# Patient Record
Sex: MALE | Race: WHITE | NOT HISPANIC OR LATINO | Employment: OTHER | URBAN - METROPOLITAN AREA
[De-identification: names, ages, dates, MRNs, and addresses within clinical notes are randomized per-mention and may not be internally consistent; named-entity substitution may affect disease eponyms.]

---

## 2017-02-20 ENCOUNTER — ALLSCRIPTS OFFICE VISIT (OUTPATIENT)
Dept: OTHER | Facility: OTHER | Age: 62
End: 2017-02-20

## 2017-05-23 ENCOUNTER — ALLSCRIPTS OFFICE VISIT (OUTPATIENT)
Dept: OTHER | Facility: OTHER | Age: 62
End: 2017-05-23

## 2017-05-23 DIAGNOSIS — N32.81 OVERACTIVE BLADDER: ICD-10-CM

## 2017-05-23 DIAGNOSIS — I48.0 PAROXYSMAL ATRIAL FIBRILLATION (HCC): ICD-10-CM

## 2017-05-23 DIAGNOSIS — I67.89 OTHER CEREBROVASCULAR DISEASE: ICD-10-CM

## 2017-05-23 DIAGNOSIS — I10 ESSENTIAL (PRIMARY) HYPERTENSION: ICD-10-CM

## 2017-06-01 ENCOUNTER — LAB CONVERSION - ENCOUNTER (OUTPATIENT)
Dept: OTHER | Facility: OTHER | Age: 62
End: 2017-06-01

## 2017-06-01 LAB
A/G RATIO (HISTORICAL): 1.7 (CALC) (ref 1–2.5)
ALBUMIN SERPL BCP-MCNC: 4 G/DL (ref 3.6–5.1)
ALP SERPL-CCNC: 58 U/L (ref 40–115)
ALT SERPL W P-5'-P-CCNC: 22 U/L (ref 9–46)
AST SERPL W P-5'-P-CCNC: 21 U/L (ref 10–35)
BACTERIA UR QL AUTO: NORMAL /HPF
BASOPHILS # BLD AUTO: 0.2 %
BASOPHILS # BLD AUTO: 9 CELLS/UL (ref 0–200)
BILIRUB SERPL-MCNC: 0.7 MG/DL (ref 0.2–1.2)
BILIRUB UR QL STRIP: NEGATIVE
BUN SERPL-MCNC: 16 MG/DL (ref 7–25)
BUN/CREA RATIO (HISTORICAL): ABNORMAL (CALC) (ref 6–22)
CALCIUM SERPL-MCNC: 9.1 MG/DL (ref 8.6–10.3)
CHLORIDE SERPL-SCNC: 105 MMOL/L (ref 98–110)
CHOLEST SERPL-MCNC: 132 MG/DL (ref 125–200)
CHOLEST/HDLC SERPL: 2.5 (CALC)
CO2 SERPL-SCNC: 33 MMOL/L (ref 20–31)
COLOR UR: YELLOW
COMMENT (HISTORICAL): CLEAR
CREAT SERPL-MCNC: 1.25 MG/DL (ref 0.7–1.25)
CULTURE RESULT (HISTORICAL): NORMAL
DEPRECATED RDW RBC AUTO: 13.7 % (ref 11–15)
EGFR AFRICAN AMERICAN (HISTORICAL): 71 ML/MIN/1.73M2
EGFR-AMERICAN CALC (HISTORICAL): 61 ML/MIN/1.73M2
EOSINOPHIL # BLD AUTO: 1 %
EOSINOPHIL # BLD AUTO: 46 CELLS/UL (ref 15–500)
FECAL OCCULT BLOOD DIAGNOSTIC (HISTORICAL): NEGATIVE
GAMMA GLOBULIN (HISTORICAL): 2.4 G/DL (CALC) (ref 1.9–3.7)
GLUCOSE (HISTORICAL): 90 MG/DL (ref 65–99)
GLUCOSE (HISTORICAL): NEGATIVE
HCT VFR BLD AUTO: 44.3 % (ref 38.5–50)
HDLC SERPL-MCNC: 53 MG/DL
HGB BLD-MCNC: 15.1 G/DL (ref 13.2–17.1)
HYALINE CASTS #/AREA URNS LPF: NORMAL /LPF
KETONES UR STRIP-MCNC: NEGATIVE MG/DL
LDL CHOLESTEROL (HISTORICAL): 68 MG/DL (CALC)
LEUKOCYTE ESTERASE UR QL STRIP: NEGATIVE
LYMPHOCYTES # BLD AUTO: 1132 CELLS/UL (ref 850–3900)
LYMPHOCYTES # BLD AUTO: 24.6 %
MCH RBC QN AUTO: 30.2 PG (ref 27–33)
MCHC RBC AUTO-ENTMCNC: 34.1 G/DL (ref 32–36)
MCV RBC AUTO: 88.8 FL (ref 80–100)
MONOCYTES # BLD AUTO: 285 CELLS/UL (ref 200–950)
MONOCYTES (HISTORICAL): 6.2 %
NEUTROPHILS # BLD AUTO: 3128 CELLS/UL (ref 1500–7800)
NEUTROPHILS # BLD AUTO: 68 %
NITRITE UR QL STRIP: NEGATIVE
NON-HDL-CHOL (CHOL-HDL) (HISTORICAL): 79 MG/DL (CALC)
PH UR STRIP.AUTO: 7 [PH] (ref 5–8)
PLATELET # BLD AUTO: 226 THOUSAND/UL (ref 140–400)
PMV BLD AUTO: 8 FL (ref 7.5–12.5)
POTASSIUM SERPL-SCNC: 4.7 MMOL/L (ref 3.5–5.3)
PROSTATE SPECIFIC ANTIGEN TOTAL (HISTORICAL): 1.7 NG/ML
PROT UR STRIP-MCNC: NEGATIVE MG/DL
RBC # BLD AUTO: 4.99 MILLION/UL (ref 4.2–5.8)
RBC (HISTORICAL): NORMAL /HPF
SODIUM SERPL-SCNC: 142 MMOL/L (ref 135–146)
SP GR UR STRIP.AUTO: 1.02 (ref 1–1.03)
SQUAMOUS EPITHELIAL CELLS (HISTORICAL): NORMAL /HPF
TOTAL PROTEIN (HISTORICAL): 6.4 G/DL (ref 6.1–8.1)
TRIGL SERPL-MCNC: 56 MG/DL
TSH SERPL DL<=0.05 MIU/L-ACNC: 2.85 MIU/L (ref 0.4–4.5)
WBC # BLD AUTO: 4.6 THOUSAND/UL (ref 3.8–10.8)
WBC # BLD AUTO: NORMAL /HPF

## 2017-07-24 ENCOUNTER — ALLSCRIPTS OFFICE VISIT (OUTPATIENT)
Dept: OTHER | Facility: OTHER | Age: 62
End: 2017-07-24

## 2017-07-24 DIAGNOSIS — I67.89 OTHER CEREBROVASCULAR DISEASE: ICD-10-CM

## 2017-09-20 ENCOUNTER — LAB CONVERSION - ENCOUNTER (OUTPATIENT)
Dept: OTHER | Facility: OTHER | Age: 62
End: 2017-09-20

## 2017-09-20 LAB
A/G RATIO (HISTORICAL): 1.6 (CALC) (ref 1–2.5)
ALBUMIN SERPL BCP-MCNC: 3.9 G/DL (ref 3.6–5.1)
ALP SERPL-CCNC: 55 U/L (ref 40–115)
ALT SERPL W P-5'-P-CCNC: 20 U/L (ref 9–46)
AST SERPL W P-5'-P-CCNC: 21 U/L (ref 10–35)
BILIRUB SERPL-MCNC: 0.9 MG/DL (ref 0.2–1.2)
BUN SERPL-MCNC: 16 MG/DL (ref 7–25)
BUN/CREA RATIO (HISTORICAL): NORMAL (CALC) (ref 6–22)
CALCIUM SERPL-MCNC: 9 MG/DL (ref 8.6–10.3)
CHLORIDE SERPL-SCNC: 103 MMOL/L (ref 98–110)
CHOLEST SERPL-MCNC: 136 MG/DL
CHOLEST/HDLC SERPL: 2.3 (CALC)
CK SERPL-CCNC: 66 U/L (ref 44–196)
CO2 SERPL-SCNC: 31 MMOL/L (ref 20–31)
CREAT SERPL-MCNC: 1.12 MG/DL (ref 0.7–1.25)
EGFR AFRICAN AMERICAN (HISTORICAL): 81 ML/MIN/1.73M2
EGFR-AMERICAN CALC (HISTORICAL): 70 ML/MIN/1.73M2
GAMMA GLOBULIN (HISTORICAL): 2.5 G/DL (CALC) (ref 1.9–3.7)
GLUCOSE (HISTORICAL): 79 MG/DL (ref 65–99)
HDLC SERPL-MCNC: 59 MG/DL
LDL CHOLESTEROL (HISTORICAL): 64 MG/DL (CALC)
NON-HDL-CHOL (CHOL-HDL) (HISTORICAL): 77 MG/DL (CALC)
POTASSIUM SERPL-SCNC: 4.3 MMOL/L (ref 3.5–5.3)
SODIUM SERPL-SCNC: 141 MMOL/L (ref 135–146)
TOTAL PROTEIN (HISTORICAL): 6.4 G/DL (ref 6.1–8.1)
TRIGL SERPL-MCNC: 54 MG/DL

## 2017-10-03 ENCOUNTER — ALLSCRIPTS OFFICE VISIT (OUTPATIENT)
Dept: OTHER | Facility: OTHER | Age: 62
End: 2017-10-03

## 2017-10-03 LAB
BILIRUB UR QL STRIP: NORMAL
CLARITY UR: NORMAL
COLOR UR: YELLOW
GLUCOSE (HISTORICAL): NORMAL
HGB UR QL STRIP.AUTO: NORMAL
KETONES UR STRIP-MCNC: NORMAL MG/DL
LEUKOCYTE ESTERASE UR QL STRIP: NORMAL
NITRITE UR QL STRIP: NORMAL
PH UR STRIP.AUTO: 6 [PH]
PROT UR STRIP-MCNC: NORMAL MG/DL
SP GR UR STRIP.AUTO: 1.01
UROBILINOGEN UR QL STRIP.AUTO: NORMAL

## 2017-10-04 NOTE — PROGRESS NOTES
Assessment  1  Special screening examination for neoplasm of prostate (V76 44) (Z12 5)   2  OAB (overactive bladder) (596 51) (N32 81)    Plan   OAB (overactive bladder)    · (1) PSA, DIAGNOSTIC (FOLLOW-UP); Status:Active; Requested [facility]:[id number]; Perform:[facility]; IXR:[id number];VFLQZIC;IGT:DEEDEE (overactive bladder); Ordered By:Raghu Jeffrey;  OAB (overactive bladder), Special screening examination for neoplasm of prostate    · Urine Dip Non-Automated- POC; Status:Complete - Retrospective By Protocol  Authorization;   Done: 95RXI3484 08:44AM   Performed: In Office; [phone]; Last Updated By:Jairo Crook; 10/3/2017 8:45:02 AM;Ordered;For:OAB (overactive bladder), Special screening examination for neoplasm of prostate; Ordered By:Raghu Jeffrey;    Follow-up visit in 1 year Evaluation and Treatment  Follow-up  Status: Hold For - Scheduling  Requested for: 31NIC2108  Ordered; For: OAB (overactive bladder); Ordered By: Shukri Ruiz  Performed:   Due: 20YBU6792     Discussion/Summary  Discussion Summary:   Patient continues to do well with stable urinary pattern  Dietary recommendations provided to help minimize bladder irritants  He will otherwise return in 1 year with PSA prior to visit  All questions answered at this time  Chief Complaint  Chief Complaint Free Text Note Form: Patient presents for over active bladder, prostate cancer screen  1 5      History of Present Illness  HPI: Suzy Smith is seen for prostate cancer screening, and lower urinary tract symptoms  He has been undergoing routine prostate cancer screening by another urologist since the age 47  His PSAs have all been normal  He does have a familial history of prostate cancer in his maternal grandfather who was diagnosed when he was 76years of age  Patient also has had bothersome lower urinary tract symptoms including urinary urgency  This happens only occasionally  He was previously trialed on tamsulosin   He discontinued this due to orthostatic hypotension  PSA is 1 7  Review of Systems  Complete-Male Urology:   Constitutional: No fever or chills, feels well, no tiredness, no recent weight gain or weight loss  Respiratory: No complaints of shortness of breath, no wheezing, no cough, no SOB on exertion, no orthopnea or PND  Cardiovascular: No complaints of slow heart rate, no fast heart rate, no chest pain, no palpitations, no leg claudication, no lower extremity  Gastrointestinal: No complaints of abdominal pain, no constipation, no nausea or vomiting, no diarrhea or bloody stools  Genitourinary: Empty sensation,-feelings of urinary urgency-and-stream quality good, but-as noted in HPI,-no dysuria,-no urinary hesitancy,-no hematuria-and-no incontinence-   The patient presents with complaints of nocturia (2 times )  Musculoskeletal: No complaints of arthralgia, no myalgias, no joint swelling or stiffness, no limb pain or swelling  Integumentary: No complaints of skin rash or skin lesions, no itching, no skin wound, no dry skin  Hematologic/Lymphatic: No complaints of swollen glands, no swollen glands in the neck, does not bleed easily, no easy bruising  Neurological: No compliants of headache, no confusion, no convulsions, no numbness or tingling, no dizziness or fainting, no limb weakness, no difficulty walking  Active Problems  1  Cerebrovascular disease, ill-defined, acute (436) (I67 89)   2  Colon cancer screening (V76 51) (Z12 11)   3  Dermatitis (692 9) (L30 9)   4  Hypertension (401 9) (I10)   5  Nasal congestion (478 19) (R09 81)   6  Need for immunization against influenza (V04 81) (Z23)   7  OAB (overactive bladder) (596 51) (N32 81)   8  Paroxysmal atrial fibrillation (427 31) (I48 0)   9  Sinusitis, acute (461 9) (J01 90)   10  Special screening examination for neoplasm of prostate (V76 44) (Z12 5)   11  Subconjunctival hemorrhage of right eye (372 72) (H11 31)    Past Medical History  1   Acute conjunctivitis (372 00) (H10 30)   2  Acute serous otitis media, unspecified laterality   3  History of pneumonia (V12 61) (Z87 01)   4  History of stroke (V12 54) (Z86 73)   5  History of upper respiratory infection (V12 09) (Z87 09)   6  History of Supraventricular tachycardia (427 89) (I47 1)  Active Problems And Past Medical History Reviewed: The active problems and past medical history were reviewed and updated today  Surgical History  1  History of Appendectomy   2  History of Colonoscopy (Fiberoptic) Screening   3  History of Hernia Repair   4  History of Knee Surgery  Surgical History Reviewed: The surgical history was reviewed and updated today  Family History  Father    1  Family history of Cancer   2  Family history of Hypertension (V17 49)   3  Family history of Stroke Syndrome (V17 1)  Family History    4  Family history of Cancer   5  Family history of Heart Disease (V17 49)   6  Family history of Hypertension (V17 49)   7  Family history of Stroke Syndrome (V17 1)  Family History Reviewed: The family history was reviewed and updated today  Social History   · Being A Social Drinker   · Denied: History of Drug Use   · Never a smoker   · Never A Smoker  Social History Reviewed: The social history was reviewed and updated today  Current Meds   1  Atorvastatin Calcium 10 MG Oral Tablet; take 0 5 tablet daily; Therapy: 75Ztu1082 to (Evaluate:20Jan2018)  Requested for: 15Btl4712; Last   Rx:34Lvv5438 Ordered   2  Eliquis 5 MG Oral Tablet; take one tablet twice daily; Therapy: 06Dog9435 to (Evaluate:10Nov2017)  Requested for: 47Dby1923; Last   Rx:83Vgt6922 Ordered   3  Metoprolol Succinate ER 50 MG Oral Tablet Extended Release 24 Hour; TAKE 1 TABLET   ONCE DAILY  Requested for: 53Sqw9681; Last Rx:01Uqs3668 Ordered   4  Mometasone Furoate 50 MCG/ACT Nasal Suspension; USE 1 SPRAY IN EACH   NOSTRIL TWICE DAILY;    Therapy: 12UAT1937 to Kenny Adelita)  Requested for: 67DCH9821; Last   Rx:92Fom6724 Ordered   5  Olmesartan Medoxomil-HCTZ 40-25 MG Oral Tablet; TAKE 1 TABLET DAILY; Therapy: 08XNG6584 to (Evaluate:56Lzg1777)  Requested for: 29Fno1466; Last   Rx:19Nnk2857 Ordered  Medication List Reviewed: The medication list was reviewed and updated today  Allergies  1  No Known Drug Allergies    Vitals  Vital Signs    Recorded: 31MRI3166 08:44AM   Heart Rate 60   Systolic 375   Diastolic 76   Height 6 ft 2 in   Weight 217 lb 6 oz   BMI Calculated 27 91   BSA Calculated 2 25     Physical Exam    Constitutional   General appearance: No acute distress, well appearing and well nourished  Pulmonary   Respiratory effort: No increased work of breathing or signs of respiratory distress  Cardiovascular   Examination of extremities for edema and/or varicosities: Normal     Abdomen   Abdomen: Non-tender, no masses  Genitourinary   Anus and perineum: Normal     Scrotum contents: Normal size, no masses  Epididymis: Normal, no masses  Testes: Normal testes, no masses  Urethral meatus: Normal, no lesions  Penis: Normal, no lesions  -25-30 g, smooth, no nodules  Digital rectal exam of seminal vesicles: Normal size, no masses  Anus, perineum, and rectum: Normal     Musculoskeletal   Digits and nails: Normal without clubbing or cyanosis  Skin   Skin and subcutaneous tissue: Normal without rashes or lesions  Lymphatic   Palpation of lymph nodes in groin: Normal        Results/Data  Urine Dip Non-Automated- POC 10PKE4297 08:44AM Jovanna Castro     Test Name Result Flag Reference   Color Yellow     Clarity Transparent     Leukocytes -     Nitrite -     Blood -     Bilirubin -     Urobilinogen -     Protein -     Ph 6 0     Specific Gravity 1 010     Ketone -     Glucose -         Future Appointments    Date/Time Provider Specialty Site   10/04/2018 09:15 AM KENZIE Bautista   [facility]rology [address]  [facility]     Signatures   Electronically signed by : Jorge Das M D ; Oct  3 2017  1:34PM EST                       (Author)

## 2017-12-08 ENCOUNTER — GENERIC CONVERSION - ENCOUNTER (OUTPATIENT)
Dept: OTHER | Facility: OTHER | Age: 62
End: 2017-12-08

## 2018-01-11 NOTE — PROGRESS NOTES
Assessment    1  Encounter for preventive health examination (V70 0) (Z00 00)    Plan  Cerebrovascular disease, ill-defined, acute, Dermatitis, Health Maintenance,  Hypertension    · Follow Up if Not Better Evaluation and Treatment  Follow-up  Status: Complete  Done:  93MIZ1621 09:42AM    Chief Complaint  PT PRESENTS FOR A YEARLY WELL VISIT  PT REPORTS DOING WELL AND HAVING NO ISSUES  PT IS Requesting YEARLY BW  PT IS DUE FOR A FLU IMMIN WHICH HE DOSE NOT WANT TO HAVE DONE  History of Present Illness  HPI: Patient is here for wellness exam  Patient doing well without complaints  Patient already had flu shot  Review of Systems    Constitutional: No fever or chills, feels well, no tiredness, no recent weight gain or weight loss  Eyes: No complaints of eye pain, no red eyes, no discharge from eyes, no itchy eyes  ENT: no complaints of earache, no hearing loss, no nosebleeds, no nasal discharge, no sore throat, no hoarseness  Cardiovascular: No complaints of slow heart rate, no fast heart rate, no chest pain, no palpitations, no leg claudication, no lower extremity  Respiratory: No complaints of shortness of breath, no wheezing, no cough, no SOB on exertion, no orthopnea or PND  Gastrointestinal: No complaints of abdominal pain, no constipation, no nausea or vomiting, no diarrhea or bloody stools  Genitourinary: No complaints of dysuria, no incontinence, no hesitancy, no nocturia, no genital lesion, no testicular pain  Musculoskeletal: No complaints of arthralgia, no myalgias, no joint swelling or stiffness, no limb pain or swelling  Integumentary: No complaints of skin rash or skin lesions, no itching, no skin wound, no dry skin  Neurological: No compliants of headache, no confusion, no convulsions, no numbness or tingling, no dizziness or fainting, no limb weakness, no difficulty walking     Psychiatric: Is not suicidal, no sleep disturbances, no anxiety or depression, no change in personality, no emotional problems  Endocrine: No complaints of proptosis, no hot flashes, no muscle weakness, no erectile dysfunction, no deepening of the voice, no feelings of weakness  Hematologic/Lymphatic: No complaints of swollen glands, no swollen glands in the neck, does not bleed easily, no easy bruising  Active Problems    1  Cerebrovascular disease, ill-defined, acute (436) (I67 89)   2  Dermatitis (692 9) (L30 9)   3  Hypertension (401 9) (I10)   4  Nasal congestion (478 19) (R09 81)   5  Need for immunization against influenza (V04 81) (Z23)   6  OAB (overactive bladder) (596 51) (N32 81)   7  Paroxysmal atrial fibrillation (427 31) (I48 0)   8  Sinusitis, acute (461 9) (J01 90)   9  Special screening examination for neoplasm of prostate (V76 44) (Z12 5)   10  Subconjunctival hemorrhage of right eye (372 72) (H11 31)    Past Medical History    · Acute conjunctivitis (372 00) (H10 30)   · Acute serous otitis media, unspecified laterality   · History of pneumonia (V12 61) (Z87 01)   · History of stroke (V12 54) (Z86 73)   · History of upper respiratory infection (V12 09) (Z87 09)   · History of Supraventricular tachycardia (427 89) (I47 1)    Surgical History    · History of Appendectomy   · History of Colonoscopy (Fiberoptic) Screening   · History of Hernia Repair   · History of Knee Surgery    Family History  Father    · Family history of Cancer   · Family history of Hypertension (V17 49)   · Family history of Stroke Syndrome (V17 1)  Family History    · Family history of Cancer   · Family history of Heart Disease (V17 49)   · Family history of Hypertension (V17 49)   · Family history of Stroke Syndrome (V17 1)    Social History    · Being A Social Drinker   · Denied: History of Drug Use   · Never A Smoker    Current Meds   1  Atorvastatin Calcium 10 MG Oral Tablet; TAKE 1 TABLET DAILY; Therapy: 25Apr2014 to (Evaluate:72Kiv9583)  Requested for: 26XNF3682; Last   Rx:30Jun2016 Ordered   2  Benicar HCT 40-25 MG Oral Tablet; TAKE 1 TABLET DAILY; Therapy: 88GGM2637 to (Evaluate:18Apr2017)  Requested for: 37XPZ5477; Last   Rx:42Oxr4451 Ordered   3  Eliquis 5 MG Oral Tablet; take one tablet twice daily; Therapy: 69Syr2099 to (Evaluate:25Nov2016)  Requested for: 48ILE7244; Last   Rx:29Tvv1510 Ordered   4  Metoprolol Succinate ER 50 MG Oral Tablet Extended Release 24 Hour; TAKE 1 TABLET   ONCE DAILY  Requested for: 28JCI4660; Last Rx:92Txk6017 Ordered   5  Mometasone Furoate 50 MCG/ACT Nasal Suspension; USE 1 SPRAY IN EACH   NOSTRIL TWICE DAILY; Therapy: 29JMZ7046 to (Evaluate:15Oct2017)  Requested for: 20YCP6864; Last   Rx:20Oct2016 Ordered    Allergies    1  No Known Drug Allergies    Vitals   Recorded: 45ZEF9117 37:04UT   Systolic 590, LUE, Sitting   Diastolic 74, LUE, Sitting   Height 6 ft 1 in   Weight 217 lb    BMI Calculated 28 63   BSA Calculated 2 23     Physical Exam    Constitutional   General appearance: No acute distress, well appearing and well nourished  Eyes   Conjunctiva and lids: No swelling, erythema, or discharge  Pupils and irises: Equal, round and reactive to light  Ears, Nose, Mouth, and Throat   External inspection of ears and nose: Normal     Otoscopic examination: Tympanic membrance translucent with normal light reflex  Canals patent without erythema  Oropharynx: Normal with no erythema, edema, exudate or lesions  Pulmonary   Respiratory effort: No increased work of breathing or signs of respiratory distress  Auscultation of lungs: Clear to auscultation  Cardiovascular   Palpation of heart: Normal PMI, no thrills  Auscultation of heart: Normal rate and rhythm, normal S1 and S2, without murmurs  Examination of extremities for edema and/or varicosities: Normal     Abdomen   Abdomen: Non-tender, no masses  Liver and spleen: No hepatomegaly or splenomegaly  Lymphatic   Palpation of lymph nodes in neck: No lymphadenopathy      Musculoskeletal   Gait and station: Normal     Digits and nails: Normal without clubbing or cyanosis  Inspection/palpation of joints, bones, and muscles: Normal     Skin   Skin and subcutaneous tissue: Normal without rashes or lesions  Neurologic   Cranial nerves: Cranial nerves 2-12 intact  Reflexes: 2+ and symmetric  Sensation: No sensory loss      Psychiatric   Orientation to person, place and time: Normal     Mood and affect: Normal        Future Appointments    Date/Time Provider Specialty Site   02/23/2017 09:00 AM Virgie Madrigal DO Family Medicine  [facility]     Signatures   Electronically signed by : Gucci Sam DO; Nov 30 2016  9:42AM EST                       (Author)

## 2018-01-12 VITALS
HEIGHT: 74 IN | DIASTOLIC BLOOD PRESSURE: 68 MMHG | WEIGHT: 215.38 LBS | BODY MASS INDEX: 27.64 KG/M2 | SYSTOLIC BLOOD PRESSURE: 126 MMHG

## 2018-01-13 VITALS
HEART RATE: 56 BPM | BODY MASS INDEX: 27.85 KG/M2 | HEIGHT: 74 IN | SYSTOLIC BLOOD PRESSURE: 120 MMHG | DIASTOLIC BLOOD PRESSURE: 76 MMHG | WEIGHT: 217 LBS

## 2018-01-13 VITALS
SYSTOLIC BLOOD PRESSURE: 112 MMHG | TEMPERATURE: 98.3 F | WEIGHT: 210.25 LBS | BODY MASS INDEX: 39.7 KG/M2 | HEIGHT: 61 IN | DIASTOLIC BLOOD PRESSURE: 60 MMHG

## 2018-01-13 VITALS
HEIGHT: 74 IN | DIASTOLIC BLOOD PRESSURE: 76 MMHG | BODY MASS INDEX: 27.9 KG/M2 | HEART RATE: 60 BPM | WEIGHT: 217.38 LBS | SYSTOLIC BLOOD PRESSURE: 122 MMHG

## 2018-01-24 VITALS
BODY MASS INDEX: 27.72 KG/M2 | SYSTOLIC BLOOD PRESSURE: 120 MMHG | TEMPERATURE: 98 F | HEIGHT: 74 IN | WEIGHT: 216 LBS | DIASTOLIC BLOOD PRESSURE: 74 MMHG

## 2018-03-15 ENCOUNTER — OFFICE VISIT (OUTPATIENT)
Dept: FAMILY MEDICINE CLINIC | Facility: CLINIC | Age: 63
End: 2018-03-15
Payer: COMMERCIAL

## 2018-03-15 VITALS
SYSTOLIC BLOOD PRESSURE: 130 MMHG | DIASTOLIC BLOOD PRESSURE: 80 MMHG | HEIGHT: 60 IN | WEIGHT: 218.6 LBS | BODY MASS INDEX: 42.92 KG/M2

## 2018-03-15 DIAGNOSIS — I10 ESSENTIAL HYPERTENSION: ICD-10-CM

## 2018-03-15 DIAGNOSIS — I48.0 PAROXYSMAL ATRIAL FIBRILLATION (HCC): ICD-10-CM

## 2018-03-15 DIAGNOSIS — I67.89 CEREBROVASCULAR DISEASE, ILL-DEFINED, ACUTE: Primary | ICD-10-CM

## 2018-03-15 DIAGNOSIS — E78.2 MIXED HYPERLIPIDEMIA: ICD-10-CM

## 2018-03-15 PROCEDURE — 99214 OFFICE O/P EST MOD 30 MIN: CPT | Performed by: FAMILY MEDICINE

## 2018-03-15 RX ORDER — METOPROLOL SUCCINATE 50 MG/1
50 TABLET, EXTENDED RELEASE ORAL DAILY
Qty: 90 TABLET | Refills: 1 | Status: SHIPPED | OUTPATIENT
Start: 2018-03-15 | End: 2018-07-16 | Stop reason: SDUPTHER

## 2018-03-15 RX ORDER — ATORVASTATIN CALCIUM 10 MG/1
10 TABLET, FILM COATED ORAL DAILY
Qty: 90 TABLET | Refills: 1 | Status: SHIPPED | OUTPATIENT
Start: 2018-03-15 | End: 2018-07-16 | Stop reason: SDUPTHER

## 2018-03-15 RX ORDER — METOPROLOL SUCCINATE 50 MG/1
50 TABLET, EXTENDED RELEASE ORAL
COMMUNITY
Start: 2014-05-08 | End: 2018-03-15 | Stop reason: SDUPTHER

## 2018-03-15 RX ORDER — OLMESARTAN MEDOXOMIL 20 MG/1
20 TABLET ORAL
COMMUNITY
End: 2018-03-15 | Stop reason: SDUPTHER

## 2018-03-15 RX ORDER — ATORVASTATIN CALCIUM 10 MG/1
10 TABLET, FILM COATED ORAL
COMMUNITY
Start: 2014-05-08 | End: 2018-03-15 | Stop reason: SDUPTHER

## 2018-03-15 RX ORDER — OLMESARTAN MEDOXOMIL AND HYDROCHLOROTHIAZIDE 40/25 40; 25 MG/1; MG/1
1 TABLET ORAL DAILY
Qty: 90 TABLET | Refills: 1 | Status: SHIPPED | OUTPATIENT
Start: 2018-03-15 | End: 2018-07-16 | Stop reason: SDUPTHER

## 2018-03-15 NOTE — PROGRESS NOTES
Assessment/Plan:   labs reviewed  Patient have laboratory studies at follow-up visit  Patient doing very well overall regard to blood pressure, cholesterol, Afib which patient is now in normal sinus rhythm  No new CVA issues  Refills given on medications  No problem-specific Assessment & Plan notes found for this encounter  Diagnoses and all orders for this visit:    Cerebrovascular disease, ill-defined, acute  -     apixaban (ELIQUIS) 5 mg; Take 1 tablet (5 mg total) by mouth 2 (two) times a day    Essential hypertension  -     olmesartan-hydrochlorothiazide (BENICAR HCT) 40-25 MG per tablet; Take 1 tablet by mouth daily    Mixed hyperlipidemia  -     atorvastatin (LIPITOR) 10 mg tablet; Take 1 tablet (10 mg total) by mouth daily    Paroxysmal atrial fibrillation (HCC)  -     metoprolol succinate (TOPROL-XL) 50 mg 24 hr tablet; Take 1 tablet (50 mg total) by mouth daily    Other orders  -     Discontinue: olmesartan (BENICAR) 20 mg tablet; Take 20 mg by mouth  -     Discontinue: apixaban (ELIQUIS) 5 mg; Take 5 mg by mouth  -     Discontinue: atorvastatin (LIPITOR) 10 mg tablet; Take 10 mg by mouth  -     Discontinue: metoprolol succinate (TOPROL-XL) 50 mg 24 hr tablet; Take 50 mg by mouth          Subjective:      Patient ID: Kirit Rivas is a 60 y o  male  Patient follow-up on CVA, hypertension, hyperlipidemia and Afib  The no chest pain, palpitations or shortness of breath  Patient did get over URI symptoms recently  No difficulty with urination or defecation  No new stroke-like symptoms  No weakness in the upper extremity or lower extremity or numbness  All other review systems negative  Patient will need medications refilled        Medication Refill         The following portions of the patient's history were reviewed and updated as appropriate: allergies, current medications, past family history, past medical history, past social history, past surgical history and problem list     Review of Systems   Constitutional: Negative  HENT: Negative  Eyes: Negative  Respiratory: Negative  Cardiovascular: Negative  Gastrointestinal: Negative  Endocrine: Negative  Genitourinary: Negative  Musculoskeletal: Negative  Skin: Negative  Allergic/Immunologic: Negative  Neurological: Negative  Hematological: Negative  Psychiatric/Behavioral: Negative  Objective:      /80 (BP Location: Left arm, Patient Position: Sitting, Cuff Size: Standard)   Ht 1' 10 56" (0 573 m)   Wt 99 2 kg (218 lb 9 6 oz)    98 kg/m²          Physical Exam   Constitutional: He is oriented to person, place, and time  He appears well-developed and well-nourished  No distress  HENT:   Head: Normocephalic  Right Ear: External ear normal    Left Ear: External ear normal    Mouth/Throat: Oropharynx is clear and moist  No oropharyngeal exudate  Eyes: EOM are normal  Pupils are equal, round, and reactive to light  Right eye exhibits no discharge  Left eye exhibits no discharge  No scleral icterus  Neck: Normal range of motion  Neck supple  No thyromegaly present  Cardiovascular: Normal rate, regular rhythm, normal heart sounds and intact distal pulses  Exam reveals no gallop and no friction rub  No murmur heard  Pulmonary/Chest: Effort normal and breath sounds normal  No respiratory distress  He has no wheezes  He has no rales  He exhibits no tenderness  Abdominal: Soft  Bowel sounds are normal  He exhibits no distension  There is no tenderness  There is no rebound and no guarding  Musculoskeletal: Normal range of motion  He exhibits no edema or tenderness  Lymphadenopathy:     He has no cervical adenopathy  Neurological: He is oriented to person, place, and time  No cranial nerve deficit  He exhibits normal muscle tone  Coordination normal    Skin: Skin is warm and dry  No rash noted  He is not diaphoretic  No erythema  No pallor     Psychiatric: He has a normal mood and affect  His behavior is normal  Judgment and thought content normal    Nursing note and vitals reviewed

## 2018-07-16 ENCOUNTER — OFFICE VISIT (OUTPATIENT)
Dept: FAMILY MEDICINE CLINIC | Facility: CLINIC | Age: 63
End: 2018-07-16
Payer: COMMERCIAL

## 2018-07-16 VITALS
TEMPERATURE: 96.9 F | DIASTOLIC BLOOD PRESSURE: 64 MMHG | HEIGHT: 72 IN | WEIGHT: 221 LBS | BODY MASS INDEX: 29.93 KG/M2 | SYSTOLIC BLOOD PRESSURE: 116 MMHG

## 2018-07-16 DIAGNOSIS — I67.89 CEREBROVASCULAR DISEASE, ILL-DEFINED, ACUTE: ICD-10-CM

## 2018-07-16 DIAGNOSIS — E78.2 MIXED HYPERLIPIDEMIA: ICD-10-CM

## 2018-07-16 DIAGNOSIS — Z12.11 SCREENING FOR COLON CANCER: ICD-10-CM

## 2018-07-16 DIAGNOSIS — I10 ESSENTIAL HYPERTENSION: ICD-10-CM

## 2018-07-16 DIAGNOSIS — M79.674 PAIN OF TOE OF RIGHT FOOT: ICD-10-CM

## 2018-07-16 DIAGNOSIS — I48.0 PAROXYSMAL ATRIAL FIBRILLATION (HCC): Primary | ICD-10-CM

## 2018-07-16 PROCEDURE — 99214 OFFICE O/P EST MOD 30 MIN: CPT | Performed by: FAMILY MEDICINE

## 2018-07-16 RX ORDER — OLMESARTAN MEDOXOMIL AND HYDROCHLOROTHIAZIDE 40/25 40; 25 MG/1; MG/1
1 TABLET ORAL DAILY
Qty: 90 TABLET | Refills: 1 | Status: SHIPPED | OUTPATIENT
Start: 2018-07-16 | End: 2018-11-19 | Stop reason: SDUPTHER

## 2018-07-16 RX ORDER — ATORVASTATIN CALCIUM 10 MG/1
10 TABLET, FILM COATED ORAL DAILY
Qty: 90 TABLET | Refills: 1 | Status: SHIPPED | OUTPATIENT
Start: 2018-07-16 | End: 2018-11-19 | Stop reason: SDUPTHER

## 2018-07-16 RX ORDER — METOPROLOL SUCCINATE 50 MG/1
50 TABLET, EXTENDED RELEASE ORAL DAILY
Qty: 90 TABLET | Refills: 1 | Status: SHIPPED | OUTPATIENT
Start: 2018-07-16 | End: 2018-11-19 | Stop reason: SDUPTHER

## 2018-07-16 NOTE — PROGRESS NOTES
Assessment/Plan:   patient's blood pressure, Afib hyperlipidemia all stable at this time  Labs reviewed  Patient had refills given at this time  Patient may get x-ray of right foot  Patient will use Tylenol  Patient to consider seeing Podiatry  Follow-up in 4 months  Diagnoses and all orders for this visit:    Paroxysmal atrial fibrillation (HCC)  -     metoprolol succinate (TOPROL-XL) 50 mg 24 hr tablet; Take 1 tablet (50 mg total) by mouth daily    Screening for colon cancer  -     Ambulatory referral to Gastroenterology; Future    Essential hypertension  -     olmesartan-hydrochlorothiazide (BENICAR HCT) 40-25 MG per tablet; Take 1 tablet by mouth daily    Mixed hyperlipidemia  -     atorvastatin (LIPITOR) 10 mg tablet; Take 1 tablet (10 mg total) by mouth daily    Cerebrovascular disease, ill-defined, acute  -     apixaban (ELIQUIS) 5 mg; Take 1 tablet (5 mg total) by mouth 2 (two) times a day    Pain of toe of right foot  -     XR foot 3+ vw right; Future    Other orders  -     Cancel: Occult Bloood,Fecal Immunochemical; Future          Subjective:      Patient ID: Senthil Almazan is a 58 y o  male  Patient follow-up on hypertension hyperlipidemia Afib  Patient doing well this regard  No chest pain or shortness of breath palpitations or syncope or dizziness  No change in urination or defecation  Patient has noticed some right toe pain over past 3-4 weeks without any significant trauma noted  No redness or swelling  Patient is not use any medication in this regard  All review systems negative  The following portions of the patient's history were reviewed and updated as appropriate: allergies, current medications, past family history, past medical history, past social history, past surgical history and problem list     Review of Systems   Constitutional: Negative  HENT: Negative  Eyes: Negative  Respiratory: Negative  Cardiovascular: Negative  Gastrointestinal: Negative  Endocrine: Negative  Genitourinary: Negative  Musculoskeletal: Positive for arthralgias  Skin: Negative  Allergic/Immunologic: Negative  Neurological: Negative  Hematological: Negative  Psychiatric/Behavioral: Negative  Objective:      /64 (BP Location: Right arm)   Temp (!) 96 9 °F (36 1 °C)   Ht 6' (1 829 m)   Wt 100 kg (221 lb)   BMI 29 97 kg/m²          Physical Exam   Constitutional: He is oriented to person, place, and time  He appears well-developed and well-nourished  No distress  HENT:   Head: Normocephalic  Right Ear: External ear normal    Left Ear: External ear normal    Mouth/Throat: Oropharynx is clear and moist  No oropharyngeal exudate  Eyes: EOM are normal  Pupils are equal, round, and reactive to light  Right eye exhibits no discharge  Left eye exhibits no discharge  No scleral icterus  Neck: Normal range of motion  Neck supple  No thyromegaly present  Cardiovascular: Normal rate, regular rhythm, normal heart sounds and intact distal pulses  Exam reveals no gallop and no friction rub  No murmur heard  Pulmonary/Chest: Effort normal and breath sounds normal  No respiratory distress  He has no wheezes  He has no rales  He exhibits no tenderness  Abdominal: Soft  Bowel sounds are normal  He exhibits no distension  There is no tenderness  There is no rebound and no guarding  Musculoskeletal: Normal range of motion  He exhibits no edema or tenderness  Pain right toe with pressure   Lymphadenopathy:     He has no cervical adenopathy  Neurological: He is oriented to person, place, and time  No cranial nerve deficit  He exhibits normal muscle tone  Coordination normal    Skin: Skin is warm and dry  No rash noted  He is not diaphoretic  No erythema  No pallor  Psychiatric: He has a normal mood and affect  His behavior is normal  Judgment and thought content normal    Nursing note and vitals reviewed

## 2018-08-06 ENCOUNTER — OFFICE VISIT (OUTPATIENT)
Dept: URGENT CARE | Age: 63
End: 2018-08-06
Payer: COMMERCIAL

## 2018-08-06 VITALS
WEIGHT: 217 LBS | TEMPERATURE: 98.2 F | HEART RATE: 61 BPM | SYSTOLIC BLOOD PRESSURE: 150 MMHG | DIASTOLIC BLOOD PRESSURE: 86 MMHG | OXYGEN SATURATION: 95 % | HEIGHT: 73 IN | BODY MASS INDEX: 28.76 KG/M2 | RESPIRATION RATE: 16 BRPM

## 2018-08-06 DIAGNOSIS — J20.9 ACUTE BRONCHITIS, UNSPECIFIED ORGANISM: Primary | ICD-10-CM

## 2018-08-06 PROCEDURE — 99283 EMERGENCY DEPT VISIT LOW MDM: CPT | Performed by: PHYSICIAN ASSISTANT

## 2018-08-06 PROCEDURE — G0382 LEV 3 HOSP TYPE B ED VISIT: HCPCS | Performed by: PHYSICIAN ASSISTANT

## 2018-08-06 PROCEDURE — 94640 AIRWAY INHALATION TREATMENT: CPT | Performed by: PHYSICIAN ASSISTANT

## 2018-08-06 RX ORDER — ALBUTEROL SULFATE 90 UG/1
2 AEROSOL, METERED RESPIRATORY (INHALATION) EVERY 6 HOURS PRN
Qty: 1 INHALER | Refills: 0 | Status: SHIPPED | OUTPATIENT
Start: 2018-08-06 | End: 2019-08-20

## 2018-08-06 RX ORDER — BENZONATATE 200 MG/1
200 CAPSULE ORAL 3 TIMES DAILY PRN
Qty: 30 CAPSULE | Refills: 0 | Status: SHIPPED | OUTPATIENT
Start: 2018-08-06 | End: 2018-10-08

## 2018-08-06 RX ORDER — ALBUTEROL SULFATE 2.5 MG/3ML
2.5 SOLUTION RESPIRATORY (INHALATION) ONCE
Status: COMPLETED | OUTPATIENT
Start: 2018-08-06 | End: 2018-08-06

## 2018-08-06 RX ADMIN — ALBUTEROL SULFATE 2.5 MG: 2.5 SOLUTION RESPIRATORY (INHALATION) at 15:21

## 2018-08-06 NOTE — PATIENT INSTRUCTIONS
Take Tessalon and albuterol inhaler as prescribed  Take over the counter Mucinex during the day  Saline Nasal Spray; Afrin as needed (do not use >3 days)  Fluids and rest (Warm water with honey and lemon)  Tylenol/Ibuprofen for pain fever  Follow up with PCP in 3-5 days  Proceed to  ER if symptoms worsen  Acute Bronchitis   WHAT YOU NEED TO KNOW:   Acute bronchitis is swelling and irritation in the air passages of your lungs  This irritation may cause you to cough or have other breathing problems  Acute bronchitis often starts because of another illness, such as a cold or the flu  The illness spreads from your nose and throat to your windpipe and airways  Bronchitis is often called a chest cold  Acute bronchitis lasts about 3 to 6 weeks and is usually not a serious illness  Your cough can last for several weeks  DISCHARGE INSTRUCTIONS:   Return to the emergency department if:   · You cough up blood  · Your lips or fingernails turn blue  · You feel like you are not getting enough air when you breathe  Contact your healthcare provider if:   · You have a fever  · Your breathing problems do not go away or get worse  · Your cough does not get better within 4 weeks  · You have questions or concerns about your condition or care  Self-care:   · Get more rest   Rest helps your body to heal  Slowly start to do more each day  Rest when you feel it is needed  · Avoid irritants in the air  Avoid chemicals, fumes, and dust  Wear a face mask if you must work around dust or fumes  Stay inside on days when air pollution levels are high  If you have allergies, stay inside when pollen counts are high  Do not use aerosol products, such as spray-on deodorant, bug spray, and hair spray  · Do not smoke or be around others who smoke  Nicotine and other chemicals in cigarettes and cigars damages the cilia that move mucus out of your lungs   Ask your healthcare provider for information if you currently smoke and need help to quit  E-cigarettes or smokeless tobacco still contain nicotine  Talk to your healthcare provider before you use these products  · Drink liquids as directed  Liquids help keep your air passages moist and help you cough up mucus  You may need to drink more liquids when you have acute bronchitis  Ask how much liquid to drink each day and which liquids are best for you  · Use a humidifier or vaporizer  Use a cool mist humidifier or a vaporizer to increase air moisture in your home  This may make it easier for you to breathe and help decrease your cough  Decrease risk for acute bronchitis:   · Get the vaccinations you need  Ask your healthcare provider if you should get vaccinated against the flu or pneumonia  · Prevent the spread of germs  You can decrease your risk of acute bronchitis and other illnesses by doing the following:     [facility] [place] often with soap and water  Carry germ-killing hand lotion or gel with you  You can use the lotion or gel to clean your hands when soap and water are not available  ¨ Do not touch your eyes, nose, or mouth unless you have washed your hands first     ¨ Always cover your mouth when you cough to prevent the spread of germs  It is best to cough into a tissue or your shirt sleeve instead of into your hand  Ask those around you cover their mouths when they cough  ¨ Try to avoid people who have a cold or the flu  If you are sick, stay away from others as much as possible  Medicines: Your healthcare provider may  give you any of the following:  · Ibuprofen or acetaminophen  are medicines that help lower your fever  They are available without a doctor's order  Ask your healthcare provider which medicine is right for you  Ask how much to take and how often to take it  Follow directions  These medicines can cause stomach bleeding if not taken correctly  Ibuprofen can cause kidney damage   Do not take ibuprofen if you have kidney disease, an ulcer, or allergies to aspirin  Acetaminophen can cause liver damage  Do not take more than 4,000 milligrams in 24 hours  · Decongestants  help loosen mucus in your lungs and make it easier to cough up  This can help you breathe easier  · Cough suppressants  decrease your urge to cough  If your cough produces mucus, do not take a cough suppressant unless your healthcare provider tells you to  Your healthcare provider may suggest that you take a cough suppressant at night so you can rest     · Inhalers  may be given  Your healthcare provider may give you one or more inhalers to help you breathe easier and cough less  An inhaler gives your medicine to open your airways  Ask your healthcare provider to show you how to use your inhaler correctly  · Take your medicine as directed  Contact your healthcare provider if you think your medicine is not helping or if you have side effects  Tell him of her if you are allergic to any medicine  Keep a list of the medicines, vitamins, and herbs you take  Include the amounts, and when and why you take them  Bring the list or the pill bottles to follow-up visits  Carry your medicine list with you in case of an emergency  Follow up with your healthcare provider as directed:  Write down questions you have so you will remember to ask them during your follow-up visits  © 2017 [employer] [employer] Information is for End User's use only and may not be sold, redistributed or otherwise used for commercial purposes  All illustrations and images included in CareNotes® are the copyrighted property of [employer] , [employer]  or Asher Lassiter  The above information is an  only  It is not intended as medical advice for individual conditions or treatments  Talk to your doctor, nurse or pharmacist before following any medical regimen to see if it is safe and effective for you

## 2018-08-06 NOTE — PROGRESS NOTES
[facility]        NAME: Concepción Dailey is a 58 y o  male  : 1955    MRN: [id number]  DATE: 2018  TIME: 3:23 PM    Assessment and Plan   Acute bronchitis, unspecified organism [J20 9]  1  Acute bronchitis, unspecified organism  benzonatate (TESSALON) 200 MG capsule    albuterol (VENTOLIN HFA) 90 mcg/act inhaler    albuterol inhalation solution 2 5 mg     Patient was given nebulizer in office  Patient Instructions     Take Tessalon and albuterol inhaler as prescribed  Take over the counter Mucinex during the day  Saline Nasal Spray; Afrin as needed (do not use >3 days)  Fluids and rest (Warm water with honey and lemon)  Tylenol/Ibuprofen for pain fever  Follow up with PCP in 3-5 days  Proceed to  ER if symptoms worsen  Chief Complaint     Chief Complaint   Patient presents with    Cough     Pt c/o cough and nasal congestion x 3 days/         History of Present Illness       Denies sick contacts  Cough   This is a new problem  The current episode started in the past 7 days  The problem has been unchanged  The problem occurs every few minutes  The cough is non-productive  Associated symptoms include nasal congestion  Pertinent negatives include no chest pain, chills, ear congestion, ear pain, fever, headaches, heartburn, hemoptysis, myalgias, postnasal drip, rash, rhinorrhea, sore throat, shortness of breath, sweats, weight loss or wheezing  Nothing aggravates the symptoms  Treatments tried: Robutussin; Mucinex  The treatment provided no relief  His past medical history is significant for pneumonia  There is no history of asthma, bronchiectasis, bronchitis, COPD, emphysema or environmental allergies  Review of Systems   Review of Systems   Constitutional: Negative for activity change, appetite change, chills, fever and weight loss  HENT: Positive for congestion   Negative for dental problem, ear discharge, ear pain, facial swelling, postnasal drip, rhinorrhea, sinus pain, sinus pressure, sneezing, sore throat and trouble swallowing  Eyes: Negative for itching  Respiratory: Positive for cough  Negative for hemoptysis, chest tightness, shortness of breath and wheezing  Cardiovascular: Negative for chest pain and palpitations  Gastrointestinal: Negative for abdominal pain, constipation, diarrhea, heartburn, nausea and vomiting  Musculoskeletal: Negative for myalgias  Skin: Negative for rash  Allergic/Immunologic: Negative for environmental allergies  Neurological: Negative for dizziness, weakness, light-headedness and headaches  Current Medications       Current Outpatient Prescriptions:     apixaban (ELIQUIS) 5 mg, Take 1 tablet (5 mg total) by mouth 2 (two) times a day, Disp: 180 tablet, Rfl: 1    metoprolol succinate (TOPROL-XL) 50 mg 24 hr tablet, Take 1 tablet (50 mg total) by mouth daily, Disp: 90 tablet, Rfl: 1    olmesartan-hydrochlorothiazide (BENICAR HCT) 40-25 MG per tablet, Take 1 tablet by mouth daily, Disp: 90 tablet, Rfl: 1    albuterol (VENTOLIN HFA) 90 mcg/act inhaler, Inhale 2 puffs every 6 (six) hours as needed for wheezing, Disp: 1 Inhaler, Rfl: 0    atorvastatin (LIPITOR) 10 mg tablet, Take 1 tablet (10 mg total) by mouth daily, Disp: 90 tablet, Rfl: 1    benzonatate (TESSALON) 200 MG capsule, Take 1 capsule (200 mg total) by mouth 3 (three) times a day as needed for cough for up to 30 doses, Disp: 30 capsule, Rfl: 0    mometasone (NASONEX) 50 mcg/act nasal spray, 1 spray into each nostril 2 (two) times a day, Disp: , Rfl:   No current facility-administered medications for this visit       Current Allergies     Allergies as of 08/06/2018    (No Known Allergies)            The following portions of the patient's history were reviewed and updated as appropriate: allergies, current medications, past family history, past medical history, past social history, past surgical history and problem list      Past Medical History: Diagnosis Date    Pneumonia     Stroke [facility])     Supraventricular tachycardia [facility])        Past Surgical History:   Procedure Laterality Date    APPENDECTOMY      COLONOSCOPY      fiberoptic, also 2009, both negative, resolved 2004    HERNIA REPAIR      KNEE SURGERY         Family History   Problem Relation Age of Onset    Cancer Father         stomach    Hypertension Father     Stroke Father         syndrome    Cancer Family     Heart disease Family     Hypertension Family     Stroke Family         syndrome         Medications have been verified  Objective   /86   Pulse 61   Temp 98 2 °F (36 8 °C) (Tympanic)   Resp 16   Ht 6' 1" (1 854 m)   Wt 98 4 kg (217 lb)   SpO2 95%   BMI 28 63 kg/m²        Physical Exam     Physical Exam   Constitutional: He is oriented to person, place, and time  He appears well-developed and well-nourished  No distress  HENT:   Head: Normocephalic  Right Ear: External ear normal    Left Ear: External ear normal    Nose: Nose normal    Mouth/Throat: Oropharynx is clear and moist  No oropharyngeal exudate  Eyes: Conjunctivae are normal    Cardiovascular: Normal rate, regular rhythm, normal heart sounds and intact distal pulses  Exam reveals no gallop and no friction rub  No murmur heard  Pulmonary/Chest: Effort normal  No respiratory distress  He has wheezes (Expiratory)  He has no rales  He exhibits no tenderness  Rhonchi noted across all lung fields     Lymphadenopathy:     He has no cervical adenopathy  Neurological: He is alert and oriented to person, place, and time  Skin: Skin is warm  He is not diaphoretic  Psychiatric: He has a normal mood and affect  His behavior is normal  Judgment and thought content normal    Vitals reviewed

## 2018-08-07 ENCOUNTER — OFFICE VISIT (OUTPATIENT)
Dept: CARDIOLOGY CLINIC | Facility: CLINIC | Age: 63
End: 2018-08-07
Payer: COMMERCIAL

## 2018-08-07 VITALS
BODY MASS INDEX: 28.63 KG/M2 | HEIGHT: 73 IN | WEIGHT: 216 LBS | DIASTOLIC BLOOD PRESSURE: 70 MMHG | HEART RATE: 60 BPM | SYSTOLIC BLOOD PRESSURE: 118 MMHG

## 2018-08-07 DIAGNOSIS — I10 ESSENTIAL HYPERTENSION: ICD-10-CM

## 2018-08-07 DIAGNOSIS — I48.0 PAF (PAROXYSMAL ATRIAL FIBRILLATION) (HCC): Primary | ICD-10-CM

## 2018-08-07 DIAGNOSIS — E78.2 MIXED HYPERLIPIDEMIA: ICD-10-CM

## 2018-08-07 PROCEDURE — 99214 OFFICE O/P EST MOD 30 MIN: CPT | Performed by: INTERNAL MEDICINE

## 2018-08-07 PROCEDURE — 93000 ELECTROCARDIOGRAM COMPLETE: CPT | Performed by: INTERNAL MEDICINE

## 2018-08-07 NOTE — PROGRESS NOTES
Cardiology Follow Up    Natalie Lopez  1955  [id number]  [address] [facility] [address] [facility] [place] [address]  [phone]    1  PAF (paroxysmal atrial fibrillation) (HCC)  POCT ECG   2  Essential hypertension     3  Mixed hyperlipidemia         Discussion/Summary:  PAF: maintaining sinus rhythm with beta blocker alone  Prior CVA  On anticoagulation  BP is controlled  Blood work done since last visit, reviewed with patient  Continue current medication  History of Present Illness:   Pleasant 21-year-old man  History of paroxysmal atrial fibrillation with a prior CVA  He is maintained on Eliquis  Sinus rhythm is maintained with Toprol X L  Since last visit, no significant changes  Denies any chest pain or shortness of breath  No palpitations  No changes in his medications  Blood pressure has been controlled with his current medication  Problem List     Cerebrovascular disease, ill-defined, acute    Essential hypertension    Paroxysmal atrial fibrillation (HCC)    Overview Signed 3/15/2018  2:07 PM by João Smith DO     Transitioned From: Atrial fibrillation         Mixed hyperlipidemia    Pain of toe of right foot        Past Medical History:   Diagnosis Date    Pneumonia     [facility] [address] [facility]     [facility]raventricular tachy[address] [address] )      Social History     Social History    Marital status: /Civil Union     Spouse name: N/A    Number of children: N/A    Years of education: N/A     Occupational History    Not on file       Social History Main Topics    Smoking status: Never Smoker    Smokeless tobacco: Never Used    Alcohol use Yes      Comment: socailly    Drug use: No    Sexual activity: Not on file     Other Topics Concern    Not on file     Social History Narrative    No narrative on file      Family History   Problem Relation Age of Onset    Cancer Father         stomach  Hypertension Father     Stroke Father         syndrome    Cancer Family     Heart disease Family     Hypertension Family     Stroke Family         syndrome     Past Surgical History:   Procedure Laterality Date    APPENDECTOMY      COLONOSCOPY      fiberoptic, also 2009, both negative, resolved 2004    HERNIA REPAIR      KNEE SURGERY         Current Outpatient Prescriptions:     albuterol (VENTOLIN HFA) 90 mcg/act inhaler, Inhale 2 puffs every 6 (six) hours as needed for wheezing, Disp: 1 Inhaler, Rfl: 0    apixaban (ELIQUIS) 5 mg, Take 1 tablet (5 mg total) by mouth 2 (two) times a day, Disp: 180 tablet, Rfl: 1    atorvastatin (LIPITOR) 10 mg tablet, Take 1 tablet (10 mg total) by mouth daily, Disp: 90 tablet, Rfl: 1    benzonatate (TESSALON) 200 MG capsule, Take 1 capsule (200 mg total) by mouth 3 (three) times a day as needed for cough for up to 30 doses, Disp: 30 capsule, Rfl: 0    metoprolol succinate (TOPROL-XL) 50 mg 24 hr tablet, Take 1 tablet (50 mg total) by mouth daily, Disp: 90 tablet, Rfl: 1    mometasone (NASONEX) 50 mcg/act nasal spray, 1 spray into each nostril 2 (two) times a day, Disp: , Rfl:     olmesartan-hydrochlorothiazide (BENICAR HCT) 40-25 MG per tablet, Take 1 tablet by mouth daily, Disp: 90 tablet, Rfl: 1  No current facility-administered medications for this visit  No Known Allergies    Vitals:    08/07/18 1043   BP: 118/70   BP Location: Right arm   Patient Position: Sitting   Cuff Size: Standard   Pulse: 60   Weight: 98 kg (216 lb)   Height: 6' 1" (1 854 m)     Vitals:    08/07/18 1043   Weight: 98 kg (216 lb)      Height: 6' 1" (185 4 cm)   Body mass index is 28 5 kg/m²      Physical Exam:  GENERAL: Alert, well appearing, and in no distress  HEENT:  PERRL, EOMI, no scleral icterus, no conjunctival pallor  NECK:  Supple, No elevated JVP, no thyromegaly, no carotid bruits  HEART:  Regular rate and rhythm, normal S1/S2, no S3/S4, no murmur or rub  LUNGS:  Clear to auscultation bilaterally  ABDOMEN:  Soft, non-tender, positive bowel sounds, no rebound or guarding  EXTREMITIES:  No edema  VASCULAR:  Normal pedal pulses   NEURO: No focal deficits,  SKIN: Normal without suspicious lesions on exposed skin    ROS:  Except as noted in HPI, is otherwise reviewed in detail and a 12 point review of systems is negative  Labs:  Lab Results   Component Value Date     09/19/2017    K 4 3 09/19/2017     09/19/2017    CREATININE 1 12 09/19/2017    BUN 16 09/19/2017    CO2 31 09/19/2017    ALT 20 09/19/2017    AST 21 09/19/2017    INR 1 07 05/14/2016    WBC NONE SEEN 05/31/2017    WBC 4 6 05/31/2017    HGB 15 1 05/31/2017    HCT 44 3 05/31/2017     05/31/2017       Lab Results   Component Value Date    CHOL 128 05/12/2018    CHOL 136 09/19/2017    CHOL 132 05/31/2017     No results found for: [address]  Lab Results   Component Value Date    HDL 59 09/19/2017    HDL 53 05/31/2017    HDL 56 03/30/2016     Lab Results   Component Value Date    TRIG 54 09/19/2017    TRIG 56 05/31/2017    TRIG 57 03/30/2016     EKG:  Sinus rhythm  60 beats per minute  Normal EKG

## 2018-08-15 ENCOUNTER — TELEPHONE (OUTPATIENT)
Dept: GASTROENTEROLOGY | Facility: CLINIC | Age: 63
End: 2018-08-15

## 2018-08-17 ENCOUNTER — APPOINTMENT (OUTPATIENT)
Dept: RADIOLOGY | Age: 63
End: 2018-08-17
Payer: COMMERCIAL

## 2018-08-17 DIAGNOSIS — M79.674 PAIN OF TOE OF RIGHT FOOT: ICD-10-CM

## 2018-08-17 PROCEDURE — 73630 X-RAY EXAM OF FOOT: CPT

## 2018-09-10 ENCOUNTER — IMMUNIZATION (OUTPATIENT)
Dept: FAMILY MEDICINE CLINIC | Facility: CLINIC | Age: 63
End: 2018-09-10
Payer: COMMERCIAL

## 2018-09-10 DIAGNOSIS — Z23 ENCOUNTER FOR IMMUNIZATION: ICD-10-CM

## 2018-09-10 PROCEDURE — 90471 IMMUNIZATION ADMIN: CPT | Performed by: FAMILY MEDICINE

## 2018-09-10 PROCEDURE — 90685 IIV4 VACC NO PRSV 0.25 ML IM: CPT | Performed by: FAMILY MEDICINE

## 2018-09-26 LAB — PSA SERPL-MCNC: 2 NG/ML

## 2018-10-03 DIAGNOSIS — N32.81 OVERACTIVE BLADDER: ICD-10-CM

## 2018-10-06 NOTE — PROGRESS NOTES
10/8/2018    Mallory Cobian  1955  [id number]    Discussion and Plan    Patient continues to do well with stable urinary symptoms  PSA results reviewed  He will return in 1 year with PSA prior to visit  All questions answered at this time  1  Benign prostatic hyperplasia with lower urinary tract symptoms, symptom details unspecified  - PSA Total, Diagnostic; Future    Assessment      Patient Active Problem List   Diagnosis    Cerebrovascular disease, ill-defined, acute    Essential hypertension    Paroxysmal atrial fibrillation (HCC)    Mixed hyperlipidemia    Pain of toe of right foot    Benign prostatic hyperplasia with lower urinary tract symptoms       History of Present Illness    Mike Stinson is a 58 y o  male seen today in regards to a history of prostate cancer screening, and lower urinary tract symptoms  He has been undergoing routine prostate cancer screening by another urologist since the age 47  His PSAs have all been normal  He does have a familial history of prostate cancer in his maternal grandfather who was diagnosed when he was 76years of age  Patient also has had bothersome lower urinary tract symptoms including urinary urgency  This happens only occasionally  He was previously trialed on tamsulosin  He discontinued this due to orthostatic hypotension  No interval changes in urinary pattern      Urinary Symptom Assessment        Past Medical History  Past Medical History:   Diagnosis Date    Pneumonia     [facility] [address] [facility]     [facility]ra[facility]ricular [facility] [address] [facility]        Past Social History  Past Surgical History:   Procedure Laterality Date    APPENDECTOMY      COLONOSCOPY      fiberoptic, also 2009, both negative, resolved 2004    HERNIA REPAIR      KNEE SURGERY         Past Family History  Family History   Problem Relation Age of Onset    Cancer Father         stomach    Hypertension Father     Stroke Father         syndrome    Cancer Family     Heart disease Family     Hypertension Family     Stroke Family         syndrome       Past Social history  Social History     Social History    Marital status: /Civil Union     Spouse name: N/A    Number of children: N/A    Years of education: N/A     Occupational History    Not on file  Social History Main Topics    Smoking status: Never Smoker    Smokeless tobacco: Never Used    Alcohol use Yes      Comment: socailly    Drug use: No    Sexual activity: Not on file     Other Topics Concern    Not on file     Social History Narrative    No narrative on file       Current Medications  Current Outpatient Prescriptions   Medication Sig Dispense Refill    apixaban (ELIQUIS) 5 mg Take 1 tablet (5 mg total) by mouth 2 (two) times a day 180 tablet 1    atorvastatin (LIPITOR) 10 mg tablet Take 1 tablet (10 mg total) by mouth daily 90 tablet 1    metoprolol succinate (TOPROL-XL) 50 mg 24 hr tablet Take 1 tablet (50 mg total) by mouth daily 90 tablet 1    olmesartan-hydrochlorothiazide (BENICAR HCT) 40-25 MG per tablet Take 1 tablet by mouth daily 90 tablet 1    albuterol (VENTOLIN HFA) 90 mcg/act inhaler Inhale 2 puffs every 6 (six) hours as needed for wheezing (Patient not taking: Reported on 10/8/2018 ) 1 Inhaler 0    benzonatate (TESSALON) 200 MG capsule Take 1 capsule (200 mg total) by mouth 3 (three) times a day as needed for cough for up to 30 doses (Patient not taking: Reported on 10/8/2018 ) 30 capsule 0    mometasone (NASONEX) 50 mcg/act nasal spray 1 spray into each nostril 2 (two) times a day       No current facility-administered medications for this visit  Allergies  No Known Allergies    Past Medical History, Social History, Family History, medications and allergies were reviewed  Review of Systems  Review of Systems   Constitutional: Negative  HENT: Negative  Eyes: Negative  Respiratory: Negative  Cardiovascular: Negative  Gastrointestinal: Negative  Endocrine: Negative  Genitourinary: Negative for decreased urine volume, difficulty urinating, hematuria and urgency  Musculoskeletal: Negative  Skin: Negative  Neurological: Negative  Hematological: Negative  Psychiatric/Behavioral: Negative  Vitals  Vitals:    10/08/18 0854   BP: 146/86   Pulse: 64   Weight: 100 kg (221 lb)   Height: 6' 2" (1 88 m)         Physical Exam    Physical Exam   Constitutional: He is oriented to person, place, and time  He appears well-developed and well-nourished  HENT:   Head: Normocephalic and atraumatic  Eyes: Pupils are equal, round, and reactive to light  Neck: Normal range of motion  Cardiovascular: Normal rate, regular rhythm and normal heart sounds  Pulmonary/Chest: Effort normal and breath sounds normal  No accessory muscle usage  No respiratory distress  Abdominal: Soft  Normal appearance and bowel sounds are normal  There is no tenderness  Genitourinary: Rectum normal, prostate normal and penis normal  No penile tenderness  Genitourinary Comments: Prostate 35 g  No nodules   Musculoskeletal: Normal range of motion  Neurological: He is alert and oriented to person, place, and time  Skin: Skin is warm, dry and intact  Psychiatric: He has a normal mood and affect  His speech is normal  Cognition and memory are normal    Nursing note and vitals reviewed        Results    Below listed labs, pathology results, and radiology images were personally reviewed:    Lab Results   Component Value Date/Time    PSA 2 0 09/25/2018 08:51 AM     Lab Results   Component Value Date    CALCIUM 9 0 09/19/2017     09/19/2017    K 4 3 09/19/2017    CO2 31 09/19/2017     09/19/2017    BUN 16 09/19/2017    CREATININE 1 12 09/19/2017     Lab Results   Component Value Date    WBC NONE SEEN 05/31/2017    WBC 4 6 05/31/2017    HGB 15 1 05/31/2017    HCT 44 3 05/31/2017    MCV 88 8 05/31/2017     05/31/2017       No results found for this or any previous visit (from the past 1 hour(s)) ]

## 2018-10-08 ENCOUNTER — OFFICE VISIT (OUTPATIENT)
Dept: GASTROENTEROLOGY | Facility: MEDICAL CENTER | Age: 63
End: 2018-10-08
Payer: COMMERCIAL

## 2018-10-08 ENCOUNTER — OFFICE VISIT (OUTPATIENT)
Dept: UROLOGY | Facility: AMBULATORY SURGERY CENTER | Age: 63
End: 2018-10-08
Payer: COMMERCIAL

## 2018-10-08 VITALS
SYSTOLIC BLOOD PRESSURE: 118 MMHG | TEMPERATURE: 98.4 F | HEIGHT: 74 IN | HEART RATE: 62 BPM | BODY MASS INDEX: 28.36 KG/M2 | DIASTOLIC BLOOD PRESSURE: 68 MMHG | WEIGHT: 221 LBS

## 2018-10-08 VITALS
SYSTOLIC BLOOD PRESSURE: 146 MMHG | HEART RATE: 64 BPM | WEIGHT: 221 LBS | BODY MASS INDEX: 28.36 KG/M2 | DIASTOLIC BLOOD PRESSURE: 86 MMHG | HEIGHT: 74 IN

## 2018-10-08 DIAGNOSIS — N40.1 BENIGN PROSTATIC HYPERPLASIA WITH LOWER URINARY TRACT SYMPTOMS, SYMPTOM DETAILS UNSPECIFIED: Primary | ICD-10-CM

## 2018-10-08 DIAGNOSIS — Z80.0 FAMILY HISTORY OF STOMACH CANCER: Primary | ICD-10-CM

## 2018-10-08 DIAGNOSIS — Z12.11 SCREENING FOR COLON CANCER: ICD-10-CM

## 2018-10-08 PROCEDURE — 99243 OFF/OP CNSLTJ NEW/EST LOW 30: CPT | Performed by: INTERNAL MEDICINE

## 2018-10-08 PROCEDURE — 99214 OFFICE O/P EST MOD 30 MIN: CPT | Performed by: UROLOGY

## 2018-10-08 NOTE — PROGRESS NOTES
[facility] - Outpatient Consultation  Nallely Davis 58 y o  male MRN: [id number]  Encounter: [id number]          ASSESSMENT AND PLAN:      1  Screening for colon cancer  He is asymptomatic without alarm symptoms at this time  His last colon cancer screening was performed 10 years ago - he had no colon polyps at that time  I will schedule him for colon cancer screening  Risks and benefits of procedure discussed  Risks include, but are not limited to bleeding, perforation, missed lesion  He is agreeable to the procedure  Bowel prep instructions given  2  Family history of stomach cancer - in first degree relative, father  Because of his family history, I will schedule him for EGD  He is agreeable to this      ______________________________________________________________________    HPI:  Nallely Davis is a 58 y o  male who is here today for colon cancer screening  His last colonoscopy was performed 10 years ago - he did not have polyps at that time  He is currently doing well  He is asymptomatic without alarm symptoms  He denies abdominal pain, reflux, change in bowel habits, melena, hematochezia  His weight has been stable  He is currently on Eliquis for A-fib, history of stroke  He has a family history of stomach cancer in his father  No family history of colon cancer  REVIEW OF SYSTEMS:    CONSTITUTIONAL: Denies any fever, chills, rigors, and weight loss  HEENT: No earache or tinnitus  Denies hearing loss or visual disturbances  CARDIOVASCULAR: No chest pain or palpitations  RESPIRATORY: Denies any cough, hemoptysis, shortness of breath or dyspnea on exertion  GASTROINTESTINAL: As noted in the History of Present Illness  GENITOURINARY: No problems with urination  Denies any hematuria or dysuria  NEUROLOGIC: No dizziness or vertigo, denies headaches  MUSCULOSKELETAL: Denies any muscle or joint pain  SKIN: Denies skin rashes or itching     ENDOCRINE: Denies excessive thirst  Denies intolerance to heat or cold  PSYCHOSOCIAL: Denies depression or anxiety  Denies any recent memory loss  Historical Information   Past Medical History:   Diagnosis Date    Hypertension     Pneumonia     [facility]     Supraventricular tachycardia ([facility] [address] [facility]      Past Surgical History:   Procedure Laterality Date    APPENDECTOMY      COLONOSCOPY      fiberoptic, also 2009, both negative, resolved 2004    HERNIA REPAIR      KNEE SURGERY       Social History   History   Alcohol Use    Yes     Comment: socailly     History   Drug Use No     History   Smoking Status    Never Smoker   Smokeless Tobacco    Never Used     Family History   Problem Relation Age of Onset    Cancer Father         stomach    Hypertension Father     Stroke Father         syndrome    Cancer Family     Heart disease Family     Hypertension Family     Stroke Family         syndrome       Meds/Allergies       Current Outpatient Prescriptions:     albuterol (VENTOLIN HFA) 90 mcg/act inhaler    apixaban (ELIQUIS) 5 mg    atorvastatin (LIPITOR) 10 mg tablet    metoprolol succinate (TOPROL-XL) 50 mg 24 hr tablet    mometasone (NASONEX) 50 mcg/act nasal spray    olmesartan-hydrochlorothiazide (BENICAR HCT) 40-25 MG per tablet    No Known Allergies        Objective     Blood pressure 118/68, pulse 62, temperature 98 4 °F (36 9 °C), temperature source Tympanic, height 6' 2" (1 88 m), weight 100 kg (221 lb)  Body mass index is 28 37 kg/m²  PHYSICAL EXAM:      General Appearance:   Alert, cooperative, no distress   HEENT:   Normocephalic, atraumatic, anicteric      Neck:  Supple, symmetrical, trachea midline   Lungs:   Clear to auscultation bilaterally; no rales, rhonchi or wheezing; respirations unlabored    Heart[de-identified]   Regular rate and rhythm; no murmur, rub, or gallop     Abdomen:   Soft, non-tender, non-distended; normal bowel sounds; no masses, no organomegaly    Genitalia:   Deferred    Rectal:   Deferred    Extremities:  No cyanosis, clubbing or edema    Pulses:  2+ and symmetric    Skin:  No jaundice, rashes, or lesions    Lymph nodes:  No palpable cervical lymphadenopathy        Lab Results:   No visits with results within 1 Day(s) from this visit  Latest known visit with results is:   Orders Only on 09/25/2018   Component Date Value    Prostate Specific Antige* 09/25/2018 2 0          Radiology Results:   No results found  Attestation:   By signing my name below, I, [facility], attest that this documentation has been prepared under the direction and in the presence of Dipak Hopkins MD Electronically Signed: [facility]  10/8/2018    I, Dipak Hopkins, personally performed the services described in this documentation  All medical record entries made by the scribe were at my direction and in my presence  I have reviewed the chart and discharge instructions and agree that the record reflects my personal performance and is accurate and complete  Dipak Hopkins MD  10/8/2018

## 2018-10-08 NOTE — LETTER
October 8, 2018     [facility], [address] [place] [address]    Patient: Tara Curiel   YOB: 1955   Date of Visit: 10/8/2018       Dear Dr Kenan Fuchs:    Thank you for referring Tara Curiel to me for evaluation  Below are my notes for this consultation  If you have questions, please do not hesitate to call me  I look forward to following your patient along with you  Sincerely,        Kenney Yeung MD        CC: No Recipients  Kenney Yeung MD  10/8/2018  2:25 PM  Sign at close encounter  [facility] - Outpatient Consultation  Tara Curiel 58 y o  male MRN: [id number]  Encounter: [id number]          ASSESSMENT AND PLAN:      1  Screening for colon cancer  He is asymptomatic without alarm symptoms at this time  His last colon cancer screening was performed 10 years ago - he had no colon polyps at that time  I will schedule him for colon cancer screening  Risks and benefits of procedure discussed  Risks include, but are not limited to bleeding, perforation, missed lesion  He is agreeable to the procedure  Bowel prep instructions given  2  Family history of stomach cancer - in first degree relative, father  Because of his family history, I will schedule him for EGD  He is agreeable to this      ______________________________________________________________________    HPI:  Tara Curiel is a 58 y o  male who is here today for colon cancer screening  His last colonoscopy was performed 10 years ago - he did not have polyps at that time  He is currently doing well  He is asymptomatic without alarm symptoms  He denies abdominal pain, reflux, change in bowel habits, melena, hematochezia  His weight has been stable  He is currently on Eliquis for A-fib, history of stroke  He has a family history of stomach cancer in his father  No family history of colon cancer            REVIEW OF SYSTEMS:    CONSTITUTIONAL: Denies any fever, chills, rigors, and weight loss  HEENT: No earache or tinnitus  Denies hearing loss or visual disturbances  CARDIOVASCULAR: No chest pain or palpitations  RESPIRATORY: Denies any cough, hemoptysis, shortness of breath or dyspnea on exertion  GASTROINTESTINAL: As noted in the History of Present Illness  GENITOURINARY: No problems with urination  Denies any hematuria or dysuria  NEUROLOGIC: No dizziness or vertigo, denies headaches  MUSCULOSKELETAL: Denies any muscle or joint pain  SKIN: Denies skin rashes or itching  ENDOCRINE: Denies excessive thirst  Denies intolerance to heat or cold  PSYCHOSOCIAL: Denies depression or anxiety  Denies any recent memory loss         Historical Information   Past Medical History:   Diagnosis Date    Hypertension     Pneumonia     [facility]     [facility]ra[facility]ricular tachy[facility]      Past Surgical History:   Procedure Laterality Date    APPENDECTOMY      COLONOSCOPY      fiberoptic, also 2009, both negative, resolved 2004    HERNIA REPAIR      KNEE SURGERY       Social History   History   Alcohol Use    Yes     Comment: socailly     History   Drug Use No     History   Smoking Status    Never Smoker   Smokeless Tobacco    Never Used     Family History   Problem Relation Age of Onset    Cancer Father         stomach    Hypertension Father     Stroke Father         syndrome    Cancer Family     Heart disease Family     Hypertension Family     Stroke Family         syndrome       Meds/Allergies       Current Outpatient Prescriptions:     albuterol (VENTOLIN HFA) 90 mcg/act inhaler    apixaban (ELIQUIS) 5 mg    atorvastatin (LIPITOR) 10 mg tablet    metoprolol succinate (TOPROL-XL) 50 mg 24 hr tablet    mometasone (NASONEX) 50 mcg/act nasal spray    olmesartan-hydrochlorothiazide (BENICAR HCT) 40-25 MG per tablet    No Known Allergies        Objective     Blood pressure 118/68, pulse 62, temperature 98 4 °F (36 9 °C), temperature source Tympanic, height 6' 2" (1 88 m), weight 100 kg (221 lb)  Body mass index is 28 37 kg/m²  PHYSICAL EXAM:      General Appearance:   Alert, cooperative, no distress   HEENT:   Normocephalic, atraumatic, anicteric      Neck:  Supple, symmetrical, trachea midline   Lungs:   Clear to auscultation bilaterally; no rales, rhonchi or wheezing; respirations unlabored    Heart[de-identified]   Regular rate and rhythm; no murmur, rub, or gallop  Abdomen:   Soft, non-tender, non-distended; normal bowel sounds; no masses, no organomegaly    Genitalia:   Deferred    Rectal:   Deferred    Extremities:  No cyanosis, clubbing or edema    Pulses:  2+ and symmetric    Skin:  No jaundice, rashes, or lesions    Lymph nodes:  No palpable cervical lymphadenopathy        Lab Results:   No visits with results within 1 Day(s) from this visit  Latest known visit with results is:   Orders Only on 09/25/2018   Component Date Value    Prostate Specific Antige* 09/25/2018 2 0          Radiology Results:   No results found  Attestation:   By signing my name below, I, MANJU GARCIA  [facility], attest that this documentation has been prepared under the direction and in the presence of Alex Lala MD Electronically Signed: [facility]  10/8/2018    I, Alex Lala, personally performed the services described in this documentation  All medical record entries made by the scribe were at my direction and in my presence  I have reviewed the chart and discharge instructions and agree that the record reflects my personal performance and is accurate and complete  Alex Lala MD  10/8/2018

## 2018-11-19 ENCOUNTER — OFFICE VISIT (OUTPATIENT)
Dept: FAMILY MEDICINE CLINIC | Facility: CLINIC | Age: 63
End: 2018-11-19
Payer: COMMERCIAL

## 2018-11-19 VITALS
WEIGHT: 221 LBS | TEMPERATURE: 97.2 F | DIASTOLIC BLOOD PRESSURE: 74 MMHG | SYSTOLIC BLOOD PRESSURE: 120 MMHG | BODY MASS INDEX: 28.36 KG/M2 | HEIGHT: 74 IN

## 2018-11-19 DIAGNOSIS — I67.89 CEREBROVASCULAR DISEASE, ILL-DEFINED, ACUTE: ICD-10-CM

## 2018-11-19 DIAGNOSIS — I10 ESSENTIAL HYPERTENSION: ICD-10-CM

## 2018-11-19 DIAGNOSIS — I48.0 PAROXYSMAL ATRIAL FIBRILLATION (HCC): ICD-10-CM

## 2018-11-19 DIAGNOSIS — E78.2 MIXED HYPERLIPIDEMIA: ICD-10-CM

## 2018-11-19 PROCEDURE — 99214 OFFICE O/P EST MOD 30 MIN: CPT | Performed by: FAMILY MEDICINE

## 2018-11-19 RX ORDER — MOMETASONE FUROATE 50 UG/1
1 SPRAY, METERED NASAL 2 TIMES DAILY
Qty: 17 G | Refills: 5 | Status: SHIPPED | OUTPATIENT
Start: 2018-11-19 | End: 2019-08-20

## 2018-11-19 RX ORDER — METOPROLOL SUCCINATE 50 MG/1
50 TABLET, EXTENDED RELEASE ORAL DAILY
Qty: 90 TABLET | Refills: 0 | Status: SHIPPED | OUTPATIENT
Start: 2018-11-19 | End: 2019-03-29 | Stop reason: SDUPTHER

## 2018-11-19 RX ORDER — OLMESARTAN MEDOXOMIL AND HYDROCHLOROTHIAZIDE 40/25 40; 25 MG/1; MG/1
1 TABLET ORAL DAILY
Qty: 90 TABLET | Refills: 0 | Status: SHIPPED | OUTPATIENT
Start: 2018-11-19 | End: 2019-03-29

## 2018-11-19 RX ORDER — ATORVASTATIN CALCIUM 10 MG/1
10 TABLET, FILM COATED ORAL DAILY
Qty: 90 TABLET | Refills: 0 | Status: SHIPPED | OUTPATIENT
Start: 2018-11-19 | End: 2019-03-28 | Stop reason: SDUPTHER

## 2018-11-19 NOTE — PROGRESS NOTES
Assessment/Plan:  Patient's AFib, hypertension CVA symptoms/hyperlipidemia all stable at present time  Patient have laboratory studies in the near future  Patient may proceed with colonoscopy  Patient will hold Eliquis for 2 days prior to procedure  Patient will start medially thereafter  Patient will try Nasonex for nasal congestion  Patient may need to see ENT if symptoms persist   Patient already had flu shot  Diagnoses and all orders for this visit:    Essential hypertension  -     olmesartan-hydrochlorothiazide (BENICAR HCT) 40-25 MG per tablet; Take 1 tablet by mouth daily  -     mometasone (NASONEX) 50 mcg/act nasal spray; 1 spray into each nostril 2 (two) times a day  -     CBC and differential; Future  -     Comprehensive metabolic panel; Future  -     Lipid panel; Future  -     TSH, 3rd generation with Free T4 reflex; Future    Paroxysmal atrial fibrillation (HCC)  -     metoprolol succinate (TOPROL-XL) 50 mg 24 hr tablet; Take 1 tablet (50 mg total) by mouth daily  -     mometasone (NASONEX) 50 mcg/act nasal spray; 1 spray into each nostril 2 (two) times a day  -     CBC and differential; Future  -     Comprehensive metabolic panel; Future  -     Lipid panel; Future  -     TSH, 3rd generation with Free T4 reflex; Future    Mixed hyperlipidemia  -     atorvastatin (LIPITOR) 10 mg tablet; Take 1 tablet (10 mg total) by mouth daily    Cerebrovascular disease, ill-defined, acute  -     apixaban (ELIQUIS) 5 mg; Take 1 tablet (5 mg total) by mouth 2 (two) times a day          Subjective:      Patient ID: Tara Curiel is a 58 y o  male  Patient follow-up on AFib, hypertension hyperlipidemia CVA  No new stroke-like symptoms  No chest pain shortness of breath palpitations or dizziness  No change in urination or defecation  No edema  All review systems negative  Patient will be going for colonoscopy in the near future    Patient will need to hold Eliquis for 2 days prior to procedure      Medication Refill         The following portions of the patient's history were reviewed and updated as appropriate: allergies, current medications, past family history, past medical history, past social history, past surgical history and problem list     Review of Systems   Constitutional: Negative  HENT: Negative  Eyes: Negative  Respiratory: Negative  Cardiovascular: Negative  Gastrointestinal: Negative  Endocrine: Negative  Genitourinary: Negative  Musculoskeletal: Negative  Skin: Negative  Allergic/Immunologic: Negative  Neurological: Negative  Hematological: Negative  Psychiatric/Behavioral: Negative  Objective:      /74 (BP Location: Right arm)   Temp (!) 97 2 °F (36 2 °C)   Ht 6' 2" (1 88 m)   Wt 100 kg (221 lb)   BMI 28 37 kg/m²          Physical Exam   Constitutional: He is oriented to person, place, and time  He appears well-developed and well-nourished  No distress  HENT:   Head: Normocephalic  Right Ear: External ear normal    Left Ear: External ear normal    Mouth/Throat: Oropharynx is clear and moist  No oropharyngeal exudate  Eyes: Pupils are equal, round, and reactive to light  EOM are normal  Right eye exhibits no discharge  Left eye exhibits no discharge  No scleral icterus  Neck: Normal range of motion  Neck supple  No thyromegaly present  Cardiovascular: Normal rate, normal heart sounds and intact distal pulses  Exam reveals no gallop and no friction rub  No murmur heard  Irregular irregular   Pulmonary/Chest: Effort normal and breath sounds normal  No respiratory distress  He has no wheezes  He has no rales  He exhibits no tenderness  Abdominal: Soft  Bowel sounds are normal  He exhibits no distension  There is no tenderness  There is no rebound and no guarding  Musculoskeletal: Normal range of motion  He exhibits no edema or tenderness  Lymphadenopathy:     He has no cervical adenopathy  Neurological: He is oriented to person, place, and time  No cranial nerve deficit  He exhibits normal muscle tone  Coordination normal    Skin: Skin is warm and dry  No rash noted  He is not diaphoretic  No erythema  No pallor  Psychiatric: He has a normal mood and affect  His behavior is normal  Judgment and thought content normal    Nursing note and vitals reviewed

## 2018-12-07 LAB
ALBUMIN SERPL-MCNC: 4.1 G/DL (ref 3.6–5.1)
ALBUMIN/GLOB SERPL: 1.6 (CALC) (ref 1–2.5)
ALP SERPL-CCNC: 54 U/L (ref 40–115)
ALT SERPL-CCNC: 19 U/L (ref 9–46)
AST SERPL-CCNC: 24 U/L (ref 10–35)
BASOPHILS # BLD AUTO: 19 CELLS/UL (ref 0–200)
BASOPHILS NFR BLD AUTO: 0.4 %
BILIRUB SERPL-MCNC: 1 MG/DL (ref 0.2–1.2)
BUN SERPL-MCNC: 16 MG/DL (ref 7–25)
BUN/CREAT SERPL: NORMAL (CALC) (ref 6–22)
CALCIUM SERPL-MCNC: 9.4 MG/DL (ref 8.6–10.3)
CHLORIDE SERPL-SCNC: 104 MMOL/L (ref 98–110)
CHOLEST SERPL-MCNC: 144 MG/DL
CHOLEST/HDLC SERPL: 2.3 (CALC)
CO2 SERPL-SCNC: 31 MMOL/L (ref 20–32)
CREAT SERPL-MCNC: 1.22 MG/DL (ref 0.7–1.25)
EOSINOPHIL # BLD AUTO: 71 CELLS/UL (ref 15–500)
EOSINOPHIL NFR BLD AUTO: 1.5 %
ERYTHROCYTE [DISTWIDTH] IN BLOOD BY AUTOMATED COUNT: 13 % (ref 11–15)
GLOBULIN SER CALC-MCNC: 2.5 G/DL (CALC) (ref 1.9–3.7)
GLUCOSE SERPL-MCNC: 90 MG/DL (ref 65–99)
HCT VFR BLD AUTO: 44.3 % (ref 38.5–50)
HDLC SERPL-MCNC: 62 MG/DL
HGB BLD-MCNC: 14.9 G/DL (ref 13.2–17.1)
LDLC SERPL CALC-MCNC: 69 MG/DL (CALC)
LYMPHOCYTES # BLD AUTO: 1123 CELLS/UL (ref 850–3900)
LYMPHOCYTES NFR BLD AUTO: 23.9 %
MCH RBC QN AUTO: 30.2 PG (ref 27–33)
MCHC RBC AUTO-ENTMCNC: 33.6 G/DL (ref 32–36)
MCV RBC AUTO: 89.7 FL (ref 80–100)
MONOCYTES # BLD AUTO: 334 CELLS/UL (ref 200–950)
MONOCYTES NFR BLD AUTO: 7.1 %
NEUTROPHILS # BLD AUTO: 3154 CELLS/UL (ref 1500–7800)
NEUTROPHILS NFR BLD AUTO: 67.1 %
NONHDLC SERPL-MCNC: 82 MG/DL (CALC)
PLATELET # BLD AUTO: 264 THOUSAND/UL (ref 140–400)
PMV BLD REES-ECKER: 9.8 FL (ref 7.5–12.5)
POTASSIUM SERPL-SCNC: 4.7 MMOL/L (ref 3.5–5.3)
PROT SERPL-MCNC: 6.6 G/DL (ref 6.1–8.1)
RBC # BLD AUTO: 4.94 MILLION/UL (ref 4.2–5.8)
SL AMB EGFR AFRICAN AMERICAN: 72 ML/MIN/1.73M2
SL AMB EGFR NON AFRICAN AMERICAN: 62 ML/MIN/1.73M2
SODIUM SERPL-SCNC: 141 MMOL/L (ref 135–146)
TRIGL SERPL-MCNC: 51 MG/DL
TSH SERPL-ACNC: 2.34 MIU/L (ref 0.4–4.5)
WBC # BLD AUTO: 4.7 THOUSAND/UL (ref 3.8–10.8)

## 2019-01-17 DIAGNOSIS — I10 HYPERTENSION, UNSPECIFIED TYPE: Primary | ICD-10-CM

## 2019-01-17 NOTE — TELEPHONE ENCOUNTER
Pt called because they can't find olmesartan anywhere  Do you want to do a different medication?  Please address, call pt if questions

## 2019-01-18 RX ORDER — LOSARTAN POTASSIUM AND HYDROCHLOROTHIAZIDE 25; 100 MG/1; MG/1
1 TABLET ORAL DAILY
Qty: 90 TABLET | Refills: 1 | Status: SHIPPED | OUTPATIENT
Start: 2019-01-18 | End: 2019-03-29 | Stop reason: SDUPTHER

## 2019-01-18 NOTE — TELEPHONE ENCOUNTER
Spoke with patient about change in medication  Patient requests medication be sent to [facility] mail order   Have placed order for approval

## 2019-03-28 DIAGNOSIS — E78.2 MIXED HYPERLIPIDEMIA: ICD-10-CM

## 2019-03-28 RX ORDER — ATORVASTATIN CALCIUM 10 MG/1
10 TABLET, FILM COATED ORAL DAILY
Qty: 90 TABLET | Refills: 1 | Status: SHIPPED | OUTPATIENT
Start: 2019-03-28 | End: 2019-03-29 | Stop reason: SDUPTHER

## 2019-03-29 ENCOUNTER — OFFICE VISIT (OUTPATIENT)
Dept: FAMILY MEDICINE CLINIC | Facility: CLINIC | Age: 64
End: 2019-03-29
Payer: COMMERCIAL

## 2019-03-29 VITALS
BODY MASS INDEX: 28.23 KG/M2 | HEIGHT: 74 IN | WEIGHT: 220 LBS | SYSTOLIC BLOOD PRESSURE: 124 MMHG | DIASTOLIC BLOOD PRESSURE: 68 MMHG | TEMPERATURE: 97.7 F

## 2019-03-29 DIAGNOSIS — I10 HYPERTENSION, UNSPECIFIED TYPE: ICD-10-CM

## 2019-03-29 DIAGNOSIS — E78.2 MIXED HYPERLIPIDEMIA: ICD-10-CM

## 2019-03-29 DIAGNOSIS — I10 ESSENTIAL HYPERTENSION: Primary | ICD-10-CM

## 2019-03-29 DIAGNOSIS — I48.0 PAROXYSMAL ATRIAL FIBRILLATION (HCC): ICD-10-CM

## 2019-03-29 DIAGNOSIS — I67.89 CEREBROVASCULAR DISEASE, ILL-DEFINED, ACUTE: ICD-10-CM

## 2019-03-29 PROCEDURE — 99214 OFFICE O/P EST MOD 30 MIN: CPT | Performed by: FAMILY MEDICINE

## 2019-03-29 RX ORDER — ATORVASTATIN CALCIUM 10 MG/1
10 TABLET, FILM COATED ORAL DAILY
Qty: 90 TABLET | Refills: 1 | Status: SHIPPED | OUTPATIENT
Start: 2019-03-29 | End: 2019-12-03 | Stop reason: SDUPTHER

## 2019-03-29 RX ORDER — LOSARTAN POTASSIUM AND HYDROCHLOROTHIAZIDE 25; 100 MG/1; MG/1
1 TABLET ORAL DAILY
Qty: 90 TABLET | Refills: 1 | Status: SHIPPED | OUTPATIENT
Start: 2019-03-29 | End: 2019-12-02 | Stop reason: SDUPTHER

## 2019-03-29 RX ORDER — METOPROLOL SUCCINATE 50 MG/1
50 TABLET, EXTENDED RELEASE ORAL DAILY
Qty: 90 TABLET | Refills: 1 | Status: SHIPPED | OUTPATIENT
Start: 2019-03-29 | End: 2019-12-03 | Stop reason: SDUPTHER

## 2019-03-29 RX ORDER — METOPROLOL SUCCINATE 50 MG/1
50 TABLET, EXTENDED RELEASE ORAL DAILY
Qty: 90 TABLET | Refills: 1 | Status: SHIPPED | OUTPATIENT
Start: 2019-03-29 | End: 2019-03-29 | Stop reason: SDUPTHER

## 2019-03-29 NOTE — PROGRESS NOTES
Assessment/Plan:  Labs reviewed  Blood pressure stable  Hyperlipidemia stable  CVA stable  AFib stable  Patient stable  Patient will follow up in 6 months   Diagnoses and all orders for this visit:    Essential hypertension    Cerebrovascular disease, ill-defined, acute  -     apixaban (ELIQUIS) 5 mg; Take 1 tablet (5 mg total) by mouth 2 (two) times a day    Mixed hyperlipidemia  -     atorvastatin (LIPITOR) 10 mg tablet; Take 1 tablet (10 mg total) by mouth daily    Hypertension, unspecified type  -     losartan-hydrochlorothiazide (HYZAAR) 100-25 MG per tablet; Take 1 tablet by mouth daily    Paroxysmal atrial fibrillation (HCC)  -     metoprolol succinate (TOPROL-XL) 50 mg 24 hr tablet; Take 1 tablet (50 mg total) by mouth daily          Subjective:      Patient ID: Josemanuel Carbajal is a 58 y o  male  Patient here to follow-up on hypertension hyperlipidemia CVA AFib  No new chest pain or palpitations or weakness or numbness in the upper extremities or lower extremities  No headache or blurred vision  No change in urination or defecation or skin related issues  No edema  No other CVA so seated symptoms noted  Patient feeling well overall  The following portions of the patient's history were reviewed and updated as appropriate: allergies, current medications, past family history, past medical history, past social history, past surgical history and problem list     Review of Systems   Constitutional: Negative  HENT: Negative  Eyes: Negative  Respiratory: Negative  Cardiovascular: Negative  Gastrointestinal: Negative  Endocrine: Negative  Genitourinary: Negative  Musculoskeletal: Negative  Skin: Negative  Allergic/Immunologic: Negative  Neurological: Negative  Hematological: Negative  Psychiatric/Behavioral: Negative            Objective:      /68 (BP Location: Right arm, Patient Position: Sitting, Cuff Size: Adult)   Temp 97 7 °F (36 5 °C) (Tympanic)  6' 1 5" (1 867 m)   Wt 99 8 kg (220 lb)   BMI 28 63 kg/m²          Physical Exam   Constitutional: He is oriented to person, place, and time  He appears well-developed and well-nourished  No distress  HENT:   Head: Normocephalic  Right Ear: External ear normal    Left Ear: External ear normal    Mouth/Throat: Oropharynx is clear and moist  No oropharyngeal exudate  Eyes: Pupils are equal, round, and reactive to light  EOM are normal  Right eye exhibits no discharge  Left eye exhibits no discharge  No scleral icterus  Neck: Normal range of motion  Neck supple  No thyromegaly present  Cardiovascular: Normal rate, regular rhythm, normal heart sounds and intact distal pulses  Exam reveals no gallop and no friction rub  No murmur heard  Pulmonary/Chest: Effort normal and breath sounds normal  No respiratory distress  He has no wheezes  He has no rales  He exhibits no tenderness  Abdominal: Soft  Bowel sounds are normal  He exhibits no distension  There is no tenderness  There is no rebound and no guarding  Musculoskeletal: Normal range of motion  He exhibits no edema or tenderness  Lymphadenopathy:     He has no cervical adenopathy  Neurological: He is oriented to person, place, and time  No cranial nerve deficit  He exhibits normal muscle tone  Coordination normal    Skin: Skin is warm and dry  No rash noted  He is not diaphoretic  No erythema  No pallor  Psychiatric: He has a normal mood and affect  His behavior is normal  Judgment and thought content normal    Nursing note and vitals reviewed

## 2019-04-26 ENCOUNTER — TELEPHONE (OUTPATIENT)
Dept: FAMILY MEDICINE CLINIC | Facility: CLINIC | Age: 64
End: 2019-04-26

## 2019-08-01 ENCOUNTER — TELEPHONE (OUTPATIENT)
Dept: GASTROENTEROLOGY | Facility: CLINIC | Age: 64
End: 2019-08-01

## 2019-08-20 ENCOUNTER — OFFICE VISIT (OUTPATIENT)
Dept: FAMILY MEDICINE CLINIC | Facility: CLINIC | Age: 64
End: 2019-08-20
Payer: MEDICARE

## 2019-08-20 VITALS
HEIGHT: 74 IN | TEMPERATURE: 97.6 F | WEIGHT: 216 LBS | BODY MASS INDEX: 27.72 KG/M2 | DIASTOLIC BLOOD PRESSURE: 70 MMHG | SYSTOLIC BLOOD PRESSURE: 132 MMHG

## 2019-08-20 DIAGNOSIS — I10 ESSENTIAL HYPERTENSION: ICD-10-CM

## 2019-08-20 DIAGNOSIS — Z12.11 SCREENING FOR COLON CANCER: Primary | ICD-10-CM

## 2019-08-20 PROCEDURE — 99213 OFFICE O/P EST LOW 20 MIN: CPT | Performed by: FAMILY MEDICINE

## 2019-08-20 NOTE — PROGRESS NOTES
Assessment/Plan:  Patient will continue with current list of medications for chronic conditions  Diagnoses and all orders for this visit:    Screening for colon cancer  -     Ambulatory referral to Gastroenterology; Future    Essential hypertension            Subjective:        Patient ID: Helene Barclay is a 71 y o  male  Patient follow-up on hypertension  Patient has noted that it has been elevated at the dermatologist well as ophthalmologist   No new headaches chest pain shortness of breath visual disturbance or difficulty urinating  The following portions of the patient's history were reviewed and updated as appropriate: allergies, current medications, past family history, past medical history, past social history, past surgical history and problem list       Review of Systems   Constitutional: Negative  HENT: Negative  Eyes: Negative  Respiratory: Negative  Cardiovascular: Negative  Gastrointestinal: Negative  Endocrine: Negative  Genitourinary: Negative  Musculoskeletal: Negative  Skin: Negative  Allergic/Immunologic: Negative  Neurological: Negative  Hematological: Negative  Psychiatric/Behavioral: Negative  Objective:      BMI Counseling: Body mass index is 27 73 kg/m²  Discussed the patient's BMI with him  The BMI is above average  BMI counseling and education was provided to the patient  Nutrition recommendations include reducing portion sizes  Depression Screening Follow-up Plan: Patient's depression screening was positive with a PHQ-2 score of 0  Their PHQ-9 score was   Patient assessed for underlying major depression  They have no active suicidal ideations  Brief counseling provided and recommend additional follow-up/re-evaluation next office visit        /70 (BP Location: Left arm, Patient Position: Sitting, Cuff Size: Adult)   Temp 97 6 °F (36 4 °C) (Tympanic)   Ht 6' 2" (1 88 m)   Wt 98 kg (216 lb)   BMI 27 73 kg/m² Physical Exam   Constitutional: He is oriented to person, place, and time  He appears well-developed and well-nourished  No distress  HENT:   Head: Normocephalic  Right Ear: External ear normal    Left Ear: External ear normal    Mouth/Throat: Oropharynx is clear and moist  No oropharyngeal exudate  Eyes: Pupils are equal, round, and reactive to light  EOM are normal  Right eye exhibits no discharge  Left eye exhibits no discharge  No scleral icterus  Neck: Normal range of motion  Neck supple  No thyromegaly present  Cardiovascular: Normal rate, regular rhythm, normal heart sounds and intact distal pulses  Exam reveals no gallop and no friction rub  No murmur heard  Pulmonary/Chest: Effort normal and breath sounds normal  No respiratory distress  He has no wheezes  He has no rales  He exhibits no tenderness  Musculoskeletal: Normal range of motion  He exhibits no edema  Lymphadenopathy:     He has no cervical adenopathy  Neurological: He is oriented to person, place, and time  No cranial nerve deficit  He exhibits normal muscle tone  Coordination normal    Skin: Skin is warm and dry  No rash noted  He is not diaphoretic  No erythema  No pallor  Psychiatric: He has a normal mood and affect  His behavior is normal  Judgment and thought content normal    Nursing note and vitals reviewed

## 2019-09-05 ENCOUNTER — OFFICE VISIT (OUTPATIENT)
Dept: CARDIOLOGY CLINIC | Facility: CLINIC | Age: 64
End: 2019-09-05
Payer: MEDICARE

## 2019-09-05 VITALS
SYSTOLIC BLOOD PRESSURE: 118 MMHG | HEART RATE: 72 BPM | RESPIRATION RATE: 18 BRPM | HEIGHT: 74 IN | DIASTOLIC BLOOD PRESSURE: 80 MMHG | WEIGHT: 217 LBS | BODY MASS INDEX: 27.85 KG/M2

## 2019-09-05 DIAGNOSIS — I48.0 PAROXYSMAL ATRIAL FIBRILLATION (HCC): Primary | ICD-10-CM

## 2019-09-05 DIAGNOSIS — I10 ESSENTIAL HYPERTENSION: ICD-10-CM

## 2019-09-05 DIAGNOSIS — I67.89 CEREBROVASCULAR DISEASE, ILL-DEFINED, ACUTE: ICD-10-CM

## 2019-09-05 PROCEDURE — 99214 OFFICE O/P EST MOD 30 MIN: CPT | Performed by: INTERNAL MEDICINE

## 2019-09-05 PROCEDURE — 93000 ELECTROCARDIOGRAM COMPLETE: CPT | Performed by: INTERNAL MEDICINE

## 2019-09-05 NOTE — PROGRESS NOTES
Cardiology Follow Up    Stacie Carreon  1955  [id number]  [address] [facility] [phone] [address]  [phone]    1  Paroxysmal atrial fibrillation (HCC)  POCT ECG    Holter monitor - 24 hour    Echo complete with contrast if indicated   2  Essential hypertension     3  Cerebrovascular disease, ill-defined, acute         Discussion/Summary:  PAF - he is back in atrial fibrillation today  He is anticoagulated with Eliquis  His heart rate is well controlled currently on his dose of metoprolol  He does have some symptoms noted  I discussed with him the options of rate versus rhythm control  I will check an echocardiogram and a Holter monitor  If these are stable, then we can continue with rate control and anticoagulation  I left it to his discretion with regards to his symptoms he wanted to try to restore sinus rhythm  I explained that we could do a stress test to try and start flecainide  If he did not convert with this alone, would then we could perform electrical cardioversion  He wanted to start with the echo and the Holter and then decide from there depending on how he feels  He has an EGD and a colonoscopy planned for the near future  I explained to him that typically after cardioversion, I would want to keep him on uninterrupted anticoagulation for 1 month, and similarly if we wanted to avoid doing a ALONZO, he would need to be on anticoagulation uninterrupted for 1 month prior to the cardioversion  I will contact him with results of testing, and he will let me know of symptoms in the meantime if he develops any  I will see him back at a shorter interval to review  History of Present Illness:   Pleasant 69-year-old man  History of paroxysmal atrial fibrillation with a prior CVA  He is maintained on Eliquis  Was previously maintaining sinus rhythm with Toprol X L        Interval History:  Returns for his regular follow-up visit today  Overall, he has been noticing some symptoms of palpitations mostly when he lays down or leans back in bed  Not particularly bothersome to him, but he does notice it  He is found to be back in atrial fibrillation today  Thinks the symptoms may be have been going on for 1 month  He has been on uninterrupted anticoagulation  Last stress testing was > 5 years ago, and no recent echo  No edema, PND, orthopnea  No chest pain      Problem List     Cerebrovascular disease, ill-defined, acute    Essential hypertension    Paroxysmal atrial fibrillation ([facility] [address] )    Overview Signed 3/15/2018  2:07 PM by Sharri Rahman DO     Transitioned From: Atrial fibrillation         Mixed hyperlipidemia    Pain of toe of right foot        Past Medical History:   Diagnosis Date    Hypertension     Pneumonia     Stroke ([facility] [address] )     Supraventricular tachycardia ([facility] [address] )      Social History     Tobacco Use    Smoking status: Never Smoker    Smokeless tobacco: Never Used   Substance Use Topics    Alcohol use: Yes     Comment: socailly     Family History   Problem Relation Age of Onset    Cancer Father         stomach    Hypertension Father     Stroke Father         syndrome    Cancer Family     Heart disease Family     Hypertension Family     Stroke Family         syndrome     Past Surgical History:   Procedure Laterality Date    APPENDECTOMY      COLONOSCOPY      fiberoptic, also 2009, both negative, resolved 2004    HERNIA REPAIR      KNEE SURGERY         Current Outpatient Medications:     apixaban (ELIQUIS) 5 mg, Take 1 tablet (5 mg total) by mouth 2 (two) times a day, Disp: 180 tablet, Rfl: 1    atorvastatin (LIPITOR) 10 mg tablet, Take 1 tablet (10 mg total) by mouth daily, Disp: 90 tablet, Rfl: 1    losartan-hydrochlorothiazide (HYZAAR) 100-25 MG per tablet, Take 1 tablet by mouth daily, Disp: 90 tablet, Rfl: 1    metoprolol succinate (TOPROL-XL) 50 mg 24 hr tablet, Take 1 tablet (50 mg total) by mouth daily, Disp: 90 tablet, Rfl: 1  No Known Allergies    Vitals:    09/05/19 0848   BP: 118/80   BP Location: Right arm   Patient Position: Sitting   Cuff Size: Large   Pulse: 72   Resp: 18   Weight: 98 4 kg (217 lb)   Height: 6' 2" (1 88 m)     Vitals:    09/05/19 0848   Weight: 98 4 kg (217 lb)      Height: 6' 2" (188 cm)   Body mass index is 27 86 kg/m²  Physical Exam:  GEN: Serge Butler appears well, alert and oriented x 3, pleasant and cooperative   HEENT: pupils equal, round, and reactive to light; extraocular muscles intact  NECK: supple, no carotid bruits   HEART: Irregularly irregular, normal S1 and S2, no murmurs, clicks, gallops or rubs   LUNGS: clear to auscultation bilaterally; no wheezes, rales, or rhonchi   ABDOMEN: normal bowel sounds, soft, no tenderness, no distention  EXTREMITIES: peripheral pulses normal; no clubbing, cyanosis  Trace LE edema  NEURO: no focal findings   SKIN: normal without suspicious lesions on exposed skin  ROS:  Positive for palpitations  Except as noted in HPI, is otherwise reviewed in detail and a 12 point review of systems is negative  ROS reviewed and is unchanged    Labs:  Lab Results   Component Value Date     09/19/2017    K 4 7 12/06/2018     12/06/2018    CREATININE 1 22 12/06/2018    BUN 16 12/06/2018    CO2 31 12/06/2018    ALT 19 12/06/2018    AST 24 12/06/2018    INR 1 07 05/14/2016    WBC 4 7 12/06/2018    HGB 14 9 12/06/2018    HCT 44 3 12/06/2018     12/06/2018       Lab Results   Component Value Date    CHOL 136 09/19/2017    CHOL 132 05/31/2017    CHOL 124 (L) 03/30/2016     No results found for: [facility]  Lab Results   Component Value Date    HDL 62 12/06/2018    HDL 59 09/19/2017    HDL 53 05/31/2017     Lab Results   Component Value Date    TRIG 51 12/06/2018    TRIG 54 09/19/2017    TRIG 56 05/31/2017     EKG:  Atrial fibrillation, 72 beats per minute   Poor anterior R wave progression

## 2019-09-11 LAB — PSA SERPL-MCNC: 2 NG/ML

## 2019-09-30 NOTE — PROGRESS NOTES
10/3/2019    Vee Gayle  1955  [id number]    Discussion and Plan      Patient continues to do well with no significant changes in urinary pattern  BPH confirmed on examination  PSA is stable  He will return in 1 year with blood work prior to visit  All questions answered at this time  1  Benign prostatic hyperplasia with lower urinary tract symptoms, symptom details unspecified  - PSA Total, Diagnostic; Future    Assessment      Patient Active Problem List   Diagnosis    Cerebrovascular disease, ill-defined, acute    Essential hypertension    Paroxysmal atrial fibrillation (HCC)    Mixed hyperlipidemia    Pain of toe of right foot    Benign prostatic hyperplasia with lower urinary tract symptoms    Screening for colon cancer       History of Present Illness    Sayra Edwards is a 71 y o  male seen today in regards to a history of prostate cancer screening, and lower urinary tract symptoms  He has been undergoing routine prostate cancer screening by another urologist since the age 47  His PSAs have all been normal  He does have a familial history of prostate cancer in his maternal grandfather who was diagnosed when he was 76years of age  Patient also has had bothersome lower urinary tract symptoms including urinary urgency  This happens only occasionally  He was previously trialed on tamsulosin  He discontinued this due to orthostatic hypotension  No interval changes in urinary pattern Other than sporadic urgency  Otherwise comfortable      Urinary Symptom Assessment        Past Medical History  Past Medical History:   Diagnosis Date    Hypertension     Pneumonia     Stroke ([address] )     Supraventricular tachycardia ([address] )        Past Social History  Past Surgical History:   Procedure Laterality Date    APPENDECTOMY      COLONOSCOPY      fiberoptic, also 2009, both negative, resolved 2004    HERNIA REPAIR      KNEE SURGERY         Past Family History  Family History   Problem Relation Age of Onset  Cancer Father         stomach    Hypertension Father     Stroke Father         syndrome    Cancer Family     Heart disease Family     Hypertension Family     Stroke Family         syndrome       Past Social history  Social History     Socioeconomic History    Marital status: /Civil Union     Spouse name: Not on file    Number of children: Not on file    Years of education: Not on file    Highest education level: Not on file   Occupational History    Not on file   Social Needs    Financial resource strain: Not on file    Food insecurity:     Worry: Not on file     Inability: Not on file    Transportation needs:     Medical: Not on file     Non-medical: Not on file   Tobacco Use    Smoking status: Never Smoker    Smokeless tobacco: Never Used   Substance and Sexual Activity    Alcohol use: Yes     Comment: socailly    Drug use: No    Sexual activity: Not on file   Lifestyle    Physical activity:     Days per week: Not on file     Minutes per session: Not on file    Stress: Not on file   Relationships    Social connections:     Talks on phone: Not on file     Gets together: Not on file     Attends [employer] service: Not on file     Active member of club or organization: Not on file     Attends meetings of clubs or organizations: Not on file     Relationship status: Not on file    Intimate partner violence:     Fear of current or ex partner: Not on file     Emotionally abused: Not on file     Physically abused: Not on file     Forced sexual activity: Not on file   Other Topics Concern    Not on file   Social History Narrative    Not on file       Current Medications  Current Outpatient Medications   Medication Sig Dispense Refill    apixaban (ELIQUIS) 5 mg Take 1 tablet (5 mg total) by mouth 2 (two) times a day 180 tablet 1    atorvastatin (LIPITOR) 10 mg tablet Take 1 tablet (10 mg total) by mouth daily 90 tablet 1    losartan-hydrochlorothiazide (HYZAAR) 100-25 MG per tablet Take 1 tablet by mouth daily 90 tablet 1    metoprolol succinate (TOPROL-XL) 50 mg 24 hr tablet Take 1 tablet (50 mg total) by mouth daily 90 tablet 1     No current facility-administered medications for this visit  Allergies  No Known Allergies    Past Medical History, Social History, Family History, medications and allergies were reviewed  Review of Systems  Review of Systems   Constitutional: Negative  HENT: Negative  Eyes: Negative  Respiratory: Negative  Cardiovascular: Negative  Gastrointestinal: Negative  Endocrine: Negative  Genitourinary: Positive for urgency  Negative for decreased urine volume, difficulty urinating and hematuria  Musculoskeletal: Negative  Skin: Negative  Neurological: Negative  Hematological: Negative  Psychiatric/Behavioral: Negative  Vitals  Vitals:    10/03/19 0758   BP: 124/70   BP Location: Left arm   Patient Position: Sitting   Cuff Size: Standard   Pulse: 57   Weight: 97 2 kg (214 lb 3 2 oz)   Height: 6' 2" (1 88 m)         Physical Exam    Physical Exam   Constitutional: He is oriented to person, place, and time  He appears well-developed and well-nourished  HENT:   Head: Normocephalic and atraumatic  Eyes: Pupils are equal, round, and reactive to light  Neck: Normal range of motion  Cardiovascular: Normal rate, regular rhythm and normal heart sounds  Pulmonary/Chest: Effort normal and breath sounds normal  No accessory muscle usage  No respiratory distress  Abdominal: Soft  Normal appearance and bowel sounds are normal  There is no tenderness  Genitourinary: Rectum normal, prostate normal and penis normal  No penile tenderness  Genitourinary Comments:   Prostate greater than 50 g  No nodules   Musculoskeletal: Normal range of motion  Neurological: He is alert and oriented to person, place, and time  Skin: Skin is warm, dry and intact  Psychiatric: He has a normal mood and affect   His speech is normal  Cognition and memory are normal    Nursing note and vitals reviewed        Results    Below listed labs, pathology results, and radiology images were personally reviewed:    Lab Results   Component Value Date/Time    PSA 2 0 09/10/2019 09:03 AM     Lab Results   Component Value Date    CALCIUM 9 4 12/06/2018     09/19/2017    K 4 7 12/06/2018    CO2 31 12/06/2018     12/06/2018    BUN 16 12/06/2018    CREATININE 1 22 12/06/2018     Lab Results   Component Value Date    WBC 4 7 12/06/2018    HGB 14 9 12/06/2018    HCT 44 3 12/06/2018    MCV 89 7 12/06/2018     12/06/2018       No results found for this or any previous visit (from the past 1 hour(s)) ]    Component      Latest Ref Rng & Units 9/25/2018 9/10/2019   PSA, Total      < OR = 4 0 ng/mL 2 0 2 0

## 2019-10-03 ENCOUNTER — OFFICE VISIT (OUTPATIENT)
Dept: UROLOGY | Facility: AMBULATORY SURGERY CENTER | Age: 64
End: 2019-10-03
Payer: MEDICARE

## 2019-10-03 VITALS
HEIGHT: 74 IN | SYSTOLIC BLOOD PRESSURE: 124 MMHG | DIASTOLIC BLOOD PRESSURE: 70 MMHG | HEART RATE: 57 BPM | BODY MASS INDEX: 27.49 KG/M2 | WEIGHT: 214.2 LBS

## 2019-10-03 DIAGNOSIS — N40.1 BENIGN PROSTATIC HYPERPLASIA WITH LOWER URINARY TRACT SYMPTOMS, SYMPTOM DETAILS UNSPECIFIED: Primary | ICD-10-CM

## 2019-10-03 PROCEDURE — 99214 OFFICE O/P EST MOD 30 MIN: CPT | Performed by: UROLOGY

## 2019-10-08 ENCOUNTER — HOSPITAL ENCOUNTER (OUTPATIENT)
Dept: NON INVASIVE DIAGNOSTICS | Facility: CLINIC | Age: 64
Discharge: HOME | End: 2019-10-08
Payer: MEDICARE

## 2019-10-08 DIAGNOSIS — I48.0 PAROXYSMAL ATRIAL FIBRILLATION (HCC): ICD-10-CM

## 2019-10-08 PROCEDURE — 93225 XTRNL ECG REC<48 HRS REC: CPT

## 2019-10-08 PROCEDURE — 93306 TTE W/DOPPLER COMPLETE: CPT

## 2019-10-08 PROCEDURE — 93226 XTRNL ECG REC<48 HR SCAN A/R: CPT

## 2019-10-08 PROCEDURE — 93306 TTE W/DOPPLER COMPLETE: CPT | Performed by: INTERNAL MEDICINE

## 2019-10-10 PROCEDURE — 93227 XTRNL ECG REC<48 HR R&I: CPT | Performed by: INTERNAL MEDICINE

## 2019-10-11 ENCOUNTER — TELEPHONE (OUTPATIENT)
Dept: CARDIOLOGY CLINIC | Facility: CLINIC | Age: 64
End: 2019-10-11

## 2019-10-17 ENCOUNTER — TELEPHONE (OUTPATIENT)
Dept: FAMILY MEDICINE CLINIC | Facility: CLINIC | Age: 64
End: 2019-10-17

## 2019-10-17 NOTE — TELEPHONE ENCOUNTER
Patient may stop Eliquis 1 day prior to dental procedure    Patient to start Eliquis right after dental procedure done

## 2019-10-17 NOTE — TELEPHONE ENCOUNTER
Pt asking about stopping eloquis, he says he has a dental appt coming up and his dentist was inquiring about it  Pt did not state when his appt is

## 2019-10-18 ENCOUNTER — ANESTHESIA EVENT (OUTPATIENT)
Dept: GASTROENTEROLOGY | Facility: MEDICAL CENTER | Age: 64
End: 2019-10-18

## 2019-10-22 ENCOUNTER — ANESTHESIA (OUTPATIENT)
Dept: GASTROENTEROLOGY | Facility: MEDICAL CENTER | Age: 64
End: 2019-10-22

## 2019-10-22 ENCOUNTER — HOSPITAL ENCOUNTER (OUTPATIENT)
Dept: GASTROENTEROLOGY | Facility: MEDICAL CENTER | Age: 64
Setting detail: OUTPATIENT SURGERY
Discharge: HOME | End: 2019-10-22
Attending: INTERNAL MEDICINE | Admitting: INTERNAL MEDICINE
Payer: MEDICARE

## 2019-10-22 VITALS
BODY MASS INDEX: 26.95 KG/M2 | WEIGHT: 210 LBS | SYSTOLIC BLOOD PRESSURE: 141 MMHG | DIASTOLIC BLOOD PRESSURE: 72 MMHG | HEART RATE: 54 BPM | OXYGEN SATURATION: 98 % | TEMPERATURE: 98.2 F | HEIGHT: 74 IN | RESPIRATION RATE: 16 BRPM

## 2019-10-22 DIAGNOSIS — Z80.0 FAMILY HISTORY OF COLON CANCER IN FATHER: ICD-10-CM

## 2019-10-22 PROCEDURE — 88305 TISSUE EXAM BY PATHOLOGIST: CPT | Performed by: PATHOLOGY

## 2019-10-22 PROCEDURE — NC001 PR NO CHARGE: Performed by: INTERNAL MEDICINE

## 2019-10-22 PROCEDURE — 45385 COLONOSCOPY W/LESION REMOVAL: CPT | Performed by: INTERNAL MEDICINE

## 2019-10-22 PROCEDURE — 43239 EGD BIOPSY SINGLE/MULTIPLE: CPT | Performed by: INTERNAL MEDICINE

## 2019-10-22 PROCEDURE — 43251 EGD REMOVE LESION SNARE: CPT | Performed by: INTERNAL MEDICINE

## 2019-10-22 RX ORDER — PROPOFOL 10 MG/ML
INJECTION, EMULSION INTRAVENOUS AS NEEDED
Status: DISCONTINUED | OUTPATIENT
Start: 2019-10-22 | End: 2019-10-22 | Stop reason: SURG

## 2019-10-22 RX ORDER — SODIUM CHLORIDE 9 MG/ML
125 INJECTION, SOLUTION INTRAVENOUS CONTINUOUS
Status: DISCONTINUED | OUTPATIENT
Start: 2019-10-22 | End: 2019-10-26 | Stop reason: HOSPADM

## 2019-10-22 RX ADMIN — PROPOFOL 100 MG: 10 INJECTION, EMULSION INTRAVENOUS at 09:31

## 2019-10-22 RX ADMIN — PROPOFOL 100 MG: 10 INJECTION, EMULSION INTRAVENOUS at 09:46

## 2019-10-22 RX ADMIN — PROPOFOL 50 MG: 10 INJECTION, EMULSION INTRAVENOUS at 09:54

## 2019-10-22 RX ADMIN — PROPOFOL 100 MG: 10 INJECTION, EMULSION INTRAVENOUS at 09:35

## 2019-10-22 RX ADMIN — PROPOFOL 150 MG: 10 INJECTION, EMULSION INTRAVENOUS at 09:29

## 2019-10-22 RX ADMIN — SODIUM CHLORIDE 125 ML/HR: 0.9 INJECTION, SOLUTION INTRAVENOUS at 08:42

## 2019-10-22 NOTE — ANESTHESIA PREPROCEDURE EVALUATION
Review of Systems/Medical History  Patient summary reviewed  Chart reviewed      Cardiovascular  Exercise tolerance (METS): >4,  Hyperlipidemia, Hypertension controlled,    Pulmonary  Pneumonia,        GI/Hepatic  Negative GI/hepatic ROS          Negative  ROS        Endo/Other  Negative endo/other ROS      GYN       Hematology  Negative hematology ROS      Musculoskeletal  Negative musculoskeletal ROS        Neurology    CVA , residual symptoms,    Psychology   Negative psychology ROS              Physical Exam    Airway    Mallampati score: II  TM Distance: <3 FB  Neck ROM: full     Dental       Cardiovascular  Rhythm: regular, Rate: normal,     Pulmonary  Breath sounds clear to auscultation,     Other Findings        Anesthesia Plan  ASA Score- 3     Anesthesia Type- IV sedation with anesthesia with ASA Monitors  Additional Monitors:   Airway Plan:         Plan Factors-Patient not instructed to abstain from smoking on day of procedure  Patient did not smoke on day of surgery  Induction- intravenous  Postoperative Plan-     Informed Consent- Anesthetic plan and risks discussed with patient

## 2019-10-22 NOTE — DISCHARGE INSTRUCTIONS
Upper Endoscopy   WHAT YOU NEED TO KNOW:   An upper endoscopy is also called an upper gastrointestinal (GI) endoscopy, or an esophagogastroduodenoscopy (EGD)  You may feel bloated, gassy, or have some abdominal discomfort after your procedure  Your throat may be sore for 24 to 36 hours  You may burp or pass gas from air that is still inside your body  DISCHARGE INSTRUCTIONS:   Call 911 for any of the following:   · You have sudden chest pain or trouble breathing  Seek care immediately if:   · You feel dizzy or faint  · You have trouble swallowing  · Your bowel movements are very dark or black  · Your abdomen is hard and firm and you have severe pain  · You vomit blood  Contact your healthcare provider if:   · You feel full or bloated and cannot burp or pass gas  · You have not had a bowel movement for 3 days after your procedure  · You have neck pain  · You have a fever or chills  · You have nausea or are vomiting  · You have a rash or hives  · You have questions or concerns about your endoscopy  Relieve a sore throat:  Suck on throat lozenges or crushed ice  Gargle with a small amount of warm salt water  Mix 1 teaspoon of salt and 1 cup of warm water to make salt water  Relieve gas and discomfort from bloating:  Lie on your right side with a heating pad on your abdomen  Take short walks to help pass gas  Eat small meals until bloating is relieved  Rest after your procedure: You have been given medicine to relax you  Do not  drive or make important decisions until the day after your procedure  Return to your normal activity as directed  You can usually return to work the day after your procedure  Follow up with your healthcare provider as directed:  Write down your questions so you remember to ask them during your visits     © 2017 [address] is for End User's use only and may not be sold, redistributed or otherwise used for commercial purposes  All illustrations and images included in CareNotes® are the copyrighted property of [employer]  or [employer]ass[employer]  The above information is an  only  It is not intended as medical advice for individual conditions or treatments  Talk to your doctor, nurse or pharmacist before following any medical regimen to see if it is safe and effective for you  Esophagitis   WHAT YOU NEED TO KNOW:   Esophagitis is inflammation or irritation of the lining of the esophagus  DISCHARGE INSTRUCTIONS:   Call 911 for any of the following:   · You have chest pain that does not go away within a few minutes or gets worse  Seek care immediately if:   · You feel like you have food stuck in your throat and you cannot cough it out  Contact your healthcare provider if:   · You have new or worsening symptoms, even after treatment  · You have questions or concerns about your condition or care  Medicines:   · Medicines  may be given to fight an infection or to control stomach acid  · Take your medicine as directed  Contact your healthcare provider if you think your medicine is not helping or if you have side effects  Tell him or her if you are allergic to any medicine  Keep a list of the medicines, vitamins, and herbs you take  Include the amounts, and when and why you take them  Bring the list or the pill bottles to follow-up visits  Carry your medicine list with you in case of an emergency  Follow up with your healthcare provider as directed: You may need ongoing tests or treatment  Write down your questions so you remember to ask them during your visits  Do not smoke:  Nicotine and other chemicals in cigarettes and cigars can cause blood vessel and lung damage  Ask your healthcare provider for information if you currently smoke and need help to quit  E-cigarettes or smokeless tobacco still contain nicotine   Talk to your healthcare provider before you use these products  Do not drink alcohol:  Alcohol can irritate your esophagus  Talk to your healthcare provider if you need help to stop drinking  Keep batteries and similar objects out of the reach of children:  Babies often put items in their mouths to explore them  Button batteries are easy to swallow and can cause serious damage  Keep the battery covers of electronic devices such as remote controls taped closed  Store all batteries and toxic materials where children cannot get to them  Use childproof locks to keep children away from dangerous materials  Manage or prevent esophagitis:   · Limit or do not eat foods that can lead to esophagitis  Foods such as oranges and salsa can irritate your esophagus  Caffeine and chocolate can cause acid reflux  High-fat and fried foods make your stomach digest food more slowly  This increases the amount of stomach acid your esophagus is exposed to  Eat small meals, and drink water with your meals  Soft foods such as yogurt and applesauce may help soothe your throat  Do not eat for at least 3 hours before you go to bed  · Prevent acid reflux  Do not bend over unless it is necessary  Acid may back up into your esophagus when you bend over  If possible, keep the head of your bed elevated while you sleep  This will help keep acid from backing up  Manage stress  Stress can make your symptoms worse or cause stomach acid to back up  · Drink more liquid when you take pills  Drink a full glass of water when you take your pills  Ask your healthcare provider if you can take your pills at least an hour before you go to bed  © 2017 [address] Information is for End User's use only and may not be sold, redistributed or otherwise used for commercial purposes  All illustrations and images included in CareNotes® are the copyrighted property of [employer] M [employer]  or [employer]her Lass[employer]  The above information is an  only   It is not intended as medical advice for individual conditions or treatments  Talk to your doctor, nurse or pharmacist before following any medical regimen to see if it is safe and effective for you  Gastroesophageal Reflux Disease   WHAT YOU NEED TO KNOW:   Gastroesophageal reflux occurs when acid and food in the stomach back up into the esophagus  Gastroesophageal reflux disease (GERD) is reflux that occurs more than twice a week for a few weeks  It usually causes heartburn and other symptoms  GERD can cause other health problems over time if it is not treated  DISCHARGE INSTRUCTIONS:   Seek care immediately if:   · You feel full and cannot burp or vomit  · You have severe chest pain and sudden trouble breathing  · Your bowel movements are black, bloody, or tarry-looking  · Your vomit looks like coffee grounds or has blood in it  Contact your healthcare provider if:   · You vomit large amounts, or you vomit often  · You have trouble breathing after you vomit  · You have trouble swallowing, or pain with swallowing  · You are losing weight without trying  · Your symptoms get worse or do not improve with treatment  · You have questions or concerns about your condition or care  Medicines:   · Medicines  are used to decrease stomach acid  Medicine may also be used to help your lower esophageal sphincter and stomach contract (tighten) more  · Take your medicine as directed  Contact your healthcare provider if you think your medicine is not helping or if you have side effects  Tell him or her if you are allergic to any medicine  Keep a list of the medicines, vitamins, and herbs you take  Include the amounts, and when and why you take them  Bring the list or the pill bottles to follow-up visits  Carry your medicine list with you in case of an emergency  Manage GERD:   · Do not have foods or drinks that may increase heartburn    These include chocolate, peppermint, fried or fatty foods, drinks that contain caffeine, or carbonated drinks (soda)  Other foods include spicy foods, onions, tomatoes, and tomato-based foods  Do not have foods or drinks that can irritate your esophagus, such as citrus fruits, juices, and alcohol  · Do not eat large meals  When you eat a lot of food at one time, your stomach needs more acid to digest it  Eat 6 small meals each day instead of 3 large ones, and eat slowly  Do not eat meals 2 to 3 hours before bedtime  · Elevate the head of your bed  Place 6-inch blocks under the head of your bed frame  You may also use more than one pillow under your head and shoulders while you sleep  · Maintain a healthy weight  If you are overweight, weight loss may help relieve symptoms of GERD  · Do not smoke  Smoking weakens the lower esophageal sphincter and increases the risk of GERD  Ask your healthcare provider for information if you currently smoke and need help to quit  E-cigarettes or smokeless tobacco still contain nicotine  Talk to your healthcare provider before you use these products  · Do not wear clothing that is tight around your waist   Tight clothing can put pressure on your stomach and cause or worsen GERD symptoms  Follow up with your healthcare provider as directed:  Write down your questions so you remember to ask them during your visits  © 2017 [address]  Information is for End User's use only and may not be sold, redistributed or otherwise used for commercial purposes  All illustrations and images included in CareNotes® are the copyrighted property of A [employer] M [employer] Inc  or [employer]her Lass[employer]  The above information is an  only  It is not intended as medical advice for individual conditions or treatments  Talk to your doctor, nurse or pharmacist before following any medical regimen to see if it is safe and effective for you        Gastric Polyps   WHAT YOU NEED TO KNOW:   Gastric polyps are growths that form in the lining of your stomach  They are not cancerous, but certain types of polyps can change into cancer  DISCHARGE INSTRUCTIONS:   Follow up with your healthcare provider as directed: You may need more tests or procedures  Write down any questions so you remember to ask them at your visits  Medicines:   · Medicines may  be given if you have an infection caused by H  pylori bacteria  · Take your medicine as directed  Contact your healthcare provider if you think your medicine is not helping or if you have side effects  Tell him or her if you are allergic to any medicine  Keep a list of the medicines, vitamins, and herbs you take  Include the amounts, and when and why you take them  Bring the list or the pill bottles to follow-up visits  Carry your medicine list with you in case of an emergency  Seek care immediately if:   · You have blood in your vomit  · You have dark bowel movements  · You have severe pain in your abdomen that does not go away after you take medicine  Contact your healthcare provider if:   · You have indigestion that does not go away with treatment  · You vomit after meals  · You have questions or concerns about your condition or care  © 2017 [address] [facility] Information is for End User's use only and may not be sold, redistributed or otherwise used for commercial purposes  All illustrations and images included in CareNotes® are the copyrighted property of [employer] M , [employer]  or [employer]her Lass[employer]  The above information is an  only  It is not intended as medical advice for individual conditions or treatments  Talk to your doctor, nurse or pharmacist before following any medical regimen to see if it is safe and effective for you  Colonoscopy   WHAT YOU NEED TO KNOW:   A colonoscopy is a procedure to examine the inside of your colon (intestine) with a scope  Polyps or tissue growths may have been removed during your colonoscopy   It is normal to feel bloated and to have some abdominal discomfort  You should be passing gas  If you have hemorrhoids or you had polyps removed, you may have a small amount of bleeding  DISCHARGE INSTRUCTIONS:   Seek care immediately if:   · You have a large amount of bright red blood in your bowel movements  · Your abdomen is hard and firm and you have severe pain  · You have sudden trouble breathing  Contact your healthcare provider if:   · You develop a rash or hives  · You have a fever within 24 hours of your procedure       · You have not had a bowel movement for 3 days after your procedure  · You have questions or concerns about your condition or care  Activity:   · Do not lift, strain, or run  for 3 days after your procedure  · Rest after your procedure  You have been given medicine to relax you  Do not  drive or make important decisions until the day after your procedure  Return to your normal activity as directed  · Relieve gas and discomfort from bloating  by lying on your right side with a heating pad on your abdomen  You may need to take short walks to help the gas move out  Eat small meals until bloating is relieved  If you had polyps removed: For 7 days after your procedure:  · Do not  take aspirin  · Do not  go on long car rides  Follow up with your healthcare provider as directed:  Write down your questions so you remember to ask them during your visits  © 2017 [address] is for End User's use only and may not be sold, redistributed or otherwise used for commercial purposes  All illustrations and images included in CareNotes® are the copyrighted property of [employer]  or [employer]her Lass[employer]  The above information is an  only  It is not intended as medical advice for individual conditions or treatments  Talk to your doctor, nurse or pharmacist before following any medical regimen to see if it is safe and effective for you        Colorectal Polyps   WHAT YOU NEED TO KNOW:   Colorectal polyps are small growths of tissue in the lining of the colon and rectum  Most polyps are hyperplastic polyps and are usually benign (noncancerous)  Certain types of polyps, called adenomatous polyps, may turn into cancer  DISCHARGE INSTRUCTIONS:   Follow up with your healthcare provider or gastroenterologist as directed: You may need to return for more tests, such as another colonoscopy  Write down your questions so you remember to ask them during your visits  Reduce your risk for colorectal polyps:   · Eat a variety of healthy foods:  Healthy foods include fruit, vegetables, whole-grain breads, low-fat dairy products, beans, lean meat, and fish  Ask if you need to be on a special diet  · Maintain a healthy weight:  Ask your healthcare provider if you need to lose weight and how much you need to lose  Ask for help with a weight loss program     · Exercise:  Begin to exercise slowly and do more as you get stronger  Talk with your healthcare provider before you start an exercise program      · Limit alcohol:  Your risk for polyps increases the more you drink  · Do not smoke: If you smoke, it is never too late to quit  Ask for information about how to stop  For support and more information:   · [facility] [place] ([facility])  [address], [place] [address]  Phone: [phone]  Web Address: www digestive  niddk nih gov  Contact your healthcare provider or gastroenterologist if:   · You have a fever  · You have chills, a cough, or feel weak and achy  · You have abdominal pain that does not go away or gets worse after you take medicine  · Your abdomen is swollen  · You are losing weight without trying  · You have questions or concerns about your condition or care  Seek care immediately or call 911 if:   · You have sudden shortness of breath       · You have a fast heart rate, fast breathing, or are too dizzy to stand up  · You have severe abdominal pain  · You see blood in your bowel movement  © 2017 [address] Information is for End User's use only and may not be sold, redistributed or otherwise used for commercial purposes  All illustrations and images included in CareNotes® are the copyrighted property of [employer]  or [employer]her [employer]  The above information is an  only  It is not intended as medical advice for individual conditions or treatments  Talk to your doctor, nurse or pharmacist before following any medical regimen to see if it is safe and effective for you  Hemorrhoids   WHAT YOU NEED TO KNOW:   Hemorrhoids are swollen blood vessels inside your rectum (internal hemorrhoids) or on your anus (external hemorrhoids)  Sometimes a hemorrhoid may prolapse  This means it extends out of your anus  DISCHARGE INSTRUCTIONS:   Seek care immediately if:   · You have severe pain in your rectum or around your anus  · You have severe pain in your abdomen and you are vomiting  · You have bleeding from your anus that soaks through your underwear  Contact your healthcare provider if:   · You have frequent and painful bowel movements  · Your hemorrhoid looks or feels more swollen than usual      · You do not have a bowel movement for 2 days or more  · You see or feel tissue coming through your anus  · You have questions or concerns about your condition or care  Medicines: You may  need any of the following:  · Medicine  may be given to decrease pain, swelling, and itching  The medicine may come as a pad, cream, or ointment  · Stool softeners  help treat or prevent constipation  · NSAIDs , such as ibuprofen, help decrease swelling, pain, and fever  NSAIDs can cause stomach bleeding or kidney problems in certain people  If you take blood thinner medicine, always ask your healthcare provider if NSAIDs are safe for you   Always read the medicine label and follow directions  · Take your medicine as directed  Contact your healthcare provider if you think your medicine is not helping or if you have side effects  Tell him or her if you are allergic to any medicine  Keep a list of the medicines, vitamins, and herbs you take  Include the amounts, and when and why you take them  Bring the list or the pill bottles to follow-up visits  Carry your medicine list with you in case of an emergency  Manage your symptoms:   · Apply ice on your anus for 15 to 20 minutes every hour or as directed  Use an ice pack, or put crushed ice in a plastic bag  Cover it with a towel before you apply it to your anus  Ice helps prevent tissue damage and decreases swelling and pain  · Take a sitz bath  Fill a bathtub with 4 to 6 inches of warm water  You may also use a sitz bath pan that fits inside a toilet bowl  Sit in the sitz bath for 15 minutes  Do this 3 times a day, and after each bowel movement  The warm water can help decrease pain and swelling  · Keep your anal area clean  Gently wash the area with warm water daily  Soap may irritate the area  After a bowel movement, wipe with moist towelettes or wet toilet paper  Dry toilet paper can irritate the area  Prevent hemorrhoids:   · Do not strain to have a bowel movement  Do not sit on the toilet too long  These actions can increase pressure on the tissues in your rectum and anus  · Drink plenty of liquids  Liquids can help prevent constipation  Ask how much liquid to drink each day and which liquids are best for you  · Eat a variety of high-fiber foods  Examples include fruits, vegetables, and whole grains  Ask your healthcare provider how much fiber you need each day  You may need to take a fiber supplement  · Exercise as directed  Exercise, such as walking, may make it easier to have a bowel movement  Ask your healthcare provider to help you create an exercise plan       · Do not have anal sex  Anal sex can weaken the skin around your rectum and anus  · Avoid heavy lifting  This can cause straining and increase your risk for another hemorrhoid  Follow up with your healthcare provider as directed:  Write down your questions so you remember to ask them during your visits  © 2017 [facility] Information is for End User's use only and may not be sold, redistributed or otherwise used for commercial purposes  All illustrations and images included in CareNotes® are the copyrighted property of [employer] M [employer] Inc  or [employer]her Lass[employer]  The above information is an  only  It is not intended as medical advice for individual conditions or treatments  Talk to your doctor, nurse or pharmacist before following any medical regimen to see if it is safe and effective for you  Diverticulosis   WHAT YOU NEED TO KNOW:   Diverticulosis is a condition that causes small pockets called diverticula to form in your intestine  These pockets make it difficult for bowel movements to pass through your digestive system  DISCHARGE INSTRUCTIONS:   Seek care immediately if:   · You have severe pain on the left side of your lower abdomen  · Your bowel movements are bright or dark red  Contact your healthcare provider if:   · You have a fever and chills  · You feel dizzy or lightheaded  · You have nausea, or you are vomiting  · You have a change in your bowel movements  · You have questions or concerns about your condition or care  Medicines:   · Medicines  to soften your bowel movements may be given  You may also need medicines to treat symptoms such as bloating and pain  · Take your medicine as directed  Contact your healthcare provider if you think your medicine is not helping or if you have side effects  Tell him or her if you are allergic to any medicine  Keep a list of the medicines, vitamins, and herbs you take   Include the amounts, and when and why you take them  Bring the list or the pill bottles to follow-up visits  Carry your medicine list with you in case of an emergency  Self-care: The goal of treatment is to manage any symptoms you have and prevent other problems such as diverticulitis  Diverticulitis is swelling or infection of the diverticula  Your healthcare provider may recommend any of the following:  · Eat a variety of high-fiber foods  High-fiber foods help you have regular bowel movements  High-fiber foods include cooked beans, fruits, vegetables, and some cereals  Most adults need 25 to 35 grams of fiber each day  Your healthcare provider may recommend that you have more  Ask your healthcare provider how much fiber you need  Increase fiber slowly  You may have abdominal discomfort, bloating, and gas if you add fiber to your diet too quickly  You may need to take a fiber supplement if you are not getting enough fiber from food  · Drink liquids as directed  You may need to drink 2 to 3 liters (8 to 12 cups) of liquids every day  Ask your healthcare provider how much liquid to drink each day and which liquids are best for you  · Apply heat  on your abdomen for 20 to 30 minutes every 2 hours for as many days as directed  Heat helps decrease pain and muscle spasms  Help prevent diverticulitis or other symptoms: The following may help decrease your risk for diverticulitis or symptoms, such as bleeding  Talk to your provider about these or other things you can do to prevent problems that may occur with diverticulosis  · Exercise regularly  Ask your healthcare provider about the best exercise plan for you  Exercise can help you have regular bowel movements  Get 30 minutes of exercise on most days of the week  · Maintain a healthy weight  Ask your healthcare provider how much you should weigh  Ask him or her to help you create a weight loss plan if you are overweight  · Do not smoke    Nicotine and other chemicals in cigarettes increase your risk for diverticulitis  Ask your healthcare provider for information if you currently smoke and need help to quit  E-cigarettes or smokeless tobacco still contain nicotine  Talk to your healthcare provider before you use these products  · Ask your healthcare provider if it is safe to take NSAIDs  NSAIDs may increase your risk of diverticulitis  Follow up with your healthcare provider as directed:  Write down your questions so you remember to ask them during your visits  © 2017 [address] Information is for End User's use only and may not be sold, redistributed or otherwise used for commercial purposes  All illustrations and images included in CareNotes® are the copyrighted property of [employer]  or [employer]her Lass[employer]  The above information is an  only  It is not intended as medical advice for individual conditions or treatments  Talk to your doctor, nurse or pharmacist before following any medical regimen to see if it is safe and effective for you  Diverticulitis Diet   WHAT YOU NEED TO KNOW:   A diverticulitis diet includes foods that allow your intestines to rest while you have diverticulitis  Diverticulitis is a condition that causes small pockets along your intestine called diverticula to become inflamed or infected  This is caused by hard bowel movement, food, or bacteria that get stuck in the pockets  DISCHARGE INSTRUCTIONS:   Foods you can eat while you have diverticulitis:   · A clear liquid diet may be recommended for 2 to 3 days  A clear liquid diet is made up of clear liquids and foods that are liquid at room temperature  Your healthcare provider will tell you when you can start eating solid foods   Examples of clear liquids include the following:     ¨ Water and clear juices (such as apple, cranberry, or grape), strained citrus juices or fruit punch    ¨ Coffee or tea (without cream or milk)    ¨ Clear sports drinks or soft drinks, such as ginger ale, lemon-lime soda, or club soda (no cola or root beer)    ¨ Clear broth, bouillon, or consommé    ¨ Plain popsicles (no popsicles with pureed fruit or fiber)    ¨ Flavored gelatin without fruit    · A low-fiber diet may be recommended until your symptoms improve  Your healthcare provider will tell you when you can slowly add high-fiber foods back into your diet  ¨ Cream of wheat and finely ground grits    ¨ White bread, white pasta, and white rice    ¨ Canned and well-cooked fruit without skins or seeds, and juice without pulp    ¨ Canned and well-cooked vegetables without skins or seeds, and vegetable juice    ¨ Cow's milk, lactose-free milk, soy milk, and rice milk    ¨ Yogurt, cottage cheese, and sherbet    ¨ Eggs, poultry (such as chicken and turkey), fish, and tender, ground, well-cooked beef     ¨ Tofu and smooth nut butters, such as peanut butter    ¨ Broth and strained soups made of low-fiber foods  Foods you should avoid while you have diverticulitis:  Avoid foods that are high in fiber while you have symptoms of diverticulitis  Examples of high-fiber foods include the following:  · Whole grains and breads, and cereals made with whole grains    · Dried fruit, fresh fruit with skin, and fruit pulp    · Raw vegetables    · Cooked greens, such as spinach    · Tough meat and meat with gristle    · Legumes, such as castellanos beans and lentils  Contact your healthcare provider if:   · Your symptoms do not get better, or they get worse  · You have questions about the foods you should eat  · You have questions or concerns about your condition or care  © 2017 [address] Information is for End User's use only and may not be sold, redistributed or otherwise used for commercial purposes  All illustrations and images included in CareNotes® are the copyrighted property of [employer]  or [employer]    The above information is an educational aid only  It is not intended as medical advice for individual conditions or treatments  Talk to your doctor, nurse or pharmacist before following any medical regimen to see if it is safe and effective for you  Diverticulosis Diet   AMBULATORY CARE:   A diverticulosis diet  includes high-fiber foods  High-fiber foods help you have regular bowel movements  Extra fiber may decrease your risk of forming new diverticula (small pockets) in your intestine  A high-fiber diet may also help prevent diverticulitis  Diverticulitis is a painful condition that occurs when diverticula become inflamed or infected  You do not need to avoid nuts, seeds, corn, or popcorn while you are on a diverticulosis diet  Contact your healthcare provider if:   · You have questions about a high-fiber diet  · You have a change in your bowel movements  · You have an upset stomach  · You have a fever  · You have pain in your lower abdomen on the left side  · You have questions about your condition or care  Amount of fiber you need: You may need 25 to 35 grams of fiber each day  Ask your dietitian or healthcare provider how much fiber you should have  Increase your intake of fiber slowly  When you eat more fiber, you may have gas and feel bloated  You may need to take a fiber supplement if you do not get enough fiber from food  Drink plenty of liquids as you increase the fiber in your diet  Your dietitian or healthcare provider may recommend 8 eight-ounce cups or more each day  Ask which liquids are best for you     Foods that are high in fiber:   · Foods with at least 4 grams of fiber per serving:      ¨ ? to ½ cup of high-fiber cereal (check the nutrition label on the box)    ¨ ½ cup of blackberries or raspberries    ¨ 4 dried prunes    ¨ 1 cooked artichoke    ¨ ½ cup of cooked legumes, such as lentils, or red, kidney, and castellanos beans    · Foods with 1 to 3 grams of fiber per serving:      ¨ 1 slice of whole-wheat, pumpernickel, or rye bread    ¨ 4 whole-wheat crackers    ¨ ½ cup of cereal with 1 to 3 grams of fiber per serving (check the nutrition label on the box)    ¨ 1 piece of fruit, such as an apple, banana, pear, kiwi, or orange    ¨ 3 dates    ¨ ½ cup of canned apricots, fruit cocktail, peaches, or pears    ¨ ½ cup of raw or cooked vegetables, such as carrots, cauliflower, cabbage, spinach, squash, or corn  © 2017 [address] is for End User's use only and may not be sold, redistributed or otherwise used for commercial purposes  All illustrations and images included in CareNotes® are the copyrighted property of [employer]  or [employer]her [employer]ass[employer]  The above information is an  only  It is not intended as medical advice for individual conditions or treatments  Talk to your doctor, nurse or pharmacist before following any medical regimen to see if it is safe and effective for you

## 2019-10-22 NOTE — H&P
History and Physical -  [facility]  Jerome Marino 71 y o  male MRN: [id number]                  HPI: Jerome Marino is a 71y o  year old male who presents for EGD and colonoscopy evaluation  EGD is for evaluation of family history of stomach cancer  Colonoscopy evaluation is for screening for colorectal cancer  REVIEW OF SYSTEMS: Per the HPI, and otherwise unremarkable  Historical Information   Past Medical History:   Diagnosis Date    [facility]     Hyperlipidemia     Hypertension     Pneumonia     Skin cancer     Stroke ([facility]     Supraventricular tachycardia (HCC)      Past Surgical History:   Procedure Laterality Date    APPENDECTOMY      COLONOSCOPY      fiberoptic, also 2009, both negative, resolved 2004    HERNIA REPAIR      KNEE SURGERY      SKIN GRAFT      left ear, skin cancer     Social History   Social History     Substance and Sexual Activity   Alcohol Use Yes    Comment: socailly     Social History     Substance and Sexual Activity   Drug Use No     Social History     Tobacco Use   Smoking Status Never Smoker   Smokeless Tobacco Never Used     Family History   Problem Relation Age of Onset    Cancer Father         stomach    Hypertension Father     Stroke Father         syndrome    Cancer Family     Heart disease Family     Hypertension Family     Stroke Family         syndrome       Meds/Allergies       (Not in a hospital admission)    No Known Allergies    Objective     /74   Pulse 60 Comment: Sinus Rhythm  Temp 98 2 °F (36 8 °C) (Temporal)   Resp 21   Ht 6' 2" (1 88 m)   Wt 95 3 kg (210 lb)   SpO2 99%   BMI 26 96 kg/m²       PHYSICAL EXAM    Gen: NAD  CV: RRR  CHEST: Clear  ABD: soft, NT/ND  EXT: no edema      ASSESSMENT/PLAN:  This is a 71y o  year old male here for EGD and colonoscopy, and he is stable and optimized for his procedure

## 2019-10-23 ENCOUNTER — CLINICAL SUPPORT (OUTPATIENT)
Dept: FAMILY MEDICINE CLINIC | Facility: CLINIC | Age: 64
End: 2019-10-23
Payer: MEDICARE

## 2019-10-23 DIAGNOSIS — Z23 NEEDS FLU SHOT: Primary | ICD-10-CM

## 2019-10-23 PROCEDURE — 90662 IIV NO PRSV INCREASED AG IM: CPT

## 2019-10-23 PROCEDURE — G0008 ADMIN INFLUENZA VIRUS VAC: HCPCS

## 2019-12-02 DIAGNOSIS — I10 HYPERTENSION, UNSPECIFIED TYPE: ICD-10-CM

## 2019-12-02 RX ORDER — LOSARTAN POTASSIUM AND HYDROCHLOROTHIAZIDE 25; 100 MG/1; MG/1
1 TABLET ORAL DAILY
Qty: 90 TABLET | Refills: 1 | Status: SHIPPED | OUTPATIENT
Start: 2019-12-02 | End: 2019-12-03 | Stop reason: SDUPTHER

## 2019-12-02 NOTE — TELEPHONE ENCOUNTER
Patient is requesting refill of losartan-HCTZ  Patient was seen in August and has follow up in December  Have placed order for approval to go to [facility] pharmacy  Thank you

## 2019-12-03 ENCOUNTER — OFFICE VISIT (OUTPATIENT)
Dept: FAMILY MEDICINE CLINIC | Facility: CLINIC | Age: 64
End: 2019-12-03
Payer: MEDICARE

## 2019-12-03 VITALS
WEIGHT: 216.2 LBS | BODY MASS INDEX: 27.75 KG/M2 | SYSTOLIC BLOOD PRESSURE: 124 MMHG | HEIGHT: 74 IN | OXYGEN SATURATION: 97 % | TEMPERATURE: 97.5 F | DIASTOLIC BLOOD PRESSURE: 80 MMHG | HEART RATE: 60 BPM

## 2019-12-03 DIAGNOSIS — I48.0 PAROXYSMAL ATRIAL FIBRILLATION (HCC): ICD-10-CM

## 2019-12-03 DIAGNOSIS — I67.89 CEREBROVASCULAR DISEASE, ILL-DEFINED, ACUTE: ICD-10-CM

## 2019-12-03 DIAGNOSIS — H10.33 ACUTE BACTERIAL CONJUNCTIVITIS OF BOTH EYES: ICD-10-CM

## 2019-12-03 DIAGNOSIS — I10 ESSENTIAL HYPERTENSION: Primary | ICD-10-CM

## 2019-12-03 DIAGNOSIS — E78.2 MIXED HYPERLIPIDEMIA: ICD-10-CM

## 2019-12-03 DIAGNOSIS — I10 HYPERTENSION, UNSPECIFIED TYPE: ICD-10-CM

## 2019-12-03 PROCEDURE — 99214 OFFICE O/P EST MOD 30 MIN: CPT | Performed by: FAMILY MEDICINE

## 2019-12-03 RX ORDER — LOSARTAN POTASSIUM 100 MG/1
100 TABLET ORAL DAILY
Qty: 90 TABLET | Refills: 1 | Status: SHIPPED | OUTPATIENT
Start: 2019-12-03 | End: 2020-04-17 | Stop reason: SDUPTHER

## 2019-12-03 RX ORDER — ATORVASTATIN CALCIUM 10 MG/1
10 TABLET, FILM COATED ORAL DAILY
Qty: 90 TABLET | Refills: 1 | Status: SHIPPED | OUTPATIENT
Start: 2019-12-03 | End: 2020-04-17 | Stop reason: SDUPTHER

## 2019-12-03 RX ORDER — LOSARTAN POTASSIUM AND HYDROCHLOROTHIAZIDE 25; 100 MG/1; MG/1
1 TABLET ORAL DAILY
Qty: 90 TABLET | Refills: 1 | Status: SHIPPED | OUTPATIENT
Start: 2019-12-03 | End: 2019-12-11

## 2019-12-03 RX ORDER — METOPROLOL SUCCINATE 50 MG/1
50 TABLET, EXTENDED RELEASE ORAL DAILY
Qty: 90 TABLET | Refills: 1 | Status: SHIPPED | OUTPATIENT
Start: 2019-12-03 | End: 2020-04-17 | Stop reason: SDUPTHER

## 2019-12-03 RX ORDER — OFLOXACIN 3 MG/ML
1 SOLUTION/ DROPS OPHTHALMIC 3 TIMES DAILY
Qty: 5 ML | Refills: 1 | Status: SHIPPED | OUTPATIENT
Start: 2019-12-03 | End: 2020-07-31

## 2019-12-03 RX ORDER — HYDROCHLOROTHIAZIDE 25 MG/1
25 TABLET ORAL DAILY
Qty: 90 TABLET | Refills: 1 | Status: SHIPPED | OUTPATIENT
Start: 2019-12-03 | End: 2020-04-17 | Stop reason: SDUPTHER

## 2019-12-03 NOTE — PROGRESS NOTES
Assessment/Plan:  Chronic conditions stable overall  Refills given on medications at this time  Diagnoses and all orders for this visit:    Essential hypertension    Cerebrovascular disease, ill-defined, acute  -     apixaban (ELIQUIS) 5 mg; Take 1 tablet (5 mg total) by mouth 2 (two) times a day    Mixed hyperlipidemia  -     atorvastatin (LIPITOR) 10 mg tablet; Take 1 tablet (10 mg total) by mouth daily    Hypertension, unspecified type  -     losartan-hydrochlorothiazide (HYZAAR) 100-25 MG per tablet; Take 1 tablet by mouth daily    Paroxysmal atrial fibrillation (HCC)  -     metoprolol succinate (TOPROL-XL) 50 mg 24 hr tablet; Take 1 tablet (50 mg total) by mouth daily    Acute bacterial conjunctivitis of both eyes  -     ofloxacin (OCUFLOX) 0 3 % ophthalmic solution; Administer 1 drop to both eyes 3 (three) times a day            Subjective:        Patient ID: Bartolome Salomon is a 71 y o  male  Patient follow-up on AFib, hypertension hyperlipidemia  Patient did see Cardiology  Patient did have echo and other studies  Patient with AFib on EKG  No chest pain shortness of breath  Patient status post colonoscopy  Patient will need to follow up in 3 years in this regard  Patient status post skin cancer left ear surgery  The patient doing well in this regard  Patient with some a cough put patient also redness and drainage out of bilateral eyes  Patient with crusting in the morning  No change in visual acuity  No fever noted  No headaches  Patient did use natural tears  The following portions of the patient's history were reviewed and updated as appropriate: allergies, current medications, past family history, past medical history, past social history, past surgical history and problem list       Review of Systems   Constitutional: Negative  HENT: Negative  Eyes: Positive for discharge, redness and itching  Negative for photophobia, pain and visual disturbance     Respiratory: Negative  Cardiovascular: Negative  Gastrointestinal: Negative  Endocrine: Negative  Genitourinary: Negative  Musculoskeletal: Negative  Skin: Negative  Allergic/Immunologic: Negative  Neurological: Negative  Hematological: Negative  Psychiatric/Behavioral: Negative  Objective:               /80 (BP Location: Right arm, Patient Position: Sitting, Cuff Size: Standard)   Pulse 60   Temp 97 5 °F (36 4 °C) (Tympanic)   Ht 6' 2" (1 88 m)   Wt 98 1 kg (216 lb 3 2 oz)   SpO2 97%   BMI 27 76 kg/m²          Physical Exam   Constitutional: He appears well-developed and well-nourished  No distress  HENT:   Head: Normocephalic  Right Ear: External ear normal    Left Ear: External ear normal    Mouth/Throat: Oropharynx is clear and moist  No oropharyngeal exudate  Eyes: Pupils are equal, round, and reactive to light  EOM are normal  Right eye exhibits discharge  Left eye exhibits discharge  No scleral icterus  Bilateral conjunctival injection   Neck: Normal range of motion  Neck supple  No thyromegaly present  Cardiovascular: Normal rate, normal heart sounds and intact distal pulses  Exam reveals no gallop and no friction rub  No murmur heard  Irregular irregular but rate controlled   Pulmonary/Chest: Effort normal and breath sounds normal  No respiratory distress  He has no wheezes  He has no rales  He exhibits no tenderness  Abdominal: Soft  Bowel sounds are normal  He exhibits no distension  There is no tenderness  There is no rebound and no guarding  Musculoskeletal: Normal range of motion  He exhibits no edema or tenderness  Lymphadenopathy:     He has no cervical adenopathy  Neurological: He is alert  No cranial nerve deficit  He exhibits normal muscle tone  Coordination normal    Skin: Skin is warm and dry  No rash noted  He is not diaphoretic  No erythema  No pallor  Psychiatric: He has a normal mood and affect   His behavior is normal  Judgment and thought content normal    Nursing note and vitals reviewed

## 2019-12-03 NOTE — TELEPHONE ENCOUNTER
Pharmacy call to inform us that the medication together is back ordered, I have place two orders for this med

## 2019-12-11 ENCOUNTER — OFFICE VISIT (OUTPATIENT)
Dept: CARDIOLOGY CLINIC | Facility: CLINIC | Age: 64
End: 2019-12-11
Payer: MEDICARE

## 2019-12-11 VITALS
BODY MASS INDEX: 27.21 KG/M2 | WEIGHT: 212 LBS | HEIGHT: 74 IN | DIASTOLIC BLOOD PRESSURE: 78 MMHG | HEART RATE: 62 BPM | SYSTOLIC BLOOD PRESSURE: 124 MMHG

## 2019-12-11 DIAGNOSIS — I10 ESSENTIAL HYPERTENSION: ICD-10-CM

## 2019-12-11 DIAGNOSIS — I48.0 PAROXYSMAL ATRIAL FIBRILLATION (HCC): Primary | ICD-10-CM

## 2019-12-11 PROCEDURE — 93000 ELECTROCARDIOGRAM COMPLETE: CPT | Performed by: INTERNAL MEDICINE

## 2019-12-11 PROCEDURE — 99214 OFFICE O/P EST MOD 30 MIN: CPT | Performed by: INTERNAL MEDICINE

## 2019-12-11 NOTE — PROGRESS NOTES
Cardiology Follow Up    Adelso Forman  1955  [id number]  [address] [facility] [address]  [facility] [address] [address]  [phone]    1  Paroxysmal atrial fibrillation (HCC)  Comprehensive metabolic panel    Lipid Panel with Direct LDL reflex    CBC and Platelet   2  Essential hypertension  Comprehensive metabolic panel    Lipid Panel with Direct LDL reflex    CBC and Platelet       Discussion/Summary:    Paroxysmal atrial fibrillation:  Holter monitor done recently in October showed sinus rhythm  Interestingly, he has no palpitations, but is in atrial fibrillation today  this suggests that he is not symptomatic in atrial fibrillation, and we continue with a rate control and anticoagulation strategy  Heart rate is very well controlled here in the office on his current dose of metoprolol  He will remain on Eliquis for anticoagulation  He recently saw his primary care physician  I will order blood work to monitor his kidney function, blood counts, cholesterol  Can return to see me in 1 year, unless there are changes in the interim  Blood pressure is well controlled  Dyslipidemia: On atorvastatin  Check lipid panel and CMP  History of Present Illness:   Pleasant 69-year-old man  History of paroxysmal atrial fibrillation with a prior CVA  He is maintained on Eliquis  Was previously maintaining sinus rhythm with Toprol X L  Interval History:  When I saw him last visit, he was complaining of palpitations  He was in atrial fibrillation at that time, but his heart rate was pretty well controlled  We opted for a Holter monitor to see what his rate control was, and interestingly he was in sinus rhythm when he had the Holter monitor done  Given this change, I felt that he was probably symptomatic with the atrial fibrillation  He returns for regular follow-up today    Overall, he continues to feel very well  He has no further palpitations  No dizziness, lightheadedness  No problems with his anticoagulation  Colonoscopy was performed, and showed some polyps  He will have a repeat in 3 years        Problem List     Cerebrovascular disease, ill-defined, acute    Essential hypertension    Paroxysmal atrial fibrillation ([facility] )    Overview Signed 3/15/2018  2:07 PM by Maggie Parmar DO     Transitioned From: Atrial fibrillation         Mixed hyperlipidemia    Pain of toe of right foot        Past Medical History:   Diagnosis Date    [facility]     Hyperlipidemia     Hypertension     Pneumonia     Skin cancer     Stroke ([facility] )     Supraventricular tachycardia ([facility] )      Social History     Tobacco Use    Smoking status: Never Smoker    Smokeless tobacco: Never Used   Substance Use Topics    Alcohol use: Yes     Comment: socailly     Family History   Problem Relation Age of Onset    Cancer Father         stomach    Hypertension Father     Stroke Father         syndrome    Cancer Family     Heart disease Family     Hypertension Family     Stroke Family         syndrome     Past Surgical History:   Procedure Laterality Date    APPENDECTOMY      COLONOSCOPY      fiberoptic, also 2009, both negative, resolved 2004    HERNIA REPAIR      KNEE SURGERY      SKIN GRAFT      left ear, skin cancer       Current Outpatient Medications:     apixaban (ELIQUIS) 5 mg, Take 1 tablet (5 mg total) by mouth 2 (two) times a day, Disp: 180 tablet, Rfl: 1    atorvastatin (LIPITOR) 10 mg tablet, Take 1 tablet (10 mg total) by mouth daily, Disp: 90 tablet, Rfl: 1    hydrochlorothiazide (HYDRODIURIL) 25 mg tablet, Take 1 tablet (25 mg total) by mouth daily, Disp: 90 tablet, Rfl: 1    losartan (COZAAR) 100 MG tablet, Take 1 tablet (100 mg total) by mouth daily, Disp: 90 tablet, Rfl: 1    metoprolol succinate (TOPROL-XL) 50 mg 24 hr tablet, Take 1 tablet (50 mg total) by mouth daily, Disp: 90 tablet, Rfl: 1    ofloxacin (OCUFLOX) 0 3 % ophthalmic solution, Administer 1 drop to both eyes 3 (three) times a day, Disp: 5 mL, Rfl: 1  No Known Allergies    Vitals:    12/11/19 1309   BP: 124/78   BP Location: Right arm   Patient Position: Sitting   Cuff Size: Standard   Pulse: 62   Weight: 96 2 kg (212 lb)   Height: 6' 2" (1 88 m)     Vitals:    12/11/19 1309   Weight: 96 2 kg (212 lb)      Height: 6' 2" (188 cm)   Body mass index is 27 22 kg/m²  Physical Exam:  GEN: Mindi Grayson appears well, alert and oriented x 3, pleasant and cooperative   HEENT: pupils equal, round, and reactive to light; extraocular muscles intact  NECK: supple, no carotid bruits   HEART: irregularly irregular  LUNGS: clear to auscultation bilaterally; no wheezes, rales, or rhonchi   ABDOMEN: normal bowel sounds, soft, no tenderness, no distention  EXTREMITIES: peripheral pulses normal; no clubbing, cyanosis, or edema  NEURO: no focal findings   SKIN: normal without suspicious lesions on exposed skin    ROS:  Palpitations are improved  Except as noted in HPI, is otherwise reviewed in detail and a 12 point review of systems is negative  ROS reviewed and is unchanged    Labs:  Lab Results   Component Value Date     09/19/2017    K 4 7 12/06/2018     12/06/2018    CREATININE 1 22 12/06/2018    BUN 16 12/06/2018    CO2 31 12/06/2018    ALT 19 12/06/2018    AST 24 12/06/2018    INR 1 07 05/14/2016    WBC 4 7 12/06/2018    HGB 14 9 12/06/2018    HCT 44 3 12/06/2018     12/06/2018       Lab Results   Component Value Date    CHOL 136 09/19/2017    CHOL 132 05/31/2017    CHOL 124 (L) 03/30/2016     No results found for: [facility]  Lab Results   Component Value Date    HDL 62 12/06/2018    HDL 59 09/19/2017    HDL 53 05/31/2017     Lab Results   Component Value Date    TRIG 51 12/06/2018    TRIG 54 09/19/2017    TRIG 56 05/31/2017     EKG:  Atrial fibrillation, 86 beats per minute

## 2020-01-24 LAB
ALBUMIN SERPL-MCNC: 4 G/DL (ref 3.6–5.1)
ALBUMIN/GLOB SERPL: 1.7 (CALC) (ref 1–2.5)
ALP SERPL-CCNC: 63 U/L (ref 40–115)
ALT SERPL-CCNC: 25 U/L (ref 9–46)
AST SERPL-CCNC: 23 U/L (ref 10–35)
BILIRUB SERPL-MCNC: 0.8 MG/DL (ref 0.2–1.2)
BUN SERPL-MCNC: 17 MG/DL (ref 7–25)
BUN/CREAT SERPL: 12 (CALC) (ref 6–22)
CALCIUM SERPL-MCNC: 9.5 MG/DL (ref 8.6–10.3)
CHLORIDE SERPL-SCNC: 104 MMOL/L (ref 98–110)
CHOLEST SERPL-MCNC: 128 MG/DL
CHOLEST/HDLC SERPL: 2.6 (CALC)
CO2 SERPL-SCNC: 35 MMOL/L (ref 20–32)
CREAT SERPL-MCNC: 1.38 MG/DL (ref 0.7–1.25)
ERYTHROCYTE [DISTWIDTH] IN BLOOD BY AUTOMATED COUNT: 12.7 % (ref 11–15)
GLOBULIN SER CALC-MCNC: 2.4 G/DL (CALC) (ref 1.9–3.7)
GLUCOSE SERPL-MCNC: 90 MG/DL (ref 65–99)
HCT VFR BLD AUTO: 44.6 % (ref 38.5–50)
HDLC SERPL-MCNC: 49 MG/DL
HGB BLD-MCNC: 14.9 G/DL (ref 13.2–17.1)
LDLC SERPL CALC-MCNC: 66 MG/DL (CALC)
MCH RBC QN AUTO: 30.2 PG (ref 27–33)
MCHC RBC AUTO-ENTMCNC: 33.4 G/DL (ref 32–36)
MCV RBC AUTO: 90.3 FL (ref 80–100)
NONHDLC SERPL-MCNC: 79 MG/DL (CALC)
PLATELET # BLD AUTO: 279 THOUSAND/UL (ref 140–400)
PMV BLD REES-ECKER: 10.2 FL (ref 7.5–12.5)
POTASSIUM SERPL-SCNC: 4.2 MMOL/L (ref 3.5–5.3)
PROT SERPL-MCNC: 6.4 G/DL (ref 6.1–8.1)
RBC # BLD AUTO: 4.94 MILLION/UL (ref 4.2–5.8)
SL AMB EGFR AFRICAN AMERICAN: 62 ML/MIN/1.73M2
SL AMB EGFR NON AFRICAN AMERICAN: 53 ML/MIN/1.73M2
SODIUM SERPL-SCNC: 143 MMOL/L (ref 135–146)
TRIGL SERPL-MCNC: 53 MG/DL
WBC # BLD AUTO: 5.7 THOUSAND/UL (ref 3.8–10.8)

## 2020-02-07 ENCOUNTER — TELEPHONE (OUTPATIENT)
Dept: CARDIOLOGY CLINIC | Facility: CLINIC | Age: 65
End: 2020-02-07

## 2020-02-07 NOTE — TELEPHONE ENCOUNTER
Spoke with patient's wife Gonzales regarding results  Advised patient should follow up with PCP per Dr Trish Leija       ----- Message from Van Menendez MD sent at 1/24/2020  7:47 AM EST -----  Please let patient know blood work was stable  Kidney function was borderline, should remain hydrated and follow up with PCP

## 2020-02-17 DIAGNOSIS — J11.1 INFLUENZA: Primary | ICD-10-CM

## 2020-02-17 RX ORDER — OSELTAMIVIR PHOSPHATE 75 MG/1
75 CAPSULE ORAL DAILY
Qty: 10 CAPSULE | Refills: 0 | Status: SHIPPED | OUTPATIENT
Start: 2020-02-17 | End: 2020-02-27

## 2020-02-17 NOTE — TELEPHONE ENCOUNTER
Patient called and stated that his wife was seen by Dr Claudette Polo for the flu and wasn't given Tamiflu  She was also advise to have her  take this medication as well    He is going to need a script call into the pharmacy, please call it into [facility] [phone]

## 2020-04-17 ENCOUNTER — OFFICE VISIT (OUTPATIENT)
Dept: FAMILY MEDICINE CLINIC | Facility: CLINIC | Age: 65
End: 2020-04-17
Payer: MEDICARE

## 2020-04-17 VITALS
BODY MASS INDEX: 28.44 KG/M2 | OXYGEN SATURATION: 99 % | TEMPERATURE: 97 F | WEIGHT: 221.6 LBS | HEIGHT: 74 IN | HEART RATE: 129 BPM | DIASTOLIC BLOOD PRESSURE: 80 MMHG | SYSTOLIC BLOOD PRESSURE: 130 MMHG

## 2020-04-17 DIAGNOSIS — I10 ESSENTIAL HYPERTENSION: ICD-10-CM

## 2020-04-17 DIAGNOSIS — E78.2 MIXED HYPERLIPIDEMIA: ICD-10-CM

## 2020-04-17 DIAGNOSIS — Z11.59 NEED FOR HEPATITIS C SCREENING TEST: ICD-10-CM

## 2020-04-17 DIAGNOSIS — Z00.00 MEDICARE ANNUAL WELLNESS VISIT, SUBSEQUENT: ICD-10-CM

## 2020-04-17 DIAGNOSIS — Z23 ENCOUNTER FOR IMMUNIZATION: ICD-10-CM

## 2020-04-17 DIAGNOSIS — I48.0 PAROXYSMAL ATRIAL FIBRILLATION (HCC): Primary | ICD-10-CM

## 2020-04-17 DIAGNOSIS — I67.89 CEREBROVASCULAR DISEASE, ILL-DEFINED, ACUTE: ICD-10-CM

## 2020-04-17 PROCEDURE — 3008F BODY MASS INDEX DOCD: CPT | Performed by: FAMILY MEDICINE

## 2020-04-17 PROCEDURE — G0009 ADMIN PNEUMOCOCCAL VACCINE: HCPCS

## 2020-04-17 PROCEDURE — 90670 PCV13 VACCINE IM: CPT

## 2020-04-17 PROCEDURE — 1125F AMNT PAIN NOTED PAIN PRSNT: CPT | Performed by: FAMILY MEDICINE

## 2020-04-17 PROCEDURE — 4040F PNEUMOC VAC/ADMIN/RCVD: CPT | Performed by: FAMILY MEDICINE

## 2020-04-17 PROCEDURE — 1160F RVW MEDS BY RX/DR IN RCRD: CPT | Performed by: FAMILY MEDICINE

## 2020-04-17 PROCEDURE — G0438 PPPS, INITIAL VISIT: HCPCS | Performed by: FAMILY MEDICINE

## 2020-04-17 PROCEDURE — 1170F FXNL STATUS ASSESSED: CPT | Performed by: FAMILY MEDICINE

## 2020-04-17 PROCEDURE — 99214 OFFICE O/P EST MOD 30 MIN: CPT | Performed by: FAMILY MEDICINE

## 2020-04-17 PROCEDURE — 1036F TOBACCO NON-USER: CPT | Performed by: FAMILY MEDICINE

## 2020-04-17 PROCEDURE — 3075F SYST BP GE 130 - 139MM HG: CPT | Performed by: FAMILY MEDICINE

## 2020-04-17 PROCEDURE — 3079F DIAST BP 80-89 MM HG: CPT | Performed by: FAMILY MEDICINE

## 2020-04-17 RX ORDER — HYDROCHLOROTHIAZIDE 25 MG/1
25 TABLET ORAL DAILY
Qty: 90 TABLET | Refills: 1 | Status: SHIPPED | OUTPATIENT
Start: 2020-04-17 | End: 2020-09-09

## 2020-04-17 RX ORDER — METOPROLOL SUCCINATE 100 MG/1
100 TABLET, EXTENDED RELEASE ORAL DAILY
Qty: 90 TABLET | Refills: 1 | Status: SHIPPED | OUTPATIENT
Start: 2020-04-17 | End: 2020-09-17

## 2020-04-17 RX ORDER — LOSARTAN POTASSIUM 100 MG/1
100 TABLET ORAL DAILY
Qty: 90 TABLET | Refills: 1 | Status: SHIPPED | OUTPATIENT
Start: 2020-04-17 | End: 2020-05-24

## 2020-04-17 RX ORDER — ATORVASTATIN CALCIUM 10 MG/1
10 TABLET, FILM COATED ORAL DAILY
Qty: 90 TABLET | Refills: 1 | Status: SHIPPED | OUTPATIENT
Start: 2020-04-17 | End: 2020-08-05

## 2020-04-29 ENCOUNTER — OFFICE VISIT (OUTPATIENT)
Dept: CARDIOLOGY CLINIC | Facility: CLINIC | Age: 65
End: 2020-04-29
Payer: MEDICARE

## 2020-04-29 VITALS
HEART RATE: 68 BPM | HEIGHT: 74 IN | WEIGHT: 219 LBS | BODY MASS INDEX: 28.11 KG/M2 | DIASTOLIC BLOOD PRESSURE: 84 MMHG | OXYGEN SATURATION: 98 % | SYSTOLIC BLOOD PRESSURE: 136 MMHG

## 2020-04-29 DIAGNOSIS — I48.19 PERSISTENT ATRIAL FIBRILLATION (HCC): Primary | ICD-10-CM

## 2020-04-29 DIAGNOSIS — I67.89 CEREBROVASCULAR DISEASE, ILL-DEFINED, ACUTE: ICD-10-CM

## 2020-04-29 DIAGNOSIS — I10 ESSENTIAL HYPERTENSION: ICD-10-CM

## 2020-04-29 PROCEDURE — 4040F PNEUMOC VAC/ADMIN/RCVD: CPT | Performed by: INTERNAL MEDICINE

## 2020-04-29 PROCEDURE — 1160F RVW MEDS BY RX/DR IN RCRD: CPT | Performed by: INTERNAL MEDICINE

## 2020-04-29 PROCEDURE — 93000 ELECTROCARDIOGRAM COMPLETE: CPT | Performed by: INTERNAL MEDICINE

## 2020-04-29 PROCEDURE — 99214 OFFICE O/P EST MOD 30 MIN: CPT | Performed by: INTERNAL MEDICINE

## 2020-04-29 PROCEDURE — 1036F TOBACCO NON-USER: CPT | Performed by: INTERNAL MEDICINE

## 2020-04-29 PROCEDURE — 1170F FXNL STATUS ASSESSED: CPT | Performed by: INTERNAL MEDICINE

## 2020-04-29 PROCEDURE — 3075F SYST BP GE 130 - 139MM HG: CPT | Performed by: INTERNAL MEDICINE

## 2020-04-29 PROCEDURE — 3079F DIAST BP 80-89 MM HG: CPT | Performed by: INTERNAL MEDICINE

## 2020-04-29 PROCEDURE — 3008F BODY MASS INDEX DOCD: CPT | Performed by: INTERNAL MEDICINE

## 2020-05-01 ENCOUNTER — TELEMEDICINE (OUTPATIENT)
Dept: CARDIOLOGY CLINIC | Facility: CLINIC | Age: 65
End: 2020-05-01
Payer: MEDICARE

## 2020-05-01 VITALS
HEIGHT: 74 IN | BODY MASS INDEX: 27.59 KG/M2 | DIASTOLIC BLOOD PRESSURE: 86 MMHG | HEART RATE: 76 BPM | SYSTOLIC BLOOD PRESSURE: 129 MMHG | WEIGHT: 215 LBS

## 2020-05-01 DIAGNOSIS — I48.19 PERSISTENT ATRIAL FIBRILLATION (HCC): ICD-10-CM

## 2020-05-01 PROCEDURE — 99214 OFFICE O/P EST MOD 30 MIN: CPT | Performed by: INTERNAL MEDICINE

## 2020-05-01 RX ORDER — FLECAINIDE ACETATE 100 MG/1
100 TABLET ORAL 2 TIMES DAILY
Qty: 60 TABLET | Refills: 3 | Status: SHIPPED | OUTPATIENT
Start: 2020-05-01 | End: 2020-09-17

## 2020-05-13 ENCOUNTER — TELEPHONE (OUTPATIENT)
Dept: SLEEP CENTER | Facility: CLINIC | Age: 65
End: 2020-05-13

## 2020-05-23 DIAGNOSIS — I10 ESSENTIAL HYPERTENSION: ICD-10-CM

## 2020-05-24 RX ORDER — LOSARTAN POTASSIUM 100 MG/1
TABLET ORAL
Qty: 90 TABLET | Refills: 1 | Status: SHIPPED | OUTPATIENT
Start: 2020-05-24 | End: 2020-09-09

## 2020-05-27 ENCOUNTER — HOSPITAL ENCOUNTER (OUTPATIENT)
Dept: NON INVASIVE DIAGNOSTICS | Facility: CLINIC | Age: 65
Discharge: HOME | End: 2020-05-27
Payer: MEDICARE

## 2020-05-27 DIAGNOSIS — I48.19 PERSISTENT ATRIAL FIBRILLATION (HCC): ICD-10-CM

## 2020-05-27 PROCEDURE — 93225 XTRNL ECG REC<48 HRS REC: CPT

## 2020-05-27 PROCEDURE — 93226 XTRNL ECG REC<48 HR SCAN A/R: CPT

## 2020-05-29 PROCEDURE — 93227 XTRNL ECG REC<48 HR R&I: CPT | Performed by: INTERNAL MEDICINE

## 2020-06-02 ENCOUNTER — TELEPHONE (OUTPATIENT)
Dept: CARDIOLOGY CLINIC | Facility: CLINIC | Age: 65
End: 2020-06-02

## 2020-06-02 ENCOUNTER — TELEPHONE (OUTPATIENT)
Dept: SLEEP CENTER | Facility: CLINIC | Age: 65
End: 2020-06-02

## 2020-06-04 ENCOUNTER — HOSPITAL ENCOUNTER (OUTPATIENT)
Dept: SLEEP CENTER | Facility: CLINIC | Age: 65
Discharge: HOME | End: 2020-06-04
Payer: MEDICARE

## 2020-06-04 DIAGNOSIS — I67.89 CEREBROVASCULAR DISEASE, ILL-DEFINED, ACUTE: ICD-10-CM

## 2020-06-04 DIAGNOSIS — I48.19 PERSISTENT ATRIAL FIBRILLATION (HCC): ICD-10-CM

## 2020-06-04 DIAGNOSIS — I10 ESSENTIAL HYPERTENSION: ICD-10-CM

## 2020-06-04 PROCEDURE — G0399 HOME SLEEP TEST/TYPE 3 PORTA: HCPCS

## 2020-06-04 PROCEDURE — G0399 HOME SLEEP TEST/TYPE 3 PORTA: HCPCS | Performed by: PSYCHIATRY & NEUROLOGY

## 2020-06-08 DIAGNOSIS — G47.33 OSA (OBSTRUCTIVE SLEEP APNEA): Primary | ICD-10-CM

## 2020-06-09 ENCOUNTER — TELEPHONE (OUTPATIENT)
Dept: SLEEP CENTER | Facility: CLINIC | Age: 65
End: 2020-06-09

## 2020-07-02 ENCOUNTER — TELEPHONE (OUTPATIENT)
Dept: SLEEP CENTER | Facility: CLINIC | Age: 65
End: 2020-07-02

## 2020-07-02 ENCOUNTER — OFFICE VISIT (OUTPATIENT)
Dept: SLEEP CENTER | Facility: CLINIC | Age: 65
End: 2020-07-02
Payer: MEDICARE

## 2020-07-02 VITALS
SYSTOLIC BLOOD PRESSURE: 140 MMHG | HEIGHT: 74 IN | WEIGHT: 213 LBS | DIASTOLIC BLOOD PRESSURE: 90 MMHG | BODY MASS INDEX: 27.34 KG/M2

## 2020-07-02 DIAGNOSIS — G47.33 OSA (OBSTRUCTIVE SLEEP APNEA): Primary | ICD-10-CM

## 2020-07-02 DIAGNOSIS — I48.19 PERSISTENT ATRIAL FIBRILLATION (HCC): ICD-10-CM

## 2020-07-02 PROCEDURE — 99204 OFFICE O/P NEW MOD 45 MIN: CPT | Performed by: PSYCHIATRY & NEUROLOGY

## 2020-07-02 NOTE — PROGRESS NOTES
Sleep Medicine Consultation Note    HPI:  Mr Gerhardt Hoe is a 76 y o  male seen at the request of Luis Antonio Jaquez DO for advice regarding treatment of sleep disordered breathing  He was recently diagnosed in June with FELISA on an HST: respiratory event index (ARIAN) of 9 2   The lowest SpO2 recorded is 85%  The patient presented with his wife  The patient stated that his PCP referred him due to atrial fibrillation  He has been diagnosed with A fib 6 years ago when he had a stroke  This was minor before, but then as the time has gone on, this has been more frequent  He has not had an ablation yet, but this is in the future he believes  He is currently on a blood thinner and beta blocker  The patient does not feel the Atrial fibrillation, but when he gets an EKG, the doctor sees it       Please see below for continuation of the HPI:      Sleep Disordered Breathing:  -Snoring: yes   -Severity: sometimes loud   -Frequency: nightly   -Duration: 30 years   -Over time: worsened   -Modifying factors: unsure  -Observed Apneas: yes  -Mouth Breathing at night: yes  -Dry Mouth in morning: yes   -Nocturnal Gasping: no  -Nasal Obstruction: yes  -Weight: stable    Sleep Pattern:  -Location: bedroom   -Bed/Recliner/Wedge: bed  -Bed Partner: yes  -HOB: flat  -# of pillows under head: 2  -Position: side  -Bedtime: 1130pm  -Lights out: by 12am  -Environmental: No lights/TV  -Latency: 15 mins  -Awakenings: 2-3   -Reason: void and other times he is unsure what wakes him   -Duration: difficult to get back to sleep  -Wake time: 6am   -Alarm: no  -Rise time: same time  -Days off: same  -Shift Work: not currently working  -Patient's estimate of total sleep time: 5-6h    Daytime Symptoms:  -Upon Awakening: tired  -Daytime fatigue/sleepiness: tired during the day and sleepy in the middle of the afternoon  -Naps: daily for 1 hour or more  -Involuntary Dozing: watching TV  -Cognitive Symptoms: denied  -Driving: Difficulty with sleepiness and driving:  denied   -- Close calls related to sleepiness: denied   -- Accidents related to sleepiness: denied      Questionnaires:   Sitting and reading: Moderate chance of dozing  Watching TV: Slight chance of dozing  Sitting, inactive in a public place (e g  a theatre or a meeting): Slight chance of dozing  As a passenger in a car for an hour without a break: Slight chance of dozing  Lying down to rest in the afternoon when circumstances permit: Moderate chance of dozing  Sitting and talking to someone: Would never doze  Sitting quietly after a lunch without alcohol: Would never doze  In a car, while stopped for a few minutes in traffic: Would never doze  Total score: 7      Sleep Review of Symptoms:  -Parasomnias:  --Sleep Walking: denied  --Dream Enactment: denied  --Bruxism: denied  -Motor:  --RLS: denied  --PLMS: denied  -Narcolepsy:  --Hallucinations: denied  --Paralysis: denied  --Cataplexy: denied    Childhood Sleep History: denied    Prior Sleep Studies/Evaluations:  See HPI    Family History:  Family history of sleep disorders: denied    Patient Active Problem List   Diagnosis    Cerebrovascular disease, ill-defined, acute    Essential hypertension    Persistent atrial fibrillation ([facility] [address]    Mixed hyperlipidemia    Pain of toe of right foot    Benign prostatic hyperplasia with lower urinary tract symptoms    Screening for colon cancer    Acute bacterial conjunctivitis of both eyes    FELISA (obstructive sleep apnea)     Past Medical History:   Diagnosis Date    [facility]     Hyperlipidemia     Hypertension     Pneumonia     Skin cancer     [facility]     [facility]        --> Denies any other cardiopulmonary disease  --> Seizure hx: denies  --> Head injury with LOC: denies  --> Supplemental Oxygen Use: denies    Labs   Results for Emmanuel Oats (MRN [id number]) as of 7/2/2020 09:22   Ref   Range 1/23/2020 07:19   Sodium Latest Ref Range: 135 - 146 mmol/L 143   Potassium Latest Ref Range: 3 5 - 5 3 mmol/L 4 2   Chloride Latest Ref Range: 98 - 110 mmol/L 104   CO2 Latest Ref Range: 20 - 32 mmol/L 35 (H)   BUN Latest Ref Range: 7 - 25 mg/dL 17   Creatinine Latest Ref Range: 0 70 - 1 25 mg/dL 1 38 (H)   SL AMB BUN/CREATININE RATIO Latest Ref Range: 6 - 22 (calc) 12   Glucose, Random Latest Ref Range: 65 - 99 mg/dL 90   Calcium Latest Ref Range: 8 6 - 10 3 mg/dL 9 5   AST Latest Ref Range: 10 - 35 U/L 23   ALT Latest Ref Range: 9 - 46 U/L 25   Alkaline Phosphatase Latest Ref Range: 40 - 115 U/L 63   Total Protein Latest Ref Range: 6 1 - 8 1 g/dL 6 4   Albumin Latest Ref Range: 3 6 - 5 1 g/dL 4 0   TOTAL BILIRUBIN Latest Ref Range: 0 2 - 1 2 mg/dL 0 8   eGFR African American Latest Ref Range: > OR = 60 mL/min/1 73m2 62   eGFR Non  Latest Ref Range: > OR = 60 mL/min/1 73m2 53 (L)   Albumin/Globulin Ratio Latest Ref Range: 1 0 - 2 5 (calc) 1 7   Cholesterol Latest Ref Range: <200 mg/dL 128   Triglycerides Latest Ref Range: <150 mg/dL 53   HDL Latest Ref Range: >40 mg/dL 49   Non-HDL Cholesterol Latest Ref Range: <130 mg/dL (calc) 79   LDL Calculated Latest Units: mg/dL (calc) 66   Chol HDLC Ratio Latest Ref Range: <5 0 (calc) 2 6   Globulin Latest Ref Range: 1 9 - 3 7 g/dL (calc) 2 4   White Blood Cell Count Latest Ref Range: 3 8 - 10 8 Thousand/uL 5 7   Red Blood Cell Count Latest Ref Range: 4 20 - 5 80 Million/uL 4 94   Hemoglobin Latest Ref Range: 13 2 - 17 1 g/dL 14 9   HCT Latest Ref Range: 38 5 - 50 0 % 44 6   MCV Latest Ref Range: 80 0 - 100 0 fL 90 3   MCH Latest Ref Range: 27 0 - 33 0 pg 30 2   MCHC  Latest Ref Range: 32 0 - 36 0 g/dL 33 4   RDW Latest Ref Range: 11 0 - 15 0 % 12 7   Platelet Count Latest Ref Range: 140 - 400 Thousand/uL 279   SL AMB MPV Latest Ref Range: 7 5 - 12 5 fL 10 2     Holter monitor 5/2020: IMPRESSION:  1   Predominantly atrial fibrillation with an average heart rate of 78 bpm    2  Rare PVCs, consisting of 0 5% of total beats  No sustained ventricular arrhythmias  3  No significant pauses  Patient's symptoms of fluttering correlated with atrial fibrillation at controlled rates  Echocardiogram 10/2019: SUMMARY     LEFT VENTRICLE:  Systolic function was normal  Ejection fraction was estimated to be 55 %  There were no regional wall motion abnormalities  Wall thickness was at the upper limits of normal      MITRAL VALVE:  There was mild to moderate regurgitation      TRICUSPID VALVE:  There was mild regurgitation  Pulmonary artery systolic pressure was within the normal range      PULMONIC VALVE:  There was trace regurgitation      AORTA:  The root exhibited mild dilatation  3 8 cm  There was mild dilatation of the ascending aorta  3 7 cm       Past Surgical History:   Procedure Laterality Date    APPENDECTOMY      COLONOSCOPY      fiberoptic, also 2009, both negative, resolved 2004    HERNIA REPAIR      KNEE SURGERY      SKIN GRAFT      left ear, skin cancer       --> ENT procedures: tonsillectomy and adenoidectomy as a child    Current Outpatient Medications   Medication Sig Dispense Refill    apixaban (ELIQUIS) 5 mg Take 1 tablet (5 mg total) by mouth 2 (two) times a day 180 tablet 1    atorvastatin (LIPITOR) 10 mg tablet Take 1 tablet (10 mg total) by mouth daily 90 tablet 1    flecainide (TAMBOCOR) 100 mg tablet Take 1 tablet (100 mg total) by mouth 2 (two) times a day 60 tablet 3    hydrochlorothiazide (HYDRODIURIL) 25 mg tablet Take 1 tablet (25 mg total) by mouth daily 90 tablet 1    losartan (COZAAR) 100 MG tablet TAKE 1 TABLET BY MOUTH DAILY 90 tablet 1    metoprolol succinate (TOPROL-XL) 100 mg 24 hr tablet Take 1 tablet (100 mg total) by mouth daily 90 tablet 1    ofloxacin (OCUFLOX) 0 3 % ophthalmic solution Administer 1 drop to both eyes 3 (three) times a day (Patient not taking: Reported on 4/17/2020) 5 mL 1     No current facility-administered medications for this visit          Social History:  -Employment:    -Smoking: denied  -Caffeine: decaf coffee  -Alcohol: minimally  -THC: denied  -OTC/Supplements/herbals: denied  -Illicits:  denies  -Family: lives with wife    ROS:  Genitourinary none   Cardiology palpitations/fluttering feeling in the chest and ankle/leg swelling   Gastrointestinal none   Neurology need to move extremities   Constitutional none   Integumentary none   Psychiatry none   Musculoskeletal none   Pulmonary shortness of breath with activity   ENT none   Endocrine none   Hematological none     MSE:  -Alert and appropriate: alert, calm, cooperative  -Oriented to person, place and time:  name, age, location, day/date/mon/yr  -Behavior: good, sustained eye contact  -Speech: Unremarkable rate/rhythm/volume  -Mood: "good"  -Affect:  constricted  -Thought Processes: linear, logical, goal directed  PE:  Body mass index is 27 35 kg/m²  Vitals:    07/02/20 0900   BP: 140/90   Weight: 96 6 kg (213 lb)   Height: 6' 2" (1 88 m)       -General:  In NAD    -Eyes: Conjunctival injection: none     -EOM:  PERRLA, EOMI   -Eyelid hooding: yes    -ENT: MP: 4/4   -Facial deformity: no retrognathia   -Hard palate: high arch   -Soft palate: low set, No crowding   -Gums and teeth: dentures on top only, normla dentition on the bottom   -Tongue:  Scalloping   -Nares:  Patent    -Neck/Lymphatics: Lymphadenopathy:  none appreciated   -Masses:  none appreciated   -Circumference: Neck Circumference: 16 5 "    -Cardiac: Auscultation:  RRR   - LE edema over shins: trace to 1+ bilaterally     -Pulm: -Respirations: unlaboured         -Auscultation:  CTA bilaterally, posterior fields    -Neuro: No resting tremor     -Musculoskeletal: Gait and stance: normal turning and ambulation; unremarkable  Assessment:  Mr Wendi Murdock is a 76 y o  male who is seen to evaluate for treatment of obstructive sleep apnea    Given the patients persistent atrial fibrillation, stroke history, and daytime sleepiness, it is important to treat his FELISA at this time  The pathophysiology of, the reasons to treat and treatment options for obstructive sleep apnea were all reviewed with the patient and his wife today  He is amenable to treatment with PAP therapy  Discussed keeping nasal passages clear, abstaining from alcohol, and other sedating drugs at night- which will worsen symptoms of FELISA  --History provided by: patient and his wife  --Records reviewed: in chart, HST, echo, holter      Recommendations:  1) APAP 5-15cm with a FFM  2) Driving safety was reviewed with patient  If the patient feels too sleepy to drive he knows not to drive  If he becomes sleepy while driving he will pull over and nap  3) Follow-up 6-8 weeks  4) Call with any questions or concerns  All questions answered for the patient and his wife who indicated understanding and agreed with the plan       Dylon Britt MD  Psychiatry/ Sleep medicine

## 2020-07-02 NOTE — PATIENT INSTRUCTIONS
Recommendations:  1) APAP 5-15cm with a FFM  2) Driving safety was reviewed with patient  If the patient feels too sleepy to drive he knows not to drive  If he becomes sleepy while driving he will pull over and nap  3) Follow-up 6-8 weeks  4) Call with any questions or concerns

## 2020-07-02 NOTE — LETTER
July 2, 2020     Suzanne Danielson, [address] [address]    Patient: Colt Wade   YOB: 1955   Date of Visit: 7/2/2020       Dear Dr Ajit Harris:    Thank you for referring Colt Wade to me for evaluation  Below are my notes for this consultation  If you have questions, please do not hesitate to call me  I look forward to following your patient along with you  Sincerely,        Santy Jesus MD        CC: MD Santy Farmer MD  7/2/2020  9:49 AM  Sign at close encounter  Sleep Medicine Consultation Note    HPI:  Mr Colt Wade is a 76 y o  male seen at the request of Antolin Rutledge DO for advice regarding treatment of sleep disordered breathing  He was recently diagnosed in June with FELISA on an HST: respiratory event index (ARIAN) of 9 2   The lowest SpO2 recorded is 85%  The patient presented with his wife  The patient stated that his PCP referred him due to atrial fibrillation  He has been diagnosed with A fib 6 years ago when he had a stroke  This was minor before, but then as the time has gone on, this has been more frequent  He has not had an ablation yet, but this is in the future he believes  He is currently on a blood thinner and beta blocker  The patient does not feel the Atrial fibrillation, but when he gets an EKG, the doctor sees it       Please see below for continuation of the HPI:      Sleep Disordered Breathing:  -Snoring: yes   -Severity: sometimes loud   -Frequency: nightly   -Duration: 30 years   -Over time: worsened   -Modifying factors: unsure  -Observed Apneas: yes  -Mouth Breathing at night: yes  -Dry Mouth in morning: yes   -Nocturnal Gasping: no  -Nasal Obstruction: yes  -Weight: stable    Sleep Pattern:  -Location: bedroom   -Bed/Recliner/Wedge: bed  -Bed Partner: yes  -HOB: flat  -# of pillows under head: 2  -Position: side  -Bedtime: 1130pm  -Lights out: by 12am  -Environmental: No lights/TV  -Latency: 15 mins  -Awakenings: 2-3   -Reason: void and other times he is unsure what wakes him   -Duration: difficult to get back to sleep  -Wake time: 6am   -Alarm: no  -Rise time: same time  -Days off: same  -Shift Work: not currently working  -Patient's estimate of total sleep time: 5-6h    Daytime Symptoms:  -Upon Awakening: tired  -Daytime fatigue/sleepiness: tired during the day and sleepy in the middle of the afternoon  -Naps: daily for 1 hour or more  -Involuntary Dozing: watching TV  -Cognitive Symptoms: denied  -Driving: Difficulty with sleepiness and driving:  denied   -- Close calls related to sleepiness: denied   -- Accidents related to sleepiness: denied      Questionnaires:   Sitting and reading:  Moderate chance of dozing  Watching TV: Slight chance of dozing  Sitting, inactive in a public place (e g  a theatre or a meeting): Slight chance of dozing  As a passenger in a car for an hour without a break: Slight chance of dozing  Lying down to rest in the afternoon when circumstances permit: Moderate chance of dozing  Sitting and talking to someone: Would never doze  Sitting quietly after a lunch without alcohol: Would never doze  In a car, while stopped for a few minutes in traffic: Would never doze  Total score: 7      Sleep Review of Symptoms:  -Parasomnias:  --Sleep Walking: denied  --Dream Enactment: denied  --Bruxism: denied  -Motor:  --RLS: denied  --PLMS: denied  -Narcolepsy:  --Hallucinations: denied  --Paralysis: denied  --Cataplexy: denied    Childhood Sleep History: denied    Prior Sleep Studies/Evaluations:  See HPI    Family History:  Family history of sleep disorders: denied    Patient Active Problem List   Diagnosis    Cerebrovascular disease, ill-defined, acute    Essential hypertension    Persistent [facility] ([facility] [address] [facility]    Mixed hyperlipidemia    Pain of toe of right foot    Benign prostatic hyperplasia with lower urinary tract symptoms    Screening for colon cancer    Acute bacterial conjunctivitis of both eyes    FELISA (obstructive sleep apnea)     Past Medical History:   Diagnosis Date    [facility]     Hyperlipidemia     Hypertension     Pneumonia     Skin cancer     [facility]     Supraventricular tachycardia ([facility])        --> Denies any other cardiopulmonary disease  --> Seizure hx: denies  --> Head injury with LOC: denies  --> Supplemental Oxygen Use: denies    Labs   Results for Maricruz Deluna (MRN [id number]) as of 7/2/2020 09:22   Ref   Range 1/23/2020 07:19   Sodium Latest Ref Range: 135 - 146 mmol/L 143   Potassium Latest Ref Range: 3 5 - 5 3 mmol/L 4 2   Chloride Latest Ref Range: 98 - 110 mmol/L 104   CO2 Latest Ref Range: 20 - 32 mmol/L 35 (H)   BUN Latest Ref Range: 7 - 25 mg/dL 17   Creatinine Latest Ref Range: 0 70 - 1 25 mg/dL 1 38 (H)   SL AMB BUN/CREATININE RATIO Latest Ref Range: 6 - 22 (calc) 12   Glucose, Random Latest Ref Range: 65 - 99 mg/dL 90   Calcium Latest Ref Range: 8 6 - 10 3 mg/dL 9 5   AST Latest Ref Range: 10 - 35 U/L 23   ALT Latest Ref Range: 9 - 46 U/L 25   Alkaline Phosphatase Latest Ref Range: 40 - 115 U/L 63   Total Protein Latest Ref Range: 6 1 - 8 1 g/dL 6 4   Albumin Latest Ref Range: 3 6 - 5 1 g/dL 4 0   TOTAL BILIRUBIN Latest Ref Range: 0 2 - 1 2 mg/dL 0 8   eGFR African American Latest Ref Range: > OR = 60 mL/min/1 73m2 62   eGFR Non  Latest Ref Range: > OR = 60 mL/min/1 73m2 53 (L)   Albumin/Globulin Ratio Latest Ref Range: 1 0 - 2 5 (calc) 1 7   Cholesterol Latest Ref Range: <200 mg/dL 128   Triglycerides Latest Ref Range: <150 mg/dL 53   HDL Latest Ref Range: >40 mg/dL 49   Non-HDL Cholesterol Latest Ref Range: <130 mg/dL (calc) 79   LDL Calculated Latest Units: mg/dL (calc) 66   Chol HDLC Ratio Latest Ref Range: <5 0 (calc) 2 6   Globulin Latest Ref Range: 1 9 - 3 7 g/dL (calc) 2 4   White Blood Cell Count Latest Ref Range: 3 8 - 10 8 Thousand/uL 5 7   Red Blood Cell Count Latest Ref Range: 4 20 - 5 80 Million/uL 4 94   Hemoglobin Latest Ref Range: 13 2 - 17 1 g/dL 14 9   HCT Latest Ref Range: 38 5 - 50 0 % 44 6   MCV Latest Ref Range: 80 0 - 100 0 fL 90 3   MCH Latest Ref Range: 27 0 - 33 0 pg 30 2   MCHC  Latest Ref Range: 32 0 - 36 0 g/dL 33 4   RDW Latest Ref Range: 11 0 - 15 0 % 12 7   Platelet Count Latest Ref Range: 140 - 400 Thousand/uL 279   SL AMB MPV Latest Ref Range: 7 5 - 12 5 fL 10 2     Holter monitor 5/2020: IMPRESSION:  1  Predominantly atrial fibrillation with an average heart rate of 78 bpm    2  Rare PVCs, consisting of 0 5% of total beats  No sustained ventricular arrhythmias  3  No significant pauses  Patient's symptoms of fluttering correlated with atrial fibrillation at controlled rates  Echocardiogram 10/2019: SUMMARY     LEFT VENTRICLE:  Systolic function was normal  Ejection fraction was estimated to be 55 %  There were no regional wall motion abnormalities  Wall thickness was at the upper limits of normal      MITRAL VALVE:  There was mild to moderate regurgitation      TRICUSPID VALVE:  There was mild regurgitation  Pulmonary artery systolic pressure was within the normal range      PULMONIC VALVE:  There was trace regurgitation      AORTA:  The root exhibited mild dilatation  3 8 cm  There was mild dilatation of the ascending aorta   3 7 cm       Past Surgical History:   Procedure Laterality Date    APPENDECTOMY      COLONOSCOPY      fiberoptic, also 2009, both negative, resolved 2004    HERNIA REPAIR      KNEE SURGERY      SKIN GRAFT      left ear, skin cancer       --> ENT procedures: tonsillectomy and adenoidectomy as a child    Current Outpatient Medications   Medication Sig Dispense Refill    apixaban (ELIQUIS) 5 mg Take 1 tablet (5 mg total) by mouth 2 (two) times a day 180 tablet 1    atorvastatin (LIPITOR) 10 mg tablet Take 1 tablet (10 mg total) by mouth daily 90 tablet 1    flecainide (TAMBOCOR) 100 mg tablet Take 1 tablet (100 mg total) by mouth 2 (two) times a day 60 tablet 3    hydrochlorothiazide (HYDRODIURIL) 25 mg tablet Take 1 tablet (25 mg total) by mouth daily 90 tablet 1    losartan (COZAAR) 100 MG tablet TAKE 1 TABLET BY MOUTH DAILY 90 tablet 1    metoprolol succinate (TOPROL-XL) 100 mg 24 hr tablet Take 1 tablet (100 mg total) by mouth daily 90 tablet 1    ofloxacin (OCUFLOX) 0 3 % ophthalmic solution Administer 1 drop to both eyes 3 (three) times a day (Patient not taking: Reported on 4/17/2020) 5 mL 1     No current facility-administered medications for this visit  Social History:  -Employment:    -Smoking: denied  -Caffeine: decaf coffee  -Alcohol: minimally  -THC: denied  -OTC/Supplements/herbals: denied  -Illicits:  denies  -Family: lives with wife    ROS:  Genitourinary none   Cardiology palpitations/fluttering feeling in the chest and ankle/leg swelling   Gastrointestinal none   Neurology need to move extremities   Constitutional none   Integumentary none   Psychiatry none   Musculoskeletal none   Pulmonary shortness of breath with activity   ENT none   Endocrine none   Hematological none     MSE:  -Alert and appropriate: alert, calm, cooperative  -Oriented to person, place and time:  name, age, location, day/date/mon/yr  -Behavior: good, sustained eye contact  -Speech: Unremarkable rate/rhythm/volume  -Mood: "good"  -Affect:  constricted  -Thought Processes: linear, logical, goal directed  PE:  Body mass index is 27 35 kg/m²    Vitals:    07/02/20 0900   BP: 140/90   Weight: 96 6 kg (213 lb)   Height: 6' 2" (1 88 m)       -General:  In NAD    -Eyes: Conjunctival injection: none     -EOM:  PERRLA, EOMI   -Eyelid hooding: yes    -ENT: MP: 4/4   -Facial deformity: no retrognathia   -Hard palate: high arch   -Soft palate: low set, No crowding   -Gums and teeth: dentures on top only, normla dentition on the bottom   -Tongue:  Scalloping   -Nares:  Patent    -Neck/Lymphatics: Lymphadenopathy:  none appreciated   -Masses:  none appreciated   -Circumference: Neck Circumference: 16 5 "    -Cardiac: Auscultation:  RRR   - LE edema over shins: trace to 1+ bilaterally     -Pulm: -Respirations: unlaboured         -Auscultation:  CTA bilaterally, posterior fields    -Neuro: No resting tremor     -Musculoskeletal: Gait and stance: normal turning and ambulation; unremarkable  Assessment:  Mr Shawn Guzman is a 76 y o  male who is seen to evaluate for treatment of obstructive sleep apnea  Given the patients persistent atrial fibrillation, stroke history, and daytime sleepiness, it is important to treat his FELISA at this time  The pathophysiology of, the reasons to treat and treatment options for obstructive sleep apnea were all reviewed with the patient and his wife today  He is amenable to treatment with PAP therapy  Discussed keeping nasal passages clear, abstaining from alcohol, and other sedating drugs at night- which will worsen symptoms of FELISA  --History provided by: patient and his wife  --Records reviewed: in chart, HST, echo, holter      Recommendations:  1) APAP 5-15cm with a FFM  2) Driving safety was reviewed with patient  If the patient feels too sleepy to drive he knows not to drive  If he becomes sleepy while driving he will pull over and nap  3) Follow-up 6-8 weeks  4) Call with any questions or concerns  All questions answered for the patient and his wife who indicated understanding and agreed with the plan       Steven Mitchell MD  Psychiatry/ Sleep medicine

## 2020-07-06 ENCOUNTER — TELEPHONE (OUTPATIENT)
Dept: SLEEP CENTER | Facility: CLINIC | Age: 65
End: 2020-07-06

## 2020-07-06 DIAGNOSIS — I67.89 CEREBROVASCULAR DISEASE, ILL-DEFINED, ACUTE: ICD-10-CM

## 2020-07-09 ENCOUNTER — OFFICE VISIT (OUTPATIENT)
Dept: CARDIOLOGY CLINIC | Facility: CLINIC | Age: 65
End: 2020-07-09
Payer: MEDICARE

## 2020-07-09 VITALS
HEART RATE: 62 BPM | DIASTOLIC BLOOD PRESSURE: 82 MMHG | TEMPERATURE: 97.4 F | WEIGHT: 214 LBS | SYSTOLIC BLOOD PRESSURE: 130 MMHG | HEIGHT: 74 IN | BODY MASS INDEX: 27.46 KG/M2

## 2020-07-09 DIAGNOSIS — I48.19 PERSISTENT ATRIAL FIBRILLATION (HCC): Primary | ICD-10-CM

## 2020-07-09 PROCEDURE — 3075F SYST BP GE 130 - 139MM HG: CPT | Performed by: INTERNAL MEDICINE

## 2020-07-09 PROCEDURE — 1160F RVW MEDS BY RX/DR IN RCRD: CPT | Performed by: INTERNAL MEDICINE

## 2020-07-09 PROCEDURE — 99215 OFFICE O/P EST HI 40 MIN: CPT | Performed by: INTERNAL MEDICINE

## 2020-07-09 PROCEDURE — 3079F DIAST BP 80-89 MM HG: CPT | Performed by: INTERNAL MEDICINE

## 2020-07-09 PROCEDURE — 1036F TOBACCO NON-USER: CPT | Performed by: INTERNAL MEDICINE

## 2020-07-09 PROCEDURE — 4040F PNEUMOC VAC/ADMIN/RCVD: CPT | Performed by: INTERNAL MEDICINE

## 2020-07-09 PROCEDURE — 93000 ELECTROCARDIOGRAM COMPLETE: CPT | Performed by: INTERNAL MEDICINE

## 2020-07-09 PROCEDURE — 3008F BODY MASS INDEX DOCD: CPT | Performed by: INTERNAL MEDICINE

## 2020-07-09 NOTE — H&P (VIEW-ONLY)
[facility] [address]     Outpatient Follow-up  Today's Date: 07/09/20        Patient name: Anika Bhatt  YOB: 1955  Sex: male         Chief Complaint: f/u afib    ASSESSMENT:  Problem List Items Addressed This Visit        Cardiovascular and Mediastinum    Persistent atrial fibrillation [facility]) - Primary    Relevant Orders    POCT ECG          77 yo male  1) Peristent afib since Dec 2019, rate controlled but symptomatic w fatigue and mild palpitations  Active 77 yo  [id number] age, HTN, CVA on Eliquis  Afib was paroxysmal before this and holter Oct 2019 showed no episodes w sinus bradycardia in 50s  We added flecaindie and HR was in 50s so he stopped on his own, but didn't feel bad  Holter showed 100% afib rate controlled 40-141bpm average 78bpm  2) Normal EF and LA size, mild to moderate MR Echo Oct 2019  No edema or symptoms of heart failure  3) HTN well controlled  4) CVA 6 years ago w/o residual defecit  PLAN:  1  Went over options of rate control, ablation and dccv w antiarrhythmic again  He will opt for DCCV w Flecainide  He will start this day before ablation and have DCCV  IF afib recurs the plan is for abaltion  He is at risk for bradycardia post DCCV so will have him hold metoprolol the morning of (But he should take flecainide/eliquis)     We went over risks and benefits of afib ablation in detail:    INFORMED CONSENT: Risks, benefits, and alternatives to atrial fibrillation ablation with possibly a combination of cryoballoon and radiofrequency ablation, and associated procedures such as transesophageal echocardiogram and atrial flutter ablation discussed  Alternative treatment with medical management has been tried and/or discussed  Usual pre and post operative expectations were discussed  Estimated complication rate is <1%    Atrial fibrillation ablation was explained to be a treatment that reduces burden of atrial fibrillation but is not a cure and medications and repeat ablations are often required  Although most patients derive improvement in symptoms and burden in atrial fibrillation, there are some patients that have not improved following this procedure  Blood thinners will not be discontinued immediately after ablation and may be required long term regardless of result  The patient understood risks include but not limited to death, esophageal injury, phrenic nerve injury (diaphragm), stroke, bleeding and vascular complication, bleeding around the heart and open heart surgery  Follow up in: 1 month (DCCV planned for 3 weeks)    Orders Placed This Encounter   Procedures    POCT ECG     There are no discontinued medications    HPI/Subjective:     77 yo male  1) Peristent afib since Dec 2019, rate controlled but symptomatic w fatigue and mild palpitations  Active 77 yo  [id number] age, HTN, CVA on Eliquis  Afib was paroxysmal before this and holter Oct 2019 showed no episodes w sinus bradycardia in 50s  We added flecaindie and HR was in 50s so he stopped on his own, but didn't feel bad  Holter showed 100% afib rate controlled 40-141bpm average 78bpm  2) Normal EF and LA size, mild to moderate MR Echo Oct 2019  No edema or symptoms of heart failure  3) HTN well controlled  4) CVA 6 years ago w/o residual defecit  Please note HPI is listed by problem with with update following it, it is copied again in the assessment above and reflects medical decision making as well  Complete 12 point ROS reviewed and otherwise non pertinent or negative except as per HPI pertinent positives in Cardiovascular and Respiratory emphasized  Please see paper chart for outpatient clinic patients where the patient completed the 12 point ROS survey             Past Medical History:   Diagnosis Date    Atrial fibrillation ([facility] )     Hyperlipidemia     Hypertension     Pneumonia     Skin cancer     Stroke ([facility] )     Supraventricular tachycardia (HCC)        No Known Allergies  I reviewed the Home Medication list and Allergies in the chart  Scheduled Meds:  Current Outpatient Medications   Medication Sig Dispense Refill    apixaban (ELIQUIS) 5 mg Take 1 tablet (5 mg total) by mouth 2 (two) times a day 180 tablet 1    atorvastatin (LIPITOR) 10 mg tablet Take 1 tablet (10 mg total) by mouth daily 90 tablet 1    hydrochlorothiazide (HYDRODIURIL) 25 mg tablet Take 1 tablet (25 mg total) by mouth daily 90 tablet 1    losartan (COZAAR) 100 MG tablet TAKE 1 TABLET BY MOUTH DAILY 90 tablet 1    metoprolol succinate (TOPROL-XL) 100 mg 24 hr tablet Take 1 tablet (100 mg total) by mouth daily 90 tablet 1    flecainide (TAMBOCOR) 100 mg tablet Take 1 tablet (100 mg total) by mouth 2 (two) times a day (Patient not taking: Reported on 7/9/2020) 60 tablet 3    ofloxacin (OCUFLOX) 0 3 % ophthalmic solution Administer 1 drop to both eyes 3 (three) times a day (Patient not taking: Reported on 4/17/2020) 5 mL 1     No current facility-administered medications for this visit        PRN Meds:         Family History   Problem Relation Age of Onset   Favian Bonilla Cancer Father         stomach    Hypertension Father     Stroke Father         syndrome    Cancer Family     Heart disease Family     Hypertension Family     Stroke Family         syndrome       Social History     Socioeconomic History    Marital status: /Civil Union     Spouse name: Not on file    Number of children: Not on file    Years of education: Not on file    Highest education level: Not on file   Occupational History    Not on file   Social Needs    Financial resource strain: Not on file    Food insecurity:     Worry: Not on file     Inability: Not on file    Transportation needs:     Medical: Not on file     Non-medical: Not on file   Tobacco Use    Smoking status: Never Smoker    Smokeless tobacco: Never Used   Substance and Sexual Activity    Alcohol use: Yes     Comment: socailly    Drug use: No    Sexual activity: Not on file   Lifestyle    Physical activity:     Days per week: Not on file     Minutes per session: Not on file    Stress: Not on file   Relationships    Social connections:     Talks on phone: Not on file     Gets together: Not on file     Attends [employer] service: Not on file     Active member of club or organization: Not on file     Attends meetings of clubs or organizations: Not on file     Relationship status: Not on file    Intimate partner violence:     Fear of current or ex partner: Not on file     Emotionally abused: Not on file     Physically abused: Not on file     Forced sexual activity: Not on file   Other Topics Concern    Not on file   Social History Narrative    Not on file         OBJECTIVE:    /82 (BP Location: Left arm, Patient Position: Sitting, Cuff Size: Large)   Pulse 62   Temp (!) 97 4 °F (36 3 °C) (Temporal)   Ht 6' 2" (1 88 m)   Wt 97 1 kg (214 lb)   BMI 27 48 kg/m²   Vitals:    07/09/20 0904   Weight: 97 1 kg (214 lb)     GEN: No acute distress, Alert and oriented, well appearing  HEENT:Head, neck, ears, oral pharynx: Mucus membranes moist, oral pharynx clear, nares clear  External ears normal  EYES: Pupils equal, sclera anicteric, midline, normal conjuctiva  NECK: No JVD, supple, no obvious masses or thryomegaly or goiter  CARDIOVASCULAR: irreg irreg, No murmur, rub, gallops S1,S2  LUNGS: Clear To auscultation bilaterally, normal effort, no rales, rhonchi, crackles  ABDOMEN:  nondistended,  without obvious organomegaly or ascites  EXTREMITIES/VASCULAR:  No edema  Radial pulses intact, pedal pulses difficult to palpate, warm an well perfused  PSYCH: Normal Affect, no overt suicidal ideation, linear speech pattern without evidence of psychosis     NEURO: Grossly intact, moving all extremiteis equal, face symmetric, alert and responsive, no obvious focal defecits  GAIT:  Ambulates normally without difficulty  HEME: No bleeding, bruising, petechia, purpura  SKIN: No significant rashes, warm, no diaphoresis or pallor  Lab Results:       LABS:      Chemistry        Component Value Date/Time     2017 0958    K 4 2 2020 0719     2020 0719    CO2 35 (H) 2020 0719    BUN 17 2020 0719    CREATININE 1 38 (H) 2020 0719    CREATININE 1 12 2017 0958        Component Value Date/Time    CALCIUM 9 5 2020 0719    ALKPHOS 63 2020 0719    AST 23 2020 0719    ALT 25 2020 0719    BILITOT 0 9 2017 0958            Lab Results   Component Value Date    CHOL 136 2017    CHOL 132 2017    CHOL 124 (L) 2016     Lab Results   Component Value Date    HDL 49 2020    HDL 62 2018    HDL 59 2017     Lab Results   Component Value Date    LDLCALC 66 2020    LDLCALC 69 2018     Lab Results   Component Value Date    TRIG 53 2020    TRIG 51 2018    TRIG 54 2017     No results found for: CHOLHDL    IMAGING: No results found       Cardiac testing:   Results for orders placed during the hospital encounter of 10/08/19   Echo complete with contrast if indicated    Narrative [facility] [address]  [address]  [phone]    Transthoracic Echocardiogram  2D, M-mode, Doppler, and Color Doppler    Study date:  08-Oct-2019    Patient: Jeison Michelle  MR number: [id number]  Account number: [de-identified]  : 1955  Age: 71 years  Gender: Male  Status: Outpatient  Location: [address] [place] [address] [facility]  Height: 76 in  Weight: 213 6 lb  BP: 124/ 70 mmHg    Indications: A-Fib    Diagnoses: I48 0 - Atrial fibrillation    Sonographer:  KIAH Nayak  Primary Physician:  Inocencia Cook DO  Referring Physician:  Dusty Szymanski MD  Group:    [facility]  Interpreting Physician:  Saida Jean MD    SUMMARY    LEFT VENTRICLE:  Systolic function was normal  Ejection fraction was estimated to be 55 %  There were no regional wall motion abnormalities  Wall thickness was at the upper limits of normal     MITRAL VALVE:  There was mild to moderate regurgitation  TRICUSPID VALVE:  There was mild regurgitation  Pulmonary artery systolic pressure was within the normal range  PULMONIC VALVE:  There was trace regurgitation  AORTA:  The root exhibited mild dilatation  3 8 cm  There was mild dilatation of the ascending aorta  3 7 cm    HISTORY: PRIOR HISTORY: HTN, HLD, cerebrovascualr disease , stroke, SVT    PROCEDURE: The study was performed in the [address] [facility] [place]  This was a routine study  The transthoracic approach was used  The study included complete 2D imaging, M-mode, complete spectral Doppler, and color Doppler  The  heart rate was 58 bpm, at the start of the study  Images were obtained from the parasternal, apical, subcostal, and suprasternal notch acoustic windows  Echocardiographic views were limited due to decreased penetration  This was a  technically difficult study  LEFT VENTRICLE: Size was normal  Systolic function was normal  Ejection fraction was estimated to be 55 %  There were no regional wall motion abnormalities  Wall thickness was at the upper limits of normal  No evidence of apical thrombus  DOPPLER: Left ventricular diastolic function parameters were normal     RIGHT VENTRICLE: The size was normal  Systolic function was normal  Wall thickness was normal     LEFT ATRIUM: Size was normal     RIGHT ATRIUM: Size was normal     MITRAL VALVE: Valve structure was normal  There was normal leaflet separation  DOPPLER: The transmitral velocity was within the normal range  There was no evidence for stenosis  There was mild to moderate regurgitation  AORTIC VALVE: The valve was trileaflet  Leaflets exhibited normal thickness and normal cuspal separation  DOPPLER: Transaortic velocity was within the normal range  There was no evidence for stenosis  There was no significant  regurgitation  TRICUSPID VALVE: The valve structure was normal  There was normal leaflet separation  DOPPLER: The transtricuspid velocity was within the normal range  There was no evidence for stenosis  There was mild regurgitation  Pulmonary artery  systolic pressure was within the normal range  PULMONIC VALVE: Leaflets exhibited normal thickness, no calcification, and normal cuspal separation  DOPPLER: The transpulmonic velocity was within the normal range  There was trace regurgitation  PERICARDIUM: There was no pericardial effusion  The pericardium was normal in appearance  AORTA: The root exhibited mild dilatation  3 8 cm There was mild dilatation of the ascending aorta  3 7 cm    SYSTEMIC VEINS: IVC: The inferior vena cava was normal in size  SYSTEM MEASUREMENT TABLES    2D  %FS: 23 4 %  Ao Diam: 3 8 cm  EDV(Teich): 110 72 ml  EF(Cube): 55 06 %  EF(Teich): 46 71 %  ESV(Cube): 51 61 ml  ESV(Teich): 59 ml  IVSd: 1 07 cm  LA Area: 16 85 cm2  LA Diam: 3 57 cm  LVEDV MOD A4C: 110 01 ml  LVEF MOD A4C: 56 61 %  LVESV MOD A4C: 47 73 ml  LVIDd: 4 86 cm  LVIDs: 3 72 cm  LVLd A4C: 8 72 cm  LVLs A4C: 7 28 cm  LVPWd: 0 83 cm  RA Area: 23 79 cm2  RV Diam : 3 81 cm  SI(Cube): 27 73 ml/m2  SI(Teich): 22 68 ml/m2  SV MOD A4C: 62 28 ml  SV(Cube): 63 23 ml  SV(Teich): 51 72 ml    CW  TR Vmax: 2 m/s  TR maxPG: 15 99 mmHg    MM  TAPSE: 2 11 cm    PW  E': 0 11 m/s  E/E': 5 87  MV A Leeroy: 0 46 m/s  MV Dec Kendall: 2 22 m/s2  MV DecT: 286 92 ms  MV E Leeroy: 0 64 m/s  MV E/A Ratio: 1 37    [employer] Accredited Echocardiography Laboratory    Prepared and electronically signed by    Cyndie Redman MD  Signed 08-Oct-2019 11:25:58       No results found for this or any previous visit    No results found for this or any previous visit  No results found for this or any previous visit          I reviewed and interpreted the following LABS/EKG/TELE/IMAGING and below is summary of my interpretation (if data available):        Current EKG and Rhythm Strip:Afib rate controlled 67bpm      HOLTER/EVENT Monitor:see disucssion

## 2020-07-09 NOTE — PROGRESS NOTES
HEART [facility] [address]     Outpatient Follow-up  Today's Date: 07/09/20        Patient name: Marquez Beltre  YOB: 1955  Sex: male         Chief Complaint: f/u afib    ASSESSMENT:  Problem List Items Addressed This Visit        Cardiovascular and Mediastinum    Persistent atrial fibrillation [facility]) - Primary    Relevant Orders    POCT ECG          77 yo male  1) Peristent afib since Dec 2019, rate controlled but symptomatic w fatigue and mild palpitations  Active 77 yo  [id number] age, HTN, CVA on Eliquis  Afib was paroxysmal before this and holter Oct 2019 showed no episodes w sinus bradycardia in 50s  We added flecaindie and HR was in 50s so he stopped on his own, but didn't feel bad  Holter showed 100% afib rate controlled 40-141bpm average 78bpm  2) Normal EF and LA size, mild to moderate MR Echo Oct 2019  No edema or symptoms of heart failure  3) HTN well controlled  4) CVA 6 years ago w/o residual defecit  PLAN:  1  Went over options of rate control, ablation and dccv w antiarrhythmic again  He will opt for DCCV w Flecainide  He will start this day before ablation and have DCCV  IF afib recurs the plan is for abaltion  He is at risk for bradycardia post DCCV so will have him hold metoprolol the morning of (But he should take flecainide/eliquis)     We went over risks and benefits of afib ablation in detail:    INFORMED CONSENT: Risks, benefits, and alternatives to atrial fibrillation ablation with possibly a combination of cryoballoon and radiofrequency ablation, and associated procedures such as transesophageal echocardiogram and atrial flutter ablation discussed  Alternative treatment with medical management has been tried and/or discussed  Usual pre and post operative expectations were discussed  Estimated complication rate is <8%    Atrial fibrillation ablation was explained to be a treatment that reduces burden of atrial fibrillation but is not a cure and medications and repeat ablations are often required  Although most patients derive improvement in symptoms and burden in atrial fibrillation, there are some patients that have not improved following this procedure  Blood thinners will not be discontinued immediately after ablation and may be required long term regardless of result  The patient understood risks include but not limited to death, esophageal injury, phrenic nerve injury (diaphragm), stroke, bleeding and vascular complication, bleeding around the heart and open heart surgery  Follow up in: 1 month (DCCV planned for 3 weeks)    Orders Placed This Encounter   Procedures    POCT ECG     There are no discontinued medications    HPI/Subjective:     77 yo male  1) Peristent afib since Dec 2019, rate controlled but symptomatic w fatigue and mild palpitations  Active 77 yo  [id number] age, HTN, CVA on Eliquis  Afib was paroxysmal before this and holter Oct 2019 showed no episodes w sinus bradycardia in 50s  We added flecaindie and HR was in 50s so he stopped on his own, but didn't feel bad  Holter showed 100% afib rate controlled 40-141bpm average 78bpm  2) Normal EF and LA size, mild to moderate MR Echo Oct 2019  No edema or symptoms of heart failure  3) HTN well controlled  4) CVA 6 years ago w/o residual defecit  Please note HPI is listed by problem with with update following it, it is copied again in the assessment above and reflects medical decision making as well  Complete 12 point ROS reviewed and otherwise non pertinent or negative except as per HPI pertinent positives in Cardiovascular and Respiratory emphasized  Please see paper chart for outpatient clinic patients where the patient completed the 12 point ROS survey             Past Medical History:   Diagnosis Date    Atrial fibrillation ([facility] )     Hyperlipidemia     Hypertension     Pneumonia     Skin cancer     Stroke ([facility] )     Supraventricular tachycardia (HCC)        No Known Allergies  I reviewed the Home Medication list and Allergies in the chart  Scheduled Meds:  Current Outpatient Medications   Medication Sig Dispense Refill    apixaban (ELIQUIS) 5 mg Take 1 tablet (5 mg total) by mouth 2 (two) times a day 180 tablet 1    atorvastatin (LIPITOR) 10 mg tablet Take 1 tablet (10 mg total) by mouth daily 90 tablet 1    hydrochlorothiazide (HYDRODIURIL) 25 mg tablet Take 1 tablet (25 mg total) by mouth daily 90 tablet 1    losartan (COZAAR) 100 MG tablet TAKE 1 TABLET BY MOUTH DAILY 90 tablet 1    metoprolol succinate (TOPROL-XL) 100 mg 24 hr tablet Take 1 tablet (100 mg total) by mouth daily 90 tablet 1    flecainide (TAMBOCOR) 100 mg tablet Take 1 tablet (100 mg total) by mouth 2 (two) times a day (Patient not taking: Reported on 7/9/2020) 60 tablet 3    ofloxacin (OCUFLOX) 0 3 % ophthalmic solution Administer 1 drop to both eyes 3 (three) times a day (Patient not taking: Reported on 4/17/2020) 5 mL 1     No current facility-administered medications for this visit        PRN Meds:         Family History   Problem Relation Age of Onset   Ruiz Cancer Father         stomach    Hypertension Father     Stroke Father         syndrome    Cancer Family     Heart disease Family     Hypertension Family     Stroke Family         syndrome       Social History     Socioeconomic History    Marital status: /Civil Union     Spouse name: Not on file    Number of children: Not on file    Years of education: Not on file    Highest education level: Not on file   Occupational History    Not on file   Social Needs    Financial resource strain: Not on file    Food insecurity:     Worry: Not on file     Inability: Not on file    Transportation needs:     Medical: Not on file     Non-medical: Not on file   Tobacco Use    Smoking status: Never Smoker    Smokeless tobacco: Never Used   Substance and Sexual Activity    Alcohol use: Yes     Comment: socailly    Drug use: No    Sexual activity: Not on file   Lifestyle    Physical activity:     Days per week: Not on file     Minutes per session: Not on file    Stress: Not on file   Relationships    Social connections:     Talks on phone: Not on file     Gets together: Not on file     Attends [employer] service: Not on file     Active member of club or organization: Not on file     Attends meetings of clubs or organizations: Not on file     Relationship status: Not on file    Intimate partner violence:     Fear of current or ex partner: Not on file     Emotionally abused: Not on file     Physically abused: Not on file     Forced sexual activity: Not on file   Other Topics Concern    Not on file   Social History Narrative    Not on file         OBJECTIVE:    /82 (BP Location: Left arm, Patient Position: Sitting, Cuff Size: Large)   Pulse 62   Temp (!) 97 4 °F (36 3 °C) (Temporal)   Ht 6' 2" (1 88 m)   Wt 97 1 kg (214 lb)   BMI 27 48 kg/m²   Vitals:    07/09/20 0904   Weight: 97 1 kg (214 lb)     GEN: No acute distress, Alert and oriented, well appearing  HEENT:Head, neck, ears, oral pharynx: Mucus membranes moist, oral pharynx clear, nares clear  External ears normal  EYES: Pupils equal, sclera anicteric, midline, normal conjuctiva  NECK: No JVD, supple, no obvious masses or thryomegaly or goiter  CARDIOVASCULAR: irreg irreg, No murmur, rub, gallops S1,S2  LUNGS: Clear To auscultation bilaterally, normal effort, no rales, rhonchi, crackles  ABDOMEN:  nondistended,  without obvious organomegaly or ascites  EXTREMITIES/VASCULAR:  No edema  Radial pulses intact, pedal pulses difficult to palpate, warm an well perfused  PSYCH: Normal Affect, no overt suicidal ideation, linear speech pattern without evidence of psychosis     NEURO: Grossly intact, moving all extremiteis equal, face symmetric, alert and responsive, no obvious focal defecits  GAIT:  Ambulates normally without difficulty  HEME: No bleeding, bruising, petechia, purpura  SKIN: No significant rashes, warm, no diaphoresis or pallor  Lab Results:       LABS:      Chemistry        Component Value Date/Time     2017 0958    K 4 2 2020 0719     2020 0719    CO2 35 (H) 2020 0719    BUN 17 2020 0719    CREATININE 1 38 (H) 2020 0719    CREATININE 1 12 2017 0958        Component Value Date/Time    CALCIUM 9 5 2020 0719    ALKPHOS 63 2020 0719    AST 23 2020 0719    ALT 25 2020 0719    BILITOT 0 9 2017 0958            Lab Results   Component Value Date    CHOL 136 2017    CHOL 132 2017    CHOL 124 (L) 2016     Lab Results   Component Value Date    HDL 49 2020    HDL 62 2018    HDL 59 2017     Lab Results   Component Value Date    LDLCALC 66 2020    LDLCALC 69 2018     Lab Results   Component Value Date    TRIG 53 2020    TRIG 51 2018    TRIG 54 2017     No results found for: CHOLHDL    IMAGING: No results found       Cardiac testing:   Results for orders placed during the hospital encounter of 10/08/19   Echo complete with contrast if indicated    Narrative [facility]  [address]  [phone]    Transthoracic Echocardiogram  2D, M-mode, Doppler, and Color Doppler    Study date:  08-Oct-2019    Patient: Alma Rosa Lucero  MR number: [id number]  Account number: [de-identified]  : 1955  Age: 71 years  Gender: Male  Status: Outpatient  Location: [address] [place] [address] [facility]  Height: 76 in  Weight: 213 6 lb  BP: 124/ 70 mmHg    Indications: A-Fib    Diagnoses: I48 0 - Atrial fibrillation    Sonographer:  KIAH Tejeda  Primary Physician:  Cat Cooley DO  Referring Physician:  Arianne Ladd MD  Group:    [facility]  Interpreting Physician:  Michelle Sharpe MD    SUMMARY    LEFT VENTRICLE:  Systolic function was normal  Ejection fraction was estimated to be 55 %  There were no regional wall motion abnormalities  Wall thickness was at the upper limits of normal     MITRAL VALVE:  There was mild to moderate regurgitation  TRICUSPID VALVE:  There was mild regurgitation  Pulmonary artery systolic pressure was within the normal range  PULMONIC VALVE:  There was trace regurgitation  AORTA:  The root exhibited mild dilatation  3 8 cm  There was mild dilatation of the ascending aorta  3 7 cm    HISTORY: PRIOR HISTORY: HTN, HLD, cerebrovascualr disease , stroke, SVT    PROCEDURE: The study was performed in the [address] [facility] [place]  This was a routine study  The transthoracic approach was used  The study included complete 2D imaging, M-mode, complete spectral Doppler, and color Doppler  The  heart rate was 58 bpm, at the start of the study  Images were obtained from the parasternal, apical, subcostal, and suprasternal notch acoustic windows  Echocardiographic views were limited due to decreased penetration  This was a  technically difficult study  LEFT VENTRICLE: Size was normal  Systolic function was normal  Ejection fraction was estimated to be 55 %  There were no regional wall motion abnormalities  Wall thickness was at the upper limits of normal  No evidence of apical thrombus  DOPPLER: Left ventricular diastolic function parameters were normal     RIGHT VENTRICLE: The size was normal  Systolic function was normal  Wall thickness was normal     LEFT ATRIUM: Size was normal     RIGHT ATRIUM: Size was normal     MITRAL VALVE: Valve structure was normal  There was normal leaflet separation  DOPPLER: The transmitral velocity was within the normal range  There was no evidence for stenosis  There was mild to moderate regurgitation  AORTIC VALVE: The valve was trileaflet  Leaflets exhibited normal thickness and normal cuspal separation  DOPPLER: Transaortic velocity was within the normal range  There was no evidence for stenosis  There was no significant  regurgitation  TRICUSPID VALVE: The valve structure was normal  There was normal leaflet separation  DOPPLER: The transtricuspid velocity was within the normal range  There was no evidence for stenosis  There was mild regurgitation  Pulmonary artery  systolic pressure was within the normal range  PULMONIC VALVE: Leaflets exhibited normal thickness, no calcification, and normal cuspal separation  DOPPLER: The transpulmonic velocity was within the normal range  There was trace regurgitation  PERICARDIUM: There was no pericardial effusion  The pericardium was normal in appearance  AORTA: The root exhibited mild dilatation  3 8 cm There was mild dilatation of the ascending aorta  3 7 cm    SYSTEMIC VEINS: IVC: The inferior vena cava was normal in size  SYSTEM MEASUREMENT TABLES    2D  %FS: 23 4 %  Ao Diam: 3 8 cm  EDV(Teich): 110 72 ml  EF(Cube): 55 06 %  EF(Teich): 46 71 %  ESV(Cube): 51 61 ml  ESV(Teich): 59 ml  IVSd: 1 07 cm  LA Area: 16 85 cm2  LA Diam: 3 57 cm  LVEDV MOD A4C: 110 01 ml  LVEF MOD A4C: 56 61 %  LVESV MOD A4C: 47 73 ml  LVIDd: 4 86 cm  LVIDs: 3 72 cm  LVLd A4C: 8 72 cm  LVLs A4C: 7 28 cm  LVPWd: 0 83 cm  RA Area: 23 79 cm2  RV Diam : 3 81 cm  SI(Cube): 27 73 ml/m2  SI(Teich): 22 68 ml/m2  SV MOD A4C: 62 28 ml  SV(Cube): 63 23 ml  SV(Teich): 51 72 ml    CW  TR Vmax: 2 m/s  TR maxPG: 15 99 mmHg    MM  TAPSE: 2 11 cm    PW  E': 0 11 m/s  E/E': 5 87  MV A Leeroy: 0 46 m/s  MV Dec [place]: 2 22 m/s2  MV DecT: 286 92 ms  MV E Leeroy: 0 64 m/s  MV E/A Ratio: 1 37    [employer] Accredited Echocardiography Laboratory    Prepared and electronically signed by    Osiel Godinez MD  Signed 08-Oct-2019 11:25:58       No results found for this or any previous visit    No results found for this or any previous visit  No results found for this or any previous visit          I reviewed and interpreted the following LABS/EKG/TELE/IMAGING and below is summary of my interpretation (if data available):        Current EKG and Rhythm Strip:Afib rate controlled 67bpm      HOLTER/EVENT Monitor:see disucssion

## 2020-07-16 ENCOUNTER — PREP FOR PROCEDURE (OUTPATIENT)
Dept: CARDIOLOGY CLINIC | Facility: CLINIC | Age: 65
End: 2020-07-16

## 2020-07-16 ENCOUNTER — TELEPHONE (OUTPATIENT)
Dept: CARDIOLOGY CLINIC | Facility: CLINIC | Age: 65
End: 2020-07-16

## 2020-07-16 DIAGNOSIS — I48.19 PERSISTENT ATRIAL FIBRILLATION (HCC): Primary | ICD-10-CM

## 2020-07-16 DIAGNOSIS — Z11.59 SCREENING FOR VIRAL DISEASE: Primary | ICD-10-CM

## 2020-07-16 NOTE — TELEPHONE ENCOUNTER
Patient scheduled for CV on 7/27/20 in [facility] with Dr Meenakshi Schaefer  Patient aware of all general instructions  Labs and COVID orders placed  Patient will have COVID testing on 7/20/20 in [address]  Patient has medicare as primary insurance

## 2020-07-17 NOTE — TELEPHONE ENCOUNTER
No problem w root canal as long as they don't want to stop his blood thinner   Otherwise he should wait at least one month after dccv to hold blood thinner

## 2020-07-20 ENCOUNTER — APPOINTMENT (OUTPATIENT)
Dept: LAB | Age: 65
End: 2020-07-20
Payer: MEDICARE

## 2020-07-20 DIAGNOSIS — I48.19 PERSISTENT ATRIAL FIBRILLATION (HCC): ICD-10-CM

## 2020-07-20 DIAGNOSIS — Z11.59 SCREENING FOR VIRAL DISEASE: ICD-10-CM

## 2020-07-20 LAB
ALBUMIN SERPL BCP-MCNC: 3.6 G/DL (ref 3.5–5)
ALP SERPL-CCNC: 101 U/L (ref 46–116)
ALT SERPL W P-5'-P-CCNC: 43 U/L (ref 12–78)
ANION GAP SERPL CALCULATED.3IONS-SCNC: 3 MMOL/L (ref 4–13)
AST SERPL W P-5'-P-CCNC: 28 U/L (ref 5–45)
BASOPHILS # BLD AUTO: 0.01 THOUSANDS/ΜL (ref 0–0.1)
BASOPHILS NFR BLD AUTO: 0 % (ref 0–1)
BILIRUB SERPL-MCNC: 1.06 MG/DL (ref 0.2–1)
BUN SERPL-MCNC: 16 MG/DL (ref 5–25)
CALCIUM SERPL-MCNC: 9 MG/DL (ref 8.3–10.1)
CHLORIDE SERPL-SCNC: 105 MMOL/L (ref 100–108)
CO2 SERPL-SCNC: 32 MMOL/L (ref 21–32)
CREAT SERPL-MCNC: 1.19 MG/DL (ref 0.6–1.3)
EOSINOPHIL # BLD AUTO: 0.11 THOUSAND/ΜL (ref 0–0.61)
EOSINOPHIL NFR BLD AUTO: 2 % (ref 0–6)
ERYTHROCYTE [DISTWIDTH] IN BLOOD BY AUTOMATED COUNT: 13.6 % (ref 11.6–15.1)
GFR SERPL CREATININE-BSD FRML MDRD: 63 ML/MIN/1.73SQ M
GLUCOSE P FAST SERPL-MCNC: 83 MG/DL (ref 65–99)
HCT VFR BLD AUTO: 46.6 % (ref 36.5–49.3)
HGB BLD-MCNC: 15.1 G/DL (ref 12–17)
IMM GRANULOCYTES # BLD AUTO: 0 THOUSAND/UL (ref 0–0.2)
IMM GRANULOCYTES NFR BLD AUTO: 0 % (ref 0–2)
LYMPHOCYTES # BLD AUTO: 1.36 THOUSANDS/ΜL (ref 0.6–4.47)
LYMPHOCYTES NFR BLD AUTO: 25 % (ref 14–44)
MCH RBC QN AUTO: 29.7 PG (ref 26.8–34.3)
MCHC RBC AUTO-ENTMCNC: 32.4 G/DL (ref 31.4–37.4)
MCV RBC AUTO: 92 FL (ref 82–98)
MONOCYTES # BLD AUTO: 0.46 THOUSAND/ΜL (ref 0.17–1.22)
MONOCYTES NFR BLD AUTO: 8 % (ref 4–12)
NEUTROPHILS # BLD AUTO: 3.57 THOUSANDS/ΜL (ref 1.85–7.62)
NEUTS SEG NFR BLD AUTO: 65 % (ref 43–75)
NRBC BLD AUTO-RTO: 0 /100 WBCS
PLATELET # BLD AUTO: 184 THOUSANDS/UL (ref 149–390)
PMV BLD AUTO: 11 FL (ref 8.9–12.7)
POTASSIUM SERPL-SCNC: 3.7 MMOL/L (ref 3.5–5.3)
PROT SERPL-MCNC: 6.8 G/DL (ref 6.4–8.2)
RBC # BLD AUTO: 5.09 MILLION/UL (ref 3.88–5.62)
SODIUM SERPL-SCNC: 140 MMOL/L (ref 136–145)
WBC # BLD AUTO: 5.51 THOUSAND/UL (ref 4.31–10.16)

## 2020-07-20 PROCEDURE — 80053 COMPREHEN METABOLIC PANEL: CPT | Performed by: INTERNAL MEDICINE

## 2020-07-20 PROCEDURE — U0003 INFECTIOUS AGENT DETECTION BY NUCLEIC ACID (DNA OR RNA); SEVERE ACUTE RESPIRATORY SYNDROME CORONAVIRUS 2 (SARS-COV-2) (CORONAVIRUS DISEASE [COVID-19]), AMPLIFIED PROBE TECHNIQUE, MAKING USE OF HIGH THROUGHPUT TECHNOLOGIES AS DESCRIBED BY CMS-2020-01-R: HCPCS

## 2020-07-20 PROCEDURE — 85025 COMPLETE CBC W/AUTO DIFF WBC: CPT

## 2020-07-20 PROCEDURE — 36415 COLL VENOUS BLD VENIPUNCTURE: CPT | Performed by: INTERNAL MEDICINE

## 2020-07-22 LAB
INPATIENT: NORMAL
SARS-COV-2 RNA SPEC QL NAA+PROBE: NOT DETECTED

## 2020-07-27 ENCOUNTER — HOSPITAL ENCOUNTER (OUTPATIENT)
Dept: NON INVASIVE DIAGNOSTICS | Facility: HOSPITAL | Age: 65
Discharge: HOME | End: 2020-07-27
Attending: INTERNAL MEDICINE | Admitting: INTERNAL MEDICINE
Payer: MEDICARE

## 2020-07-27 ENCOUNTER — ANESTHESIA (OUTPATIENT)
Dept: NON INVASIVE DIAGNOSTICS | Facility: HOSPITAL | Age: 65
End: 2020-07-27
Payer: MEDICARE

## 2020-07-27 ENCOUNTER — ANESTHESIA EVENT (OUTPATIENT)
Dept: NON INVASIVE DIAGNOSTICS | Facility: HOSPITAL | Age: 65
End: 2020-07-27
Payer: MEDICARE

## 2020-07-27 VITALS
SYSTOLIC BLOOD PRESSURE: 135 MMHG | WEIGHT: 210 LBS | HEART RATE: 55 BPM | RESPIRATION RATE: 18 BRPM | OXYGEN SATURATION: 97 % | HEIGHT: 74 IN | TEMPERATURE: 98.3 F | BODY MASS INDEX: 26.95 KG/M2 | DIASTOLIC BLOOD PRESSURE: 86 MMHG

## 2020-07-27 DIAGNOSIS — I48.91 A-FIB (HCC): ICD-10-CM

## 2020-07-27 PROCEDURE — 92960 CARDIOVERSION ELECTRIC EXT: CPT | Performed by: INTERNAL MEDICINE

## 2020-07-27 PROCEDURE — 92960 CARDIOVERSION ELECTRIC EXT: CPT

## 2020-07-27 PROCEDURE — 93005 ELECTROCARDIOGRAM TRACING: CPT

## 2020-07-27 RX ORDER — SODIUM CHLORIDE 9 MG/ML
INJECTION, SOLUTION INTRAVENOUS CONTINUOUS PRN
Status: DISCONTINUED | OUTPATIENT
Start: 2020-07-27 | End: 2020-07-27 | Stop reason: SURG

## 2020-07-27 RX ORDER — LIDOCAINE HYDROCHLORIDE 10 MG/ML
INJECTION, SOLUTION EPIDURAL; INFILTRATION; INTRACAUDAL; PERINEURAL AS NEEDED
Status: DISCONTINUED | OUTPATIENT
Start: 2020-07-27 | End: 2020-07-27 | Stop reason: SURG

## 2020-07-27 RX ORDER — PROPOFOL 10 MG/ML
INJECTION, EMULSION INTRAVENOUS AS NEEDED
Status: DISCONTINUED | OUTPATIENT
Start: 2020-07-27 | End: 2020-07-27 | Stop reason: SURG

## 2020-07-27 RX ADMIN — PROPOFOL 90 MG: 10 INJECTION, EMULSION INTRAVENOUS at 13:54

## 2020-07-27 RX ADMIN — LIDOCAINE HYDROCHLORIDE 50 MG: 10 INJECTION, SOLUTION EPIDURAL; INFILTRATION; INTRACAUDAL; PERINEURAL at 13:54

## 2020-07-27 RX ADMIN — SODIUM CHLORIDE: 9 INJECTION, SOLUTION INTRAVENOUS at 13:35

## 2020-07-27 NOTE — DISCHARGE INSTRUCTIONS
Cardioversion   WHAT YOU SHOULD KNOW:   Cardioversion is a procedure to correct arrhythmias, which is when your heart beats too fast or irregularly  Arrhythmias may prevent your body from getting the blood and oxygen it needs  Cardioversion delivers a shock of electricity to your heart to help it return to its normal rhythm  AFTER YOU LEAVE:   Medicines:   · Anticoagulants    are a type of blood thinner medicine that helps prevent clots  Clots can cause strokes, heart attacks, and death  These medicines may cause you to bleed or bruise more easily  ¨ Watch for bleeding from your gums or nose  Watch for blood in your urine and bowel movements  Use a soft washcloth and a soft toothbrush  If you shave, use an electric razor  Avoid activities that can cause bruising or bleeding  ¨ Tell your healthcare provider about all medicines you take because many medicines cannot be used with anticoagulants  Do not start or stop any medicines unless your healthcare provider tells you to  Tell your dentist and other healthcare providers that you take anticoagulants  Wear a bracelet or necklace that says you take this medicine  ¨ You will need regular blood tests so your healthcare provider can decide how much medicine you need  Take anticoagulants exactly as directed  Tell your healthcare provider right away if you forget to take the medicine, or if you take too much  ¨ If you take warfarin, some foods can change how your blood clots  Do not make major changes to your diet while you take warfarin  Warfarin works best when you eat about the same amount of vitamin K every day  Vitamin K is found in green leafy vegetables, broccoli, grapes, and other foods  Ask for more information about what to eat when you take warfarin  · Heart medicine: This medicine helps strengthen or regulate your heartbeat  · Take your medicine as directed    Call your healthcare provider if you think your medicine is not helping or if you have side effects  Tell him if you are allergic to any medicine  Keep a list of the medicines, vitamins, and herbs you take  Include the amounts, and when and why you take them  Bring the list or the pill bottles to follow-up visits  Carry your medicine list with you in case of an emergency  Follow up with your cardiologist as directed:  Write down your questions so you remember to ask them during your visits  Contact your cardiologist if:   · You have a fever  · You have new or worsening weakness or tiredness  · You have questions or concerns about your condition or care  Seek care immediately or call 911 if:   · You feel like your heart is fluttering or jumping in your chest     · The skin around the area where the internal catheter was placed is warm, red, swollen, or has pus coming from it  · You feel lightheaded or you have fainted  · You have chest pain when you take a deep breath or cough  You may cough up blood  · You have discomfort in your chest that feels like squeezing, pressure, fullness, or pain  · You have pain or discomfort in your back, neck, jaw, stomach, or arm  · You have weakness or numbness in part of your body  · You have sudden trouble breathing  · You become confused or have difficulty speaking  · You have dizziness, a severe headache, or vision loss  © 2014 [employer] is for End User's use only and may not be sold, redistributed or otherwise used for commercial purposes  All illustrations and images included in CareNotes® are the copyrighted property of A D [employer] M [employer]  or Asher Lassiter  The above information is an  only  It is not intended as medical advice for individual conditions or treatments  Talk to your doctor, nurse or pharmacist before following any medical regimen to see if it is safe and effective for you

## 2020-07-27 NOTE — INTERVAL H&P NOTE
H&P reviewed  After examining the patient, I find no changed to the H&P since it had been written  /86 (BP Location: Right arm)   Pulse 55   Temp 98 3 °F (36 8 °C) (Oral)   Resp 18   Ht 6' 2" (1 88 m)   Wt 95 3 kg (210 lb)   SpO2 97%   BMI 26 96 kg/m²      Patient re-evaluated   Accept as history and physical     Anne Marie Gray MD/July 32, 2020/2:34 PM

## 2020-07-27 NOTE — ANESTHESIA PREPROCEDURE EVALUATION
Review of Systems/Medical History  Patient summary reviewed  Chart reviewed      Cardiovascular  Exercise tolerance (METS): >4,  Hyperlipidemia, Hypertension controlled, Dysrhythmias , atrial fibrillation,    Pulmonary  Sleep apnea CPAP,        GI/Hepatic  Negative GI/hepatic ROS          Negative  ROS        Endo/Other  Negative endo/other ROS      GYN       Hematology  Negative hematology ROS      Musculoskeletal  Negative musculoskeletal ROS        Neurology    CVA , residual symptoms,    Psychology   Negative psychology ROS          ECHO SUMMARY     LEFT VENTRICLE:  Systolic function was normal  Ejection fraction was estimated to be 55 %  There were no regional wall motion abnormalities  Wall thickness was at the upper limits of normal      MITRAL VALVE:  There was mild to moderate regurgitation      TRICUSPID VALVE:  There was mild regurgitation  Pulmonary artery systolic pressure was within the normal range      PULMONIC VALVE:  There was trace regurgitation        Physical Exam    Airway    Mallampati score: II  TM Distance: <3 FB  Neck ROM: full     Dental       Cardiovascular  Rhythm: regular, Rate: normal,     Pulmonary  Breath sounds clear to auscultation,     Other Findings        Anesthesia Plan  ASA Score- 3     Anesthesia Type- IV sedation with anesthesia with ASA Monitors  Additional Monitors:   Airway Plan:     Comment: Discussed risks/benefits, including medication reactions, awareness, aspiration, and serious/life threatening complications  Plan to maintain native airway with IVGA, monitored with EtCO2  Plan Factors-Patient not instructed to abstain from smoking on day of procedure  Patient did not smoke on day of surgery  Induction- intravenous  Postoperative Plan- Plan for postoperative opioid use  Planned trial extubation    Informed Consent- Anesthetic plan and risks discussed with patient  I personally reviewed this patient with the CRNA   Discussed and agreed on the Anesthesia Plan with the CRNA  Missy Blackburn

## 2020-07-27 NOTE — ANESTHESIA POSTPROCEDURE EVALUATION
Post-Op Assessment Note    CV Status:  Stable  Pain Score: 0    Pain management: adequate     Mental Status:  Alert and awake   Hydration Status:  Euvolemic   PONV Controlled:  Controlled   Airway Patency:  Patent   Post Op Vitals Reviewed: Yes      Staff: CRNA           BP   134/86   Temp      Pulse  54   Resp   15   SpO2   98%

## 2020-07-28 ENCOUNTER — TELEPHONE (OUTPATIENT)
Dept: NON INVASIVE DIAGNOSTICS | Facility: HOSPITAL | Age: 65
End: 2020-07-28

## 2020-07-28 LAB
ATRIAL RATE: 59 BPM
ATRIAL RATE: 98 BPM
ATRIAL RATE: 98 BPM
P AXIS: 82 DEGREES
P AXIS: 90 DEGREES
P AXIS: 94 DEGREES
PR INTERVAL: 228 MS
PR INTERVAL: 232 MS
PR INTERVAL: 234 MS
QRS AXIS: 76 DEGREES
QRS AXIS: 86 DEGREES
QRS AXIS: 87 DEGREES
QRSD INTERVAL: 106 MS
QRSD INTERVAL: 114 MS
QRSD INTERVAL: 88 MS
QT INTERVAL: 364 MS
QT INTERVAL: 364 MS
QT INTERVAL: 450 MS
QTC INTERVAL: 445 MS
QTC INTERVAL: 464 MS
QTC INTERVAL: 464 MS
T WAVE AXIS: 72 DEGREES
T WAVE AXIS: 72 DEGREES
T WAVE AXIS: 77 DEGREES
VENTRICULAR RATE: 59 BPM
VENTRICULAR RATE: 98 BPM
VENTRICULAR RATE: 98 BPM

## 2020-07-28 PROCEDURE — 93010 ELECTROCARDIOGRAM REPORT: CPT | Performed by: INTERNAL MEDICINE

## 2020-07-31 ENCOUNTER — OFFICE VISIT (OUTPATIENT)
Dept: CARDIOLOGY CLINIC | Facility: CLINIC | Age: 65
End: 2020-07-31
Payer: MEDICARE

## 2020-07-31 VITALS
WEIGHT: 219 LBS | HEIGHT: 74 IN | DIASTOLIC BLOOD PRESSURE: 72 MMHG | HEART RATE: 56 BPM | BODY MASS INDEX: 28.11 KG/M2 | OXYGEN SATURATION: 98 % | TEMPERATURE: 97.7 F | SYSTOLIC BLOOD PRESSURE: 138 MMHG

## 2020-07-31 DIAGNOSIS — I48.0 PAROXYSMAL ATRIAL FIBRILLATION (HCC): Primary | ICD-10-CM

## 2020-07-31 DIAGNOSIS — G47.33 OSA (OBSTRUCTIVE SLEEP APNEA): ICD-10-CM

## 2020-07-31 DIAGNOSIS — R06.02 SHORTNESS OF BREATH: ICD-10-CM

## 2020-07-31 DIAGNOSIS — I10 ESSENTIAL HYPERTENSION: ICD-10-CM

## 2020-07-31 PROCEDURE — 1036F TOBACCO NON-USER: CPT | Performed by: INTERNAL MEDICINE

## 2020-07-31 PROCEDURE — 93000 ELECTROCARDIOGRAM COMPLETE: CPT | Performed by: INTERNAL MEDICINE

## 2020-07-31 PROCEDURE — 3008F BODY MASS INDEX DOCD: CPT | Performed by: INTERNAL MEDICINE

## 2020-07-31 PROCEDURE — 3078F DIAST BP <80 MM HG: CPT | Performed by: INTERNAL MEDICINE

## 2020-07-31 PROCEDURE — 3075F SYST BP GE 130 - 139MM HG: CPT | Performed by: INTERNAL MEDICINE

## 2020-07-31 PROCEDURE — 4040F PNEUMOC VAC/ADMIN/RCVD: CPT | Performed by: INTERNAL MEDICINE

## 2020-07-31 PROCEDURE — 99214 OFFICE O/P EST MOD 30 MIN: CPT | Performed by: INTERNAL MEDICINE

## 2020-07-31 PROCEDURE — 1160F RVW MEDS BY RX/DR IN RCRD: CPT | Performed by: INTERNAL MEDICINE

## 2020-07-31 RX ORDER — AMLODIPINE BESYLATE 5 MG/1
5 TABLET ORAL DAILY
Qty: 90 TABLET | Refills: 3 | Status: SHIPPED | OUTPATIENT
Start: 2020-07-31 | End: 2020-09-09

## 2020-07-31 NOTE — PROGRESS NOTES
Cardiology Follow Up    [facility]  1955  [id number]  [address] [facility] Graciela Uribe [address] [place] [address]  [phone]    1  Paroxysmal atrial fibrillation (HCC)  POCT ECG    Holter monitor - 24 hour    Stress test only, exercise   2  Essential hypertension  amLODIPine (NORVASC) 5 mg tablet    Holter monitor - 24 hour    Stress test only, exercise   3  FELISA (obstructive sleep apnea)  amLODIPine (NORVASC) 5 mg tablet    Holter monitor - 24 hour   4  Shortness of breath  Holter monitor - 24 hour    Stress test only, exercise       Discussion/Summary:    - paroxysmal atrial fibrillation:  His in sinus rhythm on flecainide and metoprolol currently  He has symptoms of shortness of breath on exertion  Unclear if this is a side effect of the flecainide (as the patient believes) or more related to inadequate heart rate response with exercise  Will check a Holter monitor to document his average heart rates and a exercise treadmill test to see his chronotropic competence  Will also be able to evaluate for any evidence ischemia now that he is on flecainide  Depending on these results, I indicated that at follow-up with Dr Dallie Shone, antiarrhythmic therapy could be adjusted  He is in sinus rhythm, but if he wishes to get off of flecainide entirely, ablation could be considered  Continues on anticoagulation with Eliquis  Similarly, will continue with the 100 mg of Toprol XL for the time being       - essential hypertension:  Blood pressure elevated in the office, he tells me this is high at home as well  Will add 5 mg of amlodipine for better blood pressure control     - Sleep apnea: Follows with Sleep Medicine, APAP  History of Present Illness:   Pleasant 58-year-old man  History of atrial fibrillation with a prior CVA  He is maintained on Eliquis      Was previously maintaining sinus rhythm with Toprol XL, but at visit with me 12/2019, was found to be in afib, more persistent  We did a Holter, which showed rate controlled afib, but he was feeling palpitations  After discussing options, he was referred to EP  Initially started on Flecainide, and then underwent cardioversion in 7/2020  Interval History:  Since last visit with me, he saw Dr Melanie Pete from electrophysiology  He was started on flecainide, but then discontinued this  Thereafter, it was restarted and he was referred for cardioversion  At this point, he is on 100 mg twice a day flecainide on 100 mg of Toprol-XL daily  His heart rate, which tends to be on the slower side, is in a stable range in the 50s  Since his cardioversion, he has maintained sinus rhythm  He is continued on Eliquis for anticoagulation  His son to complaint is of shortness of breath on exertion at this point  He tells me that previously, even when he was in atrial fibrillation, he was able to walk a mi and a half with his wife without feeling significant limitations  More recently, he feels short of breath  He has no chest discomfort and no palpitations  His blood pressure has also been on the higher side  Diagnosed with sleep apnea as well, has seen Sleep Medicine      Problem List     Cerebrovascular disease, ill-defined, acute    Essential hypertension    Paroxysmal atrial fibrillation ([facility] [address] )    Overview Signed 3/15/2018  2:07 PM by Fish Price DO     Transitioned From: Atrial fibrillation         Mixed hyperlipidemia    Pain of toe of right foot        Past Medical History:   Diagnosis Date    Atrial fibrillation ([facility] [address] )     Hyperlipidemia     Hypertension     Pneumonia     Skin cancer     Stroke ([facility] [address] )     Supraventricular tachycardia ([facility] [address] )      Social History     Tobacco Use    Smoking status: Never Smoker    Smokeless tobacco: Never Used   Substance Use Topics    Alcohol use: Yes     Comment: socailly     Family History   Problem Relation Age of Onset    Cancer Father         stomach    Hypertension Father     Stroke Father         syndrome    Cancer Family     Heart disease Family     Hypertension Family     Stroke Family         syndrome     Past Surgical History:   Procedure Laterality Date    APPENDECTOMY      COLONOSCOPY      fiberoptic, also 2009, both negative, resolved 2004    HERNIA REPAIR      KNEE SURGERY      SKIN GRAFT      left ear, skin cancer       Current Outpatient Medications:     apixaban (ELIQUIS) 5 mg, Take 1 tablet (5 mg total) by mouth 2 (two) times a day, Disp: 180 tablet, Rfl: 1    atorvastatin (LIPITOR) 10 mg tablet, Take 1 tablet (10 mg total) by mouth daily, Disp: 90 tablet, Rfl: 1    flecainide (TAMBOCOR) 100 mg tablet, Take 1 tablet (100 mg total) by mouth 2 (two) times a day, Disp: 60 tablet, Rfl: 3    hydrochlorothiazide (HYDRODIURIL) 25 mg tablet, Take 1 tablet (25 mg total) by mouth daily, Disp: 90 tablet, Rfl: 1    losartan (COZAAR) 100 MG tablet, TAKE 1 TABLET BY MOUTH DAILY, Disp: 90 tablet, Rfl: 1    metoprolol succinate (TOPROL-XL) 100 mg 24 hr tablet, Take 1 tablet (100 mg total) by mouth daily, Disp: 90 tablet, Rfl: 1    amLODIPine (NORVASC) 5 mg tablet, Take 1 tablet (5 mg total) by mouth daily, Disp: 90 tablet, Rfl: 3  No Known Allergies    Vitals:    07/31/20 0909   BP: 138/72   BP Location: Left arm   Patient Position: Sitting   Cuff Size: Standard   Pulse: 56   Temp: 97 7 °F (36 5 °C)   SpO2: 98%   Weight: 99 3 kg (219 lb)   Height: 6' 2" (1 88 m)     Vitals:    07/31/20 0909   Weight: 99 3 kg (219 lb)      Height: 6' 2" (188 cm)   Body mass index is 28 12 kg/m²      Physical Exam:  GEN: Annie Jesus appears well, alert and oriented x 3, pleasant and cooperative   HEENT: pupils equal, round, and reactive to light; extraocular muscles intact  NECK: supple, no carotid bruits   HEART: regular rhythm, normal S1 and S2, no murmurs, clicks, gallops or rubs   LUNGS: clear to auscultation bilaterally; no wheezes, rales, or rhonchi   ABDOMEN: normal bowel sounds, soft, no tenderness, no distention  EXTREMITIES: peripheral pulses normal; no clubbing, cyanosis, or edema  NEURO: no focal findings   SKIN: normal without suspicious lesions on exposed skin    ROS:  Shortness of breath with exertion  Except as noted in HPI, is otherwise reviewed in detail and a 12 point review of systems is negative  ROS reviewed and is unchanged    Labs:  Lab Results   Component Value Date     09/19/2017    K 3 7 07/20/2020     07/20/2020    CREATININE 1 19 07/20/2020    BUN 16 07/20/2020    CO2 32 07/20/2020    ALT 43 07/20/2020    AST 28 07/20/2020    INR 1 07 05/14/2016    GLUF 83 07/20/2020    WBC 5 51 07/20/2020    HGB 15 1 07/20/2020    HCT 46 6 07/20/2020     07/20/2020       Lab Results   Component Value Date    CHOL 136 09/19/2017    CHOL 132 05/31/2017    CHOL 124 (L) 03/30/2016     Lab Results   Component Value Date    LDLCALC 66 01/23/2020    LDLCALC 69 12/06/2018     Lab Results   Component Value Date    HDL 49 01/23/2020    HDL 62 12/06/2018    HDL 59 09/19/2017     Lab Results   Component Value Date    TRIG 53 01/23/2020    TRIG 51 12/06/2018    TRIG 54 09/19/2017     Echo 10/2019:  LEFT VENTRICLE:  Systolic function was normal  Ejection fraction was estimated to be 55 %  There were no regional wall motion abnormalities  Wall thickness was at the upper limits of normal      MITRAL VALVE:  There was mild to moderate regurgitation      TRICUSPID VALVE:  There was mild regurgitation  Pulmonary artery systolic pressure was within the normal range      PULMONIC VALVE:  There was trace regurgitation      AORTA:  The root exhibited mild dilatation  3 8 cm  There was mild dilatation of the ascending aorta  3 7 cm     Holter 5/27/2020:  IMPRESSION:  1  Predominantly atrial fibrillation with an average heart rate of 78 bpm    2  Rare PVCs, consisting of 0 5% of total beats  No sustained ventricular arrhythmias    3  No significant pauses  4  Patient's symptoms of fluttering correlated with atrial fibrillation at controlled rates  EKG:  Sinus bradycardia  56 beats per minute  First-degree AV block

## 2020-08-05 DIAGNOSIS — E78.2 MIXED HYPERLIPIDEMIA: ICD-10-CM

## 2020-08-05 RX ORDER — ATORVASTATIN CALCIUM 10 MG/1
TABLET, FILM COATED ORAL
Qty: 90 TABLET | Refills: 1 | Status: SHIPPED | OUTPATIENT
Start: 2020-08-05 | End: 2021-02-15 | Stop reason: SDUPTHER

## 2020-08-07 ENCOUNTER — TELEPHONE (OUTPATIENT)
Dept: CARDIOLOGY CLINIC | Facility: CLINIC | Age: 65
End: 2020-08-07

## 2020-08-07 ENCOUNTER — HOSPITAL ENCOUNTER (OUTPATIENT)
Dept: NON INVASIVE DIAGNOSTICS | Facility: CLINIC | Age: 65
Discharge: HOME | End: 2020-08-07
Payer: MEDICARE

## 2020-08-07 DIAGNOSIS — R06.02 SHORTNESS OF BREATH: ICD-10-CM

## 2020-08-07 DIAGNOSIS — G47.33 OSA (OBSTRUCTIVE SLEEP APNEA): ICD-10-CM

## 2020-08-07 DIAGNOSIS — I10 ESSENTIAL HYPERTENSION: ICD-10-CM

## 2020-08-07 DIAGNOSIS — I48.0 PAROXYSMAL ATRIAL FIBRILLATION (HCC): ICD-10-CM

## 2020-08-07 DIAGNOSIS — I48.0 PAROXYSMAL ATRIAL FIBRILLATION (HCC): Primary | ICD-10-CM

## 2020-08-07 PROCEDURE — 93225 XTRNL ECG REC<48 HRS REC: CPT

## 2020-08-07 PROCEDURE — 93018 CV STRESS TEST I&R ONLY: CPT | Performed by: INTERNAL MEDICINE

## 2020-08-07 PROCEDURE — 93226 XTRNL ECG REC<48 HR SCAN A/R: CPT

## 2020-08-07 PROCEDURE — 93017 CV STRESS TEST TRACING ONLY: CPT

## 2020-08-07 PROCEDURE — 93016 CV STRESS TEST SUPVJ ONLY: CPT | Performed by: INTERNAL MEDICINE

## 2020-08-07 NOTE — TELEPHONE ENCOUNTER
Called patient to discuss atrial fibrillation tx options as today during his stress test he could only exercise 3 minutes needing to discontinue due to SOB  HR unable to go above 60bpm during testing  Likely symptoms from chronotropic incompetence  He is on flecainide 100mg BID and toprol XL 100mg daily, discussed with Dr Edilberto Myers will have him decrease doses to 50mg BID and 50mg daily respectively for now  Planning for cryo ablation, and hopeful discontinuation of flecainide and BB post procedure  If he returns to AF after ablation or continues to have ESTRADA after ablation related to chronotropic incompetence despite stopping AAD's and lowering/stopping BB dose will need to pursue PPM  Did discuss this with patient who is agreeable  Risks vs benefits as well as need for overnight stay at [facility] was discussed with patient who understood and is agreeable to proceed  Will place orders for periop ALONZO, CT pulm vein and ablation to be performed by Dr Jean Claude Frankel

## 2020-08-07 NOTE — TELEPHONE ENCOUNTER
P/c'd, returning your call  He said he had stress test this am  He will be available to answer your call        Thank you

## 2020-08-10 LAB
CHEST PAIN STATEMENT: NORMAL
MAX DIASTOLIC BP: 74 MMHG
MAX HEART RATE: 142 BPM
MAX PREDICTED HEART RATE: 154 BPM
MAX. SYSTOLIC BP: 142 MMHG
PROTOCOL NAME: NORMAL
REASON FOR TERMINATION: NORMAL
TARGET HR FORMULA: NORMAL
TEST INDICATION: NORMAL
TIME IN EXERCISE PHASE: NORMAL

## 2020-08-12 ENCOUNTER — TELEPHONE (OUTPATIENT)
Dept: CARDIOLOGY CLINIC | Facility: CLINIC | Age: 65
End: 2020-08-12

## 2020-08-12 NOTE — TELEPHONE ENCOUNTER
----- Message from Shay Lim [place] sent at 8/7/2020  3:04 PM EDT -----  Maria C Red,     I placed order for this patient to have afib ablation with Dr Amee Leon as well as order for periop ALONZO and CT of his pulmonary veins  Will just need to clarify with Dr Amee Leon what holds he would like on the patient's [facility] pre procedure  Patient aware we will be calling him to schedule procedure

## 2020-08-13 ENCOUNTER — TELEPHONE (OUTPATIENT)
Dept: CARDIOLOGY CLINIC | Facility: CLINIC | Age: 65
End: 2020-08-13

## 2020-08-13 DIAGNOSIS — I48.0 PAROXYSMAL ATRIAL FIBRILLATION (HCC): Primary | ICD-10-CM

## 2020-08-13 NOTE — TELEPHONE ENCOUNTER
----- Message from María Elena Sequeira [place] sent at 8/7/2020  3:04 PM EDT -----  Bruce Epstein,     I placed order for this patient to have afib ablation with Dr Melanie Pete as well as order for periop ALONZO and CT of his pulmonary veins  Will just need to clarify with Dr Melanie Pete what holds he would like on the patient's [facility] pre procedure  Patient aware we will be calling him to schedule procedure

## 2020-08-13 NOTE — TELEPHONE ENCOUNTER
COVID Pre-Visit Screening     1  Is this a family member screening? Yes  2  Have you traveled outside of your state in the past 2 weeks? No  3  Do you presently have a fever or flu-like symptoms? No  4  Do you have symptoms of an upper respiratory infection like runny nose, sore throat, or cough? No  5  Are you suffering from new headache that you have not had in the past?  No  6  Do you have/have you experienced any new shortness of breath recently? No  7  Do you have any new diarrhea, nausea or vomiting? No  8  Have you been in contact with anyone who has been sick or diagnosed with COVID-19? No  9  Do you have any new loss of taste or smell? No  10  Are you able to wear a mask without a valve for the entire visit? Yes    patient schedule for ALONZO/Afib ablation at [facility] on 20 with Dr Carolanne Apgar  Patient aware of general instructions, medications holds (HCTZ) and blood test require  Patient cover under medicare

## 2020-08-20 ENCOUNTER — OFFICE VISIT (OUTPATIENT)
Dept: SLEEP CENTER | Facility: CLINIC | Age: 65
End: 2020-08-20
Payer: MEDICARE

## 2020-08-20 ENCOUNTER — TELEPHONE (OUTPATIENT)
Dept: SLEEP CENTER | Facility: CLINIC | Age: 65
End: 2020-08-20

## 2020-08-20 ENCOUNTER — HOSPITAL ENCOUNTER (OUTPATIENT)
Dept: RADIOLOGY | Facility: HOSPITAL | Age: 65
Discharge: HOME | End: 2020-08-20
Payer: MEDICARE

## 2020-08-20 ENCOUNTER — TRANSCRIBE ORDERS (OUTPATIENT)
Dept: RADIOLOGY | Facility: HOSPITAL | Age: 65
End: 2020-08-20

## 2020-08-20 VITALS
BODY MASS INDEX: 27.59 KG/M2 | HEART RATE: 52 BPM | SYSTOLIC BLOOD PRESSURE: 118 MMHG | DIASTOLIC BLOOD PRESSURE: 60 MMHG | WEIGHT: 215 LBS | HEIGHT: 74 IN

## 2020-08-20 DIAGNOSIS — G47.33 OSA (OBSTRUCTIVE SLEEP APNEA): Primary | ICD-10-CM

## 2020-08-20 DIAGNOSIS — I48.0 PAROXYSMAL ATRIAL FIBRILLATION (HCC): ICD-10-CM

## 2020-08-20 DIAGNOSIS — I48.19 PERSISTENT ATRIAL FIBRILLATION (HCC): ICD-10-CM

## 2020-08-20 DIAGNOSIS — Z20.822 ENCOUNTER FOR LABORATORY TESTING FOR COVID-19 VIRUS: Primary | ICD-10-CM

## 2020-08-20 PROCEDURE — 1036F TOBACCO NON-USER: CPT | Performed by: PSYCHIATRY & NEUROLOGY

## 2020-08-20 PROCEDURE — 3074F SYST BP LT 130 MM HG: CPT | Performed by: PSYCHIATRY & NEUROLOGY

## 2020-08-20 PROCEDURE — 75572 CT HRT W/3D IMAGE: CPT

## 2020-08-20 PROCEDURE — 99214 OFFICE O/P EST MOD 30 MIN: CPT | Performed by: PSYCHIATRY & NEUROLOGY

## 2020-08-20 PROCEDURE — 3078F DIAST BP <80 MM HG: CPT | Performed by: PSYCHIATRY & NEUROLOGY

## 2020-08-20 PROCEDURE — 3008F BODY MASS INDEX DOCD: CPT | Performed by: PSYCHIATRY & NEUROLOGY

## 2020-08-20 PROCEDURE — G1004 CDSM NDSC: HCPCS

## 2020-08-20 PROCEDURE — 4040F PNEUMOC VAC/ADMIN/RCVD: CPT | Performed by: PSYCHIATRY & NEUROLOGY

## 2020-08-20 RX ADMIN — IODIXANOL 120 ML: 320 INJECTION, SOLUTION INTRAVASCULAR at 07:28

## 2020-08-20 NOTE — TELEPHONE ENCOUNTER
8/20 Pt is schedule in [place] for his Cpap sleep study on 10/13/20  Pt signed FLA and COVID Letter was given  lc   Patient informed that COVID testing is to be performed 10 days prior to PAP study  Patient is to tell site that it is needed for a procedure so it is expedited  Testing site information provided

## 2020-08-20 NOTE — PROGRESS NOTES
Sleep Medicine Follow-Up Note    HPI: 70yo M with A fib and FELISA being seen for a compliance visit  Treatment Summary: 2020 HST: respiratory event index (ARIAN) of 9 2   The lowest SpO2 recorded is 85%  APAP 5-15cm recommended  HPI:   Today, patient presents accompanied by his wife  The patient stated that he has been having issues with dry mouth and air leaks with the CPAP and mask use  He has a FFM and started with a medium and went to a large because the medium was too tight  The large doesn't as much, but leaks  The pressure does not bother him  He is also getting condensation in the mask  He has had a recent admission for Atrial fibrillation and had a cardioversion  He is still thinking about the ablation  Treatment: APAP  -Pressure: 5-15cm   -Pressure intolerance: no   -Aerophagia: no   -Air Hunger: no  -Interface: FFM  -Fit: poor, leaks  -Chin strap: no  -HCC: YME  -Patient's perceived outcome: he is unsure what the benefit he is getting yet      Compliance Card Data:    - Date Range: 20-20   - Settin-15cm   - Pressure: Median 6 2cm; 90th%ile = 8 2cm   - Residual AHI: 10 9   - %  Night in Large Leak: 1h 2 5m   - Average usage days used: 6h 22m   - % Days used: 100%   - % Days with Usage > 4 hrs: 100%    Respiratory:  -Ongoing Snoring: unsure, wife is in a different room   -Mouth Breathing: sometimes  -Dry Mouth: sometimes  -Nocturnal Gasping: no    ROS:   Genitourinary need to urinate more than twice a night   Cardiology ankle/leg swelling   Gastrointestinal none   Neurology none   Constitutional none   Integumentary none   Psychiatry none   Musculoskeletal none   Pulmonary shortness of breath with activity   ENT none   Endocrine none   Hematological none       Sleep Pattern:  -Position: propped up  -Bedtime: 1130pm  -Lights Out: same time  -Environment: no Lights/TV  -Latency: 20 mins  -Awakenings: 2-3 to void  -Wake Time: 730am  -Rise Time: same time  -Patient's estimate of Sleep Time: 6-7h    Daytime Symptoms:  -Upon Awakening: tired  -Daytime fatigue/sleepiness: tired usually  -Naps: once in the afternoons  -Involuntary Dozing: no  -Cognitive Symptoms: no  -Driving: Difficulty with sleepiness and driving:  no   -- Close calls related to sleepiness: no   -- Accidents related to sleepiness: no    Substance Use:  -Caffeine: decaf only  -Alcohol: minimally  -THC: no    Since last visit was hospitalized for Atrial fibrillation:   PT NAME: Mallory Cobian YOB: 1955                      AGE: 76 y o  GENDER: male  MRN: [id number]                              PROCEDURE: Holter monitor - 24 hour     PCP: Maria De Jesus Chavez DO     Outpatient Cardiologist: Gurjit Sullivan MD      INDICATIONS: Atrial fibrillation burden and bradycardia assessment      DESCRIPTION OF FINDINGS:  The patient was monitored for a total of 23 hours and 59 minutes  The patient was in sinus rhythm throughout the study  The average heart rate was 53 beats per minute  The heart rate ranged from a low of 46 beats per minute at 11:38 PM to a maximum of 79 beats per minute at 11:29 AM      Ventricular ectopic activity consisted of 11 beats  These were primarily isolated premature ventricular contractions with a single ventricular couplet  There was no sustained or nonsustained ventricular tachycardia      Supraventricular ectopic activity consisted of 53 beats (0 1 % of total beats)  These were primarily isolated premature atrial contractions with brief episode of trigeminy and rare atrial pairs  There was no supraventricular tachycardia identified  There was no evidence of atrial fibrillation or atrial flutter      There were no significant pauses  The longest R-R interval was 1 6 seconds  There was no evidence of advanced degree heart block      The accompanying patient diary notes no symptoms         IMPRESSION:     1   Patient was in sinus rhythm throughout the study with an average HR of 53 bpm, following a diurnal pattern  2  No evidence of atrial fibrillation throughout the monitoring period  3  Minimal atrial or ventricular ectopy  4  There was 19 hr 40 min of bradycardia with a slowest heart rate of 46 bpm in sinus bradycardia  5  No evidence of high degree heart block  6  Diary noted activities of daily living but no symptoms  7  Patient documented 3 episodes of exercise with peak heart rate ranging from 70 to 79 bpm         Fellow: Hayden Barrera MD  Attending: Remington Kaminski MD     --> Supplemental Oxygen Use: denies    Questionnaire:  Sitting and reading: High chance of dozing  Watching TV: Moderate chance of dozing  Sitting, inactive in a public place (e g  a theatre or a meeting): Would never doze  As a passenger in a car for an hour without a break: Slight chance of dozing  Lying down to rest in the afternoon when circumstances permit: High chance of dozing  Sitting and talking to someone: Would never doze  Sitting quietly after a lunch without alcohol: Would never doze  In a car, while stopped for a few minutes in traffic: Would never doze  Total score: 9      PE:    /60   Pulse (!) 52   Ht 6' 2" (1 88 m)   Wt 97 5 kg (215 lb)   BMI 27 60 kg/m²     General:  In NAD  Pul: Respirations: unlaboured  MS: No atrophy  Neuro: No resting tremor  Gait normal turning & station; unremarkable overall  Psych: Socially appropriate  Pleasant  No overt dysphoria  Assessment: The patient has been compliant with his APAP, but his obstructive events are not well controlled with a residual AHI 10 9  He is not sure if he is deriving benefit from using his APAP as he is still tired and has a large leak, waking him up at night  He is still napping daily and tired during the day  CPAP titration would be helpful at this time as well as a different mask  Recommendations:    1) APAP at 5-15cm with new mask  CPAP titration  2) Safe driving reviewed  3) Follow-up after titration     4) Call with any questions or concerns  All questions answered for the patient and his wife, who indicated understanding and agreed with the plan       Johnnie Beltran MD  Psychiatry/ Sleep medicine

## 2020-08-20 NOTE — PATIENT INSTRUCTIONS
Recommendations:    1) APAP at 5-15cm with new mask  CPAP titration  2) Safe driving reviewed  3) Follow-up after titration  4) Call with any questions or concerns

## 2020-08-24 ENCOUNTER — TELEPHONE (OUTPATIENT)
Dept: UROLOGY | Facility: CLINIC | Age: 65
End: 2020-08-24

## 2020-08-24 ENCOUNTER — TELEPHONE (OUTPATIENT)
Dept: OTHER | Facility: HOSPITAL | Age: 65
End: 2020-08-24

## 2020-08-24 ENCOUNTER — OFFICE VISIT (OUTPATIENT)
Dept: CARDIOLOGY CLINIC | Facility: CLINIC | Age: 65
End: 2020-08-24
Payer: MEDICARE

## 2020-08-24 ENCOUNTER — TELEPHONE (OUTPATIENT)
Dept: UROLOGY | Facility: AMBULATORY SURGERY CENTER | Age: 65
End: 2020-08-24

## 2020-08-24 VITALS
SYSTOLIC BLOOD PRESSURE: 122 MMHG | DIASTOLIC BLOOD PRESSURE: 78 MMHG | HEIGHT: 74 IN | TEMPERATURE: 98.3 F | WEIGHT: 210 LBS | HEART RATE: 50 BPM | BODY MASS INDEX: 26.95 KG/M2

## 2020-08-24 DIAGNOSIS — I48.19 PERSISTENT ATRIAL FIBRILLATION (HCC): Primary | ICD-10-CM

## 2020-08-24 PROCEDURE — 4040F PNEUMOC VAC/ADMIN/RCVD: CPT | Performed by: INTERNAL MEDICINE

## 2020-08-24 PROCEDURE — 1036F TOBACCO NON-USER: CPT | Performed by: INTERNAL MEDICINE

## 2020-08-24 PROCEDURE — 3008F BODY MASS INDEX DOCD: CPT | Performed by: INTERNAL MEDICINE

## 2020-08-24 PROCEDURE — 93000 ELECTROCARDIOGRAM COMPLETE: CPT | Performed by: INTERNAL MEDICINE

## 2020-08-24 PROCEDURE — 3074F SYST BP LT 130 MM HG: CPT | Performed by: INTERNAL MEDICINE

## 2020-08-24 PROCEDURE — 3078F DIAST BP <80 MM HG: CPT | Performed by: INTERNAL MEDICINE

## 2020-08-24 PROCEDURE — 1160F RVW MEDS BY RX/DR IN RCRD: CPT | Performed by: INTERNAL MEDICINE

## 2020-08-24 PROCEDURE — 99214 OFFICE O/P EST MOD 30 MIN: CPT | Performed by: INTERNAL MEDICINE

## 2020-08-24 NOTE — TELEPHONE ENCOUNTER
Called and left detailed message per communication consent  Advised Dr Beatriz Pollack reached out to Dr Myla Gonzalez for patient to follow up with him regarding recent imaging  Offered to switch appointment on 8/26/20 to Dr Myla Gonzalez at [place] [address]  Asking for a call back to either confirm or decline, number provided  Appointment with Dr Myla Gonzalez for 8/26/20 at 10am was tentatively scheduled  Patient still has appointment with Dr Afshan Burnett on 8/26/20 at 1pm  Please cancel whichever appointment patient does not want when he returns call  Please review Dr Myla Gonzalez sees patient's in the [facility] office and that is where his appointment is scheduled

## 2020-08-24 NOTE — PROGRESS NOTES
HEART AND [address]     Outpatient Follow-up  Today's Date: 08/24/20        Patient name: Hermelinda Puentes  YOB: 1955  Sex: male         Chief Complaint: f/u afib    ASSESSMENT:  Problem List Items Addressed This Visit        Cardiovascular and Mediastinum    Persistent atrial fibrillation [facility]) - Primary    Relevant Orders    POCT ECG          75 yo male  1) Peristent afib since Dec 2019, post DCCV w flecainide, had bradycardia w chronotropic incompetence on toprol 100mg and flecainide 100mg bid  WE decreased to toprol 50mg and flecainide 50mg bid and feels much better    Active 75 yo  [id number] age, HTN, CVA on Eliquis  Afib was paroxysmal before this and holter Oct 2019 showed no episodes w sinus bradycardia in 50s  He was planned for ablation next month  2) Normal EF and LA size, mild to moderate MR Echo Oct 2019  No edema or symptoms of heart failure    3) HTN well controlled    4) CVA 6 years ago w/o residual defecit  5) CT A cardiac pre ablation shows 6cm mass on kidney concerning for malignancy  PLAN:  1  Recommend he see Urology first and have renal mass addressed and since stable on current meds will defer ablation for afib  2  Continue flecainide 50mg bid and metoprolol xl 50mg daily and eliquis  Orders Placed This Encounter   Procedures    POCT ECG     There are no discontinued medications    HPI/Subjective:     75 yo male  1) Peristent afib since Dec 2019, post DCCV w flecainide, had bradycardia w chronotropic incompetence on toprol 100mg and flecainide 100mg bid  WE decreased to toprol 50mg and flecainide 50mg bid and feels much better    Active 75 yo  [id number] age, HTN, CVA on Eliquis  Afib was paroxysmal before this and holter Oct 2019 showed no episodes w sinus bradycardia in 50s      He was planned for ablation next month  2) Normal EF and LA size, mild to moderate MR Echo Oct 2019  No edema or symptoms of heart failure    3) HTN well controlled    4) CVA 6 years ago w/o residual defecit  5) CT A cardiac pre ablation shows 6cm mass on kidney concerning for malignancy  Please note HPI is listed by problem with with update following it, it is copied again in the assessment above and reflects medical decision making as well  Complete 12 point ROS reviewed and otherwise non pertinent or negative except as per HPI  Please see paper chart for outpatient clinic patients where the patient completed the 12 point ROS survey  Past Medical History:   Diagnosis Date    Atrial fibrillation ([facility] [address] )     Hyperlipidemia     Hypertension     Pneumonia     Skin cancer     Stroke ([facility] [address] )     Supraventricular tachycardia (HCC)        No Known Allergies  I reviewed the Home Medication list and Allergies in the chart  Scheduled Meds:  Current Outpatient Medications   Medication Sig Dispense Refill    amLODIPine (NORVASC) 5 mg tablet Take 1 tablet (5 mg total) by mouth daily 90 tablet 3    apixaban (ELIQUIS) 5 mg Take 1 tablet (5 mg total) by mouth 2 (two) times a day 180 tablet 1    atorvastatin (LIPITOR) 10 mg tablet TAKE ONE TABLET BY MOUTH EVERY DAY 90 tablet 1    flecainide (TAMBOCOR) 100 mg tablet Take 1 tablet (100 mg total) by mouth 2 (two) times a day (Patient taking differently: Take 50 mg by mouth 2 (two) times a day ) 60 tablet 3    hydrochlorothiazide (HYDRODIURIL) 25 mg tablet Take 1 tablet (25 mg total) by mouth daily 90 tablet 1    metoprolol succinate (TOPROL-XL) 100 mg 24 hr tablet Take 1 tablet (100 mg total) by mouth daily (Patient taking differently: Take 50 mg by mouth daily ) 90 tablet 1    losartan (COZAAR) 100 MG tablet TAKE 1 TABLET BY MOUTH DAILY (Patient not taking: Reported on 8/24/2020) 90 tablet 1     No current facility-administered medications for this visit        PRN Meds:         Family History   Problem Relation Age of Onset   Shaneka Crook Cancer Father         stomach    Hypertension Father     Stroke Father         syndrome    Cancer Family     Heart disease Family     Hypertension Family     Stroke Family         syndrome       Social History     Socioeconomic History    Marital status: /Civil Union     Spouse name: Not on file    Number of children: Not on file    Years of education: Not on file    Highest education level: Not on file   Occupational History    Not on file   Social Needs    Financial resource strain: Not on file    Food insecurity     Worry: Not on file     Inability: Not on file   [employer] needs     Medical: Not on file     Non-medical: Not on file   Tobacco Use    Smoking status: Never Smoker    Smokeless tobacco: Never Used   Substance and Sexual Activity    Alcohol use: Yes     Comment: socailly    Drug use: No    Sexual activity: Not on file   Lifestyle    Physical activity     Days per week: Not on file     Minutes per session: Not on file    Stress: Not on file   Relationships    Social connections     Talks on phone: Not on file     Gets together: Not on file     Attends Yarsanism service: Not on file     Active member of club or organization: Not on file     Attends meetings of clubs or organizations: Not on file     Relationship status: Not on file    Intimate partner violence     Fear of current or ex partner: Not on file     Emotionally abused: Not on file     Physically abused: Not on file     Forced sexual activity: Not on file   Other Topics Concern    Not on file   Social History Narrative    Not on file         OBJECTIVE:    /78 (BP Location: Left arm, Patient Position: Sitting, Cuff Size: Large)   Pulse (!) 50   Temp 98 3 °F (36 8 °C) (Temporal)   Ht 6' 2" (1 88 m)   Wt 95 3 kg (210 lb)   BMI 26 96 kg/m²   Vitals:    08/24/20 0952   Weight: 95 3 kg (210 lb)       GEN: Now acute distress, Alert and oriented, well appearing  HEENT:Head, neck, ears, oral pharynx: Mucus membranes moist, oral pharynx clear, nares clear  External ears normal  EYES: Pupils equal, sclera anicteric, midline, normal conjuctiva  NECK: No JVD, supple, no obvious masses or thryomegaly or goiter  CARDIOVASCULAR: RRR, No murmur, rub, gallops S1,S2  LUNGS: Clear To auscultation bilaterally, normal effort, no rales, rhonchi, crackles  ABDOMEN: Soft, nondistended, nontender, without obvious organomegaly or ascites  EXTREMITIES/VASCULAR: No edema  Radial pulses intact, pedal pulses difficult to palpate, warm an well perfused  PSYCH: Normal Affect, no overt suicidal ideation, linear speech pattern without evidence of psychosis  NEURO: Grossly intact, moving all extremiteis equal, face symmetric, alert and responsive, no obvious focal defecits  HEME: No bleeding, bruising, petechia, purpura  SKIN: No significant rashes, warm, no diaphoresis or pallor         Lab Results:       LABS:      Chemistry        Component Value Date/Time     09/19/2017 0958    K 3 7 07/20/2020 0823    K 4 2 01/23/2020 0719     07/20/2020 0823     01/23/2020 0719    CO2 32 07/20/2020 0823    CO2 35 (H) 01/23/2020 0719    BUN 16 07/20/2020 0823    BUN 17 01/23/2020 0719    CREATININE 1 19 07/20/2020 0823    CREATININE 1 12 09/19/2017 0958        Component Value Date/Time    CALCIUM 9 0 07/20/2020 0823    CALCIUM 9 5 01/23/2020 0719    ALKPHOS 101 07/20/2020 0823    ALKPHOS 63 01/23/2020 0719    AST 28 07/20/2020 0823    AST 23 01/23/2020 0719    ALT 43 07/20/2020 0823    ALT 25 01/23/2020 0719    BILITOT 0 9 09/19/2017 0958            Lab Results   Component Value Date    CHOL 136 09/19/2017    CHOL 132 05/31/2017    CHOL 124 (L) 03/30/2016     Lab Results   Component Value Date    HDL 49 01/23/2020    HDL 62 12/06/2018    HDL 59 09/19/2017     Lab Results   Component Value Date    LDLCALC 66 01/23/2020    [address] 69 12/06/2018     Lab Results Component Value Date    TRIG 53 2020    TRIG 51 2018    TRIG 54 2017     No results found for: CHOLHDL    IMAGING: No results found  Cardiac testing:   Results for orders placed during the hospital encounter of 10/08/19   Echo complete with contrast if indicated    Narrative [address]  [facility], [facility]  [phone]    Transthoracic Echocardiogram  2D, M-mode, Doppler, and Color Doppler    Study date:  08-Oct-2019    Patient: Fatmata Willis  MR number: [id number]  Account number: [de-identified]  : 1955  Age: 71 years  Gender: Male  Status: Outpatient  Location: [address] [place] [address]  Height: 76 in  Weight: 213 6 lb  BP: 124/ 70 mmHg    Indications: A-Fib    Diagnoses: I48 0 - Atrial fibrillation    Sonographer:  KIAH Sorto  Primary Physician:  Te Styles DO  Referring Physician:  Cherry Phillisp MD  Group:  [id number]  Interpreting Physician:  Osiel Godinez MD    SUMMARY    LEFT VENTRICLE:  Systolic function was normal  Ejection fraction was estimated to be 55 %  There were no regional wall motion abnormalities  Wall thickness was at the upper limits of normal     MITRAL VALVE:  There was mild to moderate regurgitation  TRICUSPID VALVE:  There was mild regurgitation  Pulmonary artery systolic pressure was within the normal range  PULMONIC VALVE:  There was trace regurgitation  AORTA:  The root exhibited mild dilatation  3 8 cm  There was mild dilatation of the ascending aorta  3 7 cm    HISTORY: PRIOR HISTORY: HTN, HLD, cerebrovascualr disease , stroke, SVT    PROCEDURE: The study was performed in the [address]  This was a routine study  The transthoracic approach was used  The study included complete 2D imaging, M-mode, complete spectral Doppler, and color Doppler  The  heart rate was 58 bpm, at the start of the study   Images were obtained from the parasternal, apical, subcostal, and suprasternal notch acoustic windows  Echocardiographic views were limited due to decreased penetration  This was a  technically difficult study  LEFT VENTRICLE: Size was normal  Systolic function was normal  Ejection fraction was estimated to be 55 %  There were no regional wall motion abnormalities  Wall thickness was at the upper limits of normal  No evidence of apical thrombus  DOPPLER: Left ventricular diastolic function parameters were normal     RIGHT VENTRICLE: The size was normal  Systolic function was normal  Wall thickness was normal     LEFT ATRIUM: Size was normal     RIGHT ATRIUM: Size was normal     MITRAL VALVE: Valve structure was normal  There was normal leaflet separation  DOPPLER: The transmitral velocity was within the normal range  There was no evidence for stenosis  There was mild to moderate regurgitation  AORTIC VALVE: The valve was trileaflet  Leaflets exhibited normal thickness and normal cuspal separation  DOPPLER: Transaortic velocity was within the normal range  There was no evidence for stenosis  There was no significant  regurgitation  TRICUSPID VALVE: The valve structure was normal  There was normal leaflet separation  DOPPLER: The transtricuspid velocity was within the normal range  There was no evidence for stenosis  There was mild regurgitation  Pulmonary artery  systolic pressure was within the normal range  PULMONIC VALVE: Leaflets exhibited normal thickness, no calcification, and normal cuspal separation  DOPPLER: The transpulmonic velocity was within the normal range  There was trace regurgitation  PERICARDIUM: There was no pericardial effusion  The pericardium was normal in appearance  AORTA: The root exhibited mild dilatation  3 8 cm There was mild dilatation of the ascending aorta  3 7 cm    SYSTEMIC VEINS: IVC: The inferior vena cava was normal in size      SYSTEM MEASUREMENT TABLES    2D  %FS: 23 4 %  Ao Diam: 3 8 cm  EDV(Teich): 110 72 ml  EF(Cube): 55 06 %  EF(Teich): 46 71 %  ESV(Cube): 51 61 ml  ESV(Teich): 59 ml  IVSd: 1 07 cm  LA Area: 16 85 cm2  LA Diam: 3 57 cm  LVEDV MOD A4C: 110 01 ml  LVEF MOD A4C: 56 61 %  LVESV MOD A4C: 47 73 ml  LVIDd: 4 86 cm  LVIDs: 3 72 cm  LVLd A4C: 8 72 cm  LVLs A4C: 7 28 cm  LVPWd: 0 83 cm  RA Area: 23 79 cm2  RV Diam : 3 81 cm  SI(Cube): 27 73 ml/m2  SI(Teich): 22 68 ml/m2  SV MOD A4C: 62 28 ml  SV(Cube): 63 23 ml  SV(Teich): 51 72 ml    CW  TR Vmax: 2 m/s  TR maxPG: 15 99 mmHg    MM  TAPSE: 2 11 cm    PW  E': 0 11 m/s  E/E': 5 87  MV A Leeroy: 0 46 m/s  MV Dec Rooks: 2 22 m/s2  MV DecT: 286 92 ms  MV E Leeroy: 0 64 m/s  MV E/A Ratio: 1 37    [facility] Accredited Echocardiography Laboratory    Prepared and electronically signed by    Rock Zuluaga MD  Signed 08-Oct-2019 11:25:58       No results found for this or any previous visit  No results found for this or any previous visit  No results found for this or any previous visit          I reviewed and interpreted the following LABS/EKG/TELE/IMAGING and below is summary of my interpretation (if data available):        Current EKG and Rhythm Strip:sinus bradyc w first degree av block HR 51bpm        HOLTER/EVENT Monitor:see disucssion

## 2020-08-24 NOTE — TELEPHONE ENCOUNTER
----- Message from Min Meehan MD sent at 8/24/2020 11:16 AM EDT -----  Absolutely Dr Renita Huang  Great that this was found on the cardiac CT! Images reviewed    Sebas Crow - please schedule him to see me this week 8/26 at [address]  ----- Message -----  From: Shima Faustin MD  Sent: 8/24/2020  10:32 AM EDT  To: Min Meehan MD    Hi, you saw this patient in 2016  He had cardiac ct for ablation w/u, and incidentally found 6cm exophyitic mass on kidney  Can you see in f/u  We will hold off on ablation as his heart is stable in NSR  He can hold bleed thinners as needed for surgery

## 2020-08-24 NOTE — TELEPHONE ENCOUNTER
Pt came into the [place] office  He was last seen by Dr Willi Gaitan on 10/3/19  He recently had a cardiac CT which showed sharply circumscribed exophytic mass at the upper pole of the left kidney measures up to 6 4 cm  He was scheduled to see Dr Alvah Gosselin in December, however he was told to follow up with Urology ASAP

## 2020-08-25 ENCOUNTER — TELEPHONE (OUTPATIENT)
Dept: FAMILY MEDICINE CLINIC | Facility: CLINIC | Age: 65
End: 2020-08-25

## 2020-08-25 ENCOUNTER — HOSPITAL ENCOUNTER (OUTPATIENT)
Dept: RADIOLOGY | Facility: HOSPITAL | Age: 65
Discharge: HOME | End: 2020-08-25
Payer: MEDICARE

## 2020-08-25 DIAGNOSIS — N28.89 LEFT KIDNEY MASS: ICD-10-CM

## 2020-08-25 DIAGNOSIS — N28.89 LEFT KIDNEY MASS: Primary | ICD-10-CM

## 2020-08-25 PROCEDURE — 74183 MRI ABD W/O CNTR FLWD CNTR: CPT

## 2020-08-25 PROCEDURE — A9585 GADOBUTROL INJECTION: HCPCS | Performed by: FAMILY MEDICINE

## 2020-08-25 PROCEDURE — G1004 CDSM NDSC: HCPCS

## 2020-08-25 RX ADMIN — GADOBUTROL 9 ML: 604.72 INJECTION INTRAVENOUS at 21:04

## 2020-08-25 NOTE — TELEPHONE ENCOUNTER
Radiologist called in to inform Dr Adame Daughters that pt had his CT scan and it appears he may have  Renal cell carcinoma  He would like pt to have MRI

## 2020-08-25 NOTE — TELEPHONE ENCOUNTER
Spoke with patient and he decided to keep appointment with Dr Tamar King in the [place] office on 8-25 instead of his appointment with Dr Resendez at the [facility] office

## 2020-08-25 NOTE — TELEPHONE ENCOUNTER
Per Dr Evonne Cox, called patient regarding CT results and recommendation for MRI w/wo contrast renal protocol  He was already aware of some results and has appt with Urology tomorrow  Have placed order for MRI, informed patient, and called patient to give number

## 2020-08-25 NOTE — TELEPHONE ENCOUNTER
Lm for pt asking him which apt he would like to keep for tomorrow  When he calls back please cancel other apt and confirm the location

## 2020-08-27 ENCOUNTER — OFFICE VISIT (OUTPATIENT)
Dept: UROLOGY | Facility: AMBULATORY SURGERY CENTER | Age: 65
End: 2020-08-27
Payer: MEDICARE

## 2020-08-27 VITALS
SYSTOLIC BLOOD PRESSURE: 122 MMHG | HEART RATE: 60 BPM | HEIGHT: 74 IN | DIASTOLIC BLOOD PRESSURE: 72 MMHG | WEIGHT: 210 LBS | BODY MASS INDEX: 26.95 KG/M2 | TEMPERATURE: 97.5 F

## 2020-08-27 DIAGNOSIS — C64.2 MALIGNANT NEOPLASM OF LEFT KIDNEY (HCC): Primary | ICD-10-CM

## 2020-08-27 PROCEDURE — 3078F DIAST BP <80 MM HG: CPT | Performed by: UROLOGY

## 2020-08-27 PROCEDURE — 1036F TOBACCO NON-USER: CPT | Performed by: UROLOGY

## 2020-08-27 PROCEDURE — 1160F RVW MEDS BY RX/DR IN RCRD: CPT | Performed by: UROLOGY

## 2020-08-27 PROCEDURE — 4040F PNEUMOC VAC/ADMIN/RCVD: CPT | Performed by: UROLOGY

## 2020-08-27 PROCEDURE — 3008F BODY MASS INDEX DOCD: CPT | Performed by: UROLOGY

## 2020-08-27 PROCEDURE — 99215 OFFICE O/P EST HI 40 MIN: CPT | Performed by: UROLOGY

## 2020-08-27 PROCEDURE — 3074F SYST BP LT 130 MM HG: CPT | Performed by: UROLOGY

## 2020-08-27 RX ORDER — SODIUM CHLORIDE 9 MG/ML
125 INJECTION, SOLUTION INTRAVENOUS CONTINUOUS
Status: CANCELLED | OUTPATIENT
Start: 2020-08-27

## 2020-08-27 RX ORDER — CEFAZOLIN SODIUM 2 G/50ML
2000 SOLUTION INTRAVENOUS ONCE
Status: CANCELLED | OUTPATIENT
Start: 2020-10-07 | End: 2020-08-27

## 2020-08-27 NOTE — PROGRESS NOTES
8/27/2020    Tara Curiel  1955  [id number]        Assessment  6 cm left upper pole renal mass highly suspicious for renal cell carcinoma      Discussion  I had a lengthy discussion with the patient and his wife regarding the MRI findings  Findings are highly suspicious for a renal cell carcinoma originating from the upper pole of the left kidney  We discussed that surgical extirpation of renal cell carcinoma is standard of care treatment  Based on size and location of the mass it may actually be amenable to a robot assisted laparoscopic partial nephrectomy versus a left hand assisted laparoscopic nephrectomy  I recommend obtaining a CT scan of the abdomen and pelvis with, without common delayed IV contrast imaging for further surgical planning  The patient will return as soon as the CT scan has been completed  History of Present Illness  76 y o  male with a history of BPH known to Dr Scott Frias  He also has a history of atrial fibrillation on Eliquis  He recently had a cardioversion which transiently placed in back into sinus rhythm  He is in discussion with his cardiologist for possible ablation  For preprocedure planning, CT scan of the chest was performed  Cuts through the upper abdomen revealed a suspicious lesion in the upper pole of the left kidney highly concerning for renal cell carcinoma  This led to a follow-up MRI which I reviewed in the office today with the patient and his wife  The MRI shows a 6 cm mass originating from the upper pole of the left kidney which is highly suspicious for a renal cell carcinoma  AUA Symptom Score  AUA SYMPTOM SCORE      Most Recent Value   AUA SYMPTOM SCORE   How often have you had a sensation of not emptying your bladder completely after you finished urinating? 1   How often have you had to urinate again less than two hours after you finished urinating? 3   How often have you found you stopped and started again several times when you urinate? 1   How often have you found it difficult to postpone urination? 3   How often have you had a weak urinary stream?  2   How often have you had to push or strain to begin urination? 0   How many times did you most typically get up to urinate from the time you went to bed at night until the time you got up in the morning? 2   Quality of Life: If you were to spend the rest of your life with your urinary condition just the way it is now, how would you feel about that?  3   AUA SYMPTOM SCORE  12          Review of Systems  Review of Systems   Constitutional: Negative  HENT: Negative  Eyes: Negative  Respiratory: Negative  Cardiovascular: Negative  Gastrointestinal: Negative  Endocrine: Negative  Genitourinary:        Per HPI   Musculoskeletal: Negative  Skin: Negative  Allergic/Immunologic: Negative  Neurological: Negative  Hematological: Negative  Psychiatric/Behavioral: Negative            Past Medical History  Past Medical History:   Diagnosis Date    Atrial fibrillation ([facility] [address] )     Hyperlipidemia     Hypertension     Pneumonia     Skin cancer     Stroke ([facility] [address] [facility]     Supraventricular tachycardia (HCC)        Past Social History  Past Surgical History:   Procedure Laterality Date    APPENDECTOMY      COLONOSCOPY      fiberoptic, also 2009, both negative, resolved 2004    HERNIA REPAIR      KNEE SURGERY      SKIN GRAFT      left ear, skin cancer       Past Family History  Family History   Problem Relation Age of Onset    Cancer Father         stomach    Hypertension Father     Stroke Father         syndrome    Cancer Family     Heart disease Family     Hypertension Family     Stroke Family         syndrome       Past Social history  Social History     Socioeconomic History    Marital status: /Civil Union     Spouse name: Not on file    Number of children: Not on file    Years of education: Not on file    Highest education level: Not on file   Occupational History    Not on file   Social Needs    Financial resource strain: Not on file    Food insecurity     Worry: Not on file     Inability: Not on file    Transportation needs     Medical: Not on file     Non-medical: Not on file   Tobacco Use    Smoking status: Never Smoker    Smokeless tobacco: Never Used   Substance and Sexual Activity    Alcohol use: Yes     Comment: socailly    Drug use: No    Sexual activity: Not on file   Lifestyle    Physical activity     Days per week: Not on file     Minutes per session: Not on file    Stress: Not on file   Relationships    Social connections     Talks on phone: Not on file     Gets together: Not on file     Attends [employer]ism service: Not on file     Active member of club or organization: Not on file     Attends meetings of clubs or organizations: Not on file     Relationship status: Not on file    Intimate partner violence     Fear of current or ex partner: Not on file     Emotionally abused: Not on file     Physically abused: Not on file     Forced sexual activity: Not on file   Other Topics Concern    Not on file   Social History Narrative    Not on file       Current Medications  Current Outpatient Medications   Medication Sig Dispense Refill    amLODIPine (NORVASC) 5 mg tablet Take 1 tablet (5 mg total) by mouth daily 90 tablet 3    apixaban (ELIQUIS) 5 mg Take 5 mg by mouth every 12 (twelve) hours      flecainide (TAMBOCOR) 100 mg tablet Take 1 tablet (100 mg total) by mouth 2 (two) times a day (Patient taking differently: Take 50 mg by mouth 2 (two) times a day ) 60 tablet 3    metoprolol succinate (TOPROL-XL) 100 mg 24 hr tablet Take 1 tablet (100 mg total) by mouth daily (Patient taking differently: Take 50 mg by mouth daily ) 90 tablet 1    apixaban (ELIQUIS) 5 mg Take 1 tablet (5 mg total) by mouth 2 (two) times a day 180 tablet 1    atorvastatin (LIPITOR) 10 mg tablet TAKE ONE TABLET BY MOUTH EVERY DAY (Patient not taking: Reported on 8/27/2020) 90 tablet 1    hydrochlorothiazide (HYDRODIURIL) 25 mg tablet Take 1 tablet (25 mg total) by mouth daily (Patient not taking: Reported on 8/27/2020) 90 tablet 1    losartan (COZAAR) 100 MG tablet TAKE 1 TABLET BY MOUTH DAILY (Patient not taking: Reported on 8/24/2020) 90 tablet 1     No current facility-administered medications for this visit  Allergies  No Known Allergies    Past Medical History, Social History, Family History, medications and allergies were reviewed  Vitals  Vitals:    08/27/20 0827   BP: 122/72   Pulse: 60   Temp: 97 5 °F (36 4 °C)   Weight: 95 3 kg (210 lb)   Height: 6' 2" (1 88 m)       Physical Exam  Physical Exam  On examination he is in no acute distress  His abdomen is soft nontender nondistended  Appendectomy scar, inguinal hernia scars, and umbilical hernia scar noted  Digital rectal examination is deferred  Skin is warm  Extremities without edema  Neurologic is grossly intact and nonfocal   Gait normal   Affect normal    Results  Lab Results   Component Value Date    PSA 2 0 09/10/2019    PSA 2 0 09/25/2018     Lab Results   Component Value Date    CALCIUM 9 0 07/20/2020     09/19/2017    K 3 7 07/20/2020    CO2 32 07/20/2020     07/20/2020    BUN 16 07/20/2020    CREATININE 1 19 07/20/2020     Lab Results   Component Value Date    WBC 5 51 07/20/2020    HGB 15 1 07/20/2020    HCT 46 6 07/20/2020    MCV 92 07/20/2020     07/20/2020         Office Urine Dip  No results found for this or any previous visit (from the past 1 hour(s))  ]      Total visit time was 40 minutes of which over 50% was spent on counseling

## 2020-08-28 ENCOUNTER — HOSPITAL ENCOUNTER (OUTPATIENT)
Dept: RADIOLOGY | Age: 65
Discharge: HOME | End: 2020-08-28
Payer: MEDICARE

## 2020-08-28 DIAGNOSIS — C64.2 MALIGNANT NEOPLASM OF LEFT KIDNEY (HCC): ICD-10-CM

## 2020-08-28 PROCEDURE — 74178 CT ABD&PLV WO CNTR FLWD CNTR: CPT

## 2020-08-28 PROCEDURE — G1004 CDSM NDSC: HCPCS

## 2020-08-28 RX ADMIN — IOHEXOL 100 ML: 350 INJECTION, SOLUTION INTRAVENOUS at 11:29

## 2020-09-02 ENCOUNTER — TELEPHONE (OUTPATIENT)
Dept: CARDIOLOGY CLINIC | Facility: CLINIC | Age: 65
End: 2020-09-02

## 2020-09-02 ENCOUNTER — OFFICE VISIT (OUTPATIENT)
Dept: UROLOGY | Facility: AMBULATORY SURGERY CENTER | Age: 65
End: 2020-09-02
Payer: MEDICARE

## 2020-09-02 VITALS
BODY MASS INDEX: 27.46 KG/M2 | TEMPERATURE: 97.3 F | WEIGHT: 214 LBS | SYSTOLIC BLOOD PRESSURE: 122 MMHG | HEART RATE: 56 BPM | HEIGHT: 74 IN | DIASTOLIC BLOOD PRESSURE: 84 MMHG

## 2020-09-02 DIAGNOSIS — C64.2 MALIGNANT NEOPLASM OF LEFT KIDNEY (HCC): Primary | ICD-10-CM

## 2020-09-02 LAB
SL AMB  POCT GLUCOSE, UA: ABNORMAL
SL AMB LEUKOCYTE ESTERASE,UA: ABNORMAL
SL AMB POCT BILIRUBIN,UA: ABNORMAL
SL AMB POCT BLOOD,UA: + 1
SL AMB POCT CLARITY,UA: CLEAR
SL AMB POCT COLOR,UA: YELLOW
SL AMB POCT KETONES,UA: ABNORMAL
SL AMB POCT NITRITE,UA: ABNORMAL
SL AMB POCT PH,UA: 6.5
SL AMB POCT SPECIFIC GRAVITY,UA: 1.01
SL AMB POCT URINE PROTEIN: ABNORMAL
SL AMB POCT UROBILINOGEN: ABNORMAL

## 2020-09-02 PROCEDURE — 99215 OFFICE O/P EST HI 40 MIN: CPT | Performed by: UROLOGY

## 2020-09-02 PROCEDURE — 81002 URINALYSIS NONAUTO W/O SCOPE: CPT | Performed by: UROLOGY

## 2020-09-02 NOTE — TELEPHONE ENCOUNTER
No cardiac contraindication to proceeding with partial or radical nephrectomy for suspected renal cell carcinoma  The patient is considered low cardiovascular risk, but does have paroxysmal atrial fibrillation, and attention should be paid to rhythm management perioperatively  He should be monitored on telemetry, continue his flecainide and metoprolol at current doses as long as his hemodynamics allow  Eliquis can be held 48 hours prior to surgery and reinitiated post operatively per his surgeon

## 2020-09-02 NOTE — PROGRESS NOTES
9/2/2020    Hima Umaña  1955  [id number]        Assessment  6 cm left upper pole renal mass highly suspicious for renal cell carcinoma, history of CVA and atrial fibrillation on Eliquis      Discussion  I had a lengthy discussion with the patient and his wife in the office today concerning both the MRI as well as the recent CT scan  Both show a mass in the upper pole of the left kidney that is highly suspicious for renal cell carcinoma  We discussed that surgical intervention is standard of care for renal cell carcinoma  Fortunately there is no sign of disease outside of the kidney  We discussed options including radical versus partial nephrectomy  Although the mass is large, I feel that based on its exophytic nature that it would be amenable to robot assisted laparoscopic partial nephrectomy  He does understand the risk of conversion to either a radical nephrectomy or an open procedure  We discussed the risk of benign pathology such as oncocytoma  He will require preoperative cardiac clearance from Dr Demetrius Still for risk stratification and permission to hold Eliquis  Surgery to be arranged for September 2020  Informed consent obtained in the office today  History of Present Illness  76 y o  male with a history of atrial fibrillation on Eliquis  He states that 7 years ago he had a small CVA  He follows with Dr Demetrius Still from Cardiology  He recently had a cardioversion  This placed him into sinus rhythm  He has had paroxysmal atrial fibrillation since  He was recently evaluated for possible cardiac ablation  As part of that workup he had a CT scan of the chest   Images through the upper abdomen revealed a mass on the upper pole of the left kidney  This prompted an MRI followed by a CT scan  Both of these imaging modalities are suspicious for a renal cell carcinoma  He returns in follow-up today  He reports a prior history of an open appendectomy as well as bilateral inguinal hernia repairs  His wife is present with him in the office for discussion regarding his CT scan  AUA Symptom Score  AUA SYMPTOM SCORE      Most Recent Value   AUA SYMPTOM SCORE   How often have you had a sensation of not emptying your bladder completely after you finished urinating? 3   How often have you had to urinate again less than two hours after you finished urinating? 2   How often have you found you stopped and started again several times when you urinate? 1   How often have you found it difficult to postpone urination? 2   How often have you had a weak urinary stream?  1   How often have you had to push or strain to begin urination? 0   How many times did you most typically get up to urinate from the time you went to bed at night until the time you got up in the morning? 2   Quality of Life: If you were to spend the rest of your life with your urinary condition just the way it is now, how would you feel about that?  3   AUA SYMPTOM SCORE  11          Review of Systems  Review of Systems   Constitutional: Negative  HENT: Negative  Eyes: Negative  Respiratory: Negative  Cardiovascular: Negative  Gastrointestinal: Negative  Endocrine: Negative  Genitourinary:        Per HPI   Musculoskeletal: Negative  Skin: Negative  Allergic/Immunologic: Negative  Neurological: Negative  Hematological: Negative  Psychiatric/Behavioral: Negative            Past Medical History  Past Medical History:   Diagnosis Date    [facility] [facility]     Hyperlipidemia     Hypertension     Pneumonia     Skin cancer     [facility]     [facility]        Past Social History  Past Surgical History:   Procedure Laterality Date    APPENDECTOMY      COLONOSCOPY      fiberoptic, also 2009, both negative, resolved 2004    HERNIA REPAIR      KNEE SURGERY      SKIN GRAFT      left ear, skin cancer       Past Family History  Family History   Problem Relation Age of Onset    Cancer Father         stomach    Hypertension Father     Stroke Father         syndrome    Cancer Family     Heart disease Family     Hypertension Family     Stroke Family         syndrome       Past Social history  Social History     Socioeconomic History    Marital status: /Civil Union     Spouse name: Not on file    Number of children: Not on file    Years of education: Not on file    Highest education level: Not on file   Occupational History    Not on file   Social Needs    Financial resource strain: Not on file    Food insecurity     Worry: Not on file     Inability: Not on file   [employer] needs     Medical: Not on file     Non-medical: Not on file   Tobacco Use    Smoking status: Never Smoker    Smokeless tobacco: Never Used   Substance and Sexual Activity    Alcohol use: Yes     Comment: socailly    Drug use: No    Sexual activity: Not on file   Lifestyle    Physical activity     Days per week: Not on file     Minutes per session: Not on file    Stress: Not on file   Relationships    Social connections     Talks on phone: Not on file     Gets together: Not on file     Attends Mu-ism service: Not on file     Active member of club or organization: Not on file     Attends meetings of clubs or organizations: Not on file     Relationship status: Not on file    Intimate partner violence     Fear of current or ex partner: Not on file     Emotionally abused: Not on file     Physically abused: Not on file     Forced sexual activity: Not on file   Other Topics Concern    Not on file   Social History Narrative    Not on file       Current Medications  Current Outpatient Medications   Medication Sig Dispense Refill    amLODIPine (NORVASC) 5 mg tablet Take 1 tablet (5 mg total) by mouth daily 90 tablet 3    apixaban (ELIQUIS) 5 mg Take 1 tablet (5 mg total) by mouth 2 (two) times a day 180 tablet 1    apixaban (ELIQUIS) 5 mg Take 5 mg by mouth every 12 (twelve) hours      atorvastatin (LIPITOR) 10 mg tablet TAKE ONE TABLET BY MOUTH EVERY DAY 90 tablet 1    flecainide (TAMBOCOR) 100 mg tablet Take 1 tablet (100 mg total) by mouth 2 (two) times a day (Patient taking differently: Take 50 mg by mouth 2 (two) times a day ) 60 tablet 3    metoprolol succinate (TOPROL-XL) 100 mg 24 hr tablet Take 1 tablet (100 mg total) by mouth daily (Patient taking differently: Take 50 mg by mouth daily ) 90 tablet 1    hydrochlorothiazide (HYDRODIURIL) 25 mg tablet Take 1 tablet (25 mg total) by mouth daily (Patient not taking: Reported on 8/27/2020) 90 tablet 1    losartan (COZAAR) 100 MG tablet TAKE 1 TABLET BY MOUTH DAILY (Patient not taking: Reported on 8/24/2020) 90 tablet 1     No current facility-administered medications for this visit  Allergies  No Known Allergies    Past Medical History, Social History, Family History, medications and allergies were reviewed  Vitals  Vitals:    09/02/20 1005   BP: 122/84   BP Location: Right arm   Patient Position: Sitting   Cuff Size: Adult   Pulse: 56   Temp: (!) 97 3 °F (36 3 °C)   TempSrc: Temporal   Weight: 97 1 kg (214 lb)   Height: 6' 2" (1 88 m)       Physical Exam  Physical Exam    On examination he is in no acute distress  Lungs are clear  Cardiac is regular at this time  Abdomen is soft nontender nondistended  Skin is warm  Extremities without edema    Neurologic is grossly intact and nonfocal   Gait normal   Affect normal    Results  Lab Results   Component Value Date    PSA 2 0 09/10/2019    PSA 2 0 09/25/2018     Lab Results   Component Value Date    CALCIUM 9 0 07/20/2020     09/19/2017    K 3 7 07/20/2020    CO2 32 07/20/2020     07/20/2020    BUN 16 07/20/2020    CREATININE 1 19 07/20/2020     Lab Results   Component Value Date    WBC 5 51 07/20/2020    HGB 15 1 07/20/2020    HCT 46 6 07/20/2020    MCV 92 07/20/2020     07/20/2020         Office Urine Dip  Recent Results (from the past 1 hour(s))   POCT urine dip    Collection Time: 09/02/20 10:12 AM   Result Value Ref Range    LEUKOCYTE ESTERASE,UA trace     NITRITE,UA -     SL AMB POCT UROBILINOGEN -     POCT URINE PROTEIN -      PH,UA 6 5     BLOOD,UA + 1     SPECIFIC GRAVITY,UA 1 010     KETONES,UA -     BILIRUBIN,UA -     GLUCOSE, UA -      COLOR,UA yellow     CLARITY,UA clear    ]      Total visit time was 40 minutes of which over 50% was spent on counseling

## 2020-09-02 NOTE — LETTER
September 2, 2020     Allison Santos, [facility] [address]    Patient: New Lee   YOB: 1955   Date of Visit: 9/2/2020       Dear Dr Paola Benitez: Thank you for referring New Lee to me for evaluation  Below are my notes for this consultation  If you have questions, please do not hesitate to call me  I look forward to following your patient along with you  Sincerely,        Kit Harding MD        CC: No Recipients  Kit Harding MD  9/2/2020 11:10 AM  Sign when Signing Visit  9/2/2020    New Lee  1955  [id number]        Assessment  6 cm left upper pole renal mass highly suspicious for renal cell carcinoma, history of CVA and atrial fibrillation on Eliquis      Discussion  I had a lengthy discussion with the patient and his wife in the office today concerning both the MRI as well as the recent CT scan  Both show a mass in the upper pole of the left kidney that is highly suspicious for renal cell carcinoma  We discussed that surgical intervention is standard of care for renal cell carcinoma  Fortunately there is no sign of disease outside of the kidney  We discussed options including radical versus partial nephrectomy  Although the mass is large, I feel that based on its exophytic nature that it would be amenable to robot assisted laparoscopic partial nephrectomy  He does understand the risk of conversion to either a radical nephrectomy or an open procedure  We discussed the risk of benign pathology such as oncocytoma  He will require preoperative cardiac clearance from Dr Paola Benitez for risk stratification and permission to hold Eliquis  Surgery to be arranged for September 2020  Informed consent obtained in the office today  History of Present Illness  76 y o  male with a history of atrial fibrillation on Eliquis  He states that 7 years ago he had a small CVA  He follows with Dr Paola Benitez from Cardiology  He recently had a cardioversion    This placed him into sinus rhythm  He has had paroxysmal atrial fibrillation since  He was recently evaluated for possible cardiac ablation  As part of that workup he had a CT scan of the chest   Images through the upper abdomen revealed a mass on the upper pole of the left kidney  This prompted an MRI followed by a CT scan  Both of these imaging modalities are suspicious for a renal cell carcinoma  He returns in follow-up today  He reports a prior history of an open appendectomy as well as bilateral inguinal hernia repairs  His wife is present with him in the office for discussion regarding his CT scan  AUA Symptom Score  AUA SYMPTOM SCORE      Most Recent Value   AUA SYMPTOM SCORE   How often have you had a sensation of not emptying your bladder completely after you finished urinating? 3   How often have you had to urinate again less than two hours after you finished urinating? 2   How often have you found you stopped and started again several times when you urinate? 1   How often have you found it difficult to postpone urination? 2   How often have you had a weak urinary stream?  1   How often have you had to push or strain to begin urination? 0   How many times did you most typically get up to urinate from the time you went to bed at night until the time you got up in the morning? 2   Quality of Life: If you were to spend the rest of your life with your urinary condition just the way it is now, how would you feel about that?  3   AUA SYMPTOM SCORE  11          Review of Systems  Review of Systems   Constitutional: Negative  HENT: Negative  Eyes: Negative  Respiratory: Negative  Cardiovascular: Negative  Gastrointestinal: Negative  Endocrine: Negative  Genitourinary:        Per HPI   Musculoskeletal: Negative  Skin: Negative  Allergic/Immunologic: Negative  Neurological: Negative  Hematological: Negative  Psychiatric/Behavioral: Negative            Past Medical History  Past Medical History:   Diagnosis Date    [facility] [address] [facility]     Hyperlipidemia     Hypertension     Pneumonia     Skin cancer     [facility]     Supraventricular tachycardia (HCC)        Past Social History  Past Surgical History:   Procedure Laterality Date    APPENDECTOMY      COLONOSCOPY      fiberoptic, also 2009, both negative, resolved 2004    HERNIA REPAIR      KNEE SURGERY      SKIN GRAFT      left ear, skin cancer       Past Family History  Family History   Problem Relation Age of Onset    Cancer Father         stomach    Hypertension Father     Stroke Father         syndrome    Cancer Family     Heart disease Family     Hypertension Family     Stroke Family         syndrome       Past Social history  Social History     Socioeconomic History    Marital status: /Civil Union     Spouse name: Not on file    Number of children: Not on file    Years of education: Not on file    Highest education level: Not on file   Occupational History    Not on file   Social Needs    Financial resource strain: Not on file    Food insecurity     Worry: Not on file     Inability: Not on file    Transportation needs     Medical: Not on file     Non-medical: Not on file   Tobacco Use    Smoking status: Never Smoker    Smokeless tobacco: Never Used   Substance and Sexual Activity    Alcohol use: Yes     Comment: socailly    Drug use: No    Sexual activity: Not on file   Lifestyle    Physical activity     Days per week: Not on file     Minutes per session: Not on file    Stress: Not on file   Relationships    Social connections     Talks on phone: Not on file     Gets together: Not on file     Attends Tenriism service: Not on file     Active member of club or organization: Not on file     Attends meetings of clubs or organizations: Not on file     Relationship status: Not on file    Intimate partner violence     Fear of current or ex partner: Not on file     Emotionally abused: Not on file     Physically abused: Not on file     Forced sexual activity: Not on file   Other Topics Concern    Not on file   Social History Narrative    Not on file       Current Medications  Current Outpatient Medications   Medication Sig Dispense Refill    amLODIPine (NORVASC) 5 mg tablet Take 1 tablet (5 mg total) by mouth daily 90 tablet 3    apixaban (ELIQUIS) 5 mg Take 1 tablet (5 mg total) by mouth 2 (two) times a day 180 tablet 1    apixaban (ELIQUIS) 5 mg Take 5 mg by mouth every 12 (twelve) hours      atorvastatin (LIPITOR) 10 mg tablet TAKE ONE TABLET BY MOUTH EVERY DAY 90 tablet 1    flecainide (TAMBOCOR) 100 mg tablet Take 1 tablet (100 mg total) by mouth 2 (two) times a day (Patient taking differently: Take 50 mg by mouth 2 (two) times a day ) 60 tablet 3    metoprolol succinate (TOPROL-XL) 100 mg 24 hr tablet Take 1 tablet (100 mg total) by mouth daily (Patient taking differently: Take 50 mg by mouth daily ) 90 tablet 1    hydrochlorothiazide (HYDRODIURIL) 25 mg tablet Take 1 tablet (25 mg total) by mouth daily (Patient not taking: Reported on 8/27/2020) 90 tablet 1    losartan (COZAAR) 100 MG tablet TAKE 1 TABLET BY MOUTH DAILY (Patient not taking: Reported on 8/24/2020) 90 tablet 1     No current facility-administered medications for this visit  Allergies  No Known Allergies    Past Medical History, Social History, Family History, medications and allergies were reviewed  Vitals  Vitals:    09/02/20 1005   BP: 122/84   BP Location: Right arm   Patient Position: Sitting   Cuff Size: Adult   Pulse: 56   Temp: (!) 97 3 °F (36 3 °C)   TempSrc: Temporal   Weight: 97 1 kg (214 lb)   Height: 6' 2" (1 88 m)       Physical Exam  Physical Exam    On examination he is in no acute distress  Lungs are clear  Cardiac is regular at this time  Abdomen is soft nontender nondistended  Skin is warm  Extremities without edema    Neurologic is grossly intact and nonfocal   Gait normal  Affect normal    Results  Lab Results   Component Value Date    PSA 2 0 09/10/2019    PSA 2 0 09/25/2018     Lab Results   Component Value Date    CALCIUM 9 0 07/20/2020     09/19/2017    K 3 7 07/20/2020    CO2 32 07/20/2020     07/20/2020    BUN 16 07/20/2020    CREATININE 1 19 07/20/2020     Lab Results   Component Value Date    WBC 5 51 07/20/2020    HGB 15 1 07/20/2020    HCT 46 6 07/20/2020    MCV 92 07/20/2020     07/20/2020         Office Urine Dip  Recent Results (from the past 1 hour(s))   POCT urine dip    Collection Time: 09/02/20 10:12 AM   Result Value Ref Range    LEUKOCYTE ESTERASE,UA trace     NITRITE,UA -     SL AMB POCT UROBILINOGEN -     POCT URINE PROTEIN -      PH,UA 6 5     BLOOD,UA + 1     SPECIFIC GRAVITY,UA 1 010     KETONES,UA -     BILIRUBIN,UA -     GLUCOSE, UA -      COLOR,UA yellow     CLARITY,UA clear    ]      Total visit time was 40 minutes of which over 50% was spent on counseling

## 2020-09-08 ENCOUNTER — TELEPHONE (OUTPATIENT)
Dept: UROLOGY | Facility: MEDICAL CENTER | Age: 65
End: 2020-09-08

## 2020-09-08 NOTE — TELEPHONE ENCOUNTER
I returned pt 's phone call and informed him that 9/21 and 9/22 that we previously discussed are no longer available  I did explain that I have been communicating with Dr Paul Patel through out the day looking for the next available date  Pt verbalized understanding and I will call him with the new plan as soon as Dr Paul Patel approves it

## 2020-09-08 NOTE — TELEPHONE ENCOUNTER
5 minutes ago (3:47 PM)       Rochelle Garcia 5 minutes ago (3:47 PM)          Patient called back Please call patient back at [phone]           Documentation       Idania Davis [phone]  Rochelle Garcia 6 minutes ago (3:46 PM)

## 2020-09-08 NOTE — TELEPHONE ENCOUNTER
Patient called back and would like to know if he was scheduled yet for surgery  Please call patient back at [phone]

## 2020-09-09 ENCOUNTER — PREP FOR PROCEDURE (OUTPATIENT)
Dept: UROLOGY | Facility: AMBULATORY SURGERY CENTER | Age: 65
End: 2020-09-09

## 2020-09-09 ENCOUNTER — OFFICE VISIT (OUTPATIENT)
Dept: FAMILY MEDICINE CLINIC | Facility: CLINIC | Age: 65
End: 2020-09-09
Payer: MEDICARE

## 2020-09-09 ENCOUNTER — TELEPHONE (OUTPATIENT)
Dept: UROLOGY | Facility: AMBULATORY SURGERY CENTER | Age: 65
End: 2020-09-09

## 2020-09-09 VITALS
SYSTOLIC BLOOD PRESSURE: 134 MMHG | HEIGHT: 74 IN | DIASTOLIC BLOOD PRESSURE: 70 MMHG | WEIGHT: 213 LBS | TEMPERATURE: 97.1 F | BODY MASS INDEX: 27.34 KG/M2

## 2020-09-09 DIAGNOSIS — N40.1 BENIGN PROSTATIC HYPERPLASIA WITH LOWER URINARY TRACT SYMPTOMS, SYMPTOM DETAILS UNSPECIFIED: ICD-10-CM

## 2020-09-09 DIAGNOSIS — Z79.01 LONG TERM (CURRENT) USE OF ANTICOAGULANTS: ICD-10-CM

## 2020-09-09 DIAGNOSIS — Z01.818 PRE-OP TESTING: ICD-10-CM

## 2020-09-09 DIAGNOSIS — I48.19 PERSISTENT ATRIAL FIBRILLATION (HCC): ICD-10-CM

## 2020-09-09 DIAGNOSIS — Z11.59 SCREENING FOR VIRAL DISEASE: ICD-10-CM

## 2020-09-09 DIAGNOSIS — I10 ESSENTIAL HYPERTENSION: ICD-10-CM

## 2020-09-09 DIAGNOSIS — E78.2 MIXED HYPERLIPIDEMIA: ICD-10-CM

## 2020-09-09 DIAGNOSIS — C64.2 RENAL CELL CARCINOMA OF LEFT KIDNEY (HCC): Primary | ICD-10-CM

## 2020-09-09 DIAGNOSIS — C64.2 MALIGNANT TUMOR OF KIDNEY, LEFT (HCC): Primary | ICD-10-CM

## 2020-09-09 DIAGNOSIS — Z01.810 PRE-OPERATIVE CARDIOVASCULAR EXAMINATION: ICD-10-CM

## 2020-09-09 PROCEDURE — 99214 OFFICE O/P EST MOD 30 MIN: CPT | Performed by: FAMILY MEDICINE

## 2020-09-09 NOTE — PROGRESS NOTES
Assessment/Plan: patient will follow-up with specialist accordingly for renal cell carcinoma left kidney  Patient will be having surgery in the near future  Hypertension stable at this time BPH stable  Hyperlipidemia stable  Continue current dose of medications  Patient is in normal sinus rhythm at this time  Refills will be given when needed  Patient to consider flu shot in the future  Follow-up in 4 months       Diagnoses and all orders for this visit:    Renal cell carcinoma of left kidney (HCC)    Essential hypertension    Mixed hyperlipidemia    Benign prostatic hyperplasia with lower urinary tract symptoms, symptom details unspecified    Persistent atrial fibrillation (HCC)            Subjective:        Patient ID: Shawn Guzman is a 76 y o  male  Patient follow-up on hypertension Afib hyperlipidemia  Patient also with recently diagnosed varicella carcinoma left kidney  Patient did have stress test   Patient did have cardioversion done roughly a month ago  Patient is on flecainide  Patient has been off losartan hydrochlorothiazide as well as amlodipine  Medications reviewed at this time  Patient unsure of date of surgery for kidney cancer  The following portions of the patient's history were reviewed and updated as appropriate: allergies, current medications, past family history, past medical history, past social history, past surgical history and problem list       Review of Systems   Constitutional: Negative  HENT: Negative  Eyes: Negative  Respiratory: Negative  Cardiovascular: Negative  Gastrointestinal: Negative  Endocrine: Negative  Genitourinary: Negative  Musculoskeletal: Negative  Skin: Negative  Allergic/Immunologic: Negative  Neurological: Negative  Hematological: Negative  Psychiatric/Behavioral: Negative              Objective:               /70 (BP Location: Left arm, Patient Position: Sitting, Cuff Size: Adult)   Temp (!) 97 1 °F (36 2 °C) (Tympanic)   Ht 6' 2" (1 88 m)   Wt 96 6 kg (213 lb)   BMI 27 35 kg/m²          Physical Exam  Vitals signs and nursing note reviewed  Constitutional:       General: He is not in acute distress  Appearance: Normal appearance  He is well-developed  He is not diaphoretic  HENT:      Head: Normocephalic and atraumatic  Right Ear: Tympanic membrane, ear canal and external ear normal       Left Ear: Tympanic membrane, ear canal and external ear normal       Nose: Nose normal  No congestion  Mouth/Throat:      Pharynx: No oropharyngeal exudate  Eyes:      General: No scleral icterus  Right eye: No discharge  Left eye: No discharge  Pupils: Pupils are equal, round, and reactive to light  Neck:      Musculoskeletal: Normal range of motion and neck supple  Thyroid: No thyromegaly  Cardiovascular:      Rate and Rhythm: Normal rate and regular rhythm  Heart sounds: Normal heart sounds  No murmur  No friction rub  No gallop  Pulmonary:      Effort: Pulmonary effort is normal  No respiratory distress  Breath sounds: Normal breath sounds  No wheezing or rales  Chest:      Chest wall: No tenderness  Abdominal:      General: Bowel sounds are normal  There is no distension  Palpations: Abdomen is soft  Tenderness: There is no abdominal tenderness  There is no guarding or rebound  Musculoskeletal: Normal range of motion  General: No tenderness  Right lower leg: No edema  Left lower leg: No edema  Lymphadenopathy:      Cervical: No cervical adenopathy  Skin:     General: Skin is warm and dry  Coloration: Skin is not pale  Findings: No erythema or rash  Neurological:      General: No focal deficit present  Mental Status: He is alert and oriented to person, place, and time  Mental status is at baseline  Cranial Nerves: No cranial nerve deficit  Motor: No abnormal muscle tone  Coordination: Coordination normal    Psychiatric:         Mood and Affect: Mood normal          Behavior: Behavior normal          Thought Content:  Thought content normal          Judgment: Judgment normal

## 2020-09-09 NOTE — TELEPHONE ENCOUNTER
I spoke with pt this afternoon and scheduled him for his lap  Robotic assisted partial L  Nephrectomy with Dr Ebony Hurtado at the [facility] on 10/07/2020 with Dr Thuy Bahena assisting that day  I verbally went over all of pt 's pre op testing and prep information with him  He is aware that he will need to have labs done 2 weeks prior to surgery and a COVID-19 test done 1 week prior to surgery  I informed pt that I was still working on finding him a appt for his post op, and will call pt with that information as soon as I can  I then verbally went over pt 's bowel prep with him and informed him he will have to hold his Eliquis 48 hours prior to this surgery  Per pt 's request I will mail him a copy of his surgical packet  Pt was instructed to call me with any questions or concerns regarding this procedure

## 2020-09-10 ENCOUNTER — PREP FOR PROCEDURE (OUTPATIENT)
Dept: UROLOGY | Facility: AMBULATORY SURGERY CENTER | Age: 65
End: 2020-09-10

## 2020-09-17 RX ORDER — LOSARTAN POTASSIUM 100 MG/1
50 TABLET ORAL EVERY MORNING
COMMUNITY
End: 2021-01-27

## 2020-09-17 RX ORDER — AMLODIPINE BESYLATE 5 MG/1
5 TABLET ORAL EVERY MORNING
COMMUNITY
End: 2021-01-11 | Stop reason: HOSPADM

## 2020-09-17 RX ORDER — FLECAINIDE ACETATE 50 MG/1
50 TABLET ORAL 2 TIMES DAILY
COMMUNITY
End: 2021-01-11 | Stop reason: HOSPADM

## 2020-09-17 RX ORDER — METOPROLOL SUCCINATE 50 MG/1
50 TABLET, EXTENDED RELEASE ORAL EVERY MORNING
COMMUNITY
End: 2020-09-22 | Stop reason: SDUPTHER

## 2020-09-22 DIAGNOSIS — I48.19 PERSISTENT ATRIAL FIBRILLATION (HCC): Primary | ICD-10-CM

## 2020-09-22 RX ORDER — METOPROLOL SUCCINATE 50 MG/1
50 TABLET, EXTENDED RELEASE ORAL EVERY MORNING
Qty: 90 TABLET | Refills: 1 | Status: SHIPPED | OUTPATIENT
Start: 2020-09-22 | End: 2021-02-18 | Stop reason: SDUPTHER

## 2020-09-22 RX ORDER — METOPROLOL SUCCINATE 100 MG/1
TABLET, EXTENDED RELEASE ORAL
Qty: 90 TABLET | Refills: 1 | OUTPATIENT
Start: 2020-09-22

## 2020-09-28 ENCOUNTER — APPOINTMENT (OUTPATIENT)
Dept: LAB | Facility: MEDICAL CENTER | Age: 65
DRG: 657 | End: 2020-09-28
Payer: MEDICARE

## 2020-09-28 DIAGNOSIS — C64.2 MALIGNANT TUMOR OF KIDNEY, LEFT (HCC): ICD-10-CM

## 2020-09-28 DIAGNOSIS — Z01.810 PRE-OPERATIVE CARDIOVASCULAR EXAMINATION: ICD-10-CM

## 2020-09-28 DIAGNOSIS — Z11.59 SCREENING FOR VIRAL DISEASE: ICD-10-CM

## 2020-09-28 DIAGNOSIS — Z79.01 LONG TERM (CURRENT) USE OF ANTICOAGULANTS: ICD-10-CM

## 2020-09-28 DIAGNOSIS — I48.19 PERSISTENT ATRIAL FIBRILLATION (HCC): ICD-10-CM

## 2020-09-28 DIAGNOSIS — Z01.818 PRE-OP TESTING: ICD-10-CM

## 2020-09-28 LAB
ABO GROUP BLD: NORMAL
ALBUMIN SERPL BCP-MCNC: 3.7 G/DL (ref 3.5–5)
ALP SERPL-CCNC: 124 U/L (ref 46–116)
ALT SERPL W P-5'-P-CCNC: 60 U/L (ref 12–78)
ANION GAP SERPL CALCULATED.3IONS-SCNC: 4 MMOL/L (ref 4–13)
APTT PPP: 35 SECONDS (ref 23–37)
AST SERPL W P-5'-P-CCNC: 33 U/L (ref 5–45)
BASOPHILS # BLD AUTO: 0.02 THOUSANDS/ΜL (ref 0–0.1)
BASOPHILS NFR BLD AUTO: 0 % (ref 0–1)
BILIRUB SERPL-MCNC: 1.07 MG/DL (ref 0.2–1)
BLD GP AB SCN SERPL QL: NEGATIVE
BUN SERPL-MCNC: 11 MG/DL (ref 5–25)
CALCIUM SERPL-MCNC: 9.3 MG/DL (ref 8.3–10.1)
CHLORIDE SERPL-SCNC: 104 MMOL/L (ref 100–108)
CO2 SERPL-SCNC: 32 MMOL/L (ref 21–32)
CREAT SERPL-MCNC: 1.23 MG/DL (ref 0.6–1.3)
EOSINOPHIL # BLD AUTO: 0.11 THOUSAND/ΜL (ref 0–0.61)
EOSINOPHIL NFR BLD AUTO: 2 % (ref 0–6)
ERYTHROCYTE [DISTWIDTH] IN BLOOD BY AUTOMATED COUNT: 13.6 % (ref 11.6–15.1)
GFR SERPL CREATININE-BSD FRML MDRD: 61 ML/MIN/1.73SQ M
GLUCOSE P FAST SERPL-MCNC: 87 MG/DL (ref 65–99)
HCT VFR BLD AUTO: 43.8 % (ref 36.5–49.3)
HGB BLD-MCNC: 13.8 G/DL (ref 12–17)
IMM GRANULOCYTES # BLD AUTO: 0.01 THOUSAND/UL (ref 0–0.2)
IMM GRANULOCYTES NFR BLD AUTO: 0 % (ref 0–2)
INR PPP: 1.21 (ref 0.84–1.19)
LYMPHOCYTES # BLD AUTO: 1.12 THOUSANDS/ΜL (ref 0.6–4.47)
LYMPHOCYTES NFR BLD AUTO: 21 % (ref 14–44)
MCH RBC QN AUTO: 29.2 PG (ref 26.8–34.3)
MCHC RBC AUTO-ENTMCNC: 31.5 G/DL (ref 31.4–37.4)
MCV RBC AUTO: 93 FL (ref 82–98)
MONOCYTES # BLD AUTO: 0.47 THOUSAND/ΜL (ref 0.17–1.22)
MONOCYTES NFR BLD AUTO: 9 % (ref 4–12)
NEUTROPHILS # BLD AUTO: 3.57 THOUSANDS/ΜL (ref 1.85–7.62)
NEUTS SEG NFR BLD AUTO: 68 % (ref 43–75)
NRBC BLD AUTO-RTO: 0 /100 WBCS
PLATELET # BLD AUTO: 223 THOUSANDS/UL (ref 149–390)
PMV BLD AUTO: 10.2 FL (ref 8.9–12.7)
POTASSIUM SERPL-SCNC: 4 MMOL/L (ref 3.5–5.3)
PROT SERPL-MCNC: 7.2 G/DL (ref 6.4–8.2)
PROTHROMBIN TIME: 15.3 SECONDS (ref 11.6–14.5)
RBC # BLD AUTO: 4.73 MILLION/UL (ref 3.88–5.62)
RH BLD: POSITIVE
SODIUM SERPL-SCNC: 140 MMOL/L (ref 136–145)
SPECIMEN EXPIRATION DATE: NORMAL
WBC # BLD AUTO: 5.3 THOUSAND/UL (ref 4.31–10.16)

## 2020-09-28 PROCEDURE — 86850 RBC ANTIBODY SCREEN: CPT

## 2020-09-28 PROCEDURE — 85610 PROTHROMBIN TIME: CPT

## 2020-09-28 PROCEDURE — 86920 COMPATIBILITY TEST SPIN: CPT

## 2020-09-28 PROCEDURE — 86900 BLOOD TYPING SEROLOGIC ABO: CPT

## 2020-09-28 PROCEDURE — 80053 COMPREHEN METABOLIC PANEL: CPT

## 2020-09-28 PROCEDURE — U0003 INFECTIOUS AGENT DETECTION BY NUCLEIC ACID (DNA OR RNA); SEVERE ACUTE RESPIRATORY SYNDROME CORONAVIRUS 2 (SARS-COV-2) (CORONAVIRUS DISEASE [COVID-19]), AMPLIFIED PROBE TECHNIQUE, MAKING USE OF HIGH THROUGHPUT TECHNOLOGIES AS DESCRIBED BY CMS-2020-01-R: HCPCS | Performed by: UROLOGY

## 2020-09-28 PROCEDURE — 85730 THROMBOPLASTIN TIME PARTIAL: CPT

## 2020-09-28 PROCEDURE — 86901 BLOOD TYPING SEROLOGIC RH(D): CPT

## 2020-09-28 PROCEDURE — 36415 COLL VENOUS BLD VENIPUNCTURE: CPT

## 2020-09-28 PROCEDURE — 85025 COMPLETE CBC W/AUTO DIFF WBC: CPT

## 2020-09-29 LAB — SARS-COV-2 RNA SPEC QL NAA+PROBE: NOT DETECTED

## 2020-10-04 DIAGNOSIS — I48.19 PERSISTENT ATRIAL FIBRILLATION (HCC): Primary | ICD-10-CM

## 2020-10-04 RX ORDER — APIXABAN 5 MG/1
TABLET, FILM COATED ORAL
Qty: 180 TABLET | Refills: 1 | Status: SHIPPED | OUTPATIENT
Start: 2020-10-04 | End: 2021-01-11 | Stop reason: HOSPADM

## 2020-10-06 ENCOUNTER — ANESTHESIA EVENT (OUTPATIENT)
Dept: PERIOP | Facility: HOSPITAL | Age: 65
DRG: 657 | End: 2020-10-06
Payer: MEDICARE

## 2020-10-07 ENCOUNTER — HOSPITAL ENCOUNTER (INPATIENT)
Facility: HOSPITAL | Age: 65
LOS: 2 days | Discharge: HOME | DRG: 657 | End: 2020-10-09
Attending: UROLOGY | Admitting: UROLOGY
Payer: MEDICARE

## 2020-10-07 ENCOUNTER — ANESTHESIA (OUTPATIENT)
Dept: PERIOP | Facility: HOSPITAL | Age: 65
DRG: 657 | End: 2020-10-07
Payer: MEDICARE

## 2020-10-07 VITALS — HEART RATE: 99 BPM

## 2020-10-07 DIAGNOSIS — I67.89 CEREBROVASCULAR DISEASE, ILL-DEFINED, ACUTE: Primary | ICD-10-CM

## 2020-10-07 DIAGNOSIS — C64.2 MALIGNANT NEOPLASM OF LEFT KIDNEY (HCC): ICD-10-CM

## 2020-10-07 DIAGNOSIS — C64.2 RENAL CELL CARCINOMA OF LEFT KIDNEY (HCC): ICD-10-CM

## 2020-10-07 DIAGNOSIS — I48.19 PERSISTENT ATRIAL FIBRILLATION (HCC): ICD-10-CM

## 2020-10-07 DIAGNOSIS — N17.9 ACUTE KIDNEY INJURY (HCC): ICD-10-CM

## 2020-10-07 LAB
ANION GAP SERPL CALCULATED.3IONS-SCNC: 12 MMOL/L (ref 4–13)
BUN SERPL-MCNC: 18 MG/DL (ref 5–25)
CALCIUM SERPL-MCNC: 8.1 MG/DL (ref 8.3–10.1)
CHLORIDE SERPL-SCNC: 107 MMOL/L (ref 100–108)
CO2 SERPL-SCNC: 20 MMOL/L (ref 21–32)
CREAT SERPL-MCNC: 1.27 MG/DL (ref 0.6–1.3)
ERYTHROCYTE [DISTWIDTH] IN BLOOD BY AUTOMATED COUNT: 13.8 % (ref 11.6–15.1)
GFR SERPL CREATININE-BSD FRML MDRD: 58 ML/MIN/1.73SQ M
GLUCOSE P FAST SERPL-MCNC: 103 MG/DL (ref 65–99)
GLUCOSE SERPL-MCNC: 103 MG/DL (ref 65–140)
HCT VFR BLD AUTO: 45.2 % (ref 36.5–49.3)
HGB BLD-MCNC: 14.8 G/DL (ref 12–17)
MCH RBC QN AUTO: 30.2 PG (ref 26.8–34.3)
MCHC RBC AUTO-ENTMCNC: 32.7 G/DL (ref 31.4–37.4)
MCV RBC AUTO: 92 FL (ref 82–98)
PLATELET # BLD AUTO: 244 THOUSANDS/UL (ref 149–390)
PMV BLD AUTO: 9.5 FL (ref 8.9–12.7)
POTASSIUM SERPL-SCNC: 3.7 MMOL/L (ref 3.5–5.3)
RBC # BLD AUTO: 4.9 MILLION/UL (ref 3.88–5.62)
SODIUM SERPL-SCNC: 139 MMOL/L (ref 136–145)
WBC # BLD AUTO: 12.73 THOUSAND/UL (ref 4.31–10.16)

## 2020-10-07 PROCEDURE — 88341 IMHCHEM/IMCYTCHM EA ADD ANTB: CPT | Performed by: PATHOLOGY

## 2020-10-07 PROCEDURE — NC001 PR NO CHARGE: Performed by: PHYSICIAN ASSISTANT

## 2020-10-07 PROCEDURE — 94762 N-INVAS EAR/PLS OXIMTRY CONT: CPT

## 2020-10-07 PROCEDURE — 50543 LAPARO PARTIAL NEPHRECTOMY: CPT | Performed by: UROLOGY

## 2020-10-07 PROCEDURE — 88342 IMHCHEM/IMCYTCHM 1ST ANTB: CPT | Performed by: PATHOLOGY

## 2020-10-07 PROCEDURE — 8E0W3CZ ROBOTIC ASSISTED PROCEDURE OF TRUNK REGION, PERCUTANEOUS APPROACH: ICD-10-PCS | Performed by: UROLOGY

## 2020-10-07 PROCEDURE — 88307 TISSUE EXAM BY PATHOLOGIST: CPT | Performed by: PATHOLOGY

## 2020-10-07 PROCEDURE — NC001 PR NO CHARGE: Performed by: UROLOGY

## 2020-10-07 PROCEDURE — 80048 BASIC METABOLIC PNL TOTAL CA: CPT | Performed by: UROLOGY

## 2020-10-07 PROCEDURE — 85027 COMPLETE CBC AUTOMATED: CPT | Performed by: UROLOGY

## 2020-10-07 PROCEDURE — 93005 ELECTROCARDIOGRAM TRACING: CPT

## 2020-10-07 PROCEDURE — 0TB14ZZ EXCISION OF LEFT KIDNEY, PERCUTANEOUS ENDOSCOPIC APPROACH: ICD-10-PCS | Performed by: UROLOGY

## 2020-10-07 RX ORDER — DEXAMETHASONE SODIUM PHOSPHATE 10 MG/ML
INJECTION, SOLUTION INTRAMUSCULAR; INTRAVENOUS AS NEEDED
Status: DISCONTINUED | OUTPATIENT
Start: 2020-10-07 | End: 2020-10-07

## 2020-10-07 RX ORDER — METOPROLOL TARTRATE 5 MG/5ML
INJECTION INTRAVENOUS AS NEEDED
Status: DISCONTINUED | OUTPATIENT
Start: 2020-10-07 | End: 2020-10-07

## 2020-10-07 RX ORDER — METOPROLOL SUCCINATE 50 MG/1
50 TABLET, EXTENDED RELEASE ORAL EVERY MORNING
Status: DISCONTINUED | OUTPATIENT
Start: 2020-10-08 | End: 2020-10-09 | Stop reason: HOSPADM

## 2020-10-07 RX ORDER — LIDOCAINE HYDROCHLORIDE 10 MG/ML
INJECTION, SOLUTION EPIDURAL; INFILTRATION; INTRACAUDAL; PERINEURAL AS NEEDED
Status: DISCONTINUED | OUTPATIENT
Start: 2020-10-07 | End: 2020-10-07

## 2020-10-07 RX ORDER — VECURONIUM BROMIDE 1 MG/ML
INJECTION, POWDER, LYOPHILIZED, FOR SOLUTION INTRAVENOUS AS NEEDED
Status: DISCONTINUED | OUTPATIENT
Start: 2020-10-07 | End: 2020-10-07

## 2020-10-07 RX ORDER — SODIUM CHLORIDE 9 MG/ML
125 INJECTION, SOLUTION INTRAVENOUS CONTINUOUS
Status: DISCONTINUED | OUTPATIENT
Start: 2020-10-07 | End: 2020-10-07

## 2020-10-07 RX ORDER — HYDROCODONE BITARTRATE AND ACETAMINOPHEN 5; 325 MG/1; MG/1
1 TABLET ORAL EVERY 6 HOURS PRN
Qty: 20 TABLET | Refills: 0 | Status: SHIPPED | OUTPATIENT
Start: 2020-10-07 | End: 2021-01-11 | Stop reason: HOSPADM

## 2020-10-07 RX ORDER — SODIUM CHLORIDE, SODIUM LACTATE, POTASSIUM CHLORIDE, CALCIUM CHLORIDE 600; 310; 30; 20 MG/100ML; MG/100ML; MG/100ML; MG/100ML
100 INJECTION, SOLUTION INTRAVENOUS CONTINUOUS
Status: DISCONTINUED | OUTPATIENT
Start: 2020-10-07 | End: 2020-10-09

## 2020-10-07 RX ORDER — ATORVASTATIN CALCIUM 10 MG/1
10 TABLET, FILM COATED ORAL EVERY MORNING
Status: DISCONTINUED | OUTPATIENT
Start: 2020-10-08 | End: 2020-10-09 | Stop reason: HOSPADM

## 2020-10-07 RX ORDER — MIDAZOLAM HYDROCHLORIDE 2 MG/2ML
INJECTION, SOLUTION INTRAMUSCULAR; INTRAVENOUS AS NEEDED
Status: DISCONTINUED | OUTPATIENT
Start: 2020-10-07 | End: 2020-10-07

## 2020-10-07 RX ORDER — MAGNESIUM HYDROXIDE 1200 MG/15ML
LIQUID ORAL AS NEEDED
Status: DISCONTINUED | OUTPATIENT
Start: 2020-10-07 | End: 2020-10-07 | Stop reason: HOSPADM

## 2020-10-07 RX ORDER — HYDROCODONE BITARTRATE AND ACETAMINOPHEN 5; 325 MG/1; MG/1
2 TABLET ORAL EVERY 4 HOURS PRN
Status: DISCONTINUED | OUTPATIENT
Start: 2020-10-07 | End: 2020-10-09 | Stop reason: HOSPADM

## 2020-10-07 RX ORDER — HYDROMORPHONE HCL/PF 1 MG/ML
0.5 SYRINGE (ML) INJECTION EVERY 2 HOUR PRN
Status: DISCONTINUED | OUTPATIENT
Start: 2020-10-07 | End: 2020-10-09 | Stop reason: HOSPADM

## 2020-10-07 RX ORDER — ONDANSETRON 2 MG/ML
4 INJECTION INTRAMUSCULAR; INTRAVENOUS ONCE AS NEEDED
Status: DISCONTINUED | OUTPATIENT
Start: 2020-10-07 | End: 2020-10-07 | Stop reason: HOSPADM

## 2020-10-07 RX ORDER — LOSARTAN POTASSIUM 50 MG/1
100 TABLET ORAL EVERY MORNING
Status: DISCONTINUED | OUTPATIENT
Start: 2020-10-08 | End: 2020-10-08

## 2020-10-07 RX ORDER — HYDROMORPHONE HCL 110MG/55ML
PATIENT CONTROLLED ANALGESIA SYRINGE INTRAVENOUS AS NEEDED
Status: DISCONTINUED | OUTPATIENT
Start: 2020-10-07 | End: 2020-10-07

## 2020-10-07 RX ORDER — HEPARIN SODIUM 5000 [USP'U]/ML
5000 INJECTION, SOLUTION INTRAVENOUS; SUBCUTANEOUS EVERY 8 HOURS SCHEDULED
Status: DISCONTINUED | OUTPATIENT
Start: 2020-10-07 | End: 2020-10-09 | Stop reason: HOSPADM

## 2020-10-07 RX ORDER — HYDROCODONE BITARTRATE AND ACETAMINOPHEN 5; 325 MG/1; MG/1
1 TABLET ORAL EVERY 4 HOURS PRN
Status: DISCONTINUED | OUTPATIENT
Start: 2020-10-07 | End: 2020-10-09 | Stop reason: HOSPADM

## 2020-10-07 RX ORDER — DOCUSATE SODIUM 100 MG/1
100 CAPSULE, LIQUID FILLED ORAL 2 TIMES DAILY
Status: DISCONTINUED | OUTPATIENT
Start: 2020-10-07 | End: 2020-10-09 | Stop reason: HOSPADM

## 2020-10-07 RX ORDER — FLECAINIDE ACETATE 100 MG/1
50 TABLET ORAL 2 TIMES DAILY
Status: DISCONTINUED | OUTPATIENT
Start: 2020-10-07 | End: 2020-10-09

## 2020-10-07 RX ORDER — CEFAZOLIN SODIUM 2 G/50ML
2000 SOLUTION INTRAVENOUS ONCE
Status: COMPLETED | OUTPATIENT
Start: 2020-10-07 | End: 2020-10-07

## 2020-10-07 RX ORDER — ONDANSETRON 2 MG/ML
4 INJECTION INTRAMUSCULAR; INTRAVENOUS EVERY 6 HOURS PRN
Status: DISCONTINUED | OUTPATIENT
Start: 2020-10-07 | End: 2020-10-09 | Stop reason: HOSPADM

## 2020-10-07 RX ORDER — SIMETHICONE 80 MG
80 TABLET,CHEWABLE ORAL 4 TIMES DAILY PRN
Status: DISCONTINUED | OUTPATIENT
Start: 2020-10-07 | End: 2020-10-09 | Stop reason: HOSPADM

## 2020-10-07 RX ORDER — FENTANYL CITRATE/PF 50 MCG/ML
50 SYRINGE (ML) INJECTION
Status: DISCONTINUED | OUTPATIENT
Start: 2020-10-07 | End: 2020-10-07 | Stop reason: HOSPADM

## 2020-10-07 RX ORDER — ONDANSETRON 2 MG/ML
INJECTION INTRAMUSCULAR; INTRAVENOUS AS NEEDED
Status: DISCONTINUED | OUTPATIENT
Start: 2020-10-07 | End: 2020-10-07

## 2020-10-07 RX ORDER — CEFAZOLIN SODIUM 1 G/50ML
1000 SOLUTION INTRAVENOUS EVERY 8 HOURS
Status: CANCELLED | OUTPATIENT
Start: 2020-10-07 | End: 2020-10-08

## 2020-10-07 RX ORDER — AMLODIPINE BESYLATE 5 MG/1
5 TABLET ORAL EVERY MORNING
Status: DISCONTINUED | OUTPATIENT
Start: 2020-10-08 | End: 2020-10-08

## 2020-10-07 RX ORDER — DOCUSATE SODIUM 100 MG/1
100 CAPSULE, LIQUID FILLED ORAL 2 TIMES DAILY
Qty: 60 CAPSULE | Refills: 0 | Status: SHIPPED | OUTPATIENT
Start: 2020-10-07 | End: 2021-02-11 | Stop reason: ALTCHOICE

## 2020-10-07 RX ORDER — FENTANYL CITRATE 50 UG/ML
INJECTION, SOLUTION INTRAMUSCULAR; INTRAVENOUS AS NEEDED
Status: DISCONTINUED | OUTPATIENT
Start: 2020-10-07 | End: 2020-10-07

## 2020-10-07 RX ORDER — SODIUM CHLORIDE 9 MG/ML
INJECTION, SOLUTION INTRAVENOUS CONTINUOUS PRN
Status: DISCONTINUED | OUTPATIENT
Start: 2020-10-07 | End: 2020-10-07

## 2020-10-07 RX ORDER — METOPROLOL TARTRATE 5 MG/5ML
5 INJECTION INTRAVENOUS EVERY 8 HOURS
Status: COMPLETED | OUTPATIENT
Start: 2020-10-07 | End: 2020-10-08

## 2020-10-07 RX ORDER — PROPOFOL 10 MG/ML
INJECTION, EMULSION INTRAVENOUS AS NEEDED
Status: DISCONTINUED | OUTPATIENT
Start: 2020-10-07 | End: 2020-10-07

## 2020-10-07 RX ADMIN — CEFAZOLIN SODIUM 2000 MG: 2 SOLUTION INTRAVENOUS at 15:49

## 2020-10-07 RX ADMIN — VECURONIUM BROMIDE 3 MG: 1 INJECTION, POWDER, LYOPHILIZED, FOR SOLUTION INTRAVENOUS at 13:04

## 2020-10-07 RX ADMIN — PHENYLEPHRINE HYDROCHLORIDE 300 MCG: 10 INJECTION INTRAVENOUS at 12:33

## 2020-10-07 RX ADMIN — FENTANYL CITRATE 50 MCG: 50 INJECTION INTRAMUSCULAR; INTRAVENOUS at 16:37

## 2020-10-07 RX ADMIN — PHENYLEPHRINE HYDROCHLORIDE 300 MCG: 10 INJECTION INTRAVENOUS at 12:31

## 2020-10-07 RX ADMIN — FENTANYL CITRATE 50 MCG: 50 INJECTION, SOLUTION INTRAMUSCULAR; INTRAVENOUS at 12:10

## 2020-10-07 RX ADMIN — SODIUM CHLORIDE, SODIUM LACTATE, POTASSIUM CHLORIDE, AND CALCIUM CHLORIDE 125 ML/HR: .6; .31; .03; .02 INJECTION, SOLUTION INTRAVENOUS at 16:49

## 2020-10-07 RX ADMIN — HYDROCODONE BITARTRATE AND ACETAMINOPHEN 1 TABLET: 5; 325 TABLET ORAL at 19:50

## 2020-10-07 RX ADMIN — SUGAMMADEX 186 MG: 100 INJECTION, SOLUTION INTRAVENOUS at 15:47

## 2020-10-07 RX ADMIN — SODIUM CHLORIDE 125 ML/HR: 0.9 INJECTION, SOLUTION INTRAVENOUS at 10:51

## 2020-10-07 RX ADMIN — VECURONIUM BROMIDE 7 MG: 1 INJECTION, POWDER, LYOPHILIZED, FOR SOLUTION INTRAVENOUS at 12:10

## 2020-10-07 RX ADMIN — SODIUM CHLORIDE: 0.9 INJECTION, SOLUTION INTRAVENOUS at 12:29

## 2020-10-07 RX ADMIN — SODIUM CHLORIDE: 0.9 INJECTION, SOLUTION INTRAVENOUS at 15:32

## 2020-10-07 RX ADMIN — PHENYLEPHRINE HYDROCHLORIDE 300 MCG: 10 INJECTION INTRAVENOUS at 12:25

## 2020-10-07 RX ADMIN — PROPOFOL 150 MG: 10 INJECTION, EMULSION INTRAVENOUS at 12:10

## 2020-10-07 RX ADMIN — FENTANYL CITRATE 50 MCG: 50 INJECTION, SOLUTION INTRAMUSCULAR; INTRAVENOUS at 15:09

## 2020-10-07 RX ADMIN — DEXAMETHASONE SODIUM PHOSPHATE 4 MG: 10 INJECTION, SOLUTION INTRAMUSCULAR; INTRAVENOUS at 12:52

## 2020-10-07 RX ADMIN — CEFAZOLIN SODIUM 2000 MG: 2 SOLUTION INTRAVENOUS at 12:01

## 2020-10-07 RX ADMIN — METOROPROLOL TARTRATE 2.5 MG: 5 INJECTION, SOLUTION INTRAVENOUS at 12:16

## 2020-10-07 RX ADMIN — MIDAZOLAM 2 MG: 1 INJECTION INTRAMUSCULAR; INTRAVENOUS at 12:01

## 2020-10-07 RX ADMIN — ONDANSETRON 4 MG: 2 INJECTION INTRAMUSCULAR; INTRAVENOUS at 12:52

## 2020-10-07 RX ADMIN — LIDOCAINE HYDROCHLORIDE 60 MG: 10 INJECTION, SOLUTION EPIDURAL; INFILTRATION; INTRACAUDAL; PERINEURAL at 12:10

## 2020-10-07 RX ADMIN — HEPARIN SODIUM 5000 UNITS: 5000 INJECTION INTRAVENOUS; SUBCUTANEOUS at 21:44

## 2020-10-07 RX ADMIN — METOROPROLOL TARTRATE 5 MG: 5 INJECTION, SOLUTION INTRAVENOUS at 19:12

## 2020-10-07 RX ADMIN — FLECAINIDE ACETATE 50 MG: 100 TABLET ORAL at 19:06

## 2020-10-07 RX ADMIN — ONDANSETRON 4 MG: 2 INJECTION INTRAMUSCULAR; INTRAVENOUS at 15:21

## 2020-10-07 RX ADMIN — VECURONIUM BROMIDE 3 MG: 1 INJECTION, POWDER, LYOPHILIZED, FOR SOLUTION INTRAVENOUS at 14:17

## 2020-10-07 RX ADMIN — PHENYLEPHRINE HYDROCHLORIDE 200 MCG: 10 INJECTION INTRAVENOUS at 13:42

## 2020-10-07 RX ADMIN — HYDROMORPHONE HYDROCHLORIDE 0.5 MG: 2 INJECTION, SOLUTION INTRAMUSCULAR; INTRAVENOUS; SUBCUTANEOUS at 16:10

## 2020-10-07 RX ADMIN — FENTANYL CITRATE 50 MCG: 50 INJECTION, SOLUTION INTRAMUSCULAR; INTRAVENOUS at 15:01

## 2020-10-07 RX ADMIN — FENTANYL CITRATE 50 MCG: 50 INJECTION, SOLUTION INTRAMUSCULAR; INTRAVENOUS at 12:27

## 2020-10-07 RX ADMIN — PHENYLEPHRINE HYDROCHLORIDE 150 MCG: 10 INJECTION INTRAVENOUS at 12:19

## 2020-10-07 RX ADMIN — DOCUSATE SODIUM 100 MG: 100 CAPSULE, LIQUID FILLED ORAL at 19:07

## 2020-10-07 RX ADMIN — PHENYLEPHRINE HYDROCHLORIDE 300 MCG: 10 INJECTION INTRAVENOUS at 13:14

## 2020-10-07 RX ADMIN — METOROPROLOL TARTRATE 2.5 MG: 5 INJECTION, SOLUTION INTRAVENOUS at 13:02

## 2020-10-07 RX ADMIN — PHENYLEPHRINE HYDROCHLORIDE 150 MCG: 10 INJECTION INTRAVENOUS at 12:20

## 2020-10-08 LAB
AMORPH URATE CRY URNS QL MICRO: ABNORMAL /HPF
ANION GAP SERPL CALCULATED.3IONS-SCNC: 7 MMOL/L (ref 4–13)
ATRIAL RATE: 241 BPM
ATRIAL RATE: 300 BPM
BACTERIA UR QL AUTO: ABNORMAL /HPF
BILIRUB UR QL STRIP: NEGATIVE
BUN SERPL-MCNC: 22 MG/DL (ref 5–25)
CALCIUM SERPL-MCNC: 7.8 MG/DL (ref 8.3–10.1)
CHLORIDE SERPL-SCNC: 107 MMOL/L (ref 100–108)
CLARITY UR: CLEAR
CO2 SERPL-SCNC: 23 MMOL/L (ref 21–32)
COLOR UR: YELLOW
CREAT FLD-MCNC: 1.27 MG/DL
CREAT SERPL-MCNC: 1.52 MG/DL (ref 0.6–1.3)
ERYTHROCYTE [DISTWIDTH] IN BLOOD BY AUTOMATED COUNT: 14.2 % (ref 11.6–15.1)
GFR SERPL CREATININE-BSD FRML MDRD: 47 ML/MIN/1.73SQ M
GLUCOSE SERPL-MCNC: 142 MG/DL (ref 65–140)
GLUCOSE UR STRIP-MCNC: NEGATIVE MG/DL
HCT VFR BLD AUTO: 39 % (ref 36.5–49.3)
HGB BLD-MCNC: 12.7 G/DL (ref 12–17)
HGB UR QL STRIP.AUTO: ABNORMAL
KETONES UR STRIP-MCNC: NEGATIVE MG/DL
LEUKOCYTE ESTERASE UR QL STRIP: ABNORMAL
MCH RBC QN AUTO: 30 PG (ref 26.8–34.3)
MCHC RBC AUTO-ENTMCNC: 32.6 G/DL (ref 31.4–37.4)
MCV RBC AUTO: 92 FL (ref 82–98)
NITRITE UR QL STRIP: NEGATIVE
NON-SQ EPI CELLS URNS QL MICRO: ABNORMAL /HPF
PH UR STRIP.AUTO: 6.5 [PH]
PLATELET # BLD AUTO: 207 THOUSANDS/UL (ref 149–390)
PMV BLD AUTO: 9.6 FL (ref 8.9–12.7)
POTASSIUM SERPL-SCNC: 4 MMOL/L (ref 3.5–5.3)
PROT UR STRIP-MCNC: ABNORMAL MG/DL
QRS AXIS: 62 DEGREES
QRS AXIS: 74 DEGREES
QRSD INTERVAL: 80 MS
QRSD INTERVAL: 86 MS
QT INTERVAL: 364 MS
QT INTERVAL: 374 MS
QTC INTERVAL: 457 MS
QTC INTERVAL: 487 MS
RBC # BLD AUTO: 4.23 MILLION/UL (ref 3.88–5.62)
RBC #/AREA URNS AUTO: ABNORMAL /HPF
SODIUM SERPL-SCNC: 137 MMOL/L (ref 136–145)
SP GR UR STRIP.AUTO: 1.01 (ref 1–1.03)
T WAVE AXIS: 39 DEGREES
T WAVE AXIS: 69 DEGREES
UROBILINOGEN UR QL STRIP.AUTO: 0.2 E.U./DL
VENTRICULAR RATE: 108 BPM
VENTRICULAR RATE: 90 BPM
WBC # BLD AUTO: 9.67 THOUSAND/UL (ref 4.31–10.16)
WBC #/AREA URNS AUTO: ABNORMAL /HPF

## 2020-10-08 PROCEDURE — 80048 BASIC METABOLIC PNL TOTAL CA: CPT | Performed by: UROLOGY

## 2020-10-08 PROCEDURE — 99222 1ST HOSP IP/OBS MODERATE 55: CPT | Performed by: INTERNAL MEDICINE

## 2020-10-08 PROCEDURE — 85027 COMPLETE CBC AUTOMATED: CPT | Performed by: UROLOGY

## 2020-10-08 PROCEDURE — 99024 POSTOP FOLLOW-UP VISIT: CPT | Performed by: PHYSICIAN ASSISTANT

## 2020-10-08 PROCEDURE — 81001 URINALYSIS AUTO W/SCOPE: CPT | Performed by: NURSE PRACTITIONER

## 2020-10-08 PROCEDURE — 99223 1ST HOSP IP/OBS HIGH 75: CPT | Performed by: INTERNAL MEDICINE

## 2020-10-08 PROCEDURE — 82570 ASSAY OF URINE CREATININE: CPT | Performed by: PHYSICIAN ASSISTANT

## 2020-10-08 PROCEDURE — 93010 ELECTROCARDIOGRAM REPORT: CPT | Performed by: INTERNAL MEDICINE

## 2020-10-08 PROCEDURE — 94762 N-INVAS EAR/PLS OXIMTRY CONT: CPT

## 2020-10-08 RX ORDER — ACETAMINOPHEN 325 MG/1
650 TABLET ORAL EVERY 6 HOURS PRN
Status: DISCONTINUED | OUTPATIENT
Start: 2020-10-08 | End: 2020-10-09 | Stop reason: HOSPADM

## 2020-10-08 RX ORDER — AMLODIPINE BESYLATE 5 MG/1
5 TABLET ORAL EVERY MORNING
Status: DISCONTINUED | OUTPATIENT
Start: 2020-10-09 | End: 2020-10-09 | Stop reason: HOSPADM

## 2020-10-08 RX ADMIN — FLECAINIDE ACETATE 50 MG: 100 TABLET ORAL at 17:09

## 2020-10-08 RX ADMIN — HEPARIN SODIUM 5000 UNITS: 5000 INJECTION INTRAVENOUS; SUBCUTANEOUS at 05:32

## 2020-10-08 RX ADMIN — LOSARTAN POTASSIUM 100 MG: 50 TABLET, FILM COATED ORAL at 08:18

## 2020-10-08 RX ADMIN — METOPROLOL SUCCINATE 50 MG: 50 TABLET, EXTENDED RELEASE ORAL at 08:18

## 2020-10-08 RX ADMIN — DOCUSATE SODIUM 100 MG: 100 CAPSULE, LIQUID FILLED ORAL at 17:09

## 2020-10-08 RX ADMIN — HYDROCODONE BITARTRATE AND ACETAMINOPHEN 1 TABLET: 5; 325 TABLET ORAL at 11:19

## 2020-10-08 RX ADMIN — HEPARIN SODIUM 5000 UNITS: 5000 INJECTION INTRAVENOUS; SUBCUTANEOUS at 14:51

## 2020-10-08 RX ADMIN — AMLODIPINE BESYLATE 5 MG: 5 TABLET ORAL at 08:17

## 2020-10-08 RX ADMIN — ATORVASTATIN CALCIUM 10 MG: 10 TABLET, FILM COATED ORAL at 08:17

## 2020-10-08 RX ADMIN — HYDROCODONE BITARTRATE AND ACETAMINOPHEN 1 TABLET: 5; 325 TABLET ORAL at 05:32

## 2020-10-08 RX ADMIN — SODIUM CHLORIDE, SODIUM LACTATE, POTASSIUM CHLORIDE, AND CALCIUM CHLORIDE 125 ML/HR: .6; .31; .03; .02 INJECTION, SOLUTION INTRAVENOUS at 10:02

## 2020-10-08 RX ADMIN — SODIUM CHLORIDE, SODIUM LACTATE, POTASSIUM CHLORIDE, AND CALCIUM CHLORIDE 125 ML/HR: .6; .31; .03; .02 INJECTION, SOLUTION INTRAVENOUS at 01:47

## 2020-10-08 RX ADMIN — HEPARIN SODIUM 5000 UNITS: 5000 INJECTION INTRAVENOUS; SUBCUTANEOUS at 22:06

## 2020-10-08 RX ADMIN — METOROPROLOL TARTRATE 5 MG: 5 INJECTION, SOLUTION INTRAVENOUS at 01:48

## 2020-10-08 RX ADMIN — DOCUSATE SODIUM 100 MG: 100 CAPSULE, LIQUID FILLED ORAL at 08:18

## 2020-10-08 RX ADMIN — FLECAINIDE ACETATE 50 MG: 100 TABLET ORAL at 08:18

## 2020-10-08 RX ADMIN — HYDROCODONE BITARTRATE AND ACETAMINOPHEN 1 TABLET: 5; 325 TABLET ORAL at 22:06

## 2020-10-08 RX ADMIN — SODIUM CHLORIDE, SODIUM LACTATE, POTASSIUM CHLORIDE, AND CALCIUM CHLORIDE 100 ML/HR: .6; .31; .03; .02 INJECTION, SOLUTION INTRAVENOUS at 20:10

## 2020-10-09 ENCOUNTER — TELEPHONE (OUTPATIENT)
Dept: NEPHROLOGY | Facility: CLINIC | Age: 65
End: 2020-10-09

## 2020-10-09 VITALS
WEIGHT: 210.98 LBS | TEMPERATURE: 98.1 F | OXYGEN SATURATION: 98 % | BODY MASS INDEX: 27.08 KG/M2 | HEART RATE: 95 BPM | HEIGHT: 74 IN | RESPIRATION RATE: 20 BRPM | SYSTOLIC BLOOD PRESSURE: 165 MMHG | DIASTOLIC BLOOD PRESSURE: 91 MMHG

## 2020-10-09 DIAGNOSIS — N17.9 ACUTE KIDNEY INJURY (HCC): ICD-10-CM

## 2020-10-09 DIAGNOSIS — I10 ESSENTIAL HYPERTENSION: Primary | ICD-10-CM

## 2020-10-09 DIAGNOSIS — C64.2 RENAL CELL CARCINOMA OF LEFT KIDNEY (HCC): ICD-10-CM

## 2020-10-09 LAB
ANION GAP SERPL CALCULATED.3IONS-SCNC: 4 MMOL/L (ref 4–13)
BUN SERPL-MCNC: 19 MG/DL (ref 5–25)
CALCIUM SERPL-MCNC: 7.9 MG/DL (ref 8.3–10.1)
CHLORIDE SERPL-SCNC: 105 MMOL/L (ref 100–108)
CO2 SERPL-SCNC: 27 MMOL/L (ref 21–32)
CREAT SERPL-MCNC: 1.19 MG/DL (ref 0.6–1.3)
GFR SERPL CREATININE-BSD FRML MDRD: 63 ML/MIN/1.73SQ M
GLUCOSE SERPL-MCNC: 106 MG/DL (ref 65–140)
MAGNESIUM SERPL-MCNC: 1.8 MG/DL (ref 1.6–2.6)
PHOSPHATE SERPL-MCNC: 1.8 MG/DL (ref 2.3–4.1)
POTASSIUM SERPL-SCNC: 3.9 MMOL/L (ref 3.5–5.3)
SODIUM SERPL-SCNC: 136 MMOL/L (ref 136–145)

## 2020-10-09 PROCEDURE — 84100 ASSAY OF PHOSPHORUS: CPT | Performed by: NURSE PRACTITIONER

## 2020-10-09 PROCEDURE — 80048 BASIC METABOLIC PNL TOTAL CA: CPT | Performed by: NURSE PRACTITIONER

## 2020-10-09 PROCEDURE — 99024 POSTOP FOLLOW-UP VISIT: CPT | Performed by: UROLOGY

## 2020-10-09 PROCEDURE — NC001 PR NO CHARGE: Performed by: PHYSICIAN ASSISTANT

## 2020-10-09 PROCEDURE — 99232 SBSQ HOSP IP/OBS MODERATE 35: CPT | Performed by: INTERNAL MEDICINE

## 2020-10-09 PROCEDURE — 83735 ASSAY OF MAGNESIUM: CPT | Performed by: NURSE PRACTITIONER

## 2020-10-09 PROCEDURE — 94762 N-INVAS EAR/PLS OXIMTRY CONT: CPT

## 2020-10-09 RX ORDER — FLECAINIDE ACETATE 100 MG/1
50 TABLET ORAL ONCE
Status: DISCONTINUED | OUTPATIENT
Start: 2020-10-09 | End: 2020-10-09

## 2020-10-09 RX ORDER — FLECAINIDE ACETATE 100 MG/1
100 TABLET ORAL 2 TIMES DAILY
Status: DISCONTINUED | OUTPATIENT
Start: 2020-10-09 | End: 2020-10-09

## 2020-10-09 RX ORDER — HYDROCODONE BITARTRATE AND ACETAMINOPHEN 5; 325 MG/1; MG/1
1 TABLET ORAL EVERY 6 HOURS PRN
Qty: 10 TABLET | Refills: 0 | Status: SHIPPED | OUTPATIENT
Start: 2020-10-09 | End: 2021-01-11 | Stop reason: HOSPADM

## 2020-10-09 RX ORDER — FLECAINIDE ACETATE 100 MG/1
50 TABLET ORAL 2 TIMES DAILY
Status: DISCONTINUED | OUTPATIENT
Start: 2020-10-09 | End: 2020-10-09 | Stop reason: HOSPADM

## 2020-10-09 RX ADMIN — ATORVASTATIN CALCIUM 10 MG: 10 TABLET, FILM COATED ORAL at 09:36

## 2020-10-09 RX ADMIN — DIBASIC SODIUM PHOSPHATE, MONOBASIC POTASSIUM PHOSPHATE AND MONOBASIC SODIUM PHOSPHATE 2 TABLET: 852; 155; 130 TABLET ORAL at 12:35

## 2020-10-09 RX ADMIN — AMLODIPINE BESYLATE 5 MG: 5 TABLET ORAL at 09:36

## 2020-10-09 RX ADMIN — HYDROCODONE BITARTRATE AND ACETAMINOPHEN 1 TABLET: 5; 325 TABLET ORAL at 03:51

## 2020-10-09 RX ADMIN — HEPARIN SODIUM 5000 UNITS: 5000 INJECTION INTRAVENOUS; SUBCUTANEOUS at 15:31

## 2020-10-09 RX ADMIN — FLECAINIDE ACETATE 50 MG: 100 TABLET ORAL at 09:36

## 2020-10-09 RX ADMIN — DOCUSATE SODIUM 100 MG: 100 CAPSULE, LIQUID FILLED ORAL at 09:36

## 2020-10-09 RX ADMIN — HEPARIN SODIUM 5000 UNITS: 5000 INJECTION INTRAVENOUS; SUBCUTANEOUS at 05:44

## 2020-10-09 RX ADMIN — METOPROLOL SUCCINATE 50 MG: 50 TABLET, EXTENDED RELEASE ORAL at 09:36

## 2020-10-12 LAB
ABO GROUP BLD BPU: NORMAL
ABO GROUP BLD BPU: NORMAL
BPU ID: NORMAL
BPU ID: NORMAL
CROSSMATCH: NORMAL
CROSSMATCH: NORMAL
UNIT DISPENSE STATUS: NORMAL
UNIT DISPENSE STATUS: NORMAL
UNIT PRODUCT CODE: NORMAL
UNIT PRODUCT CODE: NORMAL
UNIT RH: NORMAL
UNIT RH: NORMAL

## 2020-10-13 ENCOUNTER — TRANSITIONAL CARE MANAGEMENT (OUTPATIENT)
Dept: FAMILY MEDICINE CLINIC | Facility: CLINIC | Age: 65
End: 2020-10-13

## 2020-10-14 ENCOUNTER — LAB (OUTPATIENT)
Dept: LAB | Age: 65
End: 2020-10-14
Payer: MEDICARE

## 2020-10-14 ENCOUNTER — OFFICE VISIT (OUTPATIENT)
Dept: FAMILY MEDICINE CLINIC | Facility: CLINIC | Age: 65
End: 2020-10-14
Payer: MEDICARE

## 2020-10-14 VITALS
BODY MASS INDEX: 2.57 KG/M2 | HEIGHT: 74 IN | DIASTOLIC BLOOD PRESSURE: 80 MMHG | WEIGHT: 20 LBS | OXYGEN SATURATION: 98 % | TEMPERATURE: 97.3 F | HEART RATE: 66 BPM | SYSTOLIC BLOOD PRESSURE: 140 MMHG | RESPIRATION RATE: 18 BRPM

## 2020-10-14 DIAGNOSIS — N40.1 BENIGN PROSTATIC HYPERPLASIA WITH LOWER URINARY TRACT SYMPTOMS, SYMPTOM DETAILS UNSPECIFIED: ICD-10-CM

## 2020-10-14 DIAGNOSIS — C64.2 RENAL CELL CARCINOMA OF LEFT KIDNEY (HCC): ICD-10-CM

## 2020-10-14 DIAGNOSIS — I10 ESSENTIAL HYPERTENSION: ICD-10-CM

## 2020-10-14 DIAGNOSIS — N17.9 ACUTE KIDNEY INJURY (HCC): ICD-10-CM

## 2020-10-14 DIAGNOSIS — E78.2 MIXED HYPERLIPIDEMIA: ICD-10-CM

## 2020-10-14 DIAGNOSIS — Z23 NEED FOR IMMUNIZATION AGAINST INFLUENZA: Primary | ICD-10-CM

## 2020-10-14 DIAGNOSIS — I48.19 PERSISTENT ATRIAL FIBRILLATION (HCC): ICD-10-CM

## 2020-10-14 DIAGNOSIS — I67.89 CEREBROVASCULAR DISEASE, ILL-DEFINED, ACUTE: ICD-10-CM

## 2020-10-14 DIAGNOSIS — G47.33 OSA (OBSTRUCTIVE SLEEP APNEA): ICD-10-CM

## 2020-10-14 LAB
ANION GAP SERPL CALCULATED.3IONS-SCNC: 6 MMOL/L (ref 4–13)
BUN SERPL-MCNC: 19 MG/DL (ref 5–25)
CALCIUM SERPL-MCNC: 9.7 MG/DL (ref 8.3–10.1)
CHLORIDE SERPL-SCNC: 103 MMOL/L (ref 100–108)
CO2 SERPL-SCNC: 30 MMOL/L (ref 21–32)
CREAT SERPL-MCNC: 1.24 MG/DL (ref 0.6–1.3)
GFR SERPL CREATININE-BSD FRML MDRD: 60 ML/MIN/1.73SQ M
GLUCOSE P FAST SERPL-MCNC: 73 MG/DL (ref 65–99)
MAGNESIUM SERPL-MCNC: 2.5 MG/DL (ref 1.6–2.6)
PHOSPHATE SERPL-MCNC: 3.6 MG/DL (ref 2.3–4.1)
POTASSIUM SERPL-SCNC: 3.8 MMOL/L (ref 3.5–5.3)
SODIUM SERPL-SCNC: 139 MMOL/L (ref 136–145)

## 2020-10-14 PROCEDURE — 83735 ASSAY OF MAGNESIUM: CPT

## 2020-10-14 PROCEDURE — 90662 IIV NO PRSV INCREASED AG IM: CPT

## 2020-10-14 PROCEDURE — G0008 ADMIN INFLUENZA VIRUS VAC: HCPCS

## 2020-10-14 PROCEDURE — 36415 COLL VENOUS BLD VENIPUNCTURE: CPT

## 2020-10-14 PROCEDURE — 99496 TRANSJ CARE MGMT HIGH F2F 7D: CPT | Performed by: FAMILY MEDICINE

## 2020-10-14 PROCEDURE — 84100 ASSAY OF PHOSPHORUS: CPT

## 2020-10-14 PROCEDURE — 80048 BASIC METABOLIC PNL TOTAL CA: CPT

## 2020-10-16 ENCOUNTER — TELEPHONE (OUTPATIENT)
Dept: UROLOGY | Facility: AMBULATORY SURGERY CENTER | Age: 65
End: 2020-10-16

## 2020-10-19 DIAGNOSIS — I10 ESSENTIAL HYPERTENSION: Primary | ICD-10-CM

## 2020-10-20 PROBLEM — N18.30 STAGE 3 CHRONIC KIDNEY DISEASE (HCC): Status: ACTIVE | Noted: 2020-10-20

## 2020-10-20 PROBLEM — N18.31 STAGE 3A CHRONIC KIDNEY DISEASE (HCC): Status: ACTIVE | Noted: 2020-10-20

## 2020-10-20 PROBLEM — N18.2 STAGE 2 CHRONIC KIDNEY DISEASE: Status: ACTIVE | Noted: 2020-10-20

## 2020-10-20 RX ORDER — HYDROCHLOROTHIAZIDE 25 MG/1
TABLET ORAL
Qty: 90 TABLET | Refills: 1 | Status: SHIPPED | OUTPATIENT
Start: 2020-10-20 | End: 2020-10-22

## 2020-10-21 ENCOUNTER — OFFICE VISIT (OUTPATIENT)
Dept: NEPHROLOGY | Facility: CLINIC | Age: 65
End: 2020-10-21
Payer: MEDICARE

## 2020-10-21 VITALS
BODY MASS INDEX: 26.05 KG/M2 | SYSTOLIC BLOOD PRESSURE: 112 MMHG | HEART RATE: 61 BPM | RESPIRATION RATE: 16 BRPM | WEIGHT: 203 LBS | DIASTOLIC BLOOD PRESSURE: 74 MMHG | TEMPERATURE: 96.8 F | HEIGHT: 74 IN

## 2020-10-21 DIAGNOSIS — N18.31 STAGE 3A CHRONIC KIDNEY DISEASE (HCC): Primary | ICD-10-CM

## 2020-10-21 DIAGNOSIS — I10 ESSENTIAL HYPERTENSION: ICD-10-CM

## 2020-10-21 DIAGNOSIS — C64.2 RENAL CELL CARCINOMA OF LEFT KIDNEY (HCC): ICD-10-CM

## 2020-10-21 PROCEDURE — 99214 OFFICE O/P EST MOD 30 MIN: CPT | Performed by: NURSE PRACTITIONER

## 2020-10-22 DIAGNOSIS — I10 ESSENTIAL HYPERTENSION: ICD-10-CM

## 2020-10-22 RX ORDER — HYDROCHLOROTHIAZIDE 25 MG/1
TABLET ORAL
Qty: 90 TABLET | Refills: 1 | Status: SHIPPED | OUTPATIENT
Start: 2020-10-22 | End: 2020-11-17 | Stop reason: ALTCHOICE

## 2020-10-29 ENCOUNTER — OFFICE VISIT (OUTPATIENT)
Dept: UROLOGY | Facility: AMBULATORY SURGERY CENTER | Age: 65
End: 2020-10-29
Payer: MEDICARE

## 2020-10-29 VITALS
DIASTOLIC BLOOD PRESSURE: 70 MMHG | HEART RATE: 56 BPM | BODY MASS INDEX: 26.46 KG/M2 | HEIGHT: 74 IN | SYSTOLIC BLOOD PRESSURE: 122 MMHG | WEIGHT: 206.2 LBS | TEMPERATURE: 97.1 F

## 2020-10-29 DIAGNOSIS — Z12.5 SCREENING FOR PROSTATE CANCER: ICD-10-CM

## 2020-10-29 DIAGNOSIS — N40.1 BENIGN PROSTATIC HYPERPLASIA WITH LOWER URINARY TRACT SYMPTOMS, SYMPTOM DETAILS UNSPECIFIED: Primary | ICD-10-CM

## 2020-10-29 DIAGNOSIS — C64.2 RENAL CELL CARCINOMA OF LEFT KIDNEY (HCC): ICD-10-CM

## 2020-10-29 LAB
SL AMB  POCT GLUCOSE, UA: NORMAL
SL AMB LEUKOCYTE ESTERASE,UA: NORMAL
SL AMB POCT BILIRUBIN,UA: NORMAL
SL AMB POCT BLOOD,UA: NORMAL
SL AMB POCT CLARITY,UA: CLEAR
SL AMB POCT COLOR,UA: YELLOW
SL AMB POCT KETONES,UA: NORMAL
SL AMB POCT NITRITE,UA: NORMAL
SL AMB POCT PH,UA: 5
SL AMB POCT SPECIFIC GRAVITY,UA: 1.01
SL AMB POCT URINE PROTEIN: NORMAL
SL AMB POCT UROBILINOGEN: 0.2

## 2020-10-29 PROCEDURE — 81002 URINALYSIS NONAUTO W/O SCOPE: CPT | Performed by: UROLOGY

## 2020-10-29 PROCEDURE — 99024 POSTOP FOLLOW-UP VISIT: CPT | Performed by: UROLOGY

## 2020-11-17 ENCOUNTER — OFFICE VISIT (OUTPATIENT)
Dept: CARDIOLOGY CLINIC | Facility: CLINIC | Age: 65
End: 2020-11-17
Payer: MEDICARE

## 2020-11-17 VITALS
DIASTOLIC BLOOD PRESSURE: 80 MMHG | SYSTOLIC BLOOD PRESSURE: 126 MMHG | BODY MASS INDEX: 26.55 KG/M2 | TEMPERATURE: 97.3 F | WEIGHT: 206.9 LBS | HEART RATE: 54 BPM | HEIGHT: 74 IN

## 2020-11-17 DIAGNOSIS — I48.19 PERSISTENT ATRIAL FIBRILLATION (HCC): Primary | ICD-10-CM

## 2020-11-17 DIAGNOSIS — C64.2 RENAL CELL CARCINOMA OF LEFT KIDNEY (HCC): ICD-10-CM

## 2020-11-17 DIAGNOSIS — I10 ESSENTIAL HYPERTENSION: ICD-10-CM

## 2020-11-17 PROCEDURE — 99214 OFFICE O/P EST MOD 30 MIN: CPT | Performed by: INTERNAL MEDICINE

## 2020-11-17 PROCEDURE — 93000 ELECTROCARDIOGRAM COMPLETE: CPT | Performed by: INTERNAL MEDICINE

## 2020-12-10 ENCOUNTER — OFFICE VISIT (OUTPATIENT)
Dept: CARDIOLOGY CLINIC | Facility: CLINIC | Age: 65
End: 2020-12-10
Payer: MEDICARE

## 2020-12-10 VITALS
HEIGHT: 74 IN | WEIGHT: 207 LBS | BODY MASS INDEX: 26.56 KG/M2 | HEART RATE: 89 BPM | SYSTOLIC BLOOD PRESSURE: 122 MMHG | DIASTOLIC BLOOD PRESSURE: 80 MMHG

## 2020-12-10 DIAGNOSIS — I48.19 PERSISTENT ATRIAL FIBRILLATION (HCC): Primary | ICD-10-CM

## 2020-12-10 PROCEDURE — 99214 OFFICE O/P EST MOD 30 MIN: CPT | Performed by: INTERNAL MEDICINE

## 2020-12-10 PROCEDURE — 93000 ELECTROCARDIOGRAM COMPLETE: CPT | Performed by: INTERNAL MEDICINE

## 2021-01-08 ENCOUNTER — APPOINTMENT (EMERGENCY)
Dept: CT IMAGING | Facility: HOSPITAL | Age: 66
DRG: 065 | End: 2021-01-08
Payer: MEDICARE

## 2021-01-08 ENCOUNTER — HOSPITAL ENCOUNTER (EMERGENCY)
Facility: HOSPITAL | Age: 66
DRG: 065 | End: 2021-01-09
Attending: EMERGENCY MEDICINE | Admitting: EMERGENCY MEDICINE
Payer: MEDICARE

## 2021-01-08 DIAGNOSIS — I48.91 ATRIAL FIBRILLATION (HCC): ICD-10-CM

## 2021-01-08 DIAGNOSIS — R51.9 HEADACHE: ICD-10-CM

## 2021-01-08 DIAGNOSIS — R11.10 VOMITING: ICD-10-CM

## 2021-01-08 DIAGNOSIS — R11.0 NAUSEA: ICD-10-CM

## 2021-01-08 DIAGNOSIS — S06.5X9A SUBDURAL HEMATOMA (HCC): Primary | ICD-10-CM

## 2021-01-08 DIAGNOSIS — R42 LIGHTHEADEDNESS: ICD-10-CM

## 2021-01-08 LAB
ALBUMIN SERPL BCP-MCNC: 3.8 G/DL (ref 3.5–5)
ALP SERPL-CCNC: 103 U/L (ref 46–116)
ALT SERPL W P-5'-P-CCNC: 42 U/L (ref 12–78)
ANION GAP SERPL CALCULATED.3IONS-SCNC: 5 MMOL/L (ref 4–13)
APTT PPP: 35 SECONDS (ref 23–37)
AST SERPL W P-5'-P-CCNC: 27 U/L (ref 5–45)
BASOPHILS # BLD AUTO: 0.02 THOUSANDS/ΜL (ref 0–0.1)
BASOPHILS NFR BLD AUTO: 0 % (ref 0–1)
BILIRUB SERPL-MCNC: 0.4 MG/DL (ref 0.2–1)
BUN SERPL-MCNC: 21 MG/DL (ref 5–25)
CALCIUM SERPL-MCNC: 9.4 MG/DL (ref 8.3–10.1)
CHLORIDE SERPL-SCNC: 105 MMOL/L (ref 100–108)
CO2 SERPL-SCNC: 33 MMOL/L (ref 21–32)
CREAT SERPL-MCNC: 1.36 MG/DL (ref 0.6–1.3)
EOSINOPHIL # BLD AUTO: 0.14 THOUSAND/ΜL (ref 0–0.61)
EOSINOPHIL NFR BLD AUTO: 2 % (ref 0–6)
ERYTHROCYTE [DISTWIDTH] IN BLOOD BY AUTOMATED COUNT: 13.3 % (ref 11.6–15.1)
GFR SERPL CREATININE-BSD FRML MDRD: 54 ML/MIN/1.73SQ M
GLUCOSE SERPL-MCNC: 86 MG/DL (ref 65–140)
HCT VFR BLD AUTO: 46.4 % (ref 36.5–49.3)
HGB BLD-MCNC: 14.9 G/DL (ref 12–17)
IMM GRANULOCYTES # BLD AUTO: 0.04 THOUSAND/UL (ref 0–0.2)
IMM GRANULOCYTES NFR BLD AUTO: 1 % (ref 0–2)
INR PPP: 1.26 (ref 0.84–1.19)
LYMPHOCYTES # BLD AUTO: 2.8 THOUSANDS/ΜL (ref 0.6–4.47)
LYMPHOCYTES NFR BLD AUTO: 37 % (ref 14–44)
MCH RBC QN AUTO: 29.6 PG (ref 26.8–34.3)
MCHC RBC AUTO-ENTMCNC: 32.1 G/DL (ref 31.4–37.4)
MCV RBC AUTO: 92 FL (ref 82–98)
MONOCYTES # BLD AUTO: 0.69 THOUSAND/ΜL (ref 0.17–1.22)
MONOCYTES NFR BLD AUTO: 9 % (ref 4–12)
NEUTROPHILS # BLD AUTO: 3.89 THOUSANDS/ΜL (ref 1.85–7.62)
NEUTS SEG NFR BLD AUTO: 51 % (ref 43–75)
NRBC BLD AUTO-RTO: 0 /100 WBCS
PLATELET # BLD AUTO: 265 THOUSANDS/UL (ref 149–390)
PMV BLD AUTO: 9.8 FL (ref 8.9–12.7)
POTASSIUM SERPL-SCNC: 3.9 MMOL/L (ref 3.5–5.3)
PROT SERPL-MCNC: 7.5 G/DL (ref 6.4–8.2)
PROTHROMBIN TIME: 15.5 SECONDS (ref 11.6–14.5)
RBC # BLD AUTO: 5.03 MILLION/UL (ref 3.88–5.62)
SODIUM SERPL-SCNC: 143 MMOL/L (ref 136–145)
TROPONIN I SERPL-MCNC: <0.02 NG/ML
WBC # BLD AUTO: 7.58 THOUSAND/UL (ref 4.31–10.16)

## 2021-01-08 PROCEDURE — 85730 THROMBOPLASTIN TIME PARTIAL: CPT | Performed by: EMERGENCY MEDICINE

## 2021-01-08 PROCEDURE — 99285 EMERGENCY DEPT VISIT HI MDM: CPT

## 2021-01-08 PROCEDURE — 84484 ASSAY OF TROPONIN QUANT: CPT | Performed by: EMERGENCY MEDICINE

## 2021-01-08 PROCEDURE — 99291 CRITICAL CARE FIRST HOUR: CPT | Performed by: EMERGENCY MEDICINE

## 2021-01-08 PROCEDURE — G1004 CDSM NDSC: HCPCS

## 2021-01-08 PROCEDURE — 85610 PROTHROMBIN TIME: CPT | Performed by: EMERGENCY MEDICINE

## 2021-01-08 PROCEDURE — 80053 COMPREHEN METABOLIC PANEL: CPT | Performed by: EMERGENCY MEDICINE

## 2021-01-08 PROCEDURE — 85025 COMPLETE CBC W/AUTO DIFF WBC: CPT | Performed by: EMERGENCY MEDICINE

## 2021-01-08 PROCEDURE — 70450 CT HEAD/BRAIN W/O DYE: CPT

## 2021-01-08 PROCEDURE — 93005 ELECTROCARDIOGRAM TRACING: CPT

## 2021-01-08 PROCEDURE — 36415 COLL VENOUS BLD VENIPUNCTURE: CPT | Performed by: EMERGENCY MEDICINE

## 2021-01-09 ENCOUNTER — APPOINTMENT (INPATIENT)
Dept: RADIOLOGY | Facility: HOSPITAL | Age: 66
DRG: 065 | End: 2021-01-09
Payer: MEDICARE

## 2021-01-09 ENCOUNTER — HOSPITAL ENCOUNTER (INPATIENT)
Facility: HOSPITAL | Age: 66
LOS: 2 days | Discharge: HOME | DRG: 065 | End: 2021-01-11
Attending: EMERGENCY MEDICINE | Admitting: EMERGENCY MEDICINE
Payer: MEDICARE

## 2021-01-09 VITALS
RESPIRATION RATE: 16 BRPM | OXYGEN SATURATION: 97 % | SYSTOLIC BLOOD PRESSURE: 138 MMHG | BODY MASS INDEX: 26.55 KG/M2 | HEART RATE: 82 BPM | DIASTOLIC BLOOD PRESSURE: 83 MMHG | TEMPERATURE: 97.9 F | WEIGHT: 206.79 LBS

## 2021-01-09 DIAGNOSIS — S06.5X9A SDH (SUBDURAL HEMATOMA) (HCC): Primary | ICD-10-CM

## 2021-01-09 DIAGNOSIS — I48.19 PERSISTENT ATRIAL FIBRILLATION (HCC): ICD-10-CM

## 2021-01-09 DIAGNOSIS — Z86.73 HISTORY OF STROKE: ICD-10-CM

## 2021-01-09 PROBLEM — S06.5XAA SDH (SUBDURAL HEMATOMA): Status: ACTIVE | Noted: 2021-01-09

## 2021-01-09 LAB
ANION GAP SERPL CALCULATED.3IONS-SCNC: 4 MMOL/L (ref 4–13)
ATRIAL RATE: 267 BPM
ATRIAL RATE: 357 BPM
BASOPHILS # BLD AUTO: 0.02 THOUSANDS/ΜL (ref 0–0.1)
BASOPHILS NFR BLD AUTO: 0 % (ref 0–1)
BUN SERPL-MCNC: 18 MG/DL (ref 5–25)
CA-I BLD-SCNC: 1.14 MMOL/L (ref 1.12–1.32)
CALCIUM SERPL-MCNC: 9.1 MG/DL (ref 8.3–10.1)
CHLORIDE SERPL-SCNC: 111 MMOL/L (ref 100–108)
CO2 SERPL-SCNC: 28 MMOL/L (ref 21–32)
CREAT SERPL-MCNC: 1.11 MG/DL (ref 0.6–1.3)
EOSINOPHIL # BLD AUTO: 0.06 THOUSAND/ΜL (ref 0–0.61)
EOSINOPHIL NFR BLD AUTO: 1 % (ref 0–6)
ERYTHROCYTE [DISTWIDTH] IN BLOOD BY AUTOMATED COUNT: 13.3 % (ref 11.6–15.1)
GFR SERPL CREATININE-BSD FRML MDRD: 69 ML/MIN/1.73SQ M
GLUCOSE SERPL-MCNC: 86 MG/DL (ref 65–140)
HCT VFR BLD AUTO: 45.4 % (ref 36.5–49.3)
HGB BLD-MCNC: 14.5 G/DL (ref 12–17)
IMM GRANULOCYTES # BLD AUTO: 0.02 THOUSAND/UL (ref 0–0.2)
IMM GRANULOCYTES NFR BLD AUTO: 0 % (ref 0–2)
INR PPP: 1.21 (ref 0.84–1.19)
LYMPHOCYTES # BLD AUTO: 1.67 THOUSANDS/ΜL (ref 0.6–4.47)
LYMPHOCYTES NFR BLD AUTO: 23 % (ref 14–44)
MAGNESIUM SERPL-MCNC: 2.3 MG/DL (ref 1.6–2.6)
MCH RBC QN AUTO: 29.3 PG (ref 26.8–34.3)
MCHC RBC AUTO-ENTMCNC: 31.9 G/DL (ref 31.4–37.4)
MCV RBC AUTO: 92 FL (ref 82–98)
MONOCYTES # BLD AUTO: 0.55 THOUSAND/ΜL (ref 0.17–1.22)
MONOCYTES NFR BLD AUTO: 8 % (ref 4–12)
NEUTROPHILS # BLD AUTO: 5.06 THOUSANDS/ΜL (ref 1.85–7.62)
NEUTS SEG NFR BLD AUTO: 68 % (ref 43–75)
NRBC BLD AUTO-RTO: 0 /100 WBCS
P AXIS: 242 DEGREES
PHOSPHATE SERPL-MCNC: 3.5 MG/DL (ref 2.3–4.1)
PLATELET # BLD AUTO: 245 THOUSANDS/UL (ref 149–390)
PMV BLD AUTO: 9.9 FL (ref 8.9–12.7)
POTASSIUM SERPL-SCNC: 3.6 MMOL/L (ref 3.5–5.3)
PROTHROMBIN TIME: 15.3 SECONDS (ref 11.6–14.5)
QRS AXIS: 62 DEGREES
QRS AXIS: 69 DEGREES
QRSD INTERVAL: 84 MS
QRSD INTERVAL: 84 MS
QT INTERVAL: 400 MS
QT INTERVAL: 412 MS
QTC INTERVAL: 441 MS
QTC INTERVAL: 450 MS
RBC # BLD AUTO: 4.95 MILLION/UL (ref 3.88–5.62)
SODIUM SERPL-SCNC: 143 MMOL/L (ref 136–145)
T WAVE AXIS: 48 DEGREES
T WAVE AXIS: 61 DEGREES
VENTRICULAR RATE: 69 BPM
VENTRICULAR RATE: 76 BPM
WBC # BLD AUTO: 7.38 THOUSAND/UL (ref 4.31–10.16)

## 2021-01-09 PROCEDURE — 97162 PT EVAL MOD COMPLEX 30 MIN: CPT

## 2021-01-09 PROCEDURE — 93010 ELECTROCARDIOGRAM REPORT: CPT | Performed by: INTERNAL MEDICINE

## 2021-01-09 PROCEDURE — G1004 CDSM NDSC: HCPCS

## 2021-01-09 PROCEDURE — 97166 OT EVAL MOD COMPLEX 45 MIN: CPT

## 2021-01-09 PROCEDURE — 83735 ASSAY OF MAGNESIUM: CPT | Performed by: REGISTERED NURSE

## 2021-01-09 PROCEDURE — 99291 CRITICAL CARE FIRST HOUR: CPT | Performed by: PHYSICIAN ASSISTANT

## 2021-01-09 PROCEDURE — 70450 CT HEAD/BRAIN W/O DYE: CPT

## 2021-01-09 PROCEDURE — 80048 BASIC METABOLIC PNL TOTAL CA: CPT | Performed by: REGISTERED NURSE

## 2021-01-09 PROCEDURE — 84100 ASSAY OF PHOSPHORUS: CPT | Performed by: REGISTERED NURSE

## 2021-01-09 PROCEDURE — 99223 1ST HOSP IP/OBS HIGH 75: CPT | Performed by: PHYSICIAN ASSISTANT

## 2021-01-09 PROCEDURE — 93005 ELECTROCARDIOGRAM TRACING: CPT

## 2021-01-09 PROCEDURE — 99223 1ST HOSP IP/OBS HIGH 75: CPT | Performed by: PSYCHIATRY & NEUROLOGY

## 2021-01-09 PROCEDURE — 82330 ASSAY OF CALCIUM: CPT | Performed by: REGISTERED NURSE

## 2021-01-09 PROCEDURE — 85025 COMPLETE CBC W/AUTO DIFF WBC: CPT | Performed by: REGISTERED NURSE

## 2021-01-09 PROCEDURE — 85610 PROTHROMBIN TIME: CPT | Performed by: REGISTERED NURSE

## 2021-01-09 RX ORDER — DOCUSATE SODIUM 100 MG/1
100 CAPSULE, LIQUID FILLED ORAL 2 TIMES DAILY
Status: DISCONTINUED | OUTPATIENT
Start: 2021-01-09 | End: 2021-01-11 | Stop reason: HOSPADM

## 2021-01-09 RX ORDER — METOPROLOL SUCCINATE 50 MG/1
50 TABLET, EXTENDED RELEASE ORAL EVERY MORNING
Status: DISCONTINUED | OUTPATIENT
Start: 2021-01-09 | End: 2021-01-11 | Stop reason: HOSPADM

## 2021-01-09 RX ORDER — AMLODIPINE BESYLATE 5 MG/1
5 TABLET ORAL EVERY MORNING
Status: DISCONTINUED | OUTPATIENT
Start: 2021-01-09 | End: 2021-01-11 | Stop reason: HOSPADM

## 2021-01-09 RX ORDER — ATORVASTATIN CALCIUM 10 MG/1
10 TABLET, FILM COATED ORAL DAILY
Status: DISCONTINUED | OUTPATIENT
Start: 2021-01-09 | End: 2021-01-11 | Stop reason: HOSPADM

## 2021-01-09 RX ORDER — SODIUM CHLORIDE, SODIUM GLUCONATE, SODIUM ACETATE, POTASSIUM CHLORIDE, MAGNESIUM CHLORIDE, SODIUM PHOSPHATE, DIBASIC, AND POTASSIUM PHOSPHATE .53; .5; .37; .037; .03; .012; .00082 G/100ML; G/100ML; G/100ML; G/100ML; G/100ML; G/100ML; G/100ML
50 INJECTION, SOLUTION INTRAVENOUS CONTINUOUS
Status: DISCONTINUED | OUTPATIENT
Start: 2021-01-09 | End: 2021-01-09

## 2021-01-09 RX ORDER — FLECAINIDE ACETATE 50 MG/1
50 TABLET ORAL 2 TIMES DAILY
Status: DISCONTINUED | OUTPATIENT
Start: 2021-01-09 | End: 2021-01-11

## 2021-01-09 RX ADMIN — METOPROLOL SUCCINATE 50 MG: 50 TABLET, EXTENDED RELEASE ORAL at 09:16

## 2021-01-09 RX ADMIN — Medication 1 TABLET: at 09:15

## 2021-01-09 RX ADMIN — DOCUSATE SODIUM 100 MG: 100 CAPSULE, LIQUID FILLED ORAL at 09:16

## 2021-01-09 RX ADMIN — AMLODIPINE BESYLATE 5 MG: 5 TABLET ORAL at 09:15

## 2021-01-09 RX ADMIN — FLECAINIDE ACETATE 50 MG: 50 TABLET ORAL at 22:05

## 2021-01-09 RX ADMIN — DOCUSATE SODIUM 100 MG: 100 CAPSULE, LIQUID FILLED ORAL at 22:05

## 2021-01-09 RX ADMIN — ATORVASTATIN CALCIUM 10 MG: 10 TABLET, FILM COATED ORAL at 09:17

## 2021-01-09 RX ADMIN — SODIUM CHLORIDE, SODIUM GLUCONATE, SODIUM ACETATE, POTASSIUM CHLORIDE, MAGNESIUM CHLORIDE, SODIUM PHOSPHATE, DIBASIC, AND POTASSIUM PHOSPHATE 50 ML/HR: .53; .5; .37; .037; .03; .012; .00082 INJECTION, SOLUTION INTRAVENOUS at 06:15

## 2021-01-09 NOTE — CONSULTS
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 (8214)  Only apparent residual of would be a mild dysarthria which, by the patient's report, is without change  5  Obstructive sleep apnea  6  Renal cell carcinoma, status post left nephrectomy     Plan:  1  Continue to hold Eliquis for now  Repeat head CT scheduled for tomorrow  2  As this occurred in the absence of head trauma, i e  Spontaneous SDH; risk for recurrent hemorrhage upon resuming anticoagulation remains elevated  3  Consider cardiac consultation for possible Watchman device, which although does mandate return temporarily to anticoagulation, may offer best compromise (accepting short-term risk for long-term benefit)  4  Alternately, given prior plans for ablation, it may be reasonable to conduct whichever follow-up scans are required from his urologic standpoint (CT abdomen and pelvis with and without IV contrast, per 10/29 urology note), and accelerate the timeline to attempt this procedure now  This could be combined with implantable loop recorder to assess ongoing success in staving off recurrent AFib, hopefully obviating the need for anticoagulation    Physician Requesting Consult: Jennifer Lino DO  Reason for Consult / Principal Problem:  Subdural hematoma, AFib with history prior stroke, on Eliquis    HPI: Luz Low is a 76y o  year old male who presents with complaints of dizziness, clarified as unsteadiness, along with right-sided headache discomfort, onset yesterday evening while sitting watching television  Patient was concerned about possible recurrent stroke and transported to [address] [address] Emergency Room  On route he developed some nauseousness and vomited on arrival there, after which he reports the prior symptoms of headache and dizziness seemed to resolve  A head CT there showed suggestion of left convexity subdural hematoma, acute/subacute  He was transferred to [facility] for neurosurgical evaluation  Since admission he has not had a recurrence of any of the prior symptoms nor does he endorse any current neurologic deficits  His past history is remarkable for known AFib, for which he has been maintained on Eliquis since 2014, when he incurred a right sided cortical/subcortical infarction, as described on MRI in [facility] records  A subsequent outpatient monitor detected brief atrial fibrillation, leading to his treatment with anticoagulation (Eliquis)  He had remained symptom-free, tolerating that without significant complications  His AFib has been managed by Cardiology, but become more persistent over the years  During the past year, there had been discussion of possible EPS ablative surgery, but that was postponed when he was discovered to have renal cancer, and needed to undergo nephrectomy, which was done in October  Pending repeat scans, his EPS procedure was tentatively to be rescheduled for February  In light of the SDH, his anticoagulation has been held  Repeat head CT today had shown mild progression, though the patient remains asymptomatic  He denies falling yesterday, or at any time in the recent past   No recent head strikes/injury    Inpatient consult to Neurology  Consult performed by: Luz Elena Haro DO  Consult ordered by: Rodrigo Urias DO          Review of Systems   Constitutional: Negative  HENT: Negative  Eyes: Negative  Respiratory: Negative  Cardiovascular: Negative  Gastrointestinal: Negative  Endocrine: Negative  Genitourinary: Negative  Musculoskeletal: Negative  Allergic/Immunologic: Negative  Neurological: Negative  Hematological: Negative  Psychiatric/Behavioral: Negative  Historical Information   Past Medical History:   Diagnosis Date    [facility] [address] [facility]     Cancer ([facility] [address] ) 08/2020    Kidney cancer    Cancer (HCC)     melanoma    Chronic kidney disease     left kidney cancer    CPAP (continuous positive airway pressure) dependence     Hyperlipidemia     Hypertension     Irregular heart beat     Afib    Pneumonia     Skin cancer     Sleep apnea     Stroke ([facility] [address] [facility]     Supraventricular tachycardia ([facility] [address] [facility] )     Wears glasses     Wears partial dentures     upper partial     Past Surgical History:   Procedure Laterality Date    APPENDECTOMY      CARDIOVERSION      COLONOSCOPY      fiberoptic, also 2009, both negative, resolved 2004    COLONOSCOPY  08/2019    EGD      HERNIA REPAIR      umbilical and bilateral inguinal    KNEE ARTHROSCOPY Left     SD LAP,PARTIAL NEPHRECTOMY Left 10/7/2020    Procedure: ROBOTIC PARTIAL NEPHRECTOMY;  Surgeon: Wilfredo Mendiola MD;  Location: [facility];  Service: Urology    SKIN GRAFT      left ear, skin cancer    TONSILLECTOMY       Social History   Social History     Substance and Sexual Activity   Alcohol Use Yes    Frequency: 2-4 times a month    Drinks per session: 1 or 2     Social History     Substance and Sexual Activity   Drug Use No     Social History     Tobacco Use   Smoking Status Never Smoker   Smokeless Tobacco Never Used     Family History: non-contributory    Review of previous medical records was  completed  Meds/Allergies   all current active meds have been reviewed and PTA meds:   Prior to Admission Medications   Prescriptions Last Dose Informant Patient Reported? Taking?    Coenzyme Q10 (CO Q-10 PO)  Self Yes No   Sig: Take 1 caplet by mouth every morning   Eliquis 5 MG  Self No No   Sig: TAKE 1 TABLET BY MOUTH TWO TIMES A DAY   HYDROcodone-acetaminophen (NORCO) 5-325 mg per tablet  Self No No   Sig: Take 1 tablet by mouth every 6 (six) hours as needed for pain for up to 20 dosesMax Daily Amount: 4 tablets   Patient not taking: Reported on 1/8/2021   HYDROcodone-acetaminophen (NORCO) 5-325 mg per tablet  Self No No   Sig: Take 1 tablet by mouth every 6 (six) hours as needed for pain for up to 10 dosesMax Daily Amount: 4 tablets   Patient not taking: Reported on 1/8/2021   Multiple Vitamins-Minerals (MULTIVITAMIN ADULT PO)  Self Yes No   Sig: Take 1 tablet by mouth every morning   amLODIPine (NORVASC) 5 mg tablet  Self Yes No   Sig: Take 5 mg by mouth every morning   atorvastatin (LIPITOR) 10 mg tablet  Self No No   Sig: TAKE ONE TABLET BY MOUTH EVERY DAY   Patient taking differently: Take 10 mg by mouth every morning    docusate sodium (COLACE) 100 mg capsule  Self No No   Sig: Take 1 capsule (100 mg total) by mouth 2 (two) times a day for 60 doses   flecainide (TAMBOCOR) 50 mg tablet  Self Yes No   Sig: Take 50 mg by mouth 2 (two) times a day   losartan (COZAAR) 100 MG tablet  Self Yes No   Sig: Take 50 mg by mouth every morning   metoprolol succinate (TOPROL-XL) 50 mg 24 hr tablet  Self No No   Sig: Take 1 tablet (50 mg total) by mouth every morning      Facility-Administered Medications: None       No Known Allergies    Objective   Vitals:Blood pressure 138/84, pulse 87, temperature 97 9 °F (36 6 °C), resp  rate 16, weight 93 8 kg (206 lb 12 7 oz), SpO2 97 %  ,Body mass index is 26 55 kg/m²  Intake/Output Summary (Last 24 hours) at 1/9/2021 1231  Last data filed at 1/9/2021 0800  Gross per 24 hour   Intake 0 ml   Output --   Net 0 ml       Invasive Devices: Invasive Devices     Peripheral Intravenous Line            Peripheral IV 01/09/21 Right Antecubital less than 1 day          Drain            Closed/Suction Drain Left Abdomen Bulb 10 Fr  93 days                Physical Exam  Vitals signs reviewed     Constitutional: General: He is not in acute distress  Appearance: Normal appearance  He is not ill-appearing or toxic-appearing  HENT:      Head: Normocephalic and atraumatic  Nose: Nose normal       Mouth/Throat:      Mouth: Mucous membranes are moist       Pharynx: Oropharynx is clear  No oropharyngeal exudate  Eyes:      General: No scleral icterus  Extraocular Movements: Extraocular movements intact  Pupils: Pupils are equal, round, and reactive to light  Neck:      Musculoskeletal: Neck supple  No neck rigidity or muscular tenderness  Cardiovascular:      Rate and Rhythm: Normal rate and regular rhythm  Pulmonary:      Effort: Pulmonary effort is normal  No respiratory distress  Breath sounds: Normal breath sounds  Abdominal:      General: Abdomen is flat  Palpations: Abdomen is soft  Musculoskeletal: Normal range of motion  General: No swelling or tenderness  Right lower leg: No edema  Left lower leg: No edema  Skin:     General: Skin is warm and dry  Neurological:      General: No focal deficit present  Mental Status: He is alert and oriented to person, place, and time  Psychiatric:         Mood and Affect: Mood normal        Neurologic Exam     Mental Status   Oriented to person, place, and time  Patient is awake and alert  There is no evident difficulty with language, memory, orientation, or higher cognitive function  Cranial Nerves     CN III, IV, VI   Pupils are equal, round, and reactive to light  Mild dysarthria (patient says his speech is normal for him)  Cranial nerves II through XII are intact except as noted above  Motor Exam Patient has normal  strength and tone in all proximal and distal muscle groups  No drift     Sensory Exam   Sensation is intact and symmetric to all primary modalities  No extinction  Gait, Coordination, and Reflexes Coordination testing was normal with bilateral upper and lower limbs    No tremor or involuntary movements  Reflexes were 2+ in the upper extremities, slightly more brisk in the lowers  Plantar responses were flexor  Gait evaluation deferred  He reports he did not note any unsteadiness while ambulating to the bathroom this morning       Lab Results: I have personally reviewed pertinent reports  Imaging Studies: I have personally reviewed pertinent films in PACS     I agree with the interpretation provided, there is a small subdural fluid collection left more than right, with mixed density, as well as retrocerebellar CSF collection representing either arachnoid cyst or nheal cisterna magna (this was known previously)  EKG, Pathology, and Other Studies: I have personally reviewed pertinent reports          Code Status: Level 1 - Full Code  Advance Directive and Living Will:      Power of :    POLST:

## 2021-01-09 NOTE — OCCUPATIONAL THERAPY NOTE
Occupational Therapy Evaluation     Patient Name: Flex Jones  WQCYR'I Date: 1/9/2021  Problem List  Principal Problem:    SDH (subdural hematoma) (HCC)  Active Problems:    Essential hypertension    Persistent atrial fibrillation (HCC)    Mixed hyperlipidemia    Benign prostatic hyperplasia with lower urinary tract symptoms    FELISA (obstructive sleep apnea)    Renal cell carcinoma of left kidney (HCC)    Stage 3a chronic kidney disease    Past Medical History  Past Medical History:   Diagnosis Date    Atrial fibrillation ([address] )     Cancer ([address] ) 08/2020    Kidney cancer    Cancer ([address] )     melanoma    Chronic kidney disease     left kidney cancer    CPAP (continuous positive airway pressure) dependence     Hyperlipidemia     Hypertension     Irregular heart beat     Afib    Pneumonia     Skin cancer     Sleep apnea     Stroke ([facility] [address] [facility]     Supraventricular tachycardia ([address] )     Wears glasses     Wears partial dentures     upper partial     Past Surgical History  Past Surgical History:   Procedure Laterality Date    APPENDECTOMY      CARDIOVERSION      COLONOSCOPY      fiberoptic, also 2009, both negative, resolved 2004    COLONOSCOPY  08/2019    EGD      HERNIA REPAIR      umbilical and bilateral inguinal    KNEE ARTHROSCOPY Left     MT LAP,PARTIAL NEPHRECTOMY Left 10/7/2020    Procedure: ROBOTIC PARTIAL NEPHRECTOMY;  Surgeon: Chary Pichardo MD;  Location: [facility];  Service: Urology    SKIN GRAFT      left ear, skin cancer    TONSILLECTOMY            01/09/21 1339   OT Last Visit   OT Visit Date 01/09/21   Note Type   Note type Evaluation   Restrictions/Precautions   Weight Bearing Precautions Per Order No   Other Precautions Fall Risk   Pain Assessment   Pain Assessment Tool 0-10   Pain Score No Pain   Home Living   Type of Home House   Home Layout Two level;Bed/bath upstairs   Bathroom Shower/Tub Tub/shower unit   Bathroom Toilet Standard   Bathroom Equipment   (has SC - does not use )   Home Equipment   (denies)   Prior Function   Level of [place] Independent with ADLs and functional mobility   Lives With Spouse   ADL Assistance Independent   IADLs Independent   Falls in the last 6 months 0   Vocational Retired   Lifestyle   Autonomy Pt reports living with spouse (who is always home) in [place], bed/bath on 2nd floor, 2STE   Pt is I with all ADLs/IADLs, (+) drives    Reciprocal Relationships Spouse    Service to Others Retired    [address] Enjoys taking walks with wife    Psychosocial   Psychosocial (WDL) WDL   Subjective   Subjective "I feel fine"    ADL   Eating Assistance 7  Independent   Grooming Assistance 7  Independent   UB Bathing Assistance 7  Independent    [place]  7  Independent   Bed Mobility   Supine to Sit Unable to assess   Sit to Supine Unable to assess   Additional Comments Seated in recliner upon arrival    Transfers   Sit to Stand 7  Independent   Stand to Sit 7  Independent   Additional Comments FF of stairs, I    Functional Mobility   Functional Mobility 7  Independent   Balance   Static Sitting Normal   Dynamic Sitting Normal   Static Standing Fair +   Dynamic Standing Fair +   Ambulatory Fair +   Activity Tolerance   Activity Tolerance Patient tolerated treatment well   Medical Staff Made Aware PT Lis Lopes    Nurse Made Aware RN clearance for OT    RUE Assessment   RUE Assessment WFL   LUE Assessment   LUE Assessment WFL  (slightly weaker than R )   Hand Function   Gross Motor Coordination Functional   Fine Motor Coordination Functional   Sensation   Light Touch No apparent deficits   Vision-Basic Assessment   Current Vision Wears glasses all the time   Vision - Complex Assessment   Ocular Range of Motion WFL   Head Position WDL   Tracking Able to track stimulus in all quads without difficulty   Convergence Breaks at 7 from nose Cognition   Overall Cognitive Status WFL   Arousal/Participation Alert; Responsive; Cooperative   Attention Within functional limits   Orientation Level Oriented X4   Memory Within functional limits   Following Commands Follows one step commands without difficulty   Assessment   Assessment Pt is a 76 y o  male admitted to [facility] on 1/9/2021 w/ SDH (subdural hematoma) (HCC) - c/o HA and nausea  has a past medical history of Atrial fibrillation ([facility] [address] ), Cancer ([facility] [address] ) (08/2020), Cancer ([facility] [address] ), Chronic kidney disease, CPAP (continuous positive airway pressure) dependence, Hyperlipidemia, Hypertension, Irregular heart beat, Pneumonia, Skin cancer, Sleep apnea, Stroke ([facility] [address] ), Supraventricular tachycardia ([facility] [address] ), Wears glasses, and Wears partial dentures  , hx of CVA with no residual deficits  Pt with active OT orders and activity as tolerated orders  As per pt report, pta, resides in a 2STH, 2STE with his wife, bed/bath on second floor  Pt was I w/  ADLS and IADLS, (+) drove  Pt reports wife is always home and could help prn, they talk walks often  Upon evaluation, pt demonstrated I transfers (sit <> stand), fnxl mobility, LBD, UBD  UE strength WFL  Climbed FF of steps I  Pt reports no concerns at this time, feels he has return to his baseline  No further acute OT needs  From OT standpoint pt is safe to return home when medically stable  D/C OT  Please re-consult if needed  Thank you  Pt was left in room, in chair after session with all current needs met      Goals   Patient Goals to go home    Recommendation   OT Discharge Recommendation Return to previous environment with no needs   OT - OK to Discharge Yes  (when medically stable )   Modified [place] Scale   Modified [place] Scale 0      Krish Co, MS, OTR/L

## 2021-01-09 NOTE — EMTALA/ACUTE CARE TRANSFER
[facility]  [address] [facility] [address]  Dept: [phone]      EMTALA TRANSFER CONSENT    NAME Pierre Guerrier                                         1955                              MRN [id number]    I have been informed of my rights regarding examination, treatment, and transfer   by Dr Jeremiah Escalera DO    Benefits: Specialized equipment and/or services available at the receiving facility (Include comment)________________________    Risks: Potential for delay in receiving treatment      Consent for Transfer:  I acknowledge that my medical condition has been evaluated and explained to me by the emergency department physician or other qualified medical person and/or my attending physician, who has recommended that I be transferred to the service of  Accepting Physician: Dr Raisa Echols at [address] [place] [address] Name, [address] : [facility]  The above potential benefits of such transfer, the potential risks associated with such transfer, and the probable risks of not being transferred have been explained to me, and I fully understand them  The doctor has explained that, in my case, the benefits of transfer outweigh the risks  I agree to be transferred  I authorize the performance of emergency medical procedures and treatments upon me in both transit and upon arrival at the receiving facility  Additionally, I authorize the release of any and all medical records to the receiving facility and request they be transported with me, if possible  I understand that the safest mode of transportation during a medical emergency is an ambulance and that the Hospital advocates the use of this mode of transport  Risks of traveling to the receiving facility by car, including absence of medical control, life sustaining equipment, such as oxygen, and medical personnel has been explained to me and I fully understand them      ([place] BELOW)  [  ]  I consent to the stated transfer and to be transported by ambulance/helicopter  [  ]  I consent to the stated transfer, but refuse transportation by ambulance and accept full responsibility for my transportation by car  I understand the risks of non-ambulance transfers and I exonerate the Hospital and its staff from any deterioration in my condition that results from this refusal     X___________________________________________    DATE  21  TIME________  Signature of patient or legally responsible individual signing on patient behalf           RELATIONSHIP TO PATIENT_________________________          Provider Certification    NAME Adelso Forman                                         1955                              MRN [id number]    A medical screening exam was performed on the above named patient  Based on the examination:    Condition Necessitating Transfer The primary encounter diagnosis was Subdural hematoma ([address] )  Diagnoses of Headache, Nausea, Lightheadedness, Vomiting, and Atrial fibrillation (HCC) were also pertinent to this visit      Patient Condition: The patient has been stabilized such that within reasonable medical probability, no material deterioration of the patient condition or the condition of the unborn child(reagan) is likely to result from the transfer    Reason for Transfer: Level of Care needed not available at this facility    Transfer Requirements: [address]   · Space available and qualified personnel available for treatment as acknowledged by    · Agreed to accept transfer and to provide appropriate medical treatment as acknowledged by       Dr Fer Baig  · Appropriate medical records of the examination and treatment of the patient are provided at the time of transfer   [address], [address] _______  · Transfer will be performed by qualified personnel from    and appropriate transfer equipment as required, including the use of necessary and appropriate life support measures  Provider Certification: I have examined the patient and explained the following risks and benefits of being transferred/refusing transfer to the patient/family:  General risk, such as traffic hazards, adverse weather conditions, rough terrain or turbulence, possible failure of equipment (including vehicle or aircraft), or consequences of actions of persons outside the control of the transport personnel      Based on these reasonable risks and benefits to the patient and/or the unborn child(reagan), and based upon the information available at the time of the patients examination, I certify that the medical benefits reasonably to be expected from the provision of appropriate medical treatments at another medical facility outweigh the increasing risks, if any, to the individuals medical condition, and in the case of labor to the unborn child, from effecting the transfer      X____________________________________________ DATE 01/09/21        TIME_______      ORIGINAL - SEND TO MEDICAL RECORDS   COPY - SEND WITH PATIENT DURING TRANSFER

## 2021-01-09 NOTE — CONSULTS
Consultation - Neurosurgery   Magnus Gonzalez 76 y o  male MRN: [id number]  Unit/Bed#: [facility] 720-01 Encounter: [id number]        Inpatient consult to Neurosurgery  Consult performed by: Shanti Ring PA-C  Consult ordered by: JERMAINE Shaw          Assessment/Plan               Assessment:  1  Left high frontal parietal traumatic SDH  2  History of AFib on Eliquis  3  Dizziness      Plan:   · Exam: A&OX3, PERRL, EOMI 2mm conj bilat, SF, Leonora, Finger to nose tests normal bilaterally and without drift  Strength is 5/5 bilaterally  Sensation to light touch intact throughout  DTR 3+ without clonus and Babinski negative bilaterally  · Imagings reviewed personally and by attending is as follows:  · CT head without contrast on 01/08/2021 demonstrate Suspected tiny high left frontal convexity mixed attenuation subdural collection  Incidental probable retro cerebral arachnoid cyst   · Repeat CT head without contrast on 01/09/2021 demonstrate slight increase in status of the small mixed density extra-axial collections along the cerebral convexities bilaterally  Left side larger than right  Finding is acute on subacute subdural hemorrhage  Mild cerebral atrophy, and most likely cerebellar  arachnoid cyst   · Repeat CT head without contrast in 24 hours  · Pain control:  Per primary team  · DVT ppx: SCDs bilateral legs  No AC/AP  · Seizure ppx:  None  · Activity:  As tolerated  · PT/OT evaluation & treatment  · Medical Mx:  Per primary team  · Neurocheck:  Routine  · HOB>30-45 degrees  · SBP<160  · NSx will follow the patient  Recommend repeat CT head in 24 hours  No AC/AP or pharmacological DVT prophylaxis  Call 4 question or concern  History of Present Illness     HPI: Magnus Gonzalez is a 76 y o  male with PMHx of stroke 6 years ago, AFib on Eliquis started feeling dizziness yesterday, denies history of fall or bumping his head    Patient went to [facility] and got CT head which demonstrate left high parietal makes small SDH (acute/subacute)  Patient denies any headache, nausea, vomiting, blurry vision or diplopia  Denies history of loss of consciousness or seizure  Denies swallowing issues or speech problem  Denies numbness, weakness or paresthesia and extremities denies gait instability or tendency to fall  Denies bowel/bladder dysfunction  Denies history of fever, chills, rigors, cough or chest pain  Patient has history of hypertension on antihypertensive medication and blood pressure control, supraventricular tachycardia otherwise denies history of diabetes mellitus, congestive heart failure, or bleeding disorder  status post left nephrectomy for renal cancer  He denies history of smoking cigarettes, but drinks alcohol socially  Image findings as described in the assessment section above  Recommend repeat CT head in 24 hours evaluate the stability or resolution of the intracranial collection  Review of Systems   Constitutional: Negative for activity change, appetite change, chills, fever and unexpected weight change  HENT: Negative for trouble swallowing and voice change  Eyes: Negative for photophobia and visual disturbance  Respiratory: Negative for cough, shortness of breath and wheezing  Cardiovascular: Negative for chest pain and palpitations  Gastrointestinal: Negative for constipation, diarrhea, nausea and vomiting  Genitourinary: Negative for difficulty urinating  Musculoskeletal: Negative for gait problem, neck pain and neck stiffness  Neurological: Positive for dizziness  Negative for tremors, seizures, syncope, facial asymmetry, speech difficulty, weakness, light-headedness, numbness and headaches  Hematological: Negative for adenopathy  Does not bruise/bleed easily  Psychiatric/Behavioral: Negative for confusion and decreased concentration         Historical Information   Past Medical History:   Diagnosis Date    Atrial fibrillation [facility] [address] )     Cancer ([facility] [address] ) 08/2020    Kidney cancer    Cancer [facility])     melanoma    Chronic kidney disease     left kidney cancer    CPAP (continuous positive airway pressure) dependence     Hyperlipidemia     Hypertension     Irregular heart beat     Afib    Pneumonia     Skin cancer     Sleep apnea     Stroke ([facility] [address] )     Supraventricular tachycardia ([facility] [address] )     Wears glasses     Wears partial dentures     upper partial     Past Surgical History:   Procedure Laterality Date    APPENDECTOMY      CARDIOVERSION      COLONOSCOPY      fiberoptic, also 2009, both negative, resolved 2004    COLONOSCOPY  08/2019    EGD      HERNIA REPAIR      umbilical and bilateral inguinal    KNEE ARTHROSCOPY Left     CO LAP,PARTIAL NEPHRECTOMY Left 10/7/2020    Procedure: ROBOTIC PARTIAL NEPHRECTOMY;  Surgeon: Keli Thomas MD;  Location: [facility];  Service: Urology    SKIN GRAFT      left ear, skin cancer    TONSILLECTOMY       Social History     Substance and Sexual Activity   Alcohol Use Yes    Frequency: 2-4 times a month    Drinks per session: 1 or 2     Social History     Substance and Sexual Activity   Drug Use No     Social History     Tobacco Use   Smoking Status Never Smoker   Smokeless Tobacco Never Used     Family History   Problem Relation Age of Onset    Cancer Father         stomach    Hypertension Father     Stroke Father         syndrome    Cancer Family     Heart disease Family     Hypertension Family     Stroke Family         syndrome    Throat cancer Mother     Diabetes Mother     Asthma Sister        Meds/Allergies   all current active meds have been reviewed, current meds:   Current Facility-Administered Medications   Medication Dose Route Frequency    amLODIPine (NORVASC) tablet 5 mg  5 mg Oral QAM    atorvastatin (LIPITOR) tablet 10 mg  10 mg Oral Daily    docusate sodium (COLACE) capsule 100 mg  100 mg Oral BID    metoprolol succinate (TOPROL-XL) 24 hr tablet 50 mg  50 mg Oral QAM    multi-electrolyte (PLASMALYTE-A/ISOLYTE-S PH 7 4) IV solution  50 mL/hr Intravenous Continuous    multivitamin-minerals (CENTRUM) tablet 1 tablet  1 tablet Oral QAM    and PTA meds:   Prior to Admission Medications   Prescriptions Last Dose Informant Patient Reported? Taking? Coenzyme Q10 (CO Q-10 PO)  Self Yes No   Sig: Take 1 caplet by mouth every morning   Eliquis 5 MG  Self No No   Sig: TAKE 1 TABLET BY MOUTH TWO TIMES A DAY   HYDROcodone-acetaminophen (NORCO) 5-325 mg per tablet  Self No No   Sig: Take 1 tablet by mouth every 6 (six) hours as needed for pain for up to 20 dosesMax Daily Amount: 4 tablets   Patient not taking: Reported on 1/8/2021   HYDROcodone-acetaminophen (NORCO) 5-325 mg per tablet  Self No No   Sig: Take 1 tablet by mouth every 6 (six) hours as needed for pain for up to 10 dosesMax Daily Amount: 4 tablets   Patient not taking: Reported on 1/8/2021   Multiple Vitamins-Minerals (MULTIVITAMIN ADULT PO)  Self Yes No   Sig: Take 1 tablet by mouth every morning   amLODIPine (NORVASC) 5 mg tablet  Self Yes No   Sig: Take 5 mg by mouth every morning   atorvastatin (LIPITOR) 10 mg tablet  Self No No   Sig: TAKE ONE TABLET BY MOUTH EVERY DAY   Patient taking differently: Take 10 mg by mouth every morning    docusate sodium (COLACE) 100 mg capsule  Self No No   Sig: Take 1 capsule (100 mg total) by mouth 2 (two) times a day for 60 doses   flecainide (TAMBOCOR) 50 mg tablet  Self Yes No   Sig: Take 50 mg by mouth 2 (two) times a day   losartan (COZAAR) 100 MG tablet  Self Yes No   Sig: Take 50 mg by mouth every morning   metoprolol succinate (TOPROL-XL) 50 mg 24 hr tablet  Self No No   Sig: Take 1 tablet (50 mg total) by mouth every morning      Facility-Administered Medications: None     No Known Allergies    Objective   I/O     None          Physical Exam  Constitutional:       Appearance: Normal appearance  HENT:      Head: Normocephalic and atraumatic     Eyes:      Extraocular Movements: Extraocular movements intact  Neck:      Musculoskeletal: Normal range of motion  Cardiovascular:      Rate and Rhythm: Normal rate  Pulses: Normal pulses  Pulmonary:      Effort: Pulmonary effort is normal    Musculoskeletal: Normal range of motion  Skin:     General: Skin is warm  Neurological:      General: No focal deficit present  Mental Status: He is alert  GCS: GCS eye subscore is 4  GCS verbal subscore is 5  GCS motor subscore is 6  Cranial Nerves: Cranial nerves are intact  Sensory: Sensation is intact  Motor: Motor function is intact  Coordination: Finger-Nose-Finger Test normal       Deep Tendon Reflexes: Babinski sign absent on the right side  Babinski sign absent on the left side  Reflex Scores:       Tricep reflexes are 3+ on the right side and 3+ on the left side  Bicep reflexes are 3+ on the right side and 3+ on the left side  Brachioradialis reflexes are 3+ on the right side and 3+ on the left side  Patellar reflexes are 3+ on the right side and 3+ on the left side  Achilles reflexes are 3+ on the right side and 3+ on the left side  Psychiatric:         Speech: Speech normal        Neurologic Exam     Mental Status   Oriented to person  Oriented to place  Oriented to country, city and area  Disoriented to year, month and date  Speech: speech is normal   Level of consciousness: alert    Cranial Nerves     CN II   Visual fields full to confrontation  CN III, IV, VI   Right pupil: Size: 3 mm  Shape: regular  Left pupil: Size: 3 mm  Shape: regular     Nystagmus: none     CN XI   CN XI normal      CN XII   CN XII normal      Motor Exam   Muscle bulk: normal  Overall muscle tone: normal  Right arm tone: normal  Left arm tone: normal  Right arm pronator drift: absent  Left arm pronator drift: absent  Right leg tone: normal  Left leg tone: normal    Sensory Exam   Light touch normal      Gait, Coordination, and Reflexes Coordination   Finger to nose coordination: normal    Reflexes   Right brachioradialis: 3+  Left brachioradialis: 3+  Right biceps: 3+  Left biceps: 3+  Right triceps: 3+  Left triceps: 3+  Right patellar: 3+  Left patellar: 3+  Right achilles: 3+  Left achilles: 3+  Right : 2+  Left : 2+  Right plantar: normal  Left plantar: normal  Right Encarnacion: absent  Left Encarnacion: absent  Right ankle clonus: absent  Left ankle clonus: absent      Vitals:Blood pressure 141/85, pulse 64, temperature 98 °F (36 7 °C), resp  rate 17, weight 93 8 kg (206 lb 12 7 oz), SpO2 97 %  ,Body mass index is 26 55 kg/m²       Lab Results:   Results from last 7 days   Lab Units 01/08/21  2304   WBC Thousand/uL 7 58   HEMOGLOBIN g/dL 14 9   HEMATOCRIT % 46 4   PLATELETS Thousands/uL 265   NEUTROS PCT % 51   MONOS PCT % 9     Results from last 7 days   Lab Units 01/09/21  0611 01/08/21  2304   POTASSIUM mmol/L 3 6 3 9   CHLORIDE mmol/L 111* 105   CO2 mmol/L 28 33*   BUN mg/dL 18 21   CREATININE mg/dL 1 11 1 36*   CALCIUM mg/dL 9 1 9 4   ALK PHOS U/L  --  103   ALT U/L  --  42   AST U/L  --  27     Results from last 7 days   Lab Units 01/09/21  0611   MAGNESIUM mg/dL 2 3     Results from last 7 days   Lab Units 01/09/21  0611   PHOSPHORUS mg/dL 3 5     Results from last 7 days   Lab Units 01/09/21  0611 01/08/21  2304   INR  1 21* 1 26*   PTT seconds  --  35     No results found for: TROPONINT  ABG:No results found for: PHART, RRD6GCY, PO2ART, OMU7IVO, Q4WEIOWQ, BEART, SOURCE    Imaging Studies: I have personally reviewed pertinent reports   , I have personally reviewed pertinent films in PACS and I have personally reviewed pertinent films in PACS with a Radiologist     EKG, Pathology, and Other Studies: I have personally reviewed pertinent reports   , I have personally reviewed pertinent films in PACS and I have personally reviewed pertinent films in PACS with a Radiologist     VTE Prophylaxis: Sequential compression device (Venodyne) Code Status: Level 1 - Full Code  Advance Directive and Living Will:      Power of :    POLST:      Counseling / Coordination of Care  I spent 20 minutes with the patient
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
## 2021-01-09 NOTE — PHYSICAL THERAPY NOTE
Physical Therapy Evaluation     Patient's Name: Savana Hollingsworth    Admitting Diagnosis  Subdural hemorrhage ([facility] [address] ) [I62 00]    Problem List  Patient Active Problem List   Diagnosis    Cerebrovascular disease, ill-defined, acute    Essential hypertension    Persistent atrial fibrillation (HCC)    Mixed hyperlipidemia    Pain of toe of right foot    Benign prostatic hyperplasia with lower urinary tract symptoms    Screening for colon cancer    Acute bacterial conjunctivitis of both eyes    FELISA (obstructive sleep apnea)    Renal cell carcinoma of left kidney (HCC)    Stage 3a chronic kidney disease    SDH (subdural hematoma) (HCC)       Past Medical History  Past Medical History:   Diagnosis Date    Atrial fibrillation ([address] )     Cancer ([address] ) 08/2020    Kidney cancer    Cancer ([facility] [address] )     melanoma    Chronic kidney disease     left kidney cancer    CPAP (continuous positive airway pressure) dependence     Hyperlipidemia     Hypertension     Irregular heart beat     Afib    Pneumonia     Skin cancer     Sleep apnea     Stroke ([facility] [address] )     Supraventricular tachycardia ([facility] [address] )     Wears glasses     Wears partial dentures     upper partial       Past Surgical History  Past Surgical History:   Procedure Laterality Date    APPENDECTOMY      CARDIOVERSION      COLONOSCOPY      fiberoptic, also 2009, both negative, resolved 2004    COLONOSCOPY  08/2019    EGD      HERNIA REPAIR      umbilical and bilateral inguinal    KNEE ARTHROSCOPY Left     [place] LAP,PARTIAL NEPHRECTOMY Left 10/7/2020    Procedure: ROBOTIC PARTIAL NEPHRECTOMY;  Surgeon: Kip Grossman MD;  Location: [facility];  Service: Urology    SKIN GRAFT      left ear, skin cancer    TONSILLECTOMY          01/09/21 1340   PT Last Visit   PT Visit Date 01/09/21   Note Type   Note type Evaluation   Pain Assessment   Pain Assessment Tool 0-10   Pain Score No Pain   Home Living   Type of Home House   Home Layout Two level;Bed/bath upstairs;Stairs to enter with rails   Home Equipment   (denies DME)   Additional Comments Pt resides in [facility] w/ 2 NIKO and FF to bed/bath on 2nd floor  Pt reports ambulating w/out AD PTA   Prior Function   Level of [place] Independent with ADLs and functional mobility   Lives With Spouse   Receives Help From Family   ADL Assistance Independent   IADLs Independent   Falls in the last 6 months 0   Restrictions/Precautions   Weight Bearing Precautions Per Order No   Other Precautions Telemetry   General   Family/Caregiver Present No   Cognition   Overall Cognitive Status WFL   Arousal/Participation Alert   Attention Within functional limits   Orientation Level Oriented X4   Memory Within functional limits   Following Commands Follows one step commands without difficulty   Comments Pt overall pleasant and cooperative to work w/ therapy   RLE Assessment   RLE Assessment WFL   LLE Assessment   LLE Assessment WFL   Coordination   Movements are Fluid and Coordinated 1   Bed Mobility   Supine to Sit Unable to assess   Sit to Supine Unable to assess   Additional Comments Pt seated OOB upon PT arrival  Pt returned seated OOB at end of session w/ all needs within reach   Transfers   Sit to Stand 7  Independent   Stand to Sit 7  Independent   Additional Comments Transfers w/out AD   Ambulation/Elevation   Gait pattern WNL   Gait Assistance 7  Independent   Assistive Device None   Distance 80ft x2   Stair Management Assistance 6  Modified independent   Stair Management Technique One rail R;Alternating pattern; Foreward   Number of Stairs 14   Balance   Static Sitting Normal   Dynamic Sitting Good   Static Standing Good   Dynamic Standing Good   Ambulatory Fair +   Endurance Deficit   Endurance Deficit No   Activity Tolerance   Activity Tolerance Patient tolerated treatment well   Medical Staff Made Aware ALBERTO Adam   Nurse Made Aware yes   Assessment   Assessment Pt is 76 y o  male seen for PT evaluation s/p admit to [facility] on 1/9/2021 w/ SDH (subdural hematoma) ([facility] [address] )  PT consulted to assess pt's functional mobility and d/c needs  Order placed for PT eval and tx, w/ up w/ A order  Comorbidities affecting pt's physical performance at time of assessment include: HTN, atrial fibrillation, renal cell carcinoma, CKD, SDH, cerebrovascular disease  PTA, pt was independent w/ all functional mobility w/ no  and lives w/ spouse in two level house w/ 2 NIKO and FF to bed/bath on 2nd floor  Personal factors affecting pt at time of IE include: inaccessible home environment, lives in two story house and stairs to enter home  Please find objective findings from PT assessment regarding body systems outlined above with impairments and limitations including impaired balance, decreased endurance, decreased activity tolerance, decreased functional mobility tolerance and fall risk  Pt performed functional transfers at IND level  Pt ambulated 80ft x2 w/out AD at IND  Pt performed 14 steps w/ one HR at Mod I  The following objective measures performed on IE also reveal limitations: Modified Bal: 2 (slight disability)  Pt's clinical presentation is currently evolving seen in pt's presentation of recent admission for SDH requiring medical attention, recent decline in function as compared to baseline, multiple lines, fall risk  Pt appears to be functioning at baseline level with functional mobility; therefore, requires no immediate acute skilled PT services at this time  Pt with no further questions or concerns regarding PT services  Will D/C PT at this time  Please re-consult if pt's medical status changes  From PT/mobility standpoint, recommendation at time of d/c would be home independently w/ no skilled acute PT needs pending progress in order to facilitate return to PLOF     Goals   Patient Goals to return home   Plan   PT Frequency   (D/C PT)   Recommendation   PT Discharge Recommendation Return to previous environment with no needs   PT - OK to Discharge Yes   Modified Bowie Scale   Modified Bal Scale 2     Viry Maegan, PT, DPT

## 2021-01-09 NOTE — ED PROVIDER NOTES
History  Chief Complaint   Patient presents with    Dizziness     Pt stated that an hour ago he began with dizziness and stomach discomfort  Pt began vomiting upon arrival to ER  Pt denies dizziness or pain at this time  Patient is a 58-year-old male that presents with a general onset of a right-sided headache that started while at rest tonight  Denies an abrupt or thunderclap component to this  States that it is not the worst headache of his life  States that it started on the right side of head around his R ear and temporal region with associated dizziness and vomiting  Pt describe dizziness as a feeling of weakness all over his body  Pt denies any blurred and/or double vision, denies any visual deficits  Pt  AAO X 4  Denies any numbing, tingling, or weaknesses to BUE and BLE  Denies any recent illnesses or trauma  Denies chest pain, SOB  Pt report feeling much better after vomiting  Has a history of stroke in the past and this does not feel like his previous stroke  History provided by:  Patient   used: No    Dizziness  Quality:  Imbalance (Weakness)  Severity:  Moderate  Onset quality:  Sudden  Timing:  Constant  Progression:  Resolved  Chronicity:  New  Context: inactivity    Relieved by: Vomiting  Worsened by:  Nothing  Ineffective treatments:  None tried  Associated symptoms: headaches, nausea, vomiting and weakness    Associated symptoms: no chest pain and no shortness of breath    Risk factors: heart disease, hx of stroke and multiple medications        Prior to Admission Medications   Prescriptions Last Dose Informant Patient Reported? Taking?    Coenzyme Q10 (CO Q-10 PO)  Self Yes Yes   Sig: Take 1 caplet by mouth every morning   Eliquis 5 MG  Self No Yes   Sig: TAKE 1 TABLET BY MOUTH TWO TIMES A DAY   HYDROcodone-acetaminophen (NORCO) 5-325 mg per tablet Not Taking at Unknown time Self No No   Sig: Take 1 tablet by mouth every 6 (six) hours as needed for pain for up to 20 dosesMax Daily Amount: 4 tablets   Patient not taking: Reported on 1/8/2021   HYDROcodone-acetaminophen (NORCO) 5-325 mg per tablet Not Taking at Unknown time Self No No   Sig: Take 1 tablet by mouth every 6 (six) hours as needed for pain for up to 10 dosesMax Daily Amount: 4 tablets   Patient not taking: Reported on 1/8/2021   Multiple Vitamins-Minerals (MULTIVITAMIN ADULT PO)  Self Yes Yes   Sig: Take 1 tablet by mouth every morning   amLODIPine (NORVASC) 5 mg tablet  Self Yes Yes   Sig: Take 5 mg by mouth every morning   atorvastatin (LIPITOR) 10 mg tablet  Self No Yes   Sig: TAKE ONE TABLET BY MOUTH EVERY DAY   Patient taking differently: Take 10 mg by mouth every morning    docusate sodium (COLACE) 100 mg capsule  Self No No   Sig: Take 1 capsule (100 mg total) by mouth 2 (two) times a day for 60 doses   flecainide (TAMBOCOR) 50 mg tablet  Self Yes Yes   Sig: Take 50 mg by mouth 2 (two) times a day   losartan (COZAAR) 100 MG tablet Not Taking at Unknown time Self Yes No   Sig: Take 50 mg by mouth every morning   metoprolol succinate (TOPROL-XL) 50 mg 24 hr tablet  Self No Yes   Sig: Take 1 tablet (50 mg total) by mouth every morning      Facility-Administered Medications: None       Past Medical History:   Diagnosis Date    Atrial fibrillation (HCC)     Cancer ([facility] [address] ) 08/2020    Kidney cancer    Cancer ([facility] [address] )     melanoma    Chronic kidney disease     left kidney cancer    CPAP (continuous positive airway pressure) dependence     Hyperlipidemia     Hypertension     Irregular heart beat     Afib    Pneumonia     Skin cancer     Sleep apnea     Stroke ([facility] [address] )     Supraventricular tachycardia ([facility] [address] )     Wears glasses     Wears partial dentures     upper partial       Past Surgical History:   Procedure Laterality Date    APPENDECTOMY      CARDIOVERSION      COLONOSCOPY      fiberoptic, also 2009, both negative, resolved 2004    COLONOSCOPY  08/2019    EGD      HERNIA REPAIR umbilical and bilateral inguinal    KNEE ARTHROSCOPY Left     FL LAP,PARTIAL NEPHRECTOMY Left 10/7/2020    Procedure: ROBOTIC PARTIAL NEPHRECTOMY;  Surgeon: Perla Vanegas MD;  Location: [facility];  Service: Urology    SKIN GRAFT      left ear, skin cancer    TONSILLECTOMY         Family History   Problem Relation Age of Onset    Cancer Father         stomach    Hypertension Father     Stroke Father         syndrome    Cancer Family     Heart disease Family     Hypertension Family     Stroke Family         syndrome    Throat cancer Mother     Diabetes Mother     Asthma Sister      I have reviewed and agree with the history as documented  E-Cigarette/Vaping    E-Cigarette Use Never User      E-Cigarette/Vaping Substances    Nicotine No     THC No     CBD No     Flavoring No     Other No     Unknown No      Social History     Tobacco Use    Smoking status: Never Smoker    Smokeless tobacco: Never Used   Substance Use Topics    Alcohol use: Yes     Frequency: 2-4 times a month     Drinks per session: 1 or 2    Drug use: No       Review of Systems   Constitutional: Negative for activity change and appetite change  HENT: Negative for congestion, facial swelling, postnasal drip, sneezing, sore throat, trouble swallowing and voice change  Eyes: Negative for photophobia, pain, discharge and visual disturbance  Respiratory: Negative for apnea, cough and shortness of breath  Cardiovascular: Negative for chest pain  Gastrointestinal: Positive for nausea and vomiting  Genitourinary: Negative for difficulty urinating  Musculoskeletal: Negative for back pain, gait problem and neck pain  Skin: Negative for color change  Neurological: Positive for dizziness, weakness and headaches  Negative for tremors, syncope, facial asymmetry, speech difficulty, light-headedness and numbness  Hematological: Negative for adenopathy     Psychiatric/Behavioral: Negative for agitation, behavioral problems and confusion  All other systems reviewed and are negative  Physical Exam  Physical Exam  Vitals signs reviewed  Constitutional:       General: He is not in acute distress  Appearance: Normal appearance  He is well-developed  He is not ill-appearing, toxic-appearing or diaphoretic  HENT:      Head: Normocephalic  No right periorbital erythema or left periorbital erythema  Jaw: No swelling or pain on movement  Right Ear: Tympanic membrane and external ear normal       Left Ear: Tympanic membrane and external ear normal       Nose: No congestion or rhinorrhea  Mouth/Throat:      Pharynx: Oropharynx is clear  No oropharyngeal exudate or posterior oropharyngeal erythema  Eyes:      General: Lids are normal  No visual field deficit or scleral icterus  Right eye: No discharge  Left eye: No discharge  Extraocular Movements: Extraocular movements intact  Right eye: Normal extraocular motion and no nystagmus  Left eye: Normal extraocular motion and no nystagmus  Conjunctiva/sclera:      Right eye: Right conjunctiva is not injected  No hemorrhage  Left eye: Left conjunctiva is not injected  No hemorrhage  Pupils: Pupils are equal, round, and reactive to light  Neck:      Musculoskeletal: Normal range of motion  Normal range of motion  No edema, erythema, pain with movement or muscular tenderness  Vascular: No carotid bruit  Trachea: Trachea normal  No tracheal tenderness  Cardiovascular:      Rate and Rhythm: Normal rate  Rhythm regularly irregular  Pulses:           Carotid pulses are 2+ on the right side and 2+ on the left side  Radial pulses are 2+ on the right side and 2+ on the left side  Heart sounds: No murmur  No friction rub  No gallop  Pulmonary:      Effort: Pulmonary effort is normal  No tachypnea or respiratory distress  Breath sounds: Normal breath sounds     Chest:      Chest wall: No deformity, swelling, tenderness or crepitus  Breasts:         Right: Normal          Left: Normal    Abdominal:      General: Bowel sounds are normal  There is no distension  Palpations: There is no shifting dullness or fluid wave  Tenderness: There is no abdominal tenderness  Musculoskeletal:         General: No swelling or tenderness  Right lower leg: No edema  Left lower leg: No edema  Right foot: Normal range of motion  No foot drop  Left foot: Normal range of motion  No foot drop  Feet:      Right foot:      Skin integrity: No skin breakdown or erythema  Left foot:      Skin integrity: No skin breakdown or erythema  Lymphadenopathy:      Cervical:      Right cervical: No superficial cervical adenopathy  Left cervical: No superficial cervical adenopathy  Upper Body:      Right upper body: No supraclavicular adenopathy  Left upper body: No supraclavicular adenopathy  Skin:     General: Skin is warm and dry  Capillary Refill: Capillary refill takes less than 2 seconds  Findings: No bruising or erythema  Nails: There is no clubbing  Neurological:      General: No focal deficit present  Mental Status: He is alert and oriented to person, place, and time  GCS: GCS eye subscore is 4  GCS verbal subscore is 5  GCS motor subscore is 6  Cranial Nerves: No cranial nerve deficit, dysarthria or facial asymmetry  Sensory: No sensory deficit  Motor: No weakness or tremor  Coordination: Coordination normal  Finger-Nose-Finger Test and Heel to Raenette Dhiraj Test normal  Rapid alternating movements normal       Gait: Gait normal    Psychiatric:         Attention and Perception: Attention normal          Mood and Affect: Mood normal          Speech: Speech normal  He is communicative  Speech is not delayed  Behavior: Behavior normal  Behavior is not slowed or aggressive  Behavior is cooperative           Thought Content: Thought content normal          Cognition and Memory: Cognition and memory normal  Cognition is not impaired  Memory is not impaired           Judgment: Judgment normal          Vital Signs  ED Triage Vitals [01/08/21 2151]   Temperature Pulse Respirations Blood Pressure SpO2   97 9 °F (36 6 °C) 74 18 133/93 99 %      Temp Source Heart Rate Source Patient Position - Orthostatic VS BP Location FiO2 (%)   Oral Monitor Sitting Right arm --      Pain Score       --           Vitals:    01/08/21 2151 01/09/21 0000 01/09/21 0213 01/09/21 0315   BP: 133/93 158/82 132/100 138/83   Pulse: 74 80 85 82   Patient Position - Orthostatic VS: Sitting  Lying Lying         Visual Acuity  Visual Acuity      Most Recent Value   L Pupil Size (mm)  3   R Pupil Size (mm)  3          ED Medications  Medications - No data to display    Diagnostic Studies  Results Reviewed     Procedure Component Value Units Date/Time    Troponin I [[id number]]  (Normal) Collected: 01/08/21 2304    Lab Status: Final result Specimen: Blood from Arm, Left Updated: 01/08/21 2359     Troponin I <0 02 ng/mL     Comprehensive metabolic panel [[id number]]  (Abnormal) Collected: 01/08/21 2304    Lab Status: Final result Specimen: Blood from Arm, Left Updated: 01/08/21 2357     Sodium 143 mmol/L      Potassium 3 9 mmol/L      Chloride 105 mmol/L      CO2 33 mmol/L      ANION GAP 5 mmol/L      BUN 21 mg/dL      Creatinine 1 36 mg/dL      Glucose 86 mg/dL      Calcium 9 4 mg/dL      AST 27 U/L      ALT 42 U/L      Alkaline Phosphatase 103 U/L      Total Protein 7 5 g/dL      Albumin 3 8 g/dL      Total Bilirubin 0 40 mg/dL      eGFR 54 ml/min/1 73sq m     Narrative:      [place] guidelines for Chronic Kidney Disease (CKD):     Stage 1 with normal or high GFR (GFR > 90 mL/min/1 73 square meters)    Stage 2 Mild CKD (GFR = 60-89 mL/min/1 73 square meters)    Stage 3A Moderate CKD (GFR = 45-59 mL/min/1 73 square meters)    Stage 3B Moderate CKD (GFR = 30-44 mL/min/1 73 square meters)    Stage 4 Severe CKD (GFR = 15-29 mL/min/1 73 square meters)    Stage 5 End Stage CKD (GFR <15 mL/min/1 73 square meters)  Note: GFR calculation is accurate only with a steady state creatinine    Protime-INR [[id number]]  (Abnormal) Collected: 01/08/21 2304    Lab Status: Final result Specimen: Blood from Arm, Left Updated: 01/08/21 2353     Protime 15 5 seconds      INR 1 26    APTT [[id number]]  (Normal) Collected: 01/08/21 2304    Lab Status: Final result Specimen: Blood from Arm, Left Updated: 01/08/21 2353     PTT 35 seconds     CBC and differential [[id number]] Collected: 01/08/21 2304    Lab Status: Final result Specimen: Blood from Arm, Left Updated: 01/08/21 2336     WBC 7 58 Thousand/uL      RBC 5 03 Million/uL      Hemoglobin 14 9 g/dL      Hematocrit 46 4 %      MCV 92 fL      MCH 29 6 pg      MCHC 32 1 g/dL      RDW 13 3 %      MPV 9 8 fL      Platelets 977 Thousands/uL      nRBC 0 /100 WBCs      Neutrophils Relative 51 %      Immat GRANS % 1 %      Lymphocytes Relative 37 %      Monocytes Relative 9 %      Eosinophils Relative 2 %      Basophils Relative 0 %      Neutrophils Absolute 3 89 Thousands/µL      Immature Grans Absolute 0 04 Thousand/uL      Lymphocytes Absolute 2 80 Thousands/µL      Monocytes Absolute 0 69 Thousand/µL      Eosinophils Absolute 0 14 Thousand/µL      Basophils Absolute 0 02 Thousands/µL                  CT head without contrast   Final Result by Ned Lo MD (01/09 0026)      Suspect tiny high frontal parietal convexity mixed attenuation subdural collection  ? 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       Findings discussed with Dr Jonatan Edwards at 12:20 AM on 1/9/2021 with verbal confirmation by the recipient        Workstation performed: [id number]                    Procedures  ECG 12 Lead Documentation Only    Date/Time: 1/8/2021 11:07 PM  Performed by: Rex Ortez DO  Authorized by: Rex Ortez DO     Indications / Diagnosis:  Dizziness  ECG reviewed by me, the ED Provider: yes    Patient location:  ED  Previous ECG:     Previous ECG:  Compared to current    Similarity:  No change  Interpretation:     Interpretation: abnormal    Rate:     ECG rate:  76    ECG rate assessment: normal    Rhythm:     Rhythm: atrial flutter    Ectopy:     Ectopy: none    QRS:     QRS intervals:  Normal  Conduction:     Conduction: normal    ST segments:     ST segments:  Non-specific  T waves:     T waves: normal    CriticalCare Time  Performed by: Rex Ortez DO  Authorized by: Rex Ortez DO     Critical care provider statement:     Critical care time (minutes):  35    Critical care time was exclusive of:  Separately billable procedures and treating other patients and teaching time    Critical care was necessary to treat or prevent imminent or life-threatening deterioration of the following conditions:  CNS failure or compromise    Critical care was time spent personally by me on the following activities:  Blood draw for specimens, obtaining history from patient or surrogate, development of treatment plan with patient or surrogate, discussions with consultants, evaluation of patient's response to treatment, examination of patient, interpretation of cardiac output measurements, ordering and performing treatments and interventions, ordering and review of laboratory studies, ordering and review of radiographic studies, review of old charts and re-evaluation of patient's condition    I assumed direction of critical care for this patient from another provider in my specialty: no               ED Course                 Stroke Assessment     Row Name 01/09/21 0003             NIH Stroke Scale    Interval  Baseline      Level of Consciousness (1a )  0      LOC Questions (1b )  0      LOC Commands (1c )  0      Best Gaze (2 )  0      Visual (3 )  0      Facial Palsy (4 )  0      Motor Arm, Left (5a )  0      Motor Arm, Right (5b )  0      Motor Leg, Left (6a )  0      Motor Leg, Right (6b )  0      Limb Ataxia (7 )  0      Sensory (8 )  0      Best Language (9 )  0      Dysarthria (10 )  0      Extinction and Inattention (11 ) (Formerly Neglect)  0      Total  0                                  MDM  Number of Diagnoses or Management Options  Atrial fibrillation ([facility] [address] ): new and requires workup  Headache: new and requires workup  Lightheadedness: new and requires workup  Nausea: new and requires workup  Subdural hematoma ([facility] [address] ): new and requires workup  Vomiting: new and requires workup  Diagnosis management comments: Patient presents with generalized headache progressed to nausea and vomiting  Patient now feels better since he vomited  No prior history headaches like this nature  Denies any recent trauma or changes of medication  Did drink a glass of wine tonight  States that he felt dizzy and unstable when he stood up  Has a history of a CVA but states that this is different  Is on Eliquis for AFib  Will obtain cardiac workup and a head CT  Nausea is now gone so will hold treatment but will give Zofran if needed  12:22 AM  Spoke with the radiologist   Patient has an acute on chronic left-sided subdural   Patient has no recent history of trauma  I will discuss with Ty N Michele Cox for transfer  1:32 AM  Spoke with Dr Shara Richard from [phone]  Pt accepted to her service         Amount and/or Complexity of Data Reviewed  Clinical lab tests: ordered and reviewed  Tests in the radiology section of CPT®: ordered and reviewed  Tests in the medicine section of CPT®: reviewed and ordered  Review and summarize past medical records: yes  Discuss the patient with other providers: yes    Patient Progress  Patient progress: stable      Disposition  Final diagnoses:   Subdural hematoma ([address] )   Headache   Nausea   Lightheadedness   Vomiting   Atrial fibrillation ([facility] [address] )     Time reflects when diagnosis was documented in both MDM as applicable and the Disposition within this note     Time User Action Codes Description Comment    1/9/2021 12:23 AM Smeriglio, Hailey Landen Add [J29 [id number]] Subdural hematoma ([address] )     1/9/2021  1:31 AM Smeriglio, Hailey Landen Add [R51 9] Headache     1/9/2021  1:31 AM Smeriglio, Hailey Landen Add [R11 0] Nausea     1/9/2021  1:31 AM Rosalba Knock Add [R42] Lightheadedness     1/9/2021  1:31 AM Lismore Boss, Hailey Landen Add [R11 10] Vomiting     1/9/2021  1:31 AM Smeriglio, Hailey Landen Add [I48 91] Atrial fibrillation [facility])       ED Disposition     ED Disposition Condition Date/Time Comment    Transfer to Another Facility-In Network  [facility] Jan 9, 2021  1:31 AM Magnus Doeeron should be transferred out to Dr Juan Antonio Mathew MD Documentation      Most Recent Value   Patient Condition  The patient has been stabilized such that within reasonable medical probability, no material deterioration of the patient condition or the condition of the unborn child(reagan) is likely to result from the transfer   Reason for Transfer  Level of Care needed not available at this facility   Benefits of Transfer  Specialized equipment and/or services available at the receiving facility (Include comment)________________________   Risks of Transfer  Potential for delay in receiving treatment   Accepting Physician  Sandeep Churchill MD, Dr   Provider Certification  General risk, such as traffic hazards, adverse weather conditions, rough terrain or turbulence, possible failure of equipment (including vehicle or aircraft), or consequences of actions of persons outside the control of the transport personnel      RN Documentation      Most Recent Value   Accepting Facility Name, [address] [place] [address]   Transport Mode  Ambulance   Level of Care  Advanced life support      Follow-up Information    None         Patient's Medications   Discharge Prescriptions    No medications on file     No discharge procedures on file      PDMP Review     None          ED Provider  Electronically Signed by           Sonia Valerio DO  01/09/21 0404

## 2021-01-10 ENCOUNTER — APPOINTMENT (INPATIENT)
Dept: RADIOLOGY | Facility: HOSPITAL | Age: 66
DRG: 065 | End: 2021-01-10
Payer: MEDICARE

## 2021-01-10 LAB
ANION GAP SERPL CALCULATED.3IONS-SCNC: 2 MMOL/L (ref 4–13)
BASOPHILS # BLD AUTO: 0.03 THOUSANDS/ΜL (ref 0–0.1)
BASOPHILS NFR BLD AUTO: 0 % (ref 0–1)
BUN SERPL-MCNC: 19 MG/DL (ref 5–25)
CALCIUM SERPL-MCNC: 9.1 MG/DL (ref 8.3–10.1)
CHLORIDE SERPL-SCNC: 107 MMOL/L (ref 100–108)
CO2 SERPL-SCNC: 32 MMOL/L (ref 21–32)
CREAT SERPL-MCNC: 1.31 MG/DL (ref 0.6–1.3)
EOSINOPHIL # BLD AUTO: 0.13 THOUSAND/ΜL (ref 0–0.61)
EOSINOPHIL NFR BLD AUTO: 2 % (ref 0–6)
ERYTHROCYTE [DISTWIDTH] IN BLOOD BY AUTOMATED COUNT: 13.4 % (ref 11.6–15.1)
FLUAV RNA RESP QL NAA+PROBE: NEGATIVE
FLUBV RNA RESP QL NAA+PROBE: NEGATIVE
GFR SERPL CREATININE-BSD FRML MDRD: 56 ML/MIN/1.73SQ M
GLUCOSE SERPL-MCNC: 84 MG/DL (ref 65–140)
HCT VFR BLD AUTO: 45.6 % (ref 36.5–49.3)
HGB BLD-MCNC: 15 G/DL (ref 12–17)
IMM GRANULOCYTES # BLD AUTO: 0.03 THOUSAND/UL (ref 0–0.2)
IMM GRANULOCYTES NFR BLD AUTO: 0 % (ref 0–2)
INR PPP: 1.2 (ref 0.84–1.19)
LYMPHOCYTES # BLD AUTO: 1.67 THOUSANDS/ΜL (ref 0.6–4.47)
LYMPHOCYTES NFR BLD AUTO: 23 % (ref 14–44)
MCH RBC QN AUTO: 30.1 PG (ref 26.8–34.3)
MCHC RBC AUTO-ENTMCNC: 32.9 G/DL (ref 31.4–37.4)
MCV RBC AUTO: 91 FL (ref 82–98)
MONOCYTES # BLD AUTO: 0.68 THOUSAND/ΜL (ref 0.17–1.22)
MONOCYTES NFR BLD AUTO: 9 % (ref 4–12)
NEUTROPHILS # BLD AUTO: 4.66 THOUSANDS/ΜL (ref 1.85–7.62)
NEUTS SEG NFR BLD AUTO: 66 % (ref 43–75)
NRBC BLD AUTO-RTO: 0 /100 WBCS
PLATELET # BLD AUTO: 242 THOUSANDS/UL (ref 149–390)
PMV BLD AUTO: 9.5 FL (ref 8.9–12.7)
POTASSIUM SERPL-SCNC: 4 MMOL/L (ref 3.5–5.3)
PROTHROMBIN TIME: 15.2 SECONDS (ref 11.6–14.5)
RBC # BLD AUTO: 4.99 MILLION/UL (ref 3.88–5.62)
RSV RNA RESP QL NAA+PROBE: NEGATIVE
SARS-COV-2 RNA RESP QL NAA+PROBE: NEGATIVE
SODIUM SERPL-SCNC: 141 MMOL/L (ref 136–145)
WBC # BLD AUTO: 7.2 THOUSAND/UL (ref 4.31–10.16)

## 2021-01-10 PROCEDURE — 80048 BASIC METABOLIC PNL TOTAL CA: CPT | Performed by: INTERNAL MEDICINE

## 2021-01-10 PROCEDURE — 0241U HB NFCT DS VIR RESP RNA 4 TRGT: CPT | Performed by: PHYSICIAN ASSISTANT

## 2021-01-10 PROCEDURE — 99233 SBSQ HOSP IP/OBS HIGH 50: CPT | Performed by: INTERNAL MEDICINE

## 2021-01-10 PROCEDURE — G1004 CDSM NDSC: HCPCS

## 2021-01-10 PROCEDURE — NC001 PR NO CHARGE: Performed by: INTERNAL MEDICINE

## 2021-01-10 PROCEDURE — 85025 COMPLETE CBC W/AUTO DIFF WBC: CPT | Performed by: INTERNAL MEDICINE

## 2021-01-10 PROCEDURE — 85610 PROTHROMBIN TIME: CPT | Performed by: INTERNAL MEDICINE

## 2021-01-10 PROCEDURE — 70450 CT HEAD/BRAIN W/O DYE: CPT

## 2021-01-10 RX ADMIN — FLECAINIDE ACETATE 50 MG: 50 TABLET ORAL at 17:02

## 2021-01-10 RX ADMIN — FLECAINIDE ACETATE 50 MG: 50 TABLET ORAL at 08:54

## 2021-01-10 RX ADMIN — METOPROLOL SUCCINATE 50 MG: 50 TABLET, EXTENDED RELEASE ORAL at 08:54

## 2021-01-10 RX ADMIN — Medication 1 TABLET: at 08:54

## 2021-01-10 RX ADMIN — DOCUSATE SODIUM 100 MG: 100 CAPSULE, LIQUID FILLED ORAL at 08:54

## 2021-01-10 RX ADMIN — AMLODIPINE BESYLATE 5 MG: 5 TABLET ORAL at 08:54

## 2021-01-10 RX ADMIN — DOCUSATE SODIUM 100 MG: 100 CAPSULE, LIQUID FILLED ORAL at 17:02

## 2021-01-10 RX ADMIN — ATORVASTATIN CALCIUM 10 MG: 10 TABLET, FILM COATED ORAL at 08:54

## 2021-01-10 NOTE — ASSESSMENT & PLAN NOTE
Lab Results   Component Value Date    EGFR 56 01/10/2021    EGFR 69 01/09/2021    EGFR 54 01/08/2021    CREATININE 1 31 (H) 01/10/2021    CREATININE 1 11 01/09/2021    CREATININE 1 36 (H) 01/08/2021       ? Baseline creatinine 1 2-1 4  ? Strict I/O monitoring  ?  Trend BMP

## 2021-01-10 NOTE — PROGRESS NOTES
Daily Progress Note - Critical Care   Ayana Baltazar 76 y o  male MRN: [id number]  [facility] Encounter: [id number]        ----------------------------------------------------------------------------------------  HPI/24hr events: No nursing concerns overnight  Afebrile and HDS  No significant interval change on repeat CT head      ---------------------------------------------------------------------------------------  SUBJECTIVE  Patient states that he is feeling ok  He denies any complaints at this time  Review of Systems   Constitutional: Negative for chills  HENT: Negative for trouble swallowing  Eyes: Negative for visual disturbance  Respiratory: Negative for shortness of breath  Cardiovascular: Negative for chest pain  Gastrointestinal: Negative for abdominal pain and nausea  Endocrine: Negative for polyuria  Genitourinary: Negative for dysuria  Skin: Negative for rash  Allergic/Immunologic: Negative for environmental allergies  Neurological: Negative for weakness and numbness  Hematological: Negative for adenopathy  Psychiatric/Behavioral: Negative for confusion       Review of systems was reviewed and negative unless stated above in HPI/24-hour events   ---------------------------------------------------------------------------------------  Assessment and Plan:    Neuro:    Diagnosis: Acute on chronic subdural  o Plan:   o Trend neuro checks  o Sleep wake cycle regulation  o Hold eliquis and aspirin for now  o Neurosurgery evaluated  o SBP <160  o Up with assistance      CV:    Diagnosis: Afib  o Plan: Hold eliquis and aspirin for now  o MAP >65  o Continue antihypertensives      Pulm:   Diagnosis: FELISA  o Plan:   o Maintain O2 > 92%  o CPAP at night      GI:    Diagnosis: No acute issues      :    Diagnosis: CKD 3  o Plan:   o Baseline creatinine 1 2-1 4  o Strict I/O monitoring  o Trend BMP      F/E/N:    Plan:    Replete electrolytes as needed  [place] diet      Heme/Onc:    Diagnosis: No acute issues  o Plan:   o SCDs  o Transfuse if Hgb <7  o Eliquis not reversed      Endo:    Diagnosis: No acute issues  o Plan: Monitor blood glucose      ID:    Diagnosis: No acute issues  o Plan:   o Monitor WBCs and Fever curve      MSK/Skin:    Diagnosis: No acute issues  o Plan:   - Reposition q2hr  Eliminate pressure points while in bed  Close skin surveilence      Disposition: Transfer to Med-Surg   Code Status: Level 1 - Full Code  ---------------------------------------------------------------------------------------  ICU CORE MEASURES    Prophylaxis   VTE Pharmacologic Prophylaxis: Pharmacologic VTE Prophylaxis contraindicated due to [address]  VTE Mechanical Prophylaxis: sequential compression device  Stress Ulcer Prophylaxis: Prophylaxis Not Indicated     ABCDE Protocol (if indicated)  Plan to perform spontaneous awakening trial today? Not applicable  Plan to perform spontaneous breathing trial today? Not applicable  Obvious barriers to extubation? Not applicable  CAM-ICU: Negative    Invasive Devices Review  Invasive Devices     Peripheral Intravenous Line            Peripheral IV 21 Right Antecubital 1 day          Drain            Closed/Suction Drain Left Abdomen Bulb 10 Fr  95 days              Can any invasive devices be discontinued today?  No  ---------------------------------------------------------------------------------------  OBJECTIVE    Vitals   Vitals:    01/10/21 0600 01/10/21 0802 01/10/21 1056 01/10/21 1529   BP:  123/82 109/72 125/78   Pulse:  66 74 64   Resp:  17 16 18   Temp:  (!) 97 3 °F (36 3 °C) 98 2 °F (36 8 °C) 98 1 °F (36 7 °C)   SpO2:  95% 97% 96%   Weight: 92 6 kg (204 lb 2 3 oz)        Temp (24hrs), Av 7 °F (36 5 °C), Min:97 3 °F (36 3 °C), Max:98 2 °F (36 8 °C)  Current: Temperature: 98 1 °F (36 7 °C)  HR: 64  BP: 125/78  RR: 18  SpO2: 96    Respiratory:  SpO2: SpO2: 96 %       Invasive/non-invasive ventilation settings Respiratory    Lab Data (Last 4 hours)    None         O2/Vent Data (Last 4 hours)    None                Physical Exam  Vitals signs reviewed  Constitutional:       General: He is not in acute distress  Appearance: He is not toxic-appearing  HENT:      Head: Normocephalic  Right Ear: External ear normal       Left Ear: External ear normal       Nose: Nose normal       Mouth/Throat:      Mouth: Mucous membranes are moist    Eyes:      Extraocular Movements: Extraocular movements intact  Pupils: Pupils are equal, round, and reactive to light  Neck:      Musculoskeletal: Neck supple  No muscular tenderness  Cardiovascular:      Rate and Rhythm: Normal rate  Heart sounds: No murmur  No friction rub  No gallop  Pulmonary:      Effort: Pulmonary effort is normal  No respiratory distress  Breath sounds: Normal breath sounds  No stridor  No wheezing, rhonchi or rales  Abdominal:      General: Bowel sounds are normal  There is no distension  Palpations: Abdomen is soft  Tenderness: There is no abdominal tenderness  There is no guarding or rebound  Musculoskeletal:         General: No tenderness (Calf)  Lymphadenopathy:      Cervical: No cervical adenopathy  Skin:     General: Skin is warm and dry  Capillary Refill: Capillary refill takes less than 2 seconds  Neurological:      Mental Status: He is alert  Comments: AAO x3  Cranial nerves 2-12 intact  Patient is moving all four extremities actively  No pronator drift  Normal finger-to-nose  Patient is speaking clearly complete sentences  Patient is answering appropriately and able follow commands     Psychiatric:         Mood and Affect: Mood normal          Behavior: Behavior normal          Laboratory and Diagnostics:  Results from last 7 days   Lab Units 01/10/21  0644 01/09/21  0611 01/08/21  2304   WBC Thousand/uL 7 20 7 38 7 58   HEMOGLOBIN g/dL 15 0 14 5 14 9   HEMATOCRIT % 45 6 45 4 46 4 PLATELETS Thousands/uL 242 245 265   NEUTROS PCT % 66 68 51   MONOS PCT % 9 8 9     Results from last 7 days   Lab Units 01/10/21  0644 01/09/21  0611 01/08/21  2304   SODIUM mmol/L 141 143 143   POTASSIUM mmol/L 4 0 3 6 3 9   CHLORIDE mmol/L 107 111* 105   CO2 mmol/L 32 28 33*   ANION GAP mmol/L 2* 4 5   BUN mg/dL 19 18 21   CREATININE mg/dL 1 31* 1 11 1 36*   CALCIUM mg/dL 9 1 9 1 9 4   GLUCOSE RANDOM mg/dL 84 86 86   ALT U/L  --   --  42   AST U/L  --   --  27   ALK PHOS U/L  --   --  103   ALBUMIN g/dL  --   --  3 8   TOTAL BILIRUBIN mg/dL  --   --  0 40     Results from last 7 days   Lab Units 01/09/21  0611   MAGNESIUM mg/dL 2 3   PHOSPHORUS mg/dL 3 5      Results from last 7 days   Lab Units 01/10/21  0619 01/09/21  0611 01/08/21  2304   INR  1 20* 1 21* 1 26*   PTT seconds  --   --  35      Results from last 7 days   Lab Units 01/08/21  2304   TROPONIN I ng/mL <0 02         ABG:    VBG:          Micro        EKG:   Imaging: CT  I have personally reviewed pertinent reports  Intake and Output  I/O       01/08 0701 - 01/09 0700 01/09 0701 - 01/10 0700 01/10 0701 - 01/11 0700    P  O   720 600    Total Intake(mL/kg)  720 (7 8) 600 (6 5)    Urine (mL/kg/hr)   525 (0 6)    Total Output   525    Net  +720 +75           Unmeasured Urine Occurrence  2 x         Height and Weights      IBW: -88 kg  Body mass index is 26 21 kg/m²  Weight (last 2 days)     Date/Time   Weight    01/10/21 0600   92 6 (204 15)    01/09/21 0600   93 8 (206 79)    01/09/21 0509   93 8 (206 79)                Nutrition       Diet Orders   (From admission, onward)             Start     Ordered    01/09/21 1124  Diet Cardiovascular; Sodium 2 GM  Diet effective now     Question Answer Comment   Diet Type Cardiovascular    Cardiac Sodium 2 GM    RD to adjust diet per protocol?  Yes        01/09/21 1124                    Active Medications  Scheduled Meds:  Current Facility-Administered Medications   Medication Dose Route Frequency Provider Last Rate    amLODIPine  5 mg Oral QAM Marcelina Glover, JERMAINE      atorvastatin  10 mg Oral Daily Marcelina Glover, JERMAINE      docusate sodium  100 mg Oral BID Marcelina Glover, JERMAINE      flecainide  50 mg Oral BID Marcelina Glover, JERMAINE      metoprolol succinate  50 mg Oral QAM Marcelina Glover, JERMAINE      multivitamin-minerals  1 tablet Oral QAM Marcelina Glover, JERMAINE       Continuous Infusions:     PRN Meds:        Allergies   No Known Allergies  ---------------------------------------------------------------------------------------  Advance Directive and Living Will:      Power of :    POLST:    ---------------------------------------------------------------------------------------  Care Time Delivered:   No Critical Care time spent     Frances Ruby MD      Portions of the record may have been created with voice recognition software  Occasional wrong word or "sound a like" substitutions may have occurred due to the inherent limitations of voice recognition software    Read the chart carefully and recognize, using context, where substitutions have occurred

## 2021-01-10 NOTE — PROGRESS NOTES
Progress Note - Yohana Tomlinson 1955, 76 y o  male MRN: [id number]    Unit/Bed#: [facility] 720-01 Encounter: [id number]    Primary Care Provider: Zarina Godfrey DO   Date and time admitted to hospital: 1/9/2021  4:58 AM        Stage 3a chronic kidney disease  Assessment & Plan  Lab Results   Component Value Date    EGFR 56 01/10/2021    EGFR 69 01/09/2021    EGFR 54 01/08/2021    CREATININE 1 31 (H) 01/10/2021    CREATININE 1 11 01/09/2021    CREATININE 1 36 (H) 01/08/2021       ? Baseline creatinine 1 2-1 4  ? Strict I/O monitoring  ? Trend BMP    FELISA (obstructive sleep apnea)  Assessment & Plan  Maintain O2 >92  Nighttime CPAP    Persistent atrial fibrillation (HCC)  Assessment & Plan  Continue metoprolol    Essential hypertension  Assessment & Plan  · Continue antihypertensives    * SDH (subdural hematoma) (HCC)  Assessment & Plan  ? Trend neuro checks  ? Sleep wake cycle regulation  ? Hold eliquis and aspirin for now  ? Neurosurgery evaluated  ? SBP <160  ? Up with assistance        HPI/24hr events: No nursing concerns overnight  Afebrile and HDS  No significant interval change on repeat CT head       ---------------------------------------------------------------------------------------  SUBJECTIVE  Patient states that he is feeling ok  He denies any complaints at this time       Review of Systems   Constitutional: Negative for chills  HENT: Negative for trouble swallowing  Eyes: Negative for visual disturbance  Respiratory: Negative for shortness of breath  Cardiovascular: Negative for chest pain  Gastrointestinal: Negative for abdominal pain and nausea  Endocrine: Negative for polyuria  Genitourinary: Negative for dysuria  Skin: Negative for rash  Allergic/Immunologic: Negative for environmental allergies  Neurological: Negative for weakness and numbness  Hematological: Negative for adenopathy     Psychiatric/Behavioral: Negative for confusion       Review of systems was reviewed and negative unless stated above in HPI/24-hour events   ---------------------------------------------------------------------------------------  Assessment and Plan:     Neuro:   · Diagnosis: Acute on chronic subdural  ? Plan:   ? Trend neuro checks  ? Sleep wake cycle regulation  ? Hold eliquis and aspirin for now  ? Neurosurgery evaluated  ? SBP <160  ? Up with assistance        CV:   · Diagnosis: Afib  ? Plan: Hold eliquis and aspirin for now  ? MAP >65  ? Continue antihypertensives        Pulm:  · Diagnosis: FELISA  ? Plan:   ? Maintain O2 > 92%  ? CPAP at night        GI:   · Diagnosis: No acute issues        :   · Diagnosis: CKD 3  ? Plan:   ? Baseline creatinine 1 2-1 4  ? Strict I/O monitoring  ? Trend BMP        F/E/N:   · Plan:   · Replete electrolytes as needed  · Maintain diet        Heme/Onc:   · Diagnosis: No acute issues  ? Plan:   ? SCDs  ? Transfuse if Hgb <7  ? Eliquis not reversed        Endo:   · Diagnosis: No acute issues  ? Plan: Monitor blood glucose        ID:   · Diagnosis: No acute issues  ? Plan:   ? Monitor WBCs and Fever curve        MSK/Skin:   · Diagnosis: No acute issues  ? Plan:   § Reposition q2hr  Eliminate pressure points while in bed  Close skin surveilence        Disposition: Transfer to Med-Surg   Code Status: Level 1 - Full Code  ---------------------------------------------------------------------------------------  ICU CORE MEASURES     Prophylaxis   VTE Pharmacologic Prophylaxis: Pharmacologic VTE Prophylaxis contraindicated due to [address]  VTE Mechanical Prophylaxis: sequential compression device  Stress Ulcer Prophylaxis: Prophylaxis Not Indicated      ABCDE Protocol (if indicated)  Plan to perform spontaneous awakening trial today? Not applicable  Plan to perform spontaneous breathing trial today? Not applicable  Obvious barriers to extubation?  Not applicable  CAM-ICU: Negative     Invasive Devices Review      Invasive Devices            Peripheral Intravenous Line              Peripheral IV 21 Right Antecubital 1 day                   Drain                     Closed/Suction Drain Left Abdomen Bulb 10 Fr  95 days                  Can any invasive devices be discontinued today? No  ---------------------------------------------------------------------------------------  OBJECTIVE     Vitals   Vitals          Vitals:     01/10/21 0600 01/10/21 0802 01/10/21 1056 01/10/21 1529   BP:   123/82 109/72 125/78   Pulse:   66 74 64   Resp:   17 16 18   Temp:   (!) 97 3 °F (36 3 °C) 98 2 °F (36 8 °C) 98 1 °F (36 7 °C)   SpO2:   95% 97% 96%   Weight: 92 6 kg (204 lb 2 3 oz)              Temp (24hrs), Av 7 °F (36 5 °C), Min:97 3 °F (36 3 °C), Max:98 2 °F (36 8 °C)  Current: Temperature: 98 1 °F (36 7 °C)  HR: 64  BP: 125/78  RR: 18  SpO2: 96     Respiratory:  SpO2: SpO2: 96 %     Invasive/non-invasive ventilation settings   Respiratory         Lab Data (Last 4 hours)          None                O2/Vent Data (Last 4 hours)          None                     Physical Exam  Vitals signs reviewed  Constitutional:       General: He is not in acute distress  Appearance: He is not toxic-appearing  HENT:      Head: Normocephalic  Right Ear: External ear normal       Left Ear: External ear normal       Nose: Nose normal       Mouth/Throat:      Mouth: Mucous membranes are moist    Eyes:      Extraocular Movements: Extraocular movements intact  Pupils: Pupils are equal, round, and reactive to light  Neck:      Musculoskeletal: Neck supple  No muscular tenderness  Cardiovascular:      Rate and Rhythm: Normal rate  Heart sounds: No murmur  No friction rub  No gallop  Pulmonary:      Effort: Pulmonary effort is normal  No respiratory distress  Breath sounds: Normal breath sounds  No stridor  No wheezing, rhonchi or rales  Abdominal:      General: Bowel sounds are normal  There is no distension  Palpations: Abdomen is soft  Tenderness:  There is no abdominal tenderness  There is no guarding or rebound  Musculoskeletal:         General: No tenderness (Calf)  Lymphadenopathy:      Cervical: No cervical adenopathy  Skin:     General: Skin is warm and dry  Capillary Refill: Capillary refill takes less than 2 seconds  Neurological:      Mental Status: He is alert  Comments: AAO x3  Cranial nerves 2-12 intact  Patient is moving all four extremities actively  No pronator drift  Normal finger-to-nose  Patient is speaking clearly complete sentences  Patient is answering appropriately and able follow commands     Psychiatric:         Mood and Affect: Mood normal          Behavior: Behavior normal             Laboratory and Diagnostics:         Results from last 7 days   Lab Units 01/10/21  0644 01/09/21  0611 01/08/21  2304   WBC Thousand/uL 7 20 7 38 7 58   HEMOGLOBIN g/dL 15 0 14 5 14 9   HEMATOCRIT % 45 6 45 4 46 4   PLATELETS Thousands/uL 242 245 265   NEUTROS PCT % 66 68 51   MONOS PCT % 9 8 9             Results from last 7 days   Lab Units 01/10/21  0644 01/09/21  0611 01/08/21  2304   SODIUM mmol/L 141 143 143   POTASSIUM mmol/L 4 0 3 6 3 9   CHLORIDE mmol/L 107 111* 105   CO2 mmol/L 32 28 33*   ANION GAP mmol/L 2* 4 5   BUN mg/dL 19 18 21   CREATININE mg/dL 1 31* 1 11 1 36*   CALCIUM mg/dL 9 1 9 1 9 4   GLUCOSE RANDOM mg/dL 84 86 86   ALT U/L  --   --  42   AST U/L  --   --  27   ALK PHOS U/L  --   --  103   ALBUMIN g/dL  --   --  3 8   TOTAL BILIRUBIN mg/dL  --   --  0 40           Results from last 7 days   Lab Units 01/09/21  0611   MAGNESIUM mg/dL 2 3   PHOSPHORUS mg/dL 3 5             Results from last 7 days   Lab Units 01/10/21  0619 01/09/21  0611 01/08/21  2304   INR   1 20* 1 21* 1 26*   PTT seconds  --   --  35           Results from last 7 days   Lab Units 01/08/21  2304   TROPONIN I ng/mL <0 02          ABG:    VBG:           Micro         EKG:   Imaging: CT  I have personally reviewed pertinent reports        Intake and Output         I/O        01/08 0701 - 01/09 0700 01/09 0701 - 01/10 0700 01/10 0701 - 01/11 0700     P  O    720 600     Total Intake(mL/kg)   720 (7 8) 600 (6 5)     Urine (mL/kg/hr)     525 (0 6)     Total Output     525     Net   +720 +75                 Unmeasured Urine Occurrence   2 x            Height and Weights   IBW: -88 kg  Body mass index is 26 21 kg/m²  Weight (last 2 days)      Date/Time   Weight     01/10/21 0600    92 6 (204 15)     01/09/21 0600    93 8 (206 79)     01/09/21 0509    93 8 (206 79)                      Nutrition             Diet Orders   (From admission, onward)                             Start     Ordered      01/09/21 1124   Diet Cardiovascular; Sodium 2 GM  Diet effective now     Question Answer Comment   Diet Type Cardiovascular     Cardiac Sodium 2 GM     RD to adjust diet per protocol?  Yes         01/09/21 1124                           Active Medications  Scheduled Meds:           Current Facility-Administered Medications   Medication Dose Route Frequency Provider Last Rate    amLODIPine  5 mg Oral QAM JERMAINE Eisenberg      atorvastatin  10 mg Oral Daily JERMAINE Eisenberg      docusate sodium  100 mg Oral BID Marcelina JERMAINE Glover      flecainide  50 mg Oral BID Marcelina BeJERMAINE coburn      metoprolol succinate  50 mg Oral QAM JERMAINE Eisenberg      multivitamin-minerals  1 tablet Oral QAM JERMAINE Eisenberg        Continuous Infusions:  PRN Meds:      Allergies   No Known Allergies  ---------------------------------------------------------------------------------------  Advance Directive and Living Will:      Power of :    POLST:    ---------------------------------------------------------------------------------------  Care Time Delivered:   No Critical Care time spent

## 2021-01-10 NOTE — QUICK NOTE
Repeat CT head demonstrates small mixed density extra-axial collections/heterogeneous hemorrhages along the convexities are redemonstrated, left greater than right but similar to the prior  From Neurosurgery perspective, no procedure is anticipated  Neurology on board  Patient can follow up at outpatient Neurosurgery Clinic in 2 weeks with repeat CT head  No AC/AP  Sign off  Call 4 question or concern

## 2021-01-10 NOTE — PLAN OF CARE
Problem: Potential for Falls  Goal: Patient will remain free of falls  Description: INTERVENTIONS:  - Assess patient frequently for physical needs  -  Identify cognitive and physical deficits and behaviors that affect risk of falls  -  [place] fall precautions as indicated by assessment   - Educate patient/family on patient safety including physical limitations  - Instruct patient to call for assistance with activity based on assessment  - Modify environment to reduce risk of injury  - Consider OT/PT consult to assist with strengthening/mobility  1/10/2021 0845 by Angela Hernández RN  Outcome: Progressing  1/10/2021 0845 by Angela Hernández RN  Outcome: Progressing     Problem: NEUROSENSORY - ADULT  Goal: Achieves stable or improved neurological status  Description: INTERVENTIONS  - Monitor and report changes in neurological status  - Monitor vital signs such as temperature, blood pressure, glucose, and any other labs ordered   - Initiate measures to prevent increased intracranial pressure  - Monitor for seizure activity and implement precautions if appropriate      Outcome: Progressing  Goal: Achieves maximal functionality and self care  Description: INTERVENTIONS  - Monitor swallowing and airway patency with patient fatigue and changes in neurological status  - Encourage and assist patient to increase activity and self care     - Encourage visually impaired, hearing impaired and aphasic patients to use assistive/communication devices  Outcome: Progressing

## 2021-01-11 ENCOUNTER — TELEPHONE (OUTPATIENT)
Dept: NEUROLOGY | Facility: CLINIC | Age: 66
End: 2021-01-11

## 2021-01-11 VITALS
TEMPERATURE: 98.3 F | WEIGHT: 204.15 LBS | BODY MASS INDEX: 26.21 KG/M2 | RESPIRATION RATE: 16 BRPM | SYSTOLIC BLOOD PRESSURE: 131 MMHG | DIASTOLIC BLOOD PRESSURE: 70 MMHG | OXYGEN SATURATION: 100 % | HEART RATE: 58 BPM

## 2021-01-11 LAB
ATRIAL RATE: 56 BPM
P AXIS: 35 DEGREES
PR INTERVAL: 218 MS
QRS AXIS: 62 DEGREES
QRSD INTERVAL: 84 MS
QT INTERVAL: 454 MS
QTC INTERVAL: 438 MS
T WAVE AXIS: 53 DEGREES
VENTRICULAR RATE: 56 BPM

## 2021-01-11 PROCEDURE — 99239 HOSP IP/OBS DSCHRG MGMT >30: CPT | Performed by: INTERNAL MEDICINE

## 2021-01-11 PROCEDURE — 99222 1ST HOSP IP/OBS MODERATE 55: CPT | Performed by: INTERNAL MEDICINE

## 2021-01-11 PROCEDURE — 94760 N-INVAS EAR/PLS OXIMETRY 1: CPT

## 2021-01-11 PROCEDURE — 93005 ELECTROCARDIOGRAM TRACING: CPT

## 2021-01-11 PROCEDURE — 93010 ELECTROCARDIOGRAM REPORT: CPT | Performed by: INTERNAL MEDICINE

## 2021-01-11 PROCEDURE — 99232 SBSQ HOSP IP/OBS MODERATE 35: CPT | Performed by: PSYCHIATRY & NEUROLOGY

## 2021-01-11 RX ORDER — FLECAINIDE ACETATE 100 MG/1
100 TABLET ORAL 2 TIMES DAILY
Status: DISCONTINUED | OUTPATIENT
Start: 2021-01-11 | End: 2021-01-11 | Stop reason: HOSPADM

## 2021-01-11 RX ORDER — FLECAINIDE ACETATE 100 MG/1
100 TABLET ORAL 2 TIMES DAILY
Qty: 60 TABLET | Refills: 0 | Status: SHIPPED | OUTPATIENT
Start: 2021-01-11 | End: 2021-02-18 | Stop reason: SDUPTHER

## 2021-01-11 RX ADMIN — DOCUSATE SODIUM 100 MG: 100 CAPSULE, LIQUID FILLED ORAL at 09:02

## 2021-01-11 RX ADMIN — FLECAINIDE ACETATE 50 MG: 50 TABLET ORAL at 09:03

## 2021-01-11 RX ADMIN — ATORVASTATIN CALCIUM 10 MG: 10 TABLET, FILM COATED ORAL at 09:03

## 2021-01-11 RX ADMIN — Medication 1 TABLET: at 09:02

## 2021-01-11 RX ADMIN — AMLODIPINE BESYLATE 5 MG: 5 TABLET ORAL at 09:02

## 2021-01-11 RX ADMIN — METOPROLOL SUCCINATE 50 MG: 50 TABLET, EXTENDED RELEASE ORAL at 09:03

## 2021-01-11 NOTE — PROGRESS NOTES
Progress Note - Neurology   Yohana Tomlinosn 76 y o  male MRN: [id number]  Unit/Bed#: [facility] 720-01 Encounter: [id number]      Assessment/Plan   23-year-old male with history of remote right hemisphere stroke, in 2014, with some residual dysarthria, paroxysmal AFib on anticoagulation, presenting with headache, dizziness nauseous, identification of bilateral acute/subacute subdural hemorrhage  Concern for spontaneous subdural given lack of any clear head trauma prior to presentation  1   Headache, dizziness, subdural hemorrhage:  - Neurosurgery following, repeat CT head yesterday noting similar bilateral extra-axial hemorrhages, no acute changes  - normotensive goals per neurology, avoid HTN given bleed  - holding anticoagulation with Eliquis; Neurosurgery recommending repeat CT head approximately 2 weeks from now (and at that time revisit status of anticoagulation ( as mentioned below in  AFib))  -supportive care  -therapies following    2  Atrial fibrillation:  - cardiology following, Eliquis on hold given SDH  - given spontaneous hemorrhage without clear trauma, there is risk of anticoagulation in the future:  Would benefit from cardiology/ interventional cardiology evaluation following discharge in regards to [employer] device candidacy (which would require a temporary return to anticoagulation,  short-term risk for long-term benefit)  - also consider expediting AFib ablation with loop monitor placement to monitor closely recurrent AFib (and thus holding off on anticoagulation if deemed safe/appropriate from embolic standpoint)    3  Hypertension:  - management per primary team/Cardiology, normotensive goals from neuro standpoint    4  HLD:   - lipid panel reviewed  - continue statin therapy    5    History of renal cell carcinoma/CKD:  - status post right nephrectomy in October 2020, trending creatinine/BMP    Given above plan in place and neuro exam stable, no further inpatient neurologic workup anticipated, would be ok from neurology standpoint for discharge  Discussed plan of care with attending neurologist      Subjective:   Patient resting in bedside chair, eating lunch  Doing well, aside from some dysarthria from his old stroke, denies (which is chronic and currently unchanged), no ongoing neuro concerns or deficits, denies any headache, vision issues or focal/cerebellar symptoms  Vitals: Blood pressure 131/71, pulse 59, temperature 98 °F (36 7 °C), resp  rate 17, weight 92 6 kg (204 lb 2 3 oz), SpO2 96 %  ,Body mass index is 26 21 kg/m²  Physical Exam:   Physical Exam  Constitutional:       Appearance: Normal appearance  HENT:      Head: Normocephalic and atraumatic  Eyes:      Extraocular Movements: Extraocular movements intact and EOM normal       Conjunctiva/sclera: Conjunctivae normal       Pupils: Pupils are equal, round, and reactive to light  Neck:      Musculoskeletal: Normal range of motion and neck supple  Cardiovascular:      Rate and Rhythm: Normal rate and regular rhythm  Pulmonary:      Effort: Pulmonary effort is normal       Breath sounds: Normal breath sounds  Abdominal:      General: Bowel sounds are normal       Palpations: Abdomen is soft  Musculoskeletal: Normal range of motion  Skin:     General: Skin is warm and dry  Neurological:      General: No focal deficit present  Mental Status: He is alert  Mental status is at baseline  Coordination: Finger-Nose-Finger Test, Heel to [employer] and Romberg Test normal       Gait: Gait is intact  Neurologic Exam     Mental Status   Chronic dysarthria, at baseline, speaking clearly, fully oriented, no aphasia, following commands  Cranial Nerves     CN II   Visual fields full to confrontation  CN III, IV, VI   Pupils are equal, round, and reactive to light  Extraocular motions are normal      CN V   Facial sensation intact  CN VII   Facial expression full, symmetric       CN VIII   CN VIII normal      CN IX, X   CN IX normal    CN X normal      CN XI   CN XI normal      CN XII   CN XII normal      Motor Exam   Muscle bulk: normal  Overall muscle tone: normal  Right arm pronator drift: absent  Left arm pronator drift: absent  5/5 UE and LE strength throughout, no focal deficit or laterality on exam       Sensory Exam   Light touch normal      Gait, Coordination, and Reflexes     Gait  Gait: normal (seen standing in front of bedside chair for prolonged perior, no truncal instability/ataxia  )    Coordination   Romberg: negative  Finger to nose coordination: normal  Heel to shin coordination: normal    Tremor   Resting tremor: absent  Intention tremor: absent      Lab, Imaging and other studies:   CT head 01/10/2021: Small mixed density extra-axial collections/heterogeneous hemorrhages along the convexities are redemonstrated, left greater than right but similar to the prior      Other findings as above but overall there has been no significant interval change    CBC:   Results from last 7 days   Lab Units 01/10/21  0644 01/09/21  0611 01/08/21  2304   WBC Thousand/uL 7 20 7 38 7 58   RBC Million/uL 4 99 4 95 5 03   HEMOGLOBIN g/dL 15 0 14 5 14 9   HEMATOCRIT % 45 6 45 4 46 4   MCV fL 91 92 92   PLATELETS Thousands/uL 242 245 265   , BMP/CMP:   Results from last 7 days   Lab Units 01/10/21  0644 01/09/21  0611 01/08/21  2304   SODIUM mmol/L 141 143 143   POTASSIUM mmol/L 4 0 3 6 3 9   CHLORIDE mmol/L 107 111* 105   CO2 mmol/L 32 28 33*   BUN mg/dL 19 18 21   CREATININE mg/dL 1 31* 1 11 1 36*   CALCIUM mg/dL 9 1 9 1 9 4   AST U/L  --   --  27   ALT U/L  --   --  42   ALK PHOS U/L  --   --  103   EGFR ml/min/1 73sq m 56 69 54   , Vitamin B12:   , HgBA1C:   , TSH:   , Coagulation:   Results from last 7 days   Lab Units 01/10/21  0619   INR  1 20*   , Lipid Profile:   , Ammonia:   , Urinalysis:       Invalid input(s): URIBILINOGEN, Drug Screen:   , Medication Drug Levels:       Invalid input(s): CARBAMAZEPINE,  PHENOBARB, LACOSAMIDE, OXCARBAZEPINE  VTE Prophylaxis: Sequential compression device (Venodyne)  and Reason for no pharmacologic prophylaxis SDH    Total time spent today 20 minutes  Discussed plan of care with patient and primary team: discussed need for neurosurgery follow up with CT head in 2 weeks, and ongoing discussion with cardiology in regards to [address] candidacy for stroke/bleed prevention given afib

## 2021-01-11 NOTE — CONSULTS
Cardiology   Ny Moore 76 y o  male MRN: [id number]  Unit/Bed#: [facility] 720-01 Encounter: [id number]      Reason for Consult / Principal Problem: SDH, paroxysmal AFib    Physician Requesting Consult:  Julai Baeza MD    Cardiologist:  Dr Jonatan Rudolph and Dr Sheilda Nissen  1  Paroxysmal atrial fibrillation  -12 lead ECG 1/8/2021; atrial fibrillation, rate 76 bpm  -Non telemetry but on physical exam, rate is 60, rhythm is regular appears to be in NSR  -Rate/rhythm control; On flecainide 50 mg b i d , and Toprol XL 50 mg daily  -Previously on Eliquis 5 mg b i d  (on hold in the setting of # 2) -- [id number] = 4 (age, HTN, CVA)    2  SDH  -Management per the Neurology/Neurosurgery Service  -AC/AP on hold for at least 2 weeks per Neurosurgery recommendation -- pending follow-up -CT imaging of the head as an outpatient    3  Hypertension  -BP stable last recorded 131/71, HR 59  -On Toprol XL 50 mg daily    4  Dyslipidemia  -Lipid profile 1/23/2020; cholesterol 128, triglycerides 53, HDL 49, LDL 66  -On atorvastatin 10 mg daily    5  History of right renal cell carcinoma  6   CKD stage 3  -Status post right nephrectomy -- 10/2020  -Baseline creatinine 1 2-1 4; creatinine 1 36 on admission, currently 1 3    Plan  -Appears to be maintaining NSR currently on PE, HR around 62 bpm -- Continue Toprol XL 50 mg daily, and flecainide -- will consider increasing to 100 mg q12h -- try to increase chance of maintaining NSR but will discuss with Dr Jonatan Rudolph  -Per neurosurgery recommendation -- holding [facility] for least 2 weeks pending follow-up CT imaging of the head  -Pt has CT abdomen pelvis plan for 1/29 -- pending these results, can discuss with -Dr Brigido Collado for ablation procedure -- no current plan for any procedural interventions this admission       HPI: Ny Moore 76y o  year old male with a medical history of paroxysmal atrial fibrillation (s/p DCCV 7/2020), symptomatic chronotropic incompetence, preserved biventricular systolic function, hypertension, CVA (2004), and renal carcinoma s/p nephrectomy (10/2020)  He routinely follows with both Dr eDyanira Odonnell and Dr Cielo Thomas with the [facility] service  Patient was diagnosed with paroxysmal atrial fibrillation back in 2014  It appears his atrial fibrillation had become more persistent and symptomatic with palpitations since December 2019  He was started on flecainide and underwent DCCV in July 2020  He underwent a exercise stress test in August 2020 in which patient only exercised for 4 minutes, had a severe dyspnea with exercise associated with hypotension and in inappropriate rise in his heart rate raising concern for chronotropic incompetence  He was referred to Dr Cielo Thomas with EP for further treatment recommendations/management  His flecainide and Toprol doses were adjusted and patient had improvement in his symptoms of fatigue and ESTRADA  He was ultimately recommended to undergo an ablation procedure for further management of his AFib  Unfortunately as part of pre-ablation testing he underwent a CT of his abdomen/pelvis which demonstrated a right renal mass  His planned ablation procedure was placed on hold, and he underwent a right nephrectomy in October 2020 and recovered well  He did follow-up with Dr Cielo Thomas last month, reported to have still been going in and out atrial fibrillation given patient's symptoms of intermittent palpitations, and was reported to have been in atrial fibrillation in the office  Dr Cielo Thomas recommended an ablation procedure in February after follow-up imaging to confirm absence of cancer  He presented to the [facility] on with complaints of dizziness, right-sided headache, and unsteadiness  Further workup in the ED with CT imaging of the head demonstrated a left convexity subdural hematoma  His anticoagulation was placed on hold  He was transferred to the [facility] for neuro surgical evaluation    For neuro surgery evaluation/recommendation recommending conservative management this time with no plans for surgical intervention  Recommending repeat CT of the head in 2 weeks, no AC/AP until then      Family History:   Family History   Problem Relation Age of Onset    Cancer Father         stomach    Hypertension Father     Stroke Father         syndrome    Cancer Family     Heart disease Family     Hypertension Family     Stroke Family         syndrome    Throat cancer Mother     Diabetes Mother     Asthma Sister      Historical Information   Past Medical History:   Diagnosis Date    Atrial fibrillation ([address] )     Cancer ([address] ) 08/2020    Kidney cancer    Cancer ([address] )     melanoma    Chronic kidney disease     left kidney cancer    CPAP (continuous positive airway pressure) dependence     Hyperlipidemia     Hypertension     Irregular heart beat     Afib    Pneumonia     Skin cancer     Sleep apnea     Stroke ([address] )     Supraventricular tachycardia ([address] )     Wears glasses     Wears partial dentures     upper partial     Past Surgical History:   Procedure Laterality Date    APPENDECTOMY      CARDIOVERSION      COLONOSCOPY      fiberoptic, also 2009, both negative, resolved 2004    COLONOSCOPY  08/2019    EGD      HERNIA REPAIR      umbilical and bilateral inguinal    KNEE ARTHROSCOPY Left     RI LAP,PARTIAL NEPHRECTOMY Left 10/7/2020    Procedure: ROBOTIC PARTIAL NEPHRECTOMY;  Surgeon: Giovanni Sim MD;  Location: [facility];  Service: Urology    SKIN GRAFT      left ear, skin cancer    TONSILLECTOMY       Social History   Social History     Substance and Sexual Activity   Alcohol Use Yes    Frequency: 2-4 times a month    Drinks per session: 1 or 2     Social History     Substance and Sexual Activity   Drug Use No     Social History     Tobacco Use   Smoking Status Never Smoker   Smokeless Tobacco Never Used     Family History:   Family History   Problem Relation Age of Onset    Cancer Father stomach    Hypertension Father     Stroke Father         syndrome    Cancer Family     Heart disease Family     Hypertension Family     Stroke Family         syndrome    Throat cancer Mother     Diabetes Mother     Asthma Sister        Review of Systems:  Review of Systems   Constitutional: Negative for chills, fatigue and fever  HENT: Negative for congestion  Eyes: Negative for visual disturbance  Respiratory: Negative for cough, chest tightness and shortness of breath  Cardiovascular: Negative for chest pain, palpitations and leg swelling  Gastrointestinal: Negative for abdominal pain  Genitourinary: Negative for difficulty urinating and dysuria  Musculoskeletal: Negative for arthralgias and myalgias  Neurological: Negative for dizziness, light-headedness and headaches  All other systems reviewed and are negative            Scheduled Meds:  Current Facility-Administered Medications   Medication Dose Route Frequency Provider Last Rate    amLODIPine  5 mg Oral QAM Jonathan Monreal MD      atorvastatin  10 mg Oral Daily Jonathan Monreal MD      docusate sodium  100 mg Oral BID Jonathan Monreal MD      flecainide  50 mg Oral BID Jonathan Monreal MD      metoprolol succinate  50 mg Oral QAM Jonathan Monreal MD      multivitamin-minerals  1 tablet Oral QAM Jonathan Monreal MD       Continuous Infusions:   PRN Meds:   all current active meds have been reviewed, current meds:   Current Facility-Administered Medications   Medication Dose Route Frequency    amLODIPine (NORVASC) tablet 5 mg  5 mg Oral QAM    atorvastatin (LIPITOR) tablet 10 mg  10 mg Oral Daily    docusate sodium (COLACE) capsule 100 mg  100 mg Oral BID    flecainide (TAMBOCOR) tablet 50 mg  50 mg Oral BID    metoprolol succinate (TOPROL-XL) 24 hr tablet 50 mg  50 mg Oral QAM    multivitamin-minerals (CENTRUM) tablet 1 tablet  1 tablet Oral QAM    and PTA meds:   Prior to Admission Medications Prescriptions Last Dose Informant Patient Reported? Taking? Coenzyme Q10 (CO Q-10 PO)  Self Yes No   Sig: Take 1 caplet by mouth every morning   Eliquis 5 MG  Self No No   Sig: TAKE 1 TABLET BY MOUTH TWO TIMES A DAY   HYDROcodone-acetaminophen (NORCO) 5-325 mg per tablet  Self No No   Sig: Take 1 tablet by mouth every 6 (six) hours as needed for pain for up to 20 dosesMax Daily Amount: 4 tablets   Patient not taking: Reported on 1/8/2021   HYDROcodone-acetaminophen (NORCO) 5-325 mg per tablet  Self No No   Sig: Take 1 tablet by mouth every 6 (six) hours as needed for pain for up to 10 dosesMax Daily Amount: 4 tablets   Patient not taking: Reported on 1/8/2021   Multiple Vitamins-Minerals (MULTIVITAMIN ADULT PO)  Self Yes No   Sig: Take 1 tablet by mouth every morning   amLODIPine (NORVASC) 5 mg tablet  Self Yes No   Sig: Take 5 mg by mouth every morning   atorvastatin (LIPITOR) 10 mg tablet  Self No No   Sig: TAKE ONE TABLET BY MOUTH EVERY DAY   Patient taking differently: Take 10 mg by mouth every morning    docusate sodium (COLACE) 100 mg capsule  Self No No   Sig: Take 1 capsule (100 mg total) by mouth 2 (two) times a day for 60 doses   flecainide (TAMBOCOR) 50 mg tablet  Self Yes No   Sig: Take 50 mg by mouth 2 (two) times a day   losartan (COZAAR) 100 MG tablet  Self Yes No   Sig: Take 50 mg by mouth every morning   metoprolol succinate (TOPROL-XL) 50 mg 24 hr tablet  Self No No   Sig: Take 1 tablet (50 mg total) by mouth every morning      Facility-Administered Medications: None       No Known Allergies    Objective   Vitals: Blood pressure 131/71, pulse 59, temperature 98 °F (36 7 °C), resp  rate 17, weight 92 6 kg (204 lb 2 3 oz), SpO2 96 %  , Body mass index is 26 21 kg/m² ,   Orthostatic Blood Pressures      Most Recent Value   Blood Pressure  131/71 filed at 01/11/2021 0751            Intake/Output Summary (Last 24 hours) at 1/11/2021 0827  Last data filed at 1/10/2021 1530  Gross per 24 hour   Intake 480 ml   Output 525 ml   Net -45 ml       Invasive Devices     Peripheral Intravenous Line            Peripheral IV 01/09/21 Right Antecubital 1 day          Drain            Closed/Suction Drain Left Abdomen Bulb 10 Fr  95 days              Physical Exam:  Physical Exam  Vitals signs and nursing note reviewed  Constitutional:       General: He is not in acute distress  Appearance: Normal appearance  He is not ill-appearing  HENT:      Head: Normocephalic and atraumatic  Mouth/Throat:      Mouth: Mucous membranes are moist    Eyes:      General: No scleral icterus  Neck:      Musculoskeletal: Normal range of motion and neck supple  Cardiovascular:      Rate and Rhythm: Normal rate and regular rhythm  Pulses: Normal pulses  Heart sounds: Normal heart sounds  No murmur  Pulmonary:      Breath sounds: Normal breath sounds  No wheezing or rales  Abdominal:      General: Bowel sounds are normal       Palpations: Abdomen is soft  Musculoskeletal:      Right lower leg: No edema  Left lower leg: No edema  Skin:     General: Skin is warm and dry  Capillary Refill: Capillary refill takes less than 2 seconds  Neurological:      General: No focal deficit present  Mental Status: He is alert and oriented to person, place, and time  Psychiatric:         Mood and Affect: Mood normal          Lab Results:   Recent Results (from the past 24 hour(s))   COVID19, Influenza A/B, RSV PCR, [facility]    Collection Time: 01/10/21 10:10 PM    Specimen: Nose; Nares   Result Value Ref Range    SARS-CoV-2 Negative Negative    INFLUENZA A PCR Negative Negative    INFLUENZA B PCR Negative Negative    RSV PCR Negative Negative     Imaging: I have personally reviewed pertinent reports     and I have personally reviewed pertinent films in PACS  Code Status:  Level 1 full code  Epic/ Allscripts/Care Everywhere records reviewed:  Yes    * Please Note: Fluency DirectDictation voice to text software may have been used in the creation of this document   **

## 2021-01-11 NOTE — DISCHARGE SUMMARY
Discharge Summary - [facility] Internal Medicine    Patient Information: Leland Azul 76 y o  male MRN: [id number]  Unit/Bed#: [facility] 720-01 Encounter: [id number]    Discharging Physician / Practitioner: Jonathon Painter MD  PCP: Shamar Gauthier DO  Admission Date: 1/9/2021  Discharge Date: 01/11/21    Disposition:     Home    Reason for Admission:  Subdural hematoma    Discharge Diagnoses:     Principal Problem:    SDH (subdural [facility]) ([facility] )  Active Problems:    Essential hypertension    Persistent atrial fibrillation (HCC)    Mixed hyperlipidemia    Benign prostatic hyperplasia with lower urinary tract symptoms    FELISA (obstructive sleep apnea)    Renal cell carcinoma of left kidney ([facility] )    Stage 3a chronic kidney disease  Resolved Problems:    * No resolved hospital problems  *      Consultations During Hospital Stay:  · Neurology  · Neurosurgery  · Cardiology    Procedures Performed:     CT head1  Slight increase in the size of the small mixed density extra-axial collections along the cerebral convexities bilaterally, left side larger than right  Findings are most compatible with acute on subacute subdural hemorrhage  2   Mild cerebral atrophy with chronic small vessel ischemic change  · 3  Hypodense extra-axial collection along the cerebellar hemispheres bilaterally, left side larger than right  Findings most compatible with arachnoid cyst formation  · CT headSmall mixed density extra-axial collections/heterogeneous hemorrhages along the convexities are redemonstrated, left greater than right but similar to the prior          Hospital Course:     Leland Azul is a 76 y o  male patient who originally presented to the hospital on 1/9/2021 due to transfer from [facility] to ICU  He initially presented to [facility] with right-sided headache nausea and vomiting  He is on anticoagulation with Eliquis and aspirin, imaging studies concerning for subdural hematoma    He was transferred ICU at [place] for Neurosurgery Neurology evaluation  Patient was admitted to the ICU, imaging studies revealed stable subdural hematoma  Eliquis and aspirin discontinued  These will be on hold at discharge, he will obtain CT head and follow up with Neurosurgery in 2 weeks  He has history of atrial fibrillation, Eliquis is on hold given SDH, flecainide increased to 100 mg b i d , will continue with metoprolol  He follow-up with Cardiology for further management outpatient  He has history of renal cell cancer and will obtain imaging studies in coordination with Urology as outpatient  His hemodynamically stable symptomatically improved ambulating the room and hallway independently maintaining O2 sats on ambient air and is deemed ready for discharge today  Kindly review the chart for details  Condition at Discharge: fair     Discharge Day Visit / Exam:     Subjective:      Sitting up in chair  Reports feeling better  Agreeable to discharge plan    Vitals: Blood Pressure: 131/70 (01/11/21 1514)  Pulse: 58 (01/11/21 1514)  Temperature: 98 3 °F (36 8 °C) (01/11/21 1514)  Respirations: 16 (01/11/21 1514)  Weight - Scale: 92 6 kg (204 lb 2 3 oz) (01/10/21 0600)  SpO2: 100 % (01/11/21 1514)  Exam:   Physical Exam    Comfortably sitting up in chair  Neck supple  Lungs diminished breath sounds bilateral  Heart sounds S1-S2 noted  Abdomen soft nontender  Awake alert obeys simple commands  Pulses noted  No rash      Discharge instructions/Information to patient and family:   See after visit summary for information provided to patient and family  Discharge plan discussed with Cardiology, Neurology  Outpatient close follow-up with Neurosurgery with CT head in 2 weeks    CT head ordered by Neurosurgery  Outpatient follow-up with Cardiology Urology primary care physician    Provisions for Follow-Up Care:  See after visit summary for information related to follow-up care and any pertinent home health orders  Planned Readmission: no     Discharge Statement:  I spent 45 minutes discharging the patient  This time was spent on the day of discharge  I had direct contact with the patient on the day of discharge  Greater than 50% of the total time was spent examining patient, answering all patient questions, arranging and discussing plan of care with patient as well as directly providing post-discharge instructions  Additional time then spent on discharge activities  Discharge Medications:  See after visit summary for reconciled discharge medications provided to patient and family        ** Please Note: This note has been constructed using a voice recognition system **

## 2021-01-11 NOTE — CASE MANAGEMENT
LOS 2  Unplanned readmission risk score: 15  Bundle: Stroke    Spoke with pt to discuss the role of CM and to discuss any help pt may need prior to dc  Pt lives with his wife Rohith Butler in a 2 story home with 2 NIKO  Pt performed ADL's indptly pta, no use of DME  No hx of HHC or rehab  No hx of mental health or D&A treatment  Pt's preferred pharmacy is [facility] in [place]  Contact: Rohith Butler (wife) [phone]  Pt reports Rohith Butler is POA--document requested  Rohith Butler will transport home at dc  CM reviewed d/c planning process including the following: identifying help at home, patient preference for d/c planning needs, Discharge Lounge, [facility] Meds to Bed program, availability of treatment team to discuss questions or concerns patient and/or family may have regarding understanding medications and recognizing signs and symptoms once discharged  CM also encouraged patient to follow up with all recommended appointments after discharge  Patient advised of importance for patient and family to participate in managing patients medical well being  Patient/caregiver received discharge checklist  Content reviewed  Patient/caregiver encouraged to participate in discharge plan of care prior to discharge home

## 2021-01-11 NOTE — TELEPHONE ENCOUNTER
Reviewed patient chart, inpatient at [facility] at this time  Admitted under stroke pathway for the treatment of acute SDH  Inpatient therapy recommendations pending at this time  Called patient on hospital room phone, I introduced myself and explained neurovascular nurse navigator role  We discussed follow up care, stroke education, and discharge planning  We briefly reviewed prior stroke history, SDH,and personal risk factor management  Patient declines full education review at this time  He reports that he has discussed this information with the inpatient neuro and neurosurgery teams in depth  He states he understands stroke and SDH prevention and feels confident in his education  Informed patient I will continue to follow up during hospitalization with inpatient neurology recommendations as well as after hospital discharge to assist  he verbalizes understanding and is agreeable to plan  States he resides with his wife Ever Vogel but states he is fully independent of his personal and medical care  I addressed all his questions  At the conclusion of the conversation, patient denies having any further questions or concerns  He states he will contact me with questions in the future  He asked for my direct line, I provided this to him  I also offered to send a [employer] message with this information and he is agreeable to this

## 2021-01-12 ENCOUNTER — TELEPHONE (OUTPATIENT)
Dept: FAMILY MEDICINE CLINIC | Facility: CLINIC | Age: 66
End: 2021-01-12

## 2021-01-12 ENCOUNTER — TELEPHONE (OUTPATIENT)
Dept: CARDIOLOGY CLINIC | Facility: CLINIC | Age: 66
End: 2021-01-12

## 2021-01-12 ENCOUNTER — TRANSITIONAL CARE MANAGEMENT (OUTPATIENT)
Dept: FAMILY MEDICINE CLINIC | Facility: CLINIC | Age: 66
End: 2021-01-12

## 2021-01-12 ENCOUNTER — TELEPHONE (OUTPATIENT)
Dept: NEUROSURGERY | Facility: CLINIC | Age: 66
End: 2021-01-12

## 2021-01-12 NOTE — TELEPHONE ENCOUNTER
Spoke with pt, MD instructions given  He will not start Losartan and will not take amlodipine  Pt will continue on Metoprolol Succ 50mg daily  Advised to monitor his BP daily and call [facility] if BP begins to rise  Pt agreed verbally

## 2021-01-12 NOTE — TELEPHONE ENCOUNTER
Pt called stating that his medications were change while in the hospital    He was concerned as had not been taking the Losartan prior to hospitalization  Advised to continue on medications as instructed upon discharge until he heard back from you  He has a follow up appt with you on 2/4/21  Stopped:  Amlodipine   Eliquis  Norco    Started: While in hospital  Losartan 100mg daily     Changed:   Atorvastatin 10mg daily AM  Flecainide 100mg BID    Continue:  Metoprolol Succinate 50mg daily  Multivitamin daily  Colace BID x 60 doses   CoQ 10 daily    Please advise

## 2021-01-12 NOTE — TELEPHONE ENCOUNTER
If he was not taking losartan prior to the hospitalization, he should NOT start this  Also do not take the amlodipine

## 2021-01-12 NOTE — TELEPHONE ENCOUNTER
01/14/2021-CALLED PT, CONFIRMED 01/21/2021 CT APT AND F/U APT ON 01/28/2021 W/PT  MEDICARE   [employer]      01/12/2021-PT DISCHARGED TO HOME  01/28/2021 APT W/CT HEAD      01/10/2021-([facility])PLAN:FOLLOW UP IN 2 WEEKS WITH CT HEAD WITHOUT CONTRAST WITH PA-C NO AC/AP SIGN OFF

## 2021-01-13 ENCOUNTER — PATIENT OUTREACH (OUTPATIENT)
Dept: FAMILY MEDICINE CLINIC | Facility: CLINIC | Age: 66
End: 2021-01-13

## 2021-01-13 NOTE — PROGRESS NOTES
Arie Diallo to f/u on recent hospitalization  He is managing well at home and denies needing additional assistance  Nutritional intake adequate, he is following a low sodium diet  He is monitoring BP, yesterday /70  He is taking all medications as prescribed  Reminded him to schedule f/u with PCP  Cardiology and neurosurgery appointments scheduled along with CT scan  No transportation issues  Explained the bundle program and he is willing to participate  Provided him with my contact information for any questions or concerns  He is agreeable to continued outreach  Patient

## 2021-01-15 ENCOUNTER — TELEPHONE (OUTPATIENT)
Dept: NEUROLOGY | Facility: CLINIC | Age: 66
End: 2021-01-15

## 2021-01-15 NOTE — TELEPHONE ENCOUNTER
Reviewed patient chart, there was no message regarding outpatient neurology follow up  I sent a staff message to Luly Cabrera PA-C to clarify  Once I have a message back regarding his follow up care,I will reach out for a hospital follow up call

## 2021-01-18 NOTE — TELEPHONE ENCOUNTER
Nitza Paulino 25  Male, 76 y o , 1955  MRN:   [id number]  Phone:   [phone] (OTHER PHONE)  [facility]:   Active  PCP:   Carmelo Goodpasture, DO  Primary Cvg:   Medicare/Medicare A And B  Next Appt  With Radiology  01/21/2021 at 9:45 AM  Message  Received: Today  Message Contents   ROBERTO Neri RN             Hi Jaqueline Echeverria,     Sorry the delayed response, was off on Friday/over the weekend: for him, I would like at least a one time follow up with vascular neurology (he is more hemorrhage/neurosurgery and cardiology management, but would have him see the vascular neuro team)   Can be in approx  6-8 weeks (AP or attending), no outstanding testing or imaging is needed on our end for him before that appointment (he has a scheduled follow up CT head with neurosurgery coming up)  Thanks Jaqueline Echeverria! Aleksey Harris    Previous Messages    ----- Message -----   From: Scot Rodriguez RN   Sent: 1/15/2021  10:50 AM EST   To: Gigi Pacheco PA-C     Good morning! I hope all is well! I was following up on the recent hospital discharge list and I came across this patient  Would you like him to have a follow up appointment with neurology? Or will neurosurgery be managing the case outpatient? Please let me know,     Thanks!      Jaqueline Echeverria

## 2021-01-18 NOTE — TELEPHONE ENCOUNTER
Post CVA Discharge Follow Up    Hospitalization: 1/9-1/11  The purpose of this phone call is to assess patient's general wellbeing or for any assistance needed with follow-up care  Called [phone] with no answer  Left message on voicemail box for call back  Called patient at [phone]  Since discharge, he denies experiencing any new or worsening stroke-like symptoms  Patient denies the presence of any residual symptoms following hospitalization and claims he has returned to baseline functioning  Ambulation / ADLs:  Patient claims he is ambulating independently as well as preforming his own ADLs  Patient manages his own medications, appointments, and affairs  Appointments / Medication Review:  Patient scheduled with PCP 1/128, neurosurgery 1/18  Offered to schedule hospital follow up appointment, patient is agreeable to this prefers only Thursdays in [facility]  I scheduled appointment 3/18, verified mailing address, then mailed new patient packet to home address on file  I reviewed medications with him  There have not been any medication changes since discharge from the hospital  Patient reports having no difficulties obtaining medications  Reports he is taking all as prescribed with no missed doses, medication side effects  Risk Factors / Education:  Patient verbalizes understanding of SDH, symptoms, personal risk factors and management, medications, and resources  He declines education review at this time  Patient reports he monitors his BP at home  Reported average BP continues to be 130/80  Patient is a non smoker  Reviewed dietary information with him regarding low salt and cholesterol  He verbalizes understanding and plans to maintain healthy lifestyle choices  I addressed all his questions  At the conclusion of the conversation, patient denies having any further questions or concerns

## 2021-01-19 ENCOUNTER — LAB (OUTPATIENT)
Dept: LAB | Age: 66
End: 2021-01-19
Payer: MEDICARE

## 2021-01-19 DIAGNOSIS — Z11.59 NEED FOR HEPATITIS C SCREENING TEST: ICD-10-CM

## 2021-01-19 DIAGNOSIS — Z12.5 SCREENING FOR PROSTATE CANCER: ICD-10-CM

## 2021-01-19 DIAGNOSIS — C64.2 RENAL CELL CARCINOMA OF LEFT KIDNEY (HCC): ICD-10-CM

## 2021-01-19 DIAGNOSIS — N18.31 STAGE 3A CHRONIC KIDNEY DISEASE (HCC): ICD-10-CM

## 2021-01-19 LAB
ANION GAP SERPL CALCULATED.3IONS-SCNC: 2 MMOL/L (ref 4–13)
BASOPHILS # BLD AUTO: 0.02 THOUSANDS/ΜL (ref 0–0.1)
BASOPHILS NFR BLD AUTO: 0 % (ref 0–1)
BUN SERPL-MCNC: 12 MG/DL (ref 5–25)
CALCIUM SERPL-MCNC: 9 MG/DL (ref 8.3–10.1)
CHLORIDE SERPL-SCNC: 106 MMOL/L (ref 100–108)
CO2 SERPL-SCNC: 32 MMOL/L (ref 21–32)
CREAT SERPL-MCNC: 1.1 MG/DL (ref 0.6–1.3)
CREAT UR-MCNC: 121 MG/DL
EOSINOPHIL # BLD AUTO: 0.1 THOUSAND/ΜL (ref 0–0.61)
EOSINOPHIL NFR BLD AUTO: 2 % (ref 0–6)
ERYTHROCYTE [DISTWIDTH] IN BLOOD BY AUTOMATED COUNT: 13 % (ref 11.6–15.1)
GFR SERPL CREATININE-BSD FRML MDRD: 70 ML/MIN/1.73SQ M
GLUCOSE P FAST SERPL-MCNC: 88 MG/DL (ref 65–99)
HCT VFR BLD AUTO: 44.7 % (ref 36.5–49.3)
HCV AB SER QL: NORMAL
HGB BLD-MCNC: 14.5 G/DL (ref 12–17)
IMM GRANULOCYTES # BLD AUTO: 0.01 THOUSAND/UL (ref 0–0.2)
IMM GRANULOCYTES NFR BLD AUTO: 0 % (ref 0–2)
LYMPHOCYTES # BLD AUTO: 1.39 THOUSANDS/ΜL (ref 0.6–4.47)
LYMPHOCYTES NFR BLD AUTO: 29 % (ref 14–44)
MAGNESIUM SERPL-MCNC: 2.4 MG/DL (ref 1.6–2.6)
MCH RBC QN AUTO: 29.7 PG (ref 26.8–34.3)
MCHC RBC AUTO-ENTMCNC: 32.4 G/DL (ref 31.4–37.4)
MCV RBC AUTO: 92 FL (ref 82–98)
MONOCYTES # BLD AUTO: 0.38 THOUSAND/ΜL (ref 0.17–1.22)
MONOCYTES NFR BLD AUTO: 8 % (ref 4–12)
NEUTROPHILS # BLD AUTO: 2.97 THOUSANDS/ΜL (ref 1.85–7.62)
NEUTS SEG NFR BLD AUTO: 61 % (ref 43–75)
NRBC BLD AUTO-RTO: 0 /100 WBCS
PHOSPHATE SERPL-MCNC: 3.7 MG/DL (ref 2.3–4.1)
PLATELET # BLD AUTO: 243 THOUSANDS/UL (ref 149–390)
PMV BLD AUTO: 10.1 FL (ref 8.9–12.7)
POTASSIUM SERPL-SCNC: 3.7 MMOL/L (ref 3.5–5.3)
PROT UR-MCNC: 13 MG/DL
PROT/CREAT UR: 0.11 MG/G{CREAT} (ref 0–0.1)
PSA SERPL-MCNC: 1.5 NG/ML (ref 0–4)
PTH-INTACT SERPL-MCNC: 46.1 PG/ML (ref 18.4–80.1)
RBC # BLD AUTO: 4.88 MILLION/UL (ref 3.88–5.62)
SODIUM SERPL-SCNC: 140 MMOL/L (ref 136–145)
WBC # BLD AUTO: 4.87 THOUSAND/UL (ref 4.31–10.16)

## 2021-01-19 PROCEDURE — 80048 BASIC METABOLIC PNL TOTAL CA: CPT

## 2021-01-19 PROCEDURE — 84156 ASSAY OF PROTEIN URINE: CPT

## 2021-01-19 PROCEDURE — 83735 ASSAY OF MAGNESIUM: CPT

## 2021-01-19 PROCEDURE — 82570 ASSAY OF URINE CREATININE: CPT

## 2021-01-19 PROCEDURE — 84100 ASSAY OF PHOSPHORUS: CPT

## 2021-01-19 PROCEDURE — 83970 ASSAY OF PARATHORMONE: CPT

## 2021-01-19 PROCEDURE — G0103 PSA SCREENING: HCPCS

## 2021-01-19 PROCEDURE — 36415 COLL VENOUS BLD VENIPUNCTURE: CPT

## 2021-01-19 PROCEDURE — 86803 HEPATITIS C AB TEST: CPT

## 2021-01-19 PROCEDURE — 85025 COMPLETE CBC W/AUTO DIFF WBC: CPT

## 2021-01-21 ENCOUNTER — HOSPITAL ENCOUNTER (OUTPATIENT)
Dept: RADIOLOGY | Age: 66
Discharge: HOME | End: 2021-01-21
Payer: MEDICARE

## 2021-01-21 DIAGNOSIS — C64.2 RENAL CELL CARCINOMA OF LEFT KIDNEY (HCC): ICD-10-CM

## 2021-01-21 DIAGNOSIS — S06.5X9A SDH (SUBDURAL HEMATOMA) (HCC): ICD-10-CM

## 2021-01-21 PROCEDURE — 70450 CT HEAD/BRAIN W/O DYE: CPT

## 2021-01-21 PROCEDURE — G1004 CDSM NDSC: HCPCS

## 2021-01-21 PROCEDURE — 74178 CT ABD&PLV WO CNTR FLWD CNTR: CPT

## 2021-01-21 RX ADMIN — IOHEXOL 100 ML: 350 INJECTION, SOLUTION INTRAVENOUS at 10:16

## 2021-01-22 ENCOUNTER — OFFICE VISIT (OUTPATIENT)
Dept: FAMILY MEDICINE CLINIC | Facility: CLINIC | Age: 66
End: 2021-01-22
Payer: MEDICARE

## 2021-01-22 VITALS
TEMPERATURE: 96.7 F | DIASTOLIC BLOOD PRESSURE: 88 MMHG | HEIGHT: 74 IN | WEIGHT: 210.2 LBS | SYSTOLIC BLOOD PRESSURE: 148 MMHG | BODY MASS INDEX: 26.98 KG/M2

## 2021-01-22 DIAGNOSIS — N40.1 BENIGN PROSTATIC HYPERPLASIA WITH LOWER URINARY TRACT SYMPTOMS, SYMPTOM DETAILS UNSPECIFIED: ICD-10-CM

## 2021-01-22 DIAGNOSIS — S06.5X9A SDH (SUBDURAL HEMATOMA) (HCC): Primary | ICD-10-CM

## 2021-01-22 DIAGNOSIS — I48.19 PERSISTENT ATRIAL FIBRILLATION (HCC): ICD-10-CM

## 2021-01-22 DIAGNOSIS — I67.89 CEREBROVASCULAR DISEASE, ILL-DEFINED, ACUTE: ICD-10-CM

## 2021-01-22 DIAGNOSIS — E78.2 MIXED HYPERLIPIDEMIA: ICD-10-CM

## 2021-01-22 DIAGNOSIS — N18.31 STAGE 3A CHRONIC KIDNEY DISEASE (HCC): ICD-10-CM

## 2021-01-22 DIAGNOSIS — C64.2 RENAL CELL CARCINOMA OF LEFT KIDNEY (HCC): ICD-10-CM

## 2021-01-22 PROCEDURE — 99495 TRANSJ CARE MGMT MOD F2F 14D: CPT | Performed by: FAMILY MEDICINE

## 2021-01-22 NOTE — PROGRESS NOTES
Assessment/Plan:  Patient improved status post hospitalization for subdural hematoma  Guidance given overall  Patient will continue follow-up with neurosurgery as well as Neurology and Cardiology per routine  Patient to call Cardiology in regard to elevated blood pressure to consider restarting either amlodipine or losartan  Patient follow-up with urology regarding renal cell carcinoma  Patient with history of CVA and slurred speech  Other guidance given at this time  Questions answered  Patient will follow-up per routine  Diagnoses and all orders for this visit:    SDH (subdural hematoma) (HCC)    Persistent atrial fibrillation (HCC)    Cerebrovascular disease, ill-defined, acute    Stage 3a chronic kidney disease    Renal cell carcinoma of left kidney (HCC)    Benign prostatic hyperplasia with lower urinary tract symptoms, symptom details unspecified    Mixed hyperlipidemia            Subjective:        Patient ID: Parviz Avila is a 76 y o  male  Patient is here status post hospitalization from January 9 through the 11th for headache nausea vomiting and some dizziness  Patient found to have subdural hematoma on CT scan of the brain  Other diagnosis include history of stroke, Afib hypertension BPH left-sided renal cell carcinoma and chronic kidney disease stage 3  Patient was seen by  Neurology, Neurosurgery and Cardiology  Laboratory studies as well as CT scans of the brain reviewed at this time  Hospital records reviewed  Patient's Eliquis was stopped  Patient also taken off losartan and amlodipine  The patient does have follow-up with Cardiology on February 5th and has follow-up with Neurosurgery and Neurology  Patient had recent CT scan of the brain done done yesterday which was reviewed which shows stable findings  CT scan of the abdomen/ kidney still pending at this time  Patient asymptomatic at this time    No new headaches nausea vomiting fevers or chills calf pain abdominal pain chest pain or shortness of breath  All review systems negative at this time other than slurred speech  The following portions of the patient's history were reviewed and updated as appropriate: allergies, current medications, past family history, past medical history, past social history, past surgical history and problem list       Review of Systems   Constitutional: Negative  HENT: Negative  Eyes: Negative  Respiratory: Negative  Cardiovascular: Negative  Gastrointestinal: Negative  Endocrine: Negative  Genitourinary: Negative  Musculoskeletal: Negative  Skin: Negative  Allergic/Immunologic: Negative  Neurological: Negative  Hematological: Negative  Psychiatric/Behavioral: Negative  Objective:      BMI Counseling: Body mass index is 26 99 kg/m²  The BMI is above normal  Nutrition recommendations include decreasing portion sizes  Exercise recommendations include exercising 3-5 times per week  Depression Screening and Follow-up Plan: Clincally patient does not have depression  No treatment is required  /88 (BP Location: Right arm, Patient Position: Sitting, Cuff Size: Standard)   Temp (!) 96 7 °F (35 9 °C) (Tympanic)   Ht 6' 2" (1 88 m)   Wt 95 3 kg (210 lb 3 2 oz)   BMI 26 99 kg/m²          Physical Exam  Vitals signs and nursing note reviewed  Constitutional:       General: He is not in acute distress  Appearance: Normal appearance  He is not ill-appearing, toxic-appearing or diaphoretic  HENT:      Head: Normocephalic and atraumatic  Right Ear: Tympanic membrane, ear canal and external ear normal  There is no impacted cerumen  Left Ear: Tympanic membrane, ear canal and external ear normal  There is no impacted cerumen  Nose: Nose normal  No congestion or rhinorrhea  Mouth/Throat:      Mouth: Mucous membranes are moist       Pharynx: No oropharyngeal exudate or posterior oropharyngeal erythema  Eyes:      General: No scleral icterus  Right eye: No discharge  Left eye: No discharge  Extraocular Movements: Extraocular movements intact  Conjunctiva/sclera: Conjunctivae normal       Pupils: Pupils are equal, round, and reactive to light  Neck:      Musculoskeletal: Normal range of motion and neck supple  No neck rigidity or muscular tenderness  Vascular: No carotid bruit  Cardiovascular:      Rate and Rhythm: Normal rate and regular rhythm  Pulses: Normal pulses  Heart sounds: Normal heart sounds  No murmur  No friction rub  No gallop  Pulmonary:      Effort: Pulmonary effort is normal  No respiratory distress  Breath sounds: Normal breath sounds  No stridor  No wheezing, rhonchi or rales  Chest:      Chest wall: No tenderness  Abdominal:      General: Abdomen is flat  Bowel sounds are normal  There is no distension  Palpations: Abdomen is soft  Tenderness: There is no abdominal tenderness  There is no guarding or rebound  Musculoskeletal: Normal range of motion  General: No swelling, tenderness, deformity or signs of injury  Right lower leg: No edema  Left lower leg: No edema  Lymphadenopathy:      Cervical: No cervical adenopathy  Skin:     General: Skin is warm and dry  Capillary Refill: Capillary refill takes less than 2 seconds  Coloration: Skin is not jaundiced  Findings: No bruising, erythema, lesion or rash  Neurological:      Mental Status: He is alert and oriented to person, place, and time  Mental status is at baseline  Cranial Nerves: Cranial nerve deficit present  Sensory: No sensory deficit  Motor: No weakness  Coordination: Coordination normal       Gait: Gait normal       Comments:  Slurred speech   Psychiatric:         Mood and Affect: Mood normal          Behavior: Behavior normal          Thought Content:  Thought content normal          Judgment: Judgment normal

## 2021-01-22 NOTE — PROGRESS NOTES
TCM Call (since 12/22/2020)     Date and time call was made  1/12/2021  2:07 PM    Patient was hospitialized at  [facility]        Date of Admission  01/09/21    Date of discharge  01/11/21    Diagnosis  SDH    Disposition  Home    Were the patients medications reviewed and updated  Yes    Current Symptoms  None      TCM Call (since 12/22/2020)     Post hospital issues  None    Should patient be enrolled in anticoag monitoring? No    Scheduled for follow up?   Yes    Did you obtain your prescribed medications  Yes    Do you need help managing your prescriptions or medications  No    Is transportation to your appointment needed  No    I have advised the patient to call PCP with any new or worsening symptoms  JESSICA DE LA VEGA, [address] [place] Arrangements  Spouse or Significiant other    Support System  Spouse    The type of support provided  Emotional; Physical    Are you recieving any outpatient services  No    Are you recieving home care services  No    Are you using any community resources  No    Current waiver services  No    Have you fallen in the last 12 months  No    Interperter language line needed  No

## 2021-01-25 NOTE — TELEPHONE ENCOUNTER
Leonel Milan has a f/u with you on 2/4  He was advised by his PCP to call to see if you want him to go back on losartan or amlodipine  His BP at PCP ov on 1/22 was 148/88  Since hospitalization, remains on Toprol XL 50 mg daily  Leonel Milan said this is a typical BP for him at home also  HR in the 60s  Please advise if any changes prior to ov in 10 days

## 2021-01-26 RX ORDER — AMLODIPINE BESYLATE 5 MG/1
5 TABLET ORAL DAILY
COMMUNITY
End: 2021-08-03 | Stop reason: SDUPTHER

## 2021-01-26 NOTE — TELEPHONE ENCOUNTER
I spoke with Judi Shabazz and advised  He verbalized understanding and will restart Amlodipine 5 mg daily

## 2021-01-27 ENCOUNTER — TELEPHONE (OUTPATIENT)
Dept: NEUROSURGERY | Facility: CLINIC | Age: 66
End: 2021-01-27

## 2021-01-27 ENCOUNTER — OFFICE VISIT (OUTPATIENT)
Dept: NEPHROLOGY | Facility: CLINIC | Age: 66
End: 2021-01-27
Payer: MEDICARE

## 2021-01-27 VITALS
SYSTOLIC BLOOD PRESSURE: 140 MMHG | BODY MASS INDEX: 26.99 KG/M2 | HEIGHT: 74 IN | DIASTOLIC BLOOD PRESSURE: 82 MMHG | RESPIRATION RATE: 16 BRPM

## 2021-01-27 DIAGNOSIS — N18.31 STAGE 3A CHRONIC KIDNEY DISEASE (HCC): Primary | ICD-10-CM

## 2021-01-27 DIAGNOSIS — I10 ESSENTIAL HYPERTENSION: ICD-10-CM

## 2021-01-27 PROCEDURE — 99213 OFFICE O/P EST LOW 20 MIN: CPT | Performed by: INTERNAL MEDICINE

## 2021-01-27 NOTE — PATIENT INSTRUCTIONS
- Please call me in 10 days after having your blood work done to review the results if you do not hear back from me or my office, as I may have not received the results  - please remember to perform blood work prior to the next visit  - Please call if the blood pressure top number is greater than 150 or less than 110 consistently  - Please call if you are gaining more than 2lbs in 2 days for adjustment of water pills   ~ Please AVOID the following pain medications  LIST OF NSAIDS (NONSTEROIDAL ANTI-INFLAMMATORY DRUGS) AND BARKER-2 INHIBITORS    DIFLUNISAL (DOLOBID)  IBUPROFEN (MOTRIN, ADVIL)  FLURBIPROFEN (ANSAID)  KETOPROFEN (ORUDIS, ORUVAIL)  FENOPROFEN (NALFON)  NABUMETONE (RELAFEN)  PIROXICAM (FELDENE)  NAPROXEN (ALEVE, NAPROSYN, NAPRELAN, ANAPROX)  DICLOFENAC (VOLTAREN, CATAFLAM)  INDOMETHACIN (INDOCIN)  SULINDAC (CLINORIL)  TOLMETIN (TOLETIN)  ETODOLAC (LODINE)  MELOXICAM (MOBIC)  KETOROLAC (TORADOL)  OXAPROZIN (DAYPRO)  CELECOXIB (CELEBREX)    Phosphorus diet  Follow a low phosphorus diet      Avoid these higher phosphorus foods: Choose these lower phosphorus foods:   Milk, pudding or yogurt (from animals and from many soy varieties) Rice milk (unfortified), nondairy creamer (if it doesn't have terms in the ingredients list that contain the letters "phos")   Hard cheeses, ricotta or cottage cheese, fat-free cream cheese Regular and low-fat cream cheese   Ice cream or frozen yogurt Sherbet or frozen fruit pops   Soups made with higher phosphorus ingredients (milk, dried peas, beans, lentils) Soups made with lower phosphorus ingredients (broth- or water-based with other lower phosphorus ingredients)   Whole grains, including whole-grain breads, crackers, cereal, rice and pasta Refined grains, including white bread, crackers, cereals, rice and pasta   Quick breads, biscuits, cornbread, muffins, pancakes or waffles Homemade refined (white) dinner rolls, bagels or English muffins   Dried peas (split, black-eyed), beans (black, garbanzo, lima, kidney, navy, castellanos) or lentils Green peas (canned, frozen), green beans or wax beans   Organ meats, walleye, pollock or sardines Lean beef, pork, lamb, poultry or other fish   Nuts and seeds Popcorn   Peanut butter and other nut butters Jam, jelly or honey   Chocolate, including chocolate drinks Carob (chocolate-flavored) candy, hard candy or gumdrops   Lane and pepper-type sodas, flavored agarwal, bottled teas (if a term in the ingredients list contains the letters "phos") Lemon-lime soda, ginger ale or root beer, plain water     Follow a moderate potassium diet

## 2021-01-27 NOTE — PROGRESS NOTES
Nephrology Follow up Consultation  Parviz Avila 76 y o  male MRN: [id number]            BACKGROUND:  Parviz Avila is a 76 y o male who was referred by Aislinn Burgos DO for evaluation of Chronic Kidney Disease, Follow-up, and Hypertension    ASSESSMENT / PLAN:   76 y o   male with pmh of multiple co-morbidities including HTN, AFib, hyperlipidemia, sleep apnea, CVA, I cc status post partial left nephrectomy in October 2020 and CKD stage 3 presented to the office for routine follow-up  CKD stage 2/3A:  - Patient has a baseline creatinine of 1-1 3 mg/dL dating as far back as 2017  Most recent labs show a Creatinine of 1 10 mg/dL on 01/19/2021  Renal function remains stable at baseline    - likely has underlying CKD secondary to age-related nephron loss plus hypertensive nephrosclerosis plus decreased nephron mass due to prior partial nephrectomy in October 2020 for RCC of the left kidney  - Acid base and lytes stable  - Clinically the patient appears to be euvolemic  - CT abdomen from 01/21/2021 showing status post resection of left upper pole renal neoplasm  No evidence  - Recommend to avoid use of NSAIDs, nephrotoxins  Caution advised with regards to exposure to IV contrast dye    - Discussed with the patient in depth his renal status, including the possible etiologies for CKD  - Advised the patient that when his GFR is close to 20mL/min then will start discussing about RRT(renal replacement therapy) options such as renal transplant, peritoneal dialysis and hemodialysis  - Informed the patient about the various options for Renal Replacement therapy  - Discussed with the patient how we need to work together to delay the progression of CKD with optimal BP control based on their age and co-morbidities and trying to reduce proteinuria by the use of anti-proteinuric agents  Hypertension:  - Patient is on Norvasc 5 mg p o  Q day, Toprol-XL 50 mg p o   Q day    - Goal BP of < 140/90 based on age and comorbidities  - Instructed to follow low sodium (2gm)diet  Hemoglobin:  - Goal Hb of 10-12 g/dL  - Most recent labs suggestive of 14 5 grams/deciliter  - no role for IV iron at this time    CKD-MBD(Mineral Bone Disease): - Based on patients CKD stage following is the goal of therapy  - Maintain calcium phosphorus product of < 55   - Stage 3 CKD - Goal Ca 8 5-10 mg/dL , goal Phos 2 7-4 6 mg/dL  , goal iPTH 30-70 pg/mL  - Patient is currently at goal   - Continue patient on no vitamin-D  - most recent intact PTH of 46 1 as of January 2021  - check vitamin-D prior to next visit    Proteinuria:  - proteinuria most likely secondary to hyper filtration after nephrectomy  - most recent protein creatinine ratio of 110 mg as of January 2021  - check protein creatinine ratio    Lipids:  -on Lipitor  - goal LDL less than 70  - management per primary team    Nutrition:  - Encouraged patient to follow a renal diet comprising of moderate potassium, low phosphorus and protein restriction to 0 8gm/kg  - Will check serum albumin with next blood work  Followup:  - Patient is to follow-up in 12 months, with lab work to be performed a few days prior to the next visit  Advised patient to call me in 10 days to review the results if they do not hear back from me, as I may have not received the results  Shiraz Hansen MD, [facility], 1/27/2021, 10:38 AM             SUBJECTIVE: 76 y o  is off of losartan for a few months as per cardio  2 weeks ago had a brain bleeding/SDH due ot anticoagulant  Now back to normal  Home sbp is about 130's  Feels well has no complaints no NSAID use happy to hear renal parameters stable  Has been following up closely with his PCP and cardiologist       Review of Systems   Constitutional: Negative for chills, fatigue and fever  HENT: Negative for congestion and sore throat  Respiratory: Negative for cough, shortness of breath and wheezing  Cardiovascular: Negative for leg swelling  Gastrointestinal: Negative for abdominal pain, constipation, diarrhea, nausea and vomiting  Genitourinary: Negative for difficulty urinating, dysuria and hematuria  Musculoskeletal: Negative for back pain  Skin: Negative for rash and wound  Neurological: Negative for dizziness and headaches  Psychiatric/Behavioral: Negative for agitation and confusion  All other systems reviewed and are negative        PAST MEDICAL HISTORY:  Past Medical History:   Diagnosis Date    [facility] fibrillation [facility] )     Cancer ([facility] ) 08/2020    Kidney cancer    Cancer (HCC)     melanoma    Chronic kidney disease     left kidney cancer    CPAP (continuous positive airway pressure) dependence     Hyperlipidemia     Hypertension     Irregular heart beat     Afib    Pneumonia     Skin cancer     Sleep apnea     Stroke ([facility] )     Supraventricular tachycardia ([facility] )     Wears glasses     Wears partial dentures     upper partial       PROBLEM LIST    Patient Active Problem List   Diagnosis    Cerebrovascular disease, ill-defined, acute    Essential hypertension    Persistent atrial fibrillation (HCC)    Mixed hyperlipidemia    Pain of toe of right foot    Benign prostatic hyperplasia with lower urinary tract symptoms    Screening for colon cancer    Acute bacterial conjunctivitis of both eyes    FELISA (obstructive sleep apnea)    Renal cell carcinoma of left kidney (HCC)    Stage 3a chronic kidney disease    SDH (subdural hematoma) ([place] [id number] )       PAST SURGICAL HISTORY:  Past Surgical History:   Procedure Laterality Date    APPENDECTOMY      CARDIOVERSION      COLONOSCOPY      fiberoptic, also 2009, both negative, resolved 2004    COLONOSCOPY  08/2019    EGD      HERNIA REPAIR      umbilical and bilateral inguinal    KNEE ARTHROSCOPY Left     IA LAP,PARTIAL NEPHRECTOMY Left 10/7/2020    Procedure: ROBOTIC PARTIAL NEPHRECTOMY;  Surgeon: Barbara Pro MD;  Location: [facility];  Service: Urology    SKIN GRAFT      left ear, skin cancer    TONSILLECTOMY         SOCIAL HISTORY :   reports that he has never smoked  He has never used smokeless tobacco  He reports current alcohol use  He reports that he does not use drugs  FAMILY HISTORY:  Family History   Problem Relation Age of Onset    Cancer Father         stomach    Hypertension Father     Stroke Father         syndrome    Cancer Family     Heart disease Family     Hypertension Family     Stroke Family         syndrome    Throat cancer Mother     Diabetes Mother     Asthma Sister        ALLERGIES:  No Known Allergies        PHYSICAL EXAM:  Vitals:    01/27/21 1003   BP: 140/82   Resp: 16   Height: 6' 2" (1 88 m)     Body mass index is 26 99 kg/m²  Physical Exam  Vitals signs reviewed  Constitutional:       General: He is not in acute distress  Appearance: Normal appearance  He is normal weight  He is not ill-appearing, toxic-appearing or diaphoretic  HENT:      Head: Normocephalic and atraumatic  Mouth/Throat:      Mouth: Mucous membranes are moist       Pharynx: Oropharynx is clear  No oropharyngeal exudate  Eyes:      General: No scleral icterus  Conjunctiva/sclera: Conjunctivae normal    Neck:      Musculoskeletal: Normal range of motion and neck supple  Cardiovascular:      Rate and Rhythm: Normal rate  Heart sounds: Normal heart sounds  No friction rub  Pulmonary:      Effort: Pulmonary effort is normal  No respiratory distress  Breath sounds: Normal breath sounds  No wheezing  Abdominal:      General: There is no distension  Palpations: Abdomen is soft  There is no mass  Musculoskeletal:         General: No swelling  Skin:     General: Skin is warm  Coloration: Skin is not jaundiced  Neurological:      General: No focal deficit present  Mental Status: He is alert and oriented to person, place, and time     Psychiatric:         Mood and Affect: Mood normal          Behavior: Behavior normal          LABORATORY DATA:     Results from last 6 Months   Lab Units 01/19/21  0835 01/10/21  0644 01/09/21  0611  10/14/20  0938   WBC Thousand/uL 4 87 7 20 7 38   < >  --    HEMOGLOBIN g/dL 14 5 15 0 14 5   < >  --    HEMATOCRIT % 44 7 45 6 45 4   < >  --    PLATELETS Thousands/uL 243 242 245   < >  --    POTASSIUM mmol/L 3 7 4 0 3 6   < > 3 8   CHLORIDE mmol/L 106 107 111*   < > 103   CO2 mmol/L 32 32 28   < > 30   BUN mg/dL 12 19 18   < > 19   CREATININE mg/dL 1 10 1 31* 1 11   < > 1 24   CALCIUM mg/dL 9 0 9 1 9 1   < > 9 7   MAGNESIUM mg/dL 2 4  --  2 3  --  2 5   PHOSPHORUS mg/dL 3 7  --  3 5  --  3 6    < > = values in this interval not displayed  rest all reviewed    RADIOLOGY:  No orders to display     Rest all reviewed        MEDICATIONS:    Current Outpatient Medications:     amLODIPine (NORVASC) 5 mg tablet, Take 5 mg by mouth daily, Disp: , Rfl:     atorvastatin (LIPITOR) 10 mg tablet, TAKE ONE TABLET BY MOUTH EVERY DAY (Patient taking differently: Take 10 mg by mouth every morning ), Disp: 90 tablet, Rfl: 1    Coenzyme Q10 (CO Q-10 PO), Take 1 caplet by mouth every morning, Disp: , Rfl:     flecainide (TAMBOCOR) 100 mg tablet, Take 1 tablet (100 mg total) by mouth 2 (two) times a day, Disp: 60 tablet, Rfl: 0    metoprolol succinate (TOPROL-XL) 50 mg 24 hr tablet, Take 1 tablet (50 mg total) by mouth every morning, Disp: 90 tablet, Rfl: 1    Multiple Vitamins-Minerals (MULTIVITAMIN ADULT PO), Take 1 tablet by mouth every morning, Disp: , Rfl:     docusate sodium (COLACE) 100 mg capsule, Take 1 capsule (100 mg total) by mouth 2 (two) times a day for 60 doses, Disp: 60 capsule, Rfl: 0          Portions of the record may have been created with voice recognition software  Occasional wrong word or "sound a like" substitutions may have occurred due to the inherent limitations of voice recognition software   Read the chart carefully and recognize, using context, where substitutions have occurred  If you have any questions, please contact the dictating provider

## 2021-01-28 ENCOUNTER — OFFICE VISIT (OUTPATIENT)
Dept: NEUROSURGERY | Facility: CLINIC | Age: 66
End: 2021-01-28
Payer: MEDICARE

## 2021-01-28 VITALS
WEIGHT: 210 LBS | TEMPERATURE: 96.8 F | HEIGHT: 74 IN | BODY MASS INDEX: 26.95 KG/M2 | DIASTOLIC BLOOD PRESSURE: 80 MMHG | SYSTOLIC BLOOD PRESSURE: 138 MMHG

## 2021-01-28 DIAGNOSIS — S06.5X9A SDH (SUBDURAL HEMATOMA) (HCC): Primary | ICD-10-CM

## 2021-01-28 PROCEDURE — 99213 OFFICE O/P EST LOW 20 MIN: CPT | Performed by: NURSE PRACTITIONER

## 2021-01-28 NOTE — PROGRESS NOTES
Neurosurgery Office Note  Rochelle Cheema 76 y o  male MRN: [id number]      Assessment/Plan     SDH (subdural hematoma) [facility])  Patient seen outpatient office today for 2 week post hospital follow-up for left frontal parietal  subdural hemorrhage  · History of prior CVA and AFib who was on Eliquis which has since been on hold  · Patient originally presented to the ED on 01/08 with dizziness and stomach discomfort  Imaging at that time demonstrated a left frontal parietal subdural hematoma  Patient denies any history of falls or striking his head  Imaging:    CT head wo 1/21/21:Evolving left convexity subdural collection, stable in size  Stable thin right subdural collection  No new areas of acute intracranial hemorrhage are identified    Plan:  · Reviewed imaging with patient  · Continue to hold all AP/AC  · Patient will follow-up in 2 weeks with repeat CT head or sooner if he develops worsening symptoms  · There is also an incidental probable retrocerebellar arachnoid cyst seen on imaging  Will discuss further follow up for finding at next visit  · Patient made aware to return for worsening headaches, dizziness, nausea, vomiting, confusion, slurred speech, or sleepiness  · Recommendations reviewed patient  Patient showed understanding and is agreeable to recommendations  · Patient made aware to contact Neurosurgery with any further questions or concerns         Diagnoses and all orders for this visit:    SDH (subdural hematoma) ([facility] [address] )  -     Cancel: CT head wo contrast; Future  -     CT head wo contrast; Future            CHIEF COMPLAINT    Chief Complaint   Patient presents with    Follow-up     subdural hematoma        HISTORY    History of Present Illness     76y o  year old male with past medical history significant for hypertension, AFib, CVA was on Eliquis which has since been on hold, obstructive sleep apnea, renal cell carcinoma of left kidney, BPH, chronic kidney disease, and hyperlipidemia  Patient seen in outpatient office today for 2 week post hospital follow-up for left frontal parietal subdural hemorrhage  Patient originally presented to the ED on 01/08 with dizziness and stomach discomfort  Imaging at that time demonstrated a left frontal parietal subdural hematoma  Patient denied any history of falls or striking his head  Patient currently has no complaints  He denies any headaches, dizziness, blurry vision, chest pain, shortness of breath, abdominal pain, nausea, vomiting, diarrhea, no problems with bowel or bladder, no new weakness or numbness/ tingling  Patient states he remains holding his Eliquis  He denies any recent falls or traumas or difficulty with his balance  Patient is following with nephrology as an outpatient for renal cell carcinoma  HPI    See Discussion    REVIEW OF SYSTEMS    Review of Systems   Constitutional: Negative  HENT: Negative  Eyes: Negative  Respiratory: Negative  Cardiovascular: Negative  Gastrointestinal: Negative  Endocrine: Negative  Genitourinary: Negative  Musculoskeletal: Negative  Skin: Negative  Allergic/Immunologic: Negative  Neurological: Negative  Hematological: Negative  Psychiatric/Behavioral: Negative  ROS reviewed with patient and agree and changes were made as needed      Meds/Allergies     Current Outpatient Medications   Medication Sig Dispense Refill    amLODIPine (NORVASC) 5 mg tablet Take 5 mg by mouth daily      Coenzyme Q10 (CO Q-10 PO) Take 1 caplet by mouth every morning      flecainide (TAMBOCOR) 100 mg tablet Take 1 tablet (100 mg total) by mouth 2 (two) times a day 60 tablet 0    metoprolol succinate (TOPROL-XL) 50 mg 24 hr tablet Take 1 tablet (50 mg total) by mouth every morning 90 tablet 1    Multiple Vitamins-Minerals (MULTIVITAMIN ADULT PO) Take 1 tablet by mouth every morning      atorvastatin (LIPITOR) 10 mg tablet TAKE ONE TABLET BY MOUTH EVERY DAY (Patient not taking: No sig reported) 90 tablet 1    docusate sodium (COLACE) 100 mg capsule Take 1 capsule (100 mg total) by mouth 2 (two) times a day for 60 doses 60 capsule 0     No current facility-administered medications for this visit  No Known Allergies    PAST HISTORY    Past Medical History:   Diagnosis Date    Atrial fibrillation ([facility] [address] )     Cancer ([facility] [address] ) 08/2020    Kidney cancer    Cancer (HCC)     melanoma    Chronic kidney disease     left kidney cancer    CPAP (continuous positive airway pressure) dependence     Hyperlipidemia     Hypertension     Irregular heart beat     Afib    Pneumonia     Skin cancer     Sleep apnea     Stroke ([facility] [address] )     Supraventricular tachycardia ([facility] [address] )     Wears glasses     Wears partial dentures     upper partial       Past Surgical History:   Procedure Laterality Date    APPENDECTOMY      CARDIOVERSION      COLONOSCOPY      fiberoptic, also 2009, both negative, resolved 2004    COLONOSCOPY  08/2019    EGD      HERNIA REPAIR      umbilical and bilateral inguinal    KNEE ARTHROSCOPY Left     OR LAP,PARTIAL NEPHRECTOMY Left 10/7/2020    Procedure: ROBOTIC PARTIAL NEPHRECTOMY;  Surgeon: Joshua Best MD;  Location: [facility];  Service: Urology    SKIN GRAFT      left ear, skin cancer    TONSILLECTOMY         Social History     Tobacco Use    Smoking status: Never Smoker    Smokeless tobacco: Never Used   Substance Use Topics    Alcohol use: Yes     Frequency: 2-4 times a month     Drinks per session: 1 or 2    Drug use: No       Family History   Problem Relation Age of Onset    Cancer Father         stomach    Hypertension Father     Stroke Father         syndrome    Cancer Family     Heart disease Family     Hypertension Family     Stroke Family         syndrome    Throat cancer Mother     Diabetes Mother     Asthma Sister          Above history personally reviewed         EXAM    Vitals:Blood pressure 138/80, temperature (!) 96 8 °F (36 °C), temperature source Temporal, height 6' 2" (1 88 m), weight 95 3 kg (210 lb)  ,Body mass index is 26 96 kg/m²  Physical Exam  Vitals signs reviewed  Constitutional:       General: He is awake  He is not in acute distress  Appearance: Normal appearance  He is not ill-appearing  HENT:      Head: Normocephalic and atraumatic  Eyes:      Extraocular Movements: Extraocular movements intact and EOM normal       Conjunctiva/sclera: Conjunctivae normal       Pupils: Pupils are equal, round, and reactive to light  Neck:      Musculoskeletal: Normal range of motion and neck supple  Pulmonary:      Effort: Pulmonary effort is normal  No respiratory distress  Chest:      Chest wall: No tenderness  Abdominal:      General: There is no distension  Palpations: Abdomen is soft  Tenderness: There is no abdominal tenderness  Musculoskeletal: Normal range of motion  Skin:     General: Skin is warm and dry  Neurological:      Mental Status: He is alert and oriented to person, place, and time  Coordination: Finger-Nose-Finger Test normal       Gait: Gait is intact  Deep Tendon Reflexes: Strength normal       Reflex Scores:       Tricep reflexes are 2+ on the right side and 2+ on the left side  Bicep reflexes are 2+ on the right side and 2+ on the left side  Brachioradialis reflexes are 2+ on the right side and 2+ on the left side  Patellar reflexes are 2+ on the right side and 2+ on the left side  Achilles reflexes are 2+ on the right side and 2+ on the left side  Psychiatric:         Attention and Perception: Attention and perception normal          Mood and Affect: Mood and affect normal          Speech: Speech normal          Behavior: Behavior normal  Behavior is cooperative  Thought Content:  Thought content normal          Cognition and Memory: Cognition and memory normal          Judgment: Judgment normal          Neurologic Exam Mental Status   Oriented to person, place, and time  Follows 2 step commands  Attention: normal  Concentration: normal    Speech: speech is normal   Level of consciousness: alert  Knowledge: good  Able to perform simple calculations  Able to name object  Able to repeat  Normal comprehension  Cranial Nerves     CN III, IV, VI   Pupils are equal, round, and reactive to light  Extraocular motions are normal    CN III: no CN III palsy  CN VI: no CN VI palsy  Nystagmus: none   Diplopia: none  Conjugate gaze: present    CN V   Facial sensation intact  CN VII   Facial expression full, symmetric  CN VIII   CN VIII normal    Hearing: intact    CN IX, X   CN IX normal      CN XI   CN XI normal      CN XII   CN XII normal      Mild dysarthria from prior stroke     Motor Exam   Muscle bulk: normal  Overall muscle tone: normal  Right arm pronator drift: absent  Left arm pronator drift: absent    Strength   Strength 5/5 throughout  Sensory Exam   Light touch normal    Proprioception normal    JPS and DST intact     Gait, Coordination, and Reflexes     Gait  Gait: normal    Coordination   Finger to nose coordination: normal    Tremor   Resting tremor: absent  Intention tremor: absent  Action tremor: absent    Reflexes   Right brachioradialis: 2+  Left brachioradialis: 2+  Right biceps: 2+  Left biceps: 2+  Right triceps: 2+  Left triceps: 2+  Right patellar: 2+  Left patellar: 2+  Right achilles: 2+  Left achilles: 2+  Right Encarnacion: absent  Left Encarnacion: absent  Right ankle clonus: absent  Left ankle clonus: absent        MEDICAL DECISION MAKING    Imaging Studies:     Ct Head Wo Contrast    Result Date: 1/22/2021  Narrative: CT BRAIN - WITHOUT CONTRAST INDICATION:   S06  5X9A: Traumatic subdural hemorrhage with loss of consciousness of unspecified duration, initial encounter  COMPARISON:  1/10/2021 TECHNIQUE:  CT examination of the brain was performed    In addition to axial images, sagittal and coronal 2D reformatted images were created and submitted for interpretation  Radiation dose length product (DLP) for this visit:  921 mGy-cm   This examination, like all CT scans performed in the [facility], was performed utilizing techniques to minimize radiation dose exposure, including the use of iterative reconstruction and automated exposure control  IMAGE QUALITY:  Diagnostic  FINDINGS: PARENCHYMA:  There is redemonstration of a left convexity mixed attenuation subdural hematoma  There has has been mild interval degradation of 1 products  Overall size is unchanged measuring up to 4 mm in depth  There is a stable predominantly low-attenuation collection along the right convexity measuring up to 2 mm in depth  No new areas of acute intracranial hemorrhage identified  No midline shift  No CT evidence for a large acute vascular solution infarct  Stable posterior fossa arachnoid cyst  VENTRICLES AND EXTRA-AXIAL SPACES:  Normal for the patient's age  VISUALIZED ORBITS AND PARANASAL SINUSES:  Unremarkable  CALVARIUM AND EXTRACRANIAL SOFT TISSUES:  Normal      Impression: Evolving left convexity subdural collection, stable in size  Stable thin right subdural collection  No new areas of acute intracranial hemorrhage are identified  Workstation performed: [id number]     Ct Head Wo Contrast    Result Date: 1/10/2021  Narrative: CT BRAIN - WITHOUT CONTRAST INDICATION:   Subdural hemorrhage, follow-up Follow up SDH  COMPARISON:  None  TECHNIQUE:  CT examination of the brain was performed  In addition to axial images, sagittal and coronal 2D reformatted images were created and submitted for interpretation  Radiation dose length product (DLP) for this visit:  944 3 mGy-cm   This examination, like all CT scans performed in the [facility], was performed utilizing techniques to minimize radiation dose exposure, including the use of iterative reconstruction and automated exposure control  IMAGE QUALITY:  Diagnostic  FINDINGS: PARENCHYMA, VENTRICLES AND EXTRA-AXIAL SPACES: Decreased attenuation is noted in periventricular and subcortical white matter demonstrating an appearance that is statistically most likely to represent mild microangiopathic change  Gray-white differentiation appears maintained  No CT signs of acute territorial infarction  No intracranial mass or midline shift  Low-density extra-axial collections posteriorly, left greater than right with some associated mass effect on the left cerebellar hemisphere, similar to the prior, possibly subdural hygroma  No acute parenchymal hemorrhage  Mixed density extra-axial collections along the convexity are redemonstrated, left greater than right but similar to the prior No hydrocephalus  The basal cisterns are patent  VISUALIZED ORBITS AND PARANASAL SINUSES:  Unremarkable  CALVARIUM AND EXTRACRANIAL SOFT TISSUES:  Normal      Impression: Small mixed density extra-axial collections/heterogeneous hemorrhages along the convexities are redemonstrated, left greater than right but similar to the prior  Other findings as above but overall there has been no significant interval change  Clinical follow-up recommended  Workstation performed: [id number]     Ct Head Wo Contrast    Result Date: 1/9/2021  Narrative: CT BRAIN - WITHOUT CONTRAST INDICATION:   Subdural hemorrhage, follow-up; f/u SDH  History of sudden onset of right-sided headache  COMPARISON:  None  TECHNIQUE:  CT examination of the brain was performed  In addition to axial images, sagittal and coronal 2D reformatted images were created and submitted for interpretation  Radiation dose length product (DLP) for this visit:  926 97 mGy-cm   This examination, like all CT scans performed in the [facility], was performed utilizing techniques to minimize radiation dose exposure, including the use of iterative  reconstruction and automated exposure control    IMAGE QUALITY: Diagnostic  FINDINGS: PARENCHYMA:  No acute intraparenchymal hemorrhage or infarction  Decreased attenuation in the periventricular regions and centrum semiovale bilaterally, consistent with chronic small vessel ischemic white matter disease  There are small mixed density extra-axial collections along the cerebral convexities bilaterally, left side greater than right  Findings most compatible with acute on subacute subdural hemorrhages  These have slightly progressed from the prior study  Bilateral internal carotid artery calcifications  VENTRICLES AND EXTRA-AXIAL SPACES:  Ventricles and cerebral sulci are mildly dilated  Slight increase in the size of the mixed density extra-axial collections along the cerebral convexities bilaterally, left side greater than right, most compatible with acute on subacute subdural hemorrhages  Stable low-density extra-axial collection adjacent to the cerebellar hemispheres bilaterally, left side larger than right  Mild mass effect on the cerebellar hemispheres  Findings most compatible with arachnoid cyst  VISUALIZED ORBITS AND PARANASAL SINUSES:  No acute abnormality involving the orbits  Mild scattered sinus mucosal thickening is noted  No fluid levels are seen  CALVARIUM AND EXTRACRANIAL SOFT TISSUES:  Normal      Impression: 1  Slight increase in the size of the small mixed density extra-axial collections along the cerebral convexities bilaterally, left side larger than right  Findings are most compatible with acute on subacute subdural hemorrhage  2   Mild cerebral atrophy with chronic small vessel ischemic change  3   Hypodense extra-axial collection along the cerebellar hemispheres bilaterally, left side larger than right  Findings most compatible with arachnoid cyst formation    I personally discussed this study with Dr Hernan Montelongo from critical care on  1/9/2021 at 8:05 AM  Workstation performed: [id number]     Ct Head Without Contrast    Result Date: 1/9/2021  Narrative: CT BRAIN - WITHOUT CONTRAST INDICATION:   Headache, acute, normal neuro exam Dizziness, non-specific headache  COMPARISON:  None  TECHNIQUE:  CT examination of the brain was performed  In addition to axial images, sagittal and coronal 2D reformatted images were created and submitted for interpretation  Radiation dose length product (DLP) for this visit:  869 mGy-cm   This examination, like all CT scans performed in the [address] [place] [address], was performed utilizing techniques to minimize radiation dose exposure, including the use of iterative reconstruction and automated exposure control  IMAGE QUALITY:  Diagnostic  FINDINGS: PARENCHYMA:  No intracranial mass, mass effect or midline shift  No CT signs of acute infarction  No acute parenchymal hemorrhage  VENTRICLES AND EXTRA-AXIAL SPACES:  Normal for the patient's age  Suspect high left frontal parietal subdural collection is noted with hyperattenuation seen posteriorly (2:30-33)  Prominence of the left greater than right retrocerebellar extra-axial CSF space with underlying mass effect suggestive of arachnoid cyst, incidental  VISUALIZED ORBITS AND PARANASAL SINUSES:  Unremarkable  CALVARIUM AND EXTRACRANIAL SOFT TISSUES:  Normal      Impression: Suspect tiny high frontal parietal convexity mixed attenuation subdural collection  ? Patient history of trauma  Alternatively hyperattenuation posteriorly may reflect bridging venous drainage  No skull fracture or midline shift  Short-term interval follow-up stability scan is recommended  Incidental probable retrocerebellar arachnoid cyst  Findings discussed with Dr Max Carrillo at 12:20 AM on 1/9/2021 with verbal confirmation by the recipient  Workstation performed: [id number]     Ct Renal Protocol    Result Date: 1/27/2021  Narrative: CT ABDOMEN AND PELVIS WITH AND WITHOUT IV CONTRAST INDICATION:   C64 2: Malignant neoplasm of left kidney, except renal pelvis    robot assisted laparoscopic left partial nephrectomy for an almost 7 cm upper pole left renal mass  COMPARISON:  Multiple priors most recently CT 8/20/2020 and MRI 8/25/2020 TECHNIQUE: Initial CT of the abdomen and pelvis was performed without intravenous contrast   Subsequent dynamic CT evaluation of the abdomen and pelvis was performed after the administration of intravenous contrast in both nephrographic and delayed phases after the administration of intravenous contrast   Axial, sagittal, and coronal 2D reformatted images were created from the source data and submitted for interpretation  Radiation dose length product (DLP) for this visit:  8691 mGy-cm   This examination, like all CT scans performed in the [facility], was performed utilizing techniques to minimize radiation dose exposure, including the use of iterative reconstruction and automated exposure control  IV Contrast:  100 mL of iohexol (OMNIPAQUE) Enteric Contrast:  Enteric contrast was not administered  FINDINGS: ABDOMEN RIGHT KIDNEY AND URETER: No solid renal mass  No detectable urothelial mass  No hydronephrosis or hydroureter  No urinary tract calculi  No perinephric collection  LEFT KIDNEY AND URETER: Postsurgical changes related to prior partial nephrectomy for resection of left upper pole neoplasm no definite residual or recurrent neoplasm identified  No hydronephrosis or hydroureter  No urinary tract calculi  No perinephric collection  URINARY BLADDER: No bladder wall mass  No calculi  LOWER CHEST:  No clinically significant abnormality identified in the visualized lower chest  LIVER/BILIARY TREE:  Unremarkable  GALLBLADDER:  No calcified gallstones  No pericholecystic inflammatory change  SPLEEN:  Unremarkable  PANCREAS:  Unremarkable  ADRENAL GLANDS:  Unremarkable  STOMACH AND BOWEL:  There is colonic diverticulosis without evidence of acute diverticulitis  ABDOMINOPELVIC CAVITY:  No ascites  No free intraperitoneal air   No lymphadenopathy  VESSELS:  Unremarkable for patient's age  PELVIS REPRODUCTIVE ORGANS:  The prostate is enlarged  APPENDIX: No findings to suggest appendicitis  ABDOMINAL WALL/INGUINAL REGIONS:  Unremarkable  OSSEOUS STRUCTURES:  No acute fracture or destructive osseous lesion  Impression: Status post resection of left upper pole renal neoplasm  No evidence of residual or recurrent neoplasm identified  Colonic diverticulosis without CT evidence of acute diverticulitis  Workstation performed: [id number]       I have personally reviewed pertinent reports     and I have personally reviewed pertinent films in PACS

## 2021-01-28 NOTE — ASSESSMENT & PLAN NOTE
Patient seen outpatient office today for 2 week post hospital follow-up for left frontal parietal  subdural hemorrhage  · History of prior CVA and AFib who was on Eliquis which has since been on hold  · Patient originally presented to the ED on 01/08 with dizziness and stomach discomfort  Imaging at that time demonstrated a left frontal parietal subdural hematoma  Patient denies any history of falls or striking his head  Imaging:    CT head wo 1/21/21:Evolving left convexity subdural collection, stable in size  Stable thin right subdural collection  No new areas of acute intracranial hemorrhage are identified    Plan:  · Reviewed imaging with patient  · Continue to hold all AP/AC  · Patient will follow-up in 2 weeks with repeat CT head or sooner if he develops worsening symptoms  · There is also an incidental probable retrocerebellar arachnoid cyst seen on imaging  Will discuss further follow up for finding at next visit  · Patient made aware to return for worsening headaches, dizziness, nausea, vomiting, confusion, slurred speech, or sleepiness  · Recommendations reviewed patient  Patient showed understanding and is agreeable to recommendations  · Patient made aware to contact Neurosurgery with any further questions or concerns

## 2021-02-03 ENCOUNTER — HOSPITAL ENCOUNTER (OUTPATIENT)
Dept: RADIOLOGY | Age: 66
Discharge: HOME | End: 2021-02-03
Payer: MEDICARE

## 2021-02-03 DIAGNOSIS — S06.5X9A SDH (SUBDURAL HEMATOMA) (HCC): ICD-10-CM

## 2021-02-03 PROCEDURE — G1004 CDSM NDSC: HCPCS

## 2021-02-03 PROCEDURE — 70450 CT HEAD/BRAIN W/O DYE: CPT

## 2021-02-04 ENCOUNTER — OFFICE VISIT (OUTPATIENT)
Dept: CARDIOLOGY CLINIC | Facility: CLINIC | Age: 66
End: 2021-02-04
Payer: MEDICARE

## 2021-02-04 VITALS
HEART RATE: 56 BPM | WEIGHT: 209 LBS | HEIGHT: 74 IN | SYSTOLIC BLOOD PRESSURE: 132 MMHG | DIASTOLIC BLOOD PRESSURE: 74 MMHG | BODY MASS INDEX: 26.82 KG/M2

## 2021-02-04 DIAGNOSIS — I48.0 PAROXYSMAL ATRIAL FIBRILLATION (HCC): Primary | ICD-10-CM

## 2021-02-04 DIAGNOSIS — I10 ESSENTIAL HYPERTENSION: ICD-10-CM

## 2021-02-04 DIAGNOSIS — N18.31 STAGE 3A CHRONIC KIDNEY DISEASE (HCC): ICD-10-CM

## 2021-02-04 PROCEDURE — 99214 OFFICE O/P EST MOD 30 MIN: CPT | Performed by: INTERNAL MEDICINE

## 2021-02-04 PROCEDURE — 93000 ELECTROCARDIOGRAM COMPLETE: CPT | Performed by: INTERNAL MEDICINE

## 2021-02-04 NOTE — PROGRESS NOTES
Cardiology Follow Up    Adelso Forman  1955  [id number]  [address] [facility] [phone] [place] [address]  [phone]    1  Paroxysmal atrial fibrillation (HCC)  POCT ECG   2  Essential hypertension     3  Stage 3a chronic kidney disease         Discussion/Summary:    PAF - previously, the ablation was on hold because of the RCC finding, but now the SDH recently complicates things further as well  He is still off anticoagulation at this point  A concern, obviously, given his high CHADS-VASc score and history of CVA  There was no trauma to have caused the SDH  He does remain stable with regards to his rhythm, based on EKG in the office, his HR monitor at home, and his symptoms  BP is controlled with amlodipine, metoprolol  The other medications had been stopped  He has neurosurgery follow up next week  I would ideally like him to resume anticoagulation ASAP given his prior CVA  I will also communicate with Dr Huyen Olivo, as if the risk of repeat bleeding is felt to be high, he should be evaluated for watchman device  This would require him to be able to resume anticoagulation in the short term, but would allow him to come off it in the long term  History of Present Illness:   Pleasant 56-year-old man  History of atrial fibrillation with a prior CVA  Was previously maintaining sinus rhythm with Toprol XL, but at visit with me 12/2019, was found to be in afib, more persistent  We did a Holter, which showed rate controlled afib, but he was feeling palpitations  After discussing options, he was referred to EP  Initially started on Flecainide, and then underwent cardioversion in 7/2020  Was having fatigue, so was planned for ablation, but the preoperative CT scan showed a renal mass  He is now s/p partial nephrectomy  With 100mg flecainide and 100mg toprol, he was very fatigued      Interval History:  Since last visit with me, unfortunately had a subdural hematoma  He had headache, nausea/vomiting  No trauma was known  Transferred to [facility], and was taken off Eliquis  He was initially in afib, and then converted to sinus rhythm  We did increase the flecainide to 100mg twice a day, and kept the metoprolol at 50mg  Plan was for outpatient repeat CT head, which he had done, and then another one was ordered which he had done yesterday  The size of the collection has remained stable  He feels well, is in sinus rhythm today  He has follow up again next week with neurosurgery        Problem List     Cerebrovascular disease, ill-defined, acute    Essential hypertension    Paroxysmal atrial fibrillation ([facility] )    Overview Signed 3/15/2018  2:07 PM by Steve Barlow DO     Transitioned From: Atrial fibrillation         Mixed hyperlipidemia    Pain of toe of right foot        Past Medical History:   Diagnosis Date    Atrial fibrillation ([address] )     Cancer ([facility] ) 08/2020    Kidney cancer    Cancer ([facility] )     melanoma    Chronic kidney disease     left kidney cancer    CPAP (continuous positive airway pressure) dependence     Hyperlipidemia     Hypertension     Irregular heart beat     Afib    Pneumonia     Skin cancer     Sleep apnea     Stroke ([facility] [facility] )     Supraventricular tachycardia ([facility] [facility] )     Wears glasses     Wears partial dentures     upper partial     Social History     Tobacco Use    Smoking status: Never Smoker    Smokeless tobacco: Never Used   Substance Use Topics    Alcohol use: Yes     Frequency: 2-4 times a month     Drinks per session: 1 or 2     Family History   Problem Relation Age of Onset    Cancer Father         stomach    Hypertension Father     Stroke Father         syndrome    Cancer Family     Heart disease Family     Hypertension Family     Stroke Family         syndrome    Throat cancer Mother     Diabetes Mother     Asthma Sister      Past Surgical History: Procedure Laterality Date    APPENDECTOMY      CARDIOVERSION      COLONOSCOPY      fiberoptic, also 2009, both negative, resolved 2004    COLONOSCOPY  08/2019    EGD      HERNIA REPAIR      umbilical and bilateral inguinal    KNEE ARTHROSCOPY Left     IL LAP,PARTIAL NEPHRECTOMY Left 10/7/2020    Procedure: ROBOTIC PARTIAL NEPHRECTOMY;  Surgeon: Yesenia Nunez MD;  [facility];  Service: Urology    SKIN GRAFT      left ear, skin cancer    TONSILLECTOMY         Current Outpatient Medications:     amLODIPine (NORVASC) 5 mg tablet, Take 5 mg by mouth daily, Disp: , Rfl:     atorvastatin (LIPITOR) 10 mg tablet, TAKE ONE TABLET BY MOUTH EVERY DAY, Disp: 90 tablet, Rfl: 1    Coenzyme Q10 (CO Q-10 PO), Take 1 caplet by mouth every morning, Disp: , Rfl:     flecainide (TAMBOCOR) 100 mg tablet, Take 1 tablet (100 mg total) by mouth 2 (two) times a day, Disp: 60 tablet, Rfl: 0    metoprolol succinate (TOPROL-XL) 50 mg 24 hr tablet, Take 1 tablet (50 mg total) by mouth every morning, Disp: 90 tablet, Rfl: 1    Multiple Vitamins-Minerals (MULTIVITAMIN ADULT PO), Take 1 tablet by mouth every morning, Disp: , Rfl:     docusate sodium (COLACE) 100 mg capsule, Take 1 capsule (100 mg total) by mouth 2 (two) times a day for 60 doses, Disp: 60 capsule, Rfl: 0  No Known Allergies    Vitals:    02/04/21 1047   BP: 132/74   BP Location: Right arm   Patient Position: Sitting   Cuff Size: Adult   Pulse: 56   Weight: 94 8 kg (209 lb)   Height: 6' 2" (1 88 m)     Vitals:    02/04/21 1047   Weight: 94 8 kg (209 lb)      Height: 6' 2" (188 cm)   Body mass index is 26 83 kg/m²      Physical Exam:  GEN: Kajal Collar appears well, alert and oriented x 3, pleasant and cooperative   HEENT: pupils equal, round, and reactive to light; extraocular muscles intact  NECK: supple, no carotid bruits   HEART: regular rhythm, bradycardic   normal S1 and S2, no murmurs, clicks, gallops or rubs   LUNGS: clear to auscultation bilaterally; no wheezes, rales, or rhonchi   ABDOMEN: normal bowel sounds, soft, no tenderness, no distention  EXTREMITIES: peripheral pulses normal; no clubbing, cyanosis, or edema  NEURO: no focal findings   SKIN: normal without suspicious lesions on exposed skin    ROS:  Except as noted in HPI, is otherwise reviewed in detail and a 12 point review of systems is negative  ROS reviewed and is unchanged    Labs:  Lab Results   Component Value Date     09/19/2017    K 3 7 01/19/2021     01/19/2021    CREATININE 1 10 01/19/2021    BUN 12 01/19/2021    CO2 32 01/19/2021    ALT 42 01/08/2021    AST 27 01/08/2021    INR 1 20 (H) 01/10/2021    GLUF 88 01/19/2021    WBC 4 87 01/19/2021    HGB 14 5 01/19/2021    HCT 44 7 01/19/2021     01/19/2021       Lab Results   Component Value Date    CHOL 136 09/19/2017    CHOL 132 05/31/2017    CHOL 124 (L) 03/30/2016     Lab Results   Component Value Date    LDLCALC 66 01/23/2020    LDLCALC 69 12/06/2018     Lab Results   Component Value Date    HDL 49 01/23/2020    HDL 62 12/06/2018    HDL 59 09/19/2017     Lab Results   Component Value Date    TRIG 53 01/23/2020    TRIG 51 12/06/2018    TRIG 54 09/19/2017     Echo 10/2019:  LEFT VENTRICLE:  Systolic function was normal  Ejection fraction was estimated to be 55 %  There were no regional wall motion abnormalities  Wall thickness was at the upper limits of normal      MITRAL VALVE:  There was mild to moderate regurgitation      TRICUSPID VALVE:  There was mild regurgitation  Pulmonary artery systolic pressure was within the normal range      PULMONIC VALVE:  There was trace regurgitation      AORTA:  The root exhibited mild dilatation  3 8 cm  There was mild dilatation of the ascending aorta  3 7 cm     Holter 5/27/2020:  IMPRESSION:  1  Predominantly atrial fibrillation with an average heart rate of 78 bpm    2  Rare PVCs, consisting of 0 5% of total beats  No sustained ventricular arrhythmias    3  No significant pauses  4  Patient's symptoms of fluttering correlated with atrial fibrillation at controlled rates  EKG:  Sinus bradycardia  56 beats per minute  First-degree AV block

## 2021-02-11 ENCOUNTER — OFFICE VISIT (OUTPATIENT)
Dept: NEUROSURGERY | Facility: CLINIC | Age: 66
End: 2021-02-11
Payer: MEDICARE

## 2021-02-11 VITALS
WEIGHT: 210 LBS | DIASTOLIC BLOOD PRESSURE: 70 MMHG | BODY MASS INDEX: 26.95 KG/M2 | SYSTOLIC BLOOD PRESSURE: 110 MMHG | HEIGHT: 74 IN | TEMPERATURE: 97 F

## 2021-02-11 DIAGNOSIS — S06.5X9A SDH (SUBDURAL HEMATOMA) (HCC): Primary | ICD-10-CM

## 2021-02-11 PROCEDURE — 99213 OFFICE O/P EST LOW 20 MIN: CPT | Performed by: NURSE PRACTITIONER

## 2021-02-11 NOTE — PROGRESS NOTES
Neurosurgery Office Note  Savana Hollingsworth 76 y o  male MRN: [id number]      Assessment/Plan     SDH (subdural hematoma) [facility])  Patient seen outpatient office today for follow-up for B/L SDHs L>R  · History of prior CVA and AFib who was on Eliquis which has since been on hold  · Patient originally presented to the ED on 01/08 with dizziness and stomach discomfort  Imaging at that time demonstrated a left frontal parietal subdural hematoma  Patient denies any history of falls or striking his head  Imaging:    CT head wo 2/3/21:Small bilateral subacute subdural collections which are isodense to adjacent brain parenchyma suggestive of chronic subdural hematomas  These are unchanged in size and configuration measuring less than 4 mm in thickness from the inner table of the calvarium without significant mass effect upon the underlying brain parenchyma  Plan:  · Reviewed imaging with patient  · Patient is cleared from neurosurgical standpoint to restart anticoagulation  · Patient will follow-up in 2 weeks with repeat CT head or sooner if he develops worsening symptoms  · There is also an incidental retrocerebellar arachnoid cyst seen on imaging  Follow-up in 6 months with MRI brain w/wo  Will schedule at follow-up appointment  · Patient made aware to return for worsening headaches, dizziness, nausea, vomiting, confusion, slurred speech, or sleepiness  · Recommendations reviewed patient  Patient showed understanding and is agreeable to recommendations  · Patient made aware to contact Neurosurgery with any further questions or concerns         Diagnoses and all orders for this visit:    SDH (subdural hematoma) ([facility] [address] )  -     CT head wo contrast; Future            CHIEF COMPLAINT    Chief Complaint   Patient presents with    Follow-up     subdural hematoma       HISTORY    History of Present Illness     76y o  year old male with past medical history significant for hypertension, AFib, CVA was on Eliquis which has since been on hold, obstructive sleep apnea, renal cell carcinoma of left kidney, BPH, chronic kidney disease, and hyperlipidemia  Patient seen in outpatient office today for 2 week post hospital follow-up for bilateral subdural hematomas  Patient originally presented to the ED on 01/08 with dizziness and stomach discomfort  Imaging at that time demonstrated a left frontal parietal subdural hematoma  Patient denied any history of falls or striking his head  Repeat imaging demonstrated bilateral subdural hematomas left greater than right  Patient has no complaints at this time  He denies any headaches, dizziness, blurry vision, chest pain, shortness of breath, abdominal pain, nausea, vomiting, diarrhea, no problems with bowel or bladder, no new weakness or numbness/ tingling  He denies any recent falls or traumas or difficulty with his balance  Repeat CT head stable, patient is cleared from neurosurgical standpoint to restart anticoagulation  Sent patient's cardiologist a message  Patient recently saw cardiologist who wanted patient to restart anticoagulation as soon as possible given history of CVA and AFib  Also patient to be evaluated for possible Watchman per chart review would need to be on anticoagulation short-term and be off long-term  Plan for repeat CT head in 2 weeks since restarting anticoagulation or sooner if symptoms worsen  Patient made aware to contact Neurosurgery with any further questions or concerns  HPI    See Discussion    REVIEW OF SYSTEMS    Review of Systems   Constitutional: Negative  HENT: Negative  Eyes: Negative  Respiratory: Negative  Cardiovascular: Negative  Gastrointestinal: Negative  Endocrine: Negative  Genitourinary: Negative  Musculoskeletal: Negative  Skin: Negative  Allergic/Immunologic: Negative  Neurological: Negative  Hematological: Negative  Psychiatric/Behavioral: Negative         ROS reviewed with patient and agree and changes were made as needed  Meds/Allergies     Current Outpatient Medications   Medication Sig Dispense Refill    amLODIPine (NORVASC) 5 mg tablet Take 5 mg by mouth daily      atorvastatin (LIPITOR) 10 mg tablet TAKE ONE TABLET BY MOUTH EVERY DAY 90 tablet 1    Coenzyme Q10 (CO Q-10 PO) Take 1 caplet by mouth every morning      flecainide (TAMBOCOR) 100 mg tablet Take 1 tablet (100 mg total) by mouth 2 (two) times a day 60 tablet 0    metoprolol succinate (TOPROL-XL) 50 mg 24 hr tablet Take 1 tablet (50 mg total) by mouth every morning 90 tablet 1    Multiple Vitamins-Minerals (MULTIVITAMIN ADULT PO) Take 1 tablet by mouth every morning       No current facility-administered medications for this visit  No Known Allergies    PAST HISTORY    Past Medical History:   Diagnosis Date    Atrial fibrillation ([facility] [address] )     Cancer ([facility] [address] ) 08/2020    Kidney cancer    Cancer (HCC)     melanoma    Chronic kidney disease     left kidney cancer    CPAP (continuous positive airway pressure) dependence     Hyperlipidemia     Hypertension     Irregular heart beat     Afib    Pneumonia     Skin cancer     Sleep apnea     Stroke ([facility] [address] )     Supraventricular tachycardia ([facility] [address] )     Wears glasses     Wears partial dentures     upper partial       Past Surgical History:   Procedure Laterality Date    APPENDECTOMY      CARDIOVERSION      COLONOSCOPY      fiberoptic, also 2009, both negative, resolved 2004    COLONOSCOPY  08/2019    EGD      HERNIA REPAIR      umbilical and bilateral inguinal    KNEE ARTHROSCOPY Left     MT LAP,PARTIAL NEPHRECTOMY Left 10/7/2020    Procedure: ROBOTIC PARTIAL NEPHRECTOMY;  Surgeon: Kellie Cortez MD;  Location: [facility] OR;  Service: Urology    SKIN GRAFT      left ear, skin cancer    TONSILLECTOMY         Social History     Tobacco Use    Smoking status: Never Smoker    Smokeless tobacco: Never Used   Substance Use Topics    Alcohol use:  Yes Frequency: 2-4 times a month     Drinks per session: 1 or 2    Drug use: No       Family History   Problem Relation Age of Onset    Cancer Father         stomach    Hypertension Father     Stroke Father         syndrome    Cancer Family     Heart disease Family     Hypertension Family     Stroke Family         syndrome    Throat cancer Mother     Diabetes Mother     Asthma Sister          Above history personally reviewed  EXAM    Vitals:Blood pressure 110/70, temperature (!) 97 °F (36 1 °C), temperature source Temporal, height 6' 2" (1 88 m), weight 95 3 kg (210 lb)  ,Body mass index is 26 96 kg/m²  Physical Exam  Vitals signs reviewed  Constitutional:       General: He is awake  He is not in acute distress  Appearance: Normal appearance  He is not ill-appearing  HENT:      Head: Normocephalic and atraumatic  Eyes:      Extraocular Movements: Extraocular movements intact and EOM normal       Conjunctiva/sclera: Conjunctivae normal       Pupils: Pupils are equal, round, and reactive to light  Neck:      Musculoskeletal: Normal range of motion and neck supple  Pulmonary:      Effort: Pulmonary effort is normal  No respiratory distress  Chest:      Chest wall: No tenderness  Abdominal:      General: There is no distension  Palpations: Abdomen is soft  Tenderness: There is no abdominal tenderness  Musculoskeletal: Normal range of motion  Skin:     General: Skin is warm and dry  Neurological:      Mental Status: He is alert and oriented to person, place, and time  Coordination: Finger-Nose-Finger Test normal       Gait: Gait is intact  Deep Tendon Reflexes: Strength normal       Reflex Scores:       Tricep reflexes are 2+ on the right side and 2+ on the left side  Bicep reflexes are 2+ on the right side and 2+ on the left side  Brachioradialis reflexes are 2+ on the right side and 2+ on the left side         Patellar reflexes are 2+ on the right side and 2+ on the left side  Achilles reflexes are 2+ on the right side and 2+ on the left side  Psychiatric:         Attention and Perception: Attention and perception normal          Mood and Affect: Mood and affect normal          Speech: Speech normal          Behavior: Behavior normal  Behavior is cooperative  Thought Content: Thought content normal          Cognition and Memory: Cognition and memory normal          Judgment: Judgment normal          Neurologic Exam     Mental Status   Oriented to person, place, and time  Follows 2 step commands  Attention: normal  Concentration: normal    Speech: speech is normal   Level of consciousness: alert  Knowledge: good  Able to perform simple calculations  Able to name object  Able to repeat  Normal comprehension  Cranial Nerves     CN III, IV, VI   Pupils are equal, round, and reactive to light  Extraocular motions are normal    CN III: no CN III palsy  CN VI: no CN VI palsy  Nystagmus: none   Diplopia: none  Conjugate gaze: present    CN V   Facial sensation intact  CN VII   Facial expression full, symmetric  CN VIII   CN VIII normal    Hearing: intact    CN IX, X   CN IX normal      CN XI   CN XI normal      CN XII   CN XII normal      Mild dysarthria from prior stroke     Motor Exam   Muscle bulk: normal  Overall muscle tone: normal  Right arm pronator drift: absent  Left arm pronator drift: absent    Strength   Strength 5/5 throughout       Sensory Exam   Light touch normal    Proprioception normal    JPS and DST intact     Gait, Coordination, and Reflexes     Gait  Gait: normal    Coordination   Finger to nose coordination: normal    Tremor   Resting tremor: absent  Intention tremor: absent  Action tremor: absent    Reflexes   Right brachioradialis: 2+  Left brachioradialis: 2+  Right biceps: 2+  Left biceps: 2+  Right triceps: 2+  Left triceps: 2+  Right patellar: 2+  Left patellar: 2+  Right achilles: 2+  Left achilles: 2+  Right Encarnacion: absent  Left Encarnacion: absent  Right ankle clonus: absent  Left ankle clonus: absent        MEDICAL DECISION MAKING    Imaging Studies:     Ct Head Wo Contrast    Result Date: 2/3/2021  Narrative: CT BRAIN - WITHOUT CONTRAST INDICATION:   S06  5X9A: Traumatic subdural hemorrhage with loss of consciousness of unspecified duration, initial encounter  COMPARISON:  1/21/2021 TECHNIQUE:  CT examination of the brain was performed  In addition to axial images, sagittal and coronal 2D reformatted images were created and submitted for interpretation  Radiation dose length product (DLP) for this visit:  895 mGy-cm   This examination, like all CT scans performed in the [facility], was performed utilizing techniques to minimize radiation dose exposure, including the use of iterative reconstruction and automated exposure control  IMAGE QUALITY:  Diagnostic  FINDINGS: PARENCHYMA:  Stable arachnoid cyst posterior to the left cerebellar hemisphere and cerebellar vermis  Small subacute subdural hematomas are identified within each hemisphere on the left there is a small frontal parietal collection measuring 3 to 4 mm in thickness without significant mass effect upon the adjacent brain parenchyma  This is unchanged in size and configuration  On the right side there is an even smaller subdural collection measuring only approximately 2 mm from the inner table of the calvarium towards the posterior frontal vertex  Stable underlying brain parenchyma  Normal basilar cisterns  Brainstem and cerebellum demonstrate normal density  VENTRICLES:  No obstructive hydrocephalus  VISUALIZED ORBITS AND PARANASAL SINUSES:  Unremarkable  CALVARIUM AND EXTRACRANIAL SOFT TISSUES:  Normal      Impression: Small bilateral subacute subdural collections which are isodense to adjacent brain parenchyma suggestive of chronic subdural hematomas    These are unchanged in size and configuration measuring less than 4 mm in thickness from the inner table of the calvarium without significant mass effect upon the underlying brain parenchyma  Workstation performed: [id number]     Ct Head Wo Contrast    Result Date: 1/22/2021  Narrative: CT BRAIN - WITHOUT CONTRAST INDICATION:   S06  5X9A: Traumatic subdural hemorrhage with loss of consciousness of unspecified duration, initial encounter  COMPARISON:  1/10/2021 TECHNIQUE:  CT examination of the brain was performed  In addition to axial images, sagittal and coronal 2D reformatted images were created and submitted for interpretation  Radiation dose length product (DLP) for this visit:  921 mGy-cm   This examination, like all CT scans performed in the [facility], was performed utilizing techniques to minimize radiation dose exposure, including the use of iterative reconstruction and automated exposure control  IMAGE QUALITY:  Diagnostic  FINDINGS: PARENCHYMA:  There is redemonstration of a left convexity mixed attenuation subdural hematoma  There has has been mild interval degradation of 1 products  Overall size is unchanged measuring up to 4 mm in depth  There is a stable predominantly low-attenuation collection along the right convexity measuring up to 2 mm in depth  No new areas of acute intracranial hemorrhage identified  No midline shift  No CT evidence for a large acute vascular solution infarct  Stable posterior fossa arachnoid cyst  VENTRICLES AND EXTRA-AXIAL SPACES:  Normal for the patient's age  VISUALIZED ORBITS AND PARANASAL SINUSES:  Unremarkable  CALVARIUM AND EXTRACRANIAL SOFT TISSUES:  Normal      Impression: Evolving left convexity subdural collection, stable in size  Stable thin right subdural collection  No new areas of acute intracranial hemorrhage are identified   Workstation performed: [id number]     Ct Renal Protocol    Result Date: 1/27/2021  Narrative: CT ABDOMEN AND PELVIS WITH AND WITHOUT IV CONTRAST INDICATION:   C64 2: Malignant neoplasm of left kidney, except renal pelvis  robot assisted laparoscopic left partial nephrectomy for an almost 7 cm upper pole left renal mass  COMPARISON:  Multiple priors most recently CT 8/20/2020 and MRI 8/25/2020 TECHNIQUE: Initial CT of the abdomen and pelvis was performed without intravenous contrast   Subsequent dynamic CT evaluation of the abdomen and pelvis was performed after the administration of intravenous contrast in both nephrographic and delayed phases after the administration of intravenous contrast   Axial, sagittal, and coronal 2D reformatted images were created from the source data and submitted for interpretation  Radiation dose length product (DLP) for this visit:  2717 mGy-cm   This examination, like all CT scans performed in the [facility], was performed utilizing techniques to minimize radiation dose exposure, including the use of iterative reconstruction and automated exposure control  IV Contrast:  100 mL of iohexol (OMNIPAQUE) Enteric Contrast:  Enteric contrast was not administered  FINDINGS: ABDOMEN RIGHT KIDNEY AND URETER: No solid renal mass  No detectable urothelial mass  No hydronephrosis or hydroureter  No urinary tract calculi  No perinephric collection  LEFT KIDNEY AND URETER: Postsurgical changes related to prior partial nephrectomy for resection of left upper pole neoplasm no definite residual or recurrent neoplasm identified  No hydronephrosis or hydroureter  No urinary tract calculi  No perinephric collection  URINARY BLADDER: No bladder wall mass  No calculi  LOWER CHEST:  No clinically significant abnormality identified in the visualized lower chest  LIVER/BILIARY TREE:  Unremarkable  GALLBLADDER:  No calcified gallstones  No pericholecystic inflammatory change  SPLEEN:  Unremarkable  PANCREAS:  Unremarkable  ADRENAL GLANDS:  Unremarkable  STOMACH AND BOWEL:  There is colonic diverticulosis without evidence of acute diverticulitis   ABDOMINOPELVIC CAVITY:  No ascites  No free intraperitoneal air  No lymphadenopathy  VESSELS:  Unremarkable for patient's age  PELVIS REPRODUCTIVE ORGANS:  The prostate is enlarged  APPENDIX: No findings to suggest appendicitis  ABDOMINAL WALL/INGUINAL REGIONS:  Unremarkable  OSSEOUS STRUCTURES:  No acute fracture or destructive osseous lesion  Impression: Status post resection of left upper pole renal neoplasm  No evidence of residual or recurrent neoplasm identified  Colonic diverticulosis without CT evidence of acute diverticulitis  Workstation performed: [id number]       I have personally reviewed pertinent reports     and I have personally reviewed pertinent films in PACS

## 2021-02-11 NOTE — ASSESSMENT & PLAN NOTE
Patient seen outpatient office today for follow-up for B/L SDHs L>R  · History of prior CVA and AFib who was on Eliquis which has since been on hold  · Patient originally presented to the ED on 01/08 with dizziness and stomach discomfort  Imaging at that time demonstrated a left frontal parietal subdural hematoma  Patient denies any history of falls or striking his head  Imaging:    CT head wo 2/3/21:Small bilateral subacute subdural collections which are isodense to adjacent brain parenchyma suggestive of chronic subdural hematomas  These are unchanged in size and configuration measuring less than 4 mm in thickness from the inner table of the calvarium without significant mass effect upon the underlying brain parenchyma  Plan:  · Reviewed imaging with patient  · Patient is cleared from neurosurgical standpoint to restart anticoagulation  · Patient will follow-up in 2 weeks with repeat CT head or sooner if he develops worsening symptoms  · There is also an incidental retrocerebellar arachnoid cyst seen on imaging  Follow-up in 6 months with MRI brain w/wo  Will schedule at follow-up appointment  · Patient made aware to return for worsening headaches, dizziness, nausea, vomiting, confusion, slurred speech, or sleepiness  · Recommendations reviewed patient  Patient showed understanding and is agreeable to recommendations  · Patient made aware to contact Neurosurgery with any further questions or concerns

## 2021-02-11 NOTE — PATIENT INSTRUCTIONS
Please follow-up in 2 weeks once CT head completed or sooner if symptoms worsen    Okay from neurosurgical standpoint to restart anticoagulation per cardiology

## 2021-02-12 ENCOUNTER — TELEPHONE (OUTPATIENT)
Dept: CARDIOLOGY CLINIC | Facility: CLINIC | Age: 66
End: 2021-02-12

## 2021-02-12 ENCOUNTER — TELEPHONE (OUTPATIENT)
Dept: NEUROSURGERY | Facility: CLINIC | Age: 66
End: 2021-02-12

## 2021-02-12 NOTE — TELEPHONE ENCOUNTER
Spoke with [employer] from Neuro surgery  Patient has been cleared to restart Eliquis  Patient has been notified

## 2021-02-12 NOTE — TELEPHONE ENCOUNTER
Patient left message on nurseline regarding his eliquis  Noticed notes from both cardiology and neurosurgery have cleared patient to resume his eliquis  Returned call to patient to inform him of this info  Patient stated the cardiology office needs neuro sx to contact them despite notes in chart  Left voicemail on cardiology nurseline, will wait for call back to discuss

## 2021-02-15 ENCOUNTER — PATIENT OUTREACH (OUTPATIENT)
Dept: FAMILY MEDICINE CLINIC | Facility: CLINIC | Age: 66
End: 2021-02-15

## 2021-02-15 DIAGNOSIS — E78.2 MIXED HYPERLIPIDEMIA: ICD-10-CM

## 2021-02-15 DIAGNOSIS — S06.5X9A SDH (SUBDURAL HEMATOMA) (HCC): ICD-10-CM

## 2021-02-15 DIAGNOSIS — I48.0 PAROXYSMAL ATRIAL FIBRILLATION (HCC): ICD-10-CM

## 2021-02-15 DIAGNOSIS — I67.89 CEREBROVASCULAR DISEASE, ILL-DEFINED, ACUTE: Primary | ICD-10-CM

## 2021-02-15 RX ORDER — ATORVASTATIN CALCIUM 10 MG/1
10 TABLET, FILM COATED ORAL DAILY
Qty: 90 TABLET | Refills: 1 | Status: SHIPPED | OUTPATIENT
Start: 2021-02-15 | End: 2021-10-05 | Stop reason: SDUPTHER

## 2021-02-15 NOTE — TELEPHONE ENCOUNTER
Patient is requesting refill of meds  He was seen on 1/22  Patient states that Eliquis was just restarted by cardiology  Have placed orders  Thank you

## 2021-02-15 NOTE — PROGRESS NOTES
Sonia Marquis for follow up  He is managing well at home and denies needing additional assistance at home  Nutritional intake adequate  He is taking all medications as prescribed  Follow up appointments scheduled  BP has been stable  No issues or concerns  Declined further outreach

## 2021-02-18 ENCOUNTER — TELEMEDICINE (OUTPATIENT)
Dept: CARDIOLOGY CLINIC | Facility: CLINIC | Age: 66
End: 2021-02-18
Payer: MEDICARE

## 2021-02-18 VITALS
SYSTOLIC BLOOD PRESSURE: 130 MMHG | BODY MASS INDEX: 26.95 KG/M2 | WEIGHT: 210 LBS | DIASTOLIC BLOOD PRESSURE: 77 MMHG | HEIGHT: 74 IN

## 2021-02-18 DIAGNOSIS — I48.19 PERSISTENT ATRIAL FIBRILLATION (HCC): ICD-10-CM

## 2021-02-18 DIAGNOSIS — I48.0 PAROXYSMAL ATRIAL FIBRILLATION (HCC): Primary | ICD-10-CM

## 2021-02-18 PROCEDURE — 99215 OFFICE O/P EST HI 40 MIN: CPT | Performed by: INTERNAL MEDICINE

## 2021-02-18 RX ORDER — DABIGATRAN ETEXILATE 75 MG/1
75 CAPSULE, COATED PELLETS ORAL EVERY 12 HOURS SCHEDULED
Qty: 60 CAPSULE | Refills: 3 | Status: SHIPPED | OUTPATIENT
Start: 2021-02-18 | End: 2021-05-14

## 2021-02-18 RX ORDER — FLECAINIDE ACETATE 100 MG/1
100 TABLET ORAL 2 TIMES DAILY
Qty: 60 TABLET | Refills: 0 | Status: SHIPPED | OUTPATIENT
Start: 2021-02-18 | End: 2021-03-29 | Stop reason: SDUPTHER

## 2021-02-18 RX ORDER — METOPROLOL SUCCINATE 50 MG/1
50 TABLET, EXTENDED RELEASE ORAL EVERY MORNING
Qty: 90 TABLET | Refills: 1 | Status: SHIPPED | OUTPATIENT
Start: 2021-02-18 | End: 2021-06-02 | Stop reason: SDUPTHER

## 2021-02-18 NOTE — PROGRESS NOTES
Virtual Regular Visit      Assessment/Plan:    Problem List Items Addressed This Visit        Cardiovascular and Mediastinum    Paroxysmal atrial fibrillation (HCC) - Primary    Relevant Medications    dabigatran etexilate (PRADAXA) 75 mg capsule    flecainide (TAMBOCOR) 100 mg tablet    metoprolol succinate (TOPROL-XL) 50 mg 24 hr tablet      Other Visit Diagnoses     Persistent atrial fibrillation (HCC)        Relevant Medications    flecainide (TAMBOCOR) 100 mg tablet    metoprolol succinate (TOPROL-XL) 50 mg 24 hr tablet             75 yo male    Reason for visit is   Chief Complaint   Patient presents with    Follow-up     2 Month F/u    Virtual Regular Visit      75 yo male  1)Paroxysmal afib since Dec 2019, post DCCV w flecainide, had bradycardia w chronotropic incompetence on toprol 100mg and flecainide 100mg bid  WE decreased to toprol 50mg and flecainide 50mg bid and feels much better, now up to 100mg bid flecainide because had afib on the 50mg bid dose  Kenny Simon Active 75 yo  [id number]=1 age, HTN, CVA, now on Eliquis  Afib was paroxysmal before this and holter Oct 2019 showed no episodes w sinus bradycardia in 50s      He was planned for ablation but was cancelled due to finding renal carcinoma, now post nephrectomy in Oct and recovering well     He unfortunately had subdural hematoma spontaneously on ELiquis and was cleared to resume [facility] per Neurosurgery they preferred something reversible, he is back on Eliquis for past few days  2) Normal EF and LA size, mild to moderate MR Echo Oct 2019  No edema or symptoms of heart failure     3) HTN well controlled     4) CVA 6 years ago w/o residual defecit         Plan  1) Recommend Watchman to minimize time on blood thinners in future  Discussed risks and benefits of Watchman at length  We went over trial data, serious complications including bleeding around the heart, stroke, death, surgery   Explained it would lower stroke rate similar to if taking anticoagulation but ultimately will lower bleeding risk  I explained need for anticoagulation for first 45 days followed by dual antiplatelets for first 6 months and risks of bleeding then, also risk of thrombus on device necessating need for blood thinners after procedure  We discussed need for repeat Transesophageal echocardiograms to monitor device and also pre procedure  2) Will d/c Eliquis and do low dose Pradaxa 75mg bid (off label as this is subtherepeutic dose, but his risk of bleeding full dose is signficant and he had neprectomy w reduced CrCl  Pradaxa is  Reversible w [employer]xab[employer] where Eliquis reversing agent is not on formulary at [facility]  He is fortunatley in NSR so lower risk for CVA in meantime  D/w  Erika Dickerson who agrees  3) We can still perform afib ablation , he is in NSR but is high risk for recurrence and limited w bradycardia in past on meds  HE has gone through a lot lately and not sure wants to deal w 2 invasive procedures (ablation and Watchman), which is reasonable, we could choose to do ablation at later time, seems more pertinent to do Watchman  If does want to do ablation ahead of Watchman that is reasonable in that ablation also requires blood thinner for at least 2 months afterward even if we did Watchman, so to consolidate time on blood thinners doing both in same month makes sense   I went over all of this w him and he will think about ablation and let us know, but he definitely wants to proceed w Watchman            Encounter provider Darby Davis MD    Provider located at [address] [facility]      Recent Visits  Date Type Provider Dept   02/12/21 Telephone [address] recent visits within past 7 days and meeting all other requirements     Today's Visits  Date Type Provider Dept   02/18/21 Telemedicine Darby Davis MD [address] [place] [address] Showing today's visits and meeting all other requirements     Future Appointments  No visits were found meeting these conditions  Showing future appointments within next 150 days and meeting all other requirements        The patient was identified by name and date of birth  Em Montana was informed that this is a telemedicine visit and that the visit is being conducted through [facility] and patient was informed that this is not a secure, HIPAA-compliant platform  He agrees to proceed     My office door was closed  No one else was in the room  He acknowledged consent and understanding of privacy and security of the video platform  The patient has agreed to participate and understands they can discontinue the visit at any time  Patient is aware this is a billable service  Subjective  Em Montana is a 76 y o  male with paroxysmal afib and post nephrectomy for RCC last fall and now w spontaneous subdural hematoma he is recovering from due to Eliquis  HE was cleared to resume Eliquis by neurosurgery team but they prefer something more reversible   HE is feeling good now, no palpitations or shortness of breath and he feels he is in NSR, HR in 60s       HPI     Past Medical History:   Diagnosis Date    Atrial fibrillation ([address] )     Cancer ([address] ) 08/2020    Kidney cancer    Cancer ([address] )     melanoma    Chronic kidney disease     left kidney cancer    CPAP (continuous positive airway pressure) dependence     Hyperlipidemia     Hypertension     Irregular heart beat     Afib    Pneumonia     Skin cancer     Sleep apnea     Stroke ([address] )     Supraventricular tachycardia ([address] )     Wears glasses     Wears partial dentures     upper partial       Past Surgical History:   Procedure Laterality Date    APPENDECTOMY      CARDIOVERSION      COLONOSCOPY      fiberoptic, also 2009, both negative, resolved 2004    COLONOSCOPY  08/2019    EGD      HERNIA REPAIR      umbilical and bilateral inguinal    KNEE ARTHROSCOPY Left     MI LAP,PARTIAL NEPHRECTOMY Left 10/7/2020    Procedure: ROBOTIC PARTIAL NEPHRECTOMY;  Surgeon: Miki Castaneda MD;  Location: [facility];  Service: Urology    SKIN GRAFT      left ear, skin cancer    TONSILLECTOMY         Current Outpatient Medications   Medication Sig Dispense Refill    amLODIPine (NORVASC) 5 mg tablet Take 5 mg by mouth daily      Coenzyme Q10 (CO Q-10 PO) Take 1 caplet by mouth every morning      flecainide (TAMBOCOR) 100 mg tablet Take 1 tablet (100 mg total) by mouth 2 (two) times a day 60 tablet 0    metoprolol succinate (TOPROL-XL) 50 mg 24 hr tablet Take 1 tablet (50 mg total) by mouth every morning 90 tablet 1    Multiple Vitamins-Minerals (MULTIVITAMIN ADULT PO) Take 1 tablet by mouth every morning      atorvastatin (LIPITOR) 10 mg tablet Take 1 tablet (10 mg total) by mouth daily (Patient not taking: Reported on 2/18/2021) 90 tablet 1    dabigatran etexilate (PRADAXA) 75 mg capsule Take 1 capsule (75 mg total) by mouth every 12 (twelve) hours 60 capsule 3     No current facility-administered medications for this visit  No Known Allergies     Complete 12 point ROS reviewed and otherwise non pertinent or negative except as per HPI  Video Exam    Vitals:    02/18/21 0853   BP: 130/77   Weight: 95 3 kg (210 lb)   Height: 6' 2" (1 88 m)     GEN: Appears well  NAD, alert and oriented x3  Neck supple no JVD  Face symetric  HEENT: Mucus membranes appear most, eyes anicteric and midline  Pulm: No obvious respiratory distress  Normal effert and rate  No audible wheezing  Skin appears normal color, no pallor or erythema or rash  Wilner Duque Psych: Normal affect, judgement, linear speech  I spent 45 minutes directly with the patient during this visit  Including chart review, documentation and coordination with Dr Isadora Desir acknowledges that he has consented to an online visit or consultation   He understands that the online visit is based solely on information provided by him, and that, in the absence of a face-to-face physical evaluation by the physician, the diagnosis he receives is both limited and provisional in terms of accuracy and completeness  This is not intended to replace a full medical face-to-face evaluation by the physician  Viktoriya Colon understands and accepts these terms

## 2021-02-25 ENCOUNTER — HOSPITAL ENCOUNTER (OUTPATIENT)
Dept: RADIOLOGY | Age: 66
Discharge: HOME | End: 2021-02-25
Payer: MEDICARE

## 2021-02-25 DIAGNOSIS — S06.5X9A SDH (SUBDURAL HEMATOMA) (HCC): ICD-10-CM

## 2021-02-25 PROCEDURE — 70450 CT HEAD/BRAIN W/O DYE: CPT

## 2021-02-25 PROCEDURE — G1004 CDSM NDSC: HCPCS

## 2021-02-25 NOTE — ASSESSMENT & PLAN NOTE
Patient seen outpatient office today for follow-up for B/L SDHs L>R  · History of prior CVA and AFib who was on Eliquis which has since been on hold  · Patient originally presented to the ED on 01/08 with dizziness and stomach discomfort  Imaging at that time demonstrated a left frontal parietal subdural hematoma  Patient denies any history of falls or striking his head  Imaging:  · CT head wo 2/26/2021: Small thin subdural's again seen bilaterally  Left sided collection appears to have migrated slightly anterior compared to prior  Formal read pending  Plan:  · Reviewed imaging with patient  · Patient started Pradaxa on 2/18 per cardiology after being on eliquis for a few days as pradaxa has a reversal agent  · There is also an incidental retrocerebellar arachnoid cyst seen on imaging  Will need a 6 month follow up with MRI brain for stability  · Patient made aware to return for worsening headaches, dizziness, nausea, vomiting, confusion, slurred speech, or sleepiness  · As patient was restarted on [facility] and patient previously had spontaneous subdural hemorrhage, will do a delayed CT head in 1 month to evaluate chronic bilateral subdural hematomas  Patient made aware to contact Neurosurgery with any further questions or concerns

## 2021-02-26 ENCOUNTER — OFFICE VISIT (OUTPATIENT)
Dept: NEUROSURGERY | Facility: CLINIC | Age: 66
End: 2021-02-26
Payer: MEDICARE

## 2021-02-26 VITALS
SYSTOLIC BLOOD PRESSURE: 131 MMHG | DIASTOLIC BLOOD PRESSURE: 70 MMHG | HEIGHT: 74 IN | TEMPERATURE: 97.3 F | HEART RATE: 57 BPM | RESPIRATION RATE: 16 BRPM | WEIGHT: 210 LBS | BODY MASS INDEX: 26.95 KG/M2

## 2021-02-26 DIAGNOSIS — S06.5X9A SDH (SUBDURAL HEMATOMA) (HCC): Primary | ICD-10-CM

## 2021-02-26 PROCEDURE — 99214 OFFICE O/P EST MOD 30 MIN: CPT | Performed by: PHYSICIAN ASSISTANT

## 2021-02-26 NOTE — PATIENT INSTRUCTIONS
Subdural Hematoma   WHAT YOU NEED TO KNOW:   A subdural hematoma is a condition that develops when blood collects under the dura (protective covering of the brain)  As the blood collects between the dura and the brain, the brain compresses  The compression can lead to serious medical problems including seizure, coma, and death  WHILE YOU ARE HERE:   Informed consent  is a legal document that explains the tests, treatments, or procedures that you may need  Informed consent means you understand what will be done and can make decisions about what you want  You give your permission when you sign the consent form  You can have someone sign this form for you if you are not able to sign it  You have the right to understand your medical care in words you know  Before you sign the consent form, understand the risks and benefits of what will be done  Make sure all your questions are answered  Activity:  You may need to walk around the same day of surgery, or the day after  Movement will help prevent blood clots  You may also be given exercises to do in bed  Do not get out of bed on your own until your caregiver says you can  Talk to caregivers before you get up the first time  They may need to help you stand up safely  When you are able to get up on your own, sit or lie down right away if you feel weak or dizzy  Then press the call light button to let caregivers know you need help  Monitoring:   · ICP monitor  keeps an ongoing measurement of the pressure inside your skull (the bones of your head)  ICP stands for intracranial pressure  An ICP monitor is a small tube that is put through the skull and into the head  The tubing is connected to a TV-type screen  · Neurological exams  show healthcare providers how well your brain works after an injury or illness  This is also called neuro signs, neuro checks, or neuro status   Healthcare providers will check how your pupils (black dots in the center of each eye) react to light  They may check your memory and how easily you wake up  Your hand grasp and balance may also be tested  You may be given a neurological exam every hour  Medicines:   · Diuretics  are given to help decrease swelling in your brain  This may help your brain get better blood flow  · Anticonvulsants  may be given to prevent and control seizures  Tests:  CT or MRI pictures of your brain may show blood in the dura  Do not enter the MRI room with anything metal  Metal can cause serious injury  Tell the healthcare provider if you have any metal in or on your body  Surgery  is the only treatment that can remove the blood from under the dura  RISKS:   You may have seizures if your brain continues to swell  Even with treatment, a subdural hematoma may return  If left untreated, a subdural hematoma may cause brain damage, numbness, permanent body weakness, coma, and even death  CARE AGREEMENT:   You have the right to help plan your care  Learn about your health condition and how it may be treated  Discuss treatment options with your caregivers to decide what care you want to receive  You always have the right to refuse treatment  © 2017 [facility] Information is for End User's use only and may not be sold, redistributed or otherwise used for commercial purposes  All illustrations and images included in CareNotes® are the copyrighted property of A D A M [employer] Inc  or Asher Lassiter  The above information is an  only  It is not intended as medical advice for individual conditions or treatments  Talk to your doctor, nurse or pharmacist before following any medical regimen to see if it is safe and effective for you

## 2021-02-26 NOTE — PROGRESS NOTES
Neurosurgery Office Note  Cookie May 76 y o  male MRN: [id number]      Assessment/Plan     SDH (subdural hematoma) [facility])  Patient seen outpatient office today for follow-up for B/L SDHs L>R  · History of prior CVA and AFib who was on Eliquis which has since been on hold  · Patient originally presented to the ED on 01/08 with dizziness and stomach discomfort  Imaging at that time demonstrated a left frontal parietal subdural hematoma  Patient denies any history of falls or striking his head  Imaging:  · CT head wo 2/26/2021: Small thin subdural's again seen bilaterally  Left sided collection appears to have migrated slightly anterior compared to prior  Formal read pending  Plan:  · Reviewed imaging with patient  · Patient started Pradaxa on 2/18 per cardiology after being on eliquis for a few days as pradaxa has a reversal agent  · There is also an incidental retrocerebellar arachnoid cyst seen on imaging  Will need a 6 month follow up with MRI brain for stability  · Patient made aware to return for worsening headaches, dizziness, nausea, vomiting, confusion, slurred speech, or sleepiness  · As patient was restarted on [facility] and patient previously had spontaneous subdural hemorrhage, will do a delayed CT head in 1 month to evaluate chronic bilateral subdural hematomas  Patient made aware to contact Neurosurgery with any further questions or concerns  Diagnoses and all orders for this visit:    SDH (subdural hematoma) ([facility] [address] )  -     CT head wo contrast; Future            CHIEF COMPLAINT    Chief Complaint   Patient presents with    Follow-up     with [address] [place] [address]       HISTORY    History of Present Illness     76y o  year old male     76y o  year old male with past medical history significant for hypertension, AFib, CVA was on Eliquis, obstructive sleep apnea, renal cell carcinoma of left kidney, BPH, chronic kidney disease, and hyperlipidemia    Patient originally presented to the ED on 01/08 with dizziness and stomach discomfort  Imaging at that time demonstrated a left frontal parietal subdural hematoma  Patient denied any history of falls or striking his head  Repeat imaging demonstrated bilateral subdural hematomas left greater than right  Eliquis was stopped in setting of spontaneous subdural hemorrhage  Patient was seen in the office 2 weeks ago and cleared to resume [facility] therapy per cardiology  He saw his   Cardiologist on 02/18/2021 and was transition from Eliquis to Pradaxa as it is more easily reversible  Patient today presents with a CT head which shows redemonstration of bilateral chronic subdural's without significant interval change  Formal radiology read is still pending  Patient is asymptomatic  He denies any headaches, change in vision, trouble speech, facial weakness, lightheadedness, dizziness, trouble with ambulation, chest pain, shortness of breath, abdominal pain  He denies any weakness numbness or tingling in his arms or legs  Patient has ongoing follow-up with cardiology determine possible Watchmen's procedure and cardiac ablation  See Discussion    REVIEW OF SYSTEMS    Review of Systems   Constitutional: Negative  HENT: Negative  Eyes: Negative  Respiratory: Negative  Cardiovascular: Negative  Gastrointestinal: Negative  Endocrine: Negative  Genitourinary: Negative  Musculoskeletal: Negative  Skin: Negative  Allergic/Immunologic: Negative  Neurological: Negative  Hematological: Negative  Psychiatric/Behavioral: Negative            Meds/Allergies     Current Outpatient Medications   Medication Sig Dispense Refill    amLODIPine (NORVASC) 5 mg tablet Take 5 mg by mouth daily      atorvastatin (LIPITOR) 10 mg tablet Take 1 tablet (10 mg total) by mouth daily 90 tablet 1    Coenzyme Q10 (CO Q-10 PO) Take 1 caplet by mouth every morning      dabigatran etexilate (PRADAXA) 75 mg capsule Take 1 capsule (75 mg total) by mouth every 12 (twelve) hours 60 capsule 3    flecainide (TAMBOCOR) 100 mg tablet Take 1 tablet (100 mg total) by mouth 2 (two) times a day 60 tablet 0    metoprolol succinate (TOPROL-XL) 50 mg 24 hr tablet Take 1 tablet (50 mg total) by mouth every morning 90 tablet 1    Multiple Vitamins-Minerals (MULTIVITAMIN ADULT PO) Take 1 tablet by mouth every morning       No current facility-administered medications for this visit          No Known Allergies    PAST HISTORY    Past Medical History:   Diagnosis Date    Atrial fibrillation [facility] [address] )     Cancer ([facility] [address] ) 08/2020    Kidney cancer    Cancer (HCC)     melanoma    Chronic kidney disease     left kidney cancer    CPAP (continuous positive airway pressure) dependence     Hyperlipidemia     Hypertension     Irregular heart beat     Afib    Pneumonia     Skin cancer     Sleep apnea     Stroke ([facility] [address] [facility]     Supraventricular tachycardia ([facility] [address] )     Wears glasses     Wears partial dentures     upper partial       Past Surgical History:   Procedure Laterality Date    APPENDECTOMY      CARDIOVERSION      COLONOSCOPY      fiberoptic, also 2009, both negative, resolved 2004    COLONOSCOPY  08/2019    EGD      HERNIA REPAIR      umbilical and bilateral inguinal    KNEE ARTHROSCOPY Left     CO LAP,PARTIAL NEPHRECTOMY Left 10/7/2020    Procedure: ROBOTIC PARTIAL NEPHRECTOMY;  Surgeon: Rikki Melendrez MD;  Location: [facility];  Service: Urology    SKIN GRAFT      left ear, skin cancer    TONSILLECTOMY         Social History     Tobacco Use    Smoking status: Never Smoker    Smokeless tobacco: Never Used   Substance Use Topics    Alcohol use: Not Currently     Frequency: 2-4 times a month     Drinks per session: 1 or 2    Drug use: No       Family History   Problem Relation Age of Onset    Cancer Father         stomach    Hypertension Father     Stroke Father         syndrome    Cancer Family     Heart disease Family     Hypertension Family     Stroke Family syndrome    Throat cancer Mother     Diabetes Mother     Asthma Sister          Above history personally reviewed  EXAM    Vitals:Blood pressure 131/70, pulse 57, temperature (!) 97 3 °F (36 3 °C), temperature source Tympanic, resp  rate 16, height 6' 2" (1 88 m), weight 95 3 kg (210 lb)  ,Body mass index is 26 96 kg/m²  Physical Exam  Constitutional:       Appearance: He is well-developed  HENT:      Head: Normocephalic  Eyes:      General:         Right eye: No discharge  Extraocular Movements: EOM normal       Conjunctiva/sclera: Conjunctivae normal       Pupils: Pupils are equal, round, and reactive to light  Neck:      Musculoskeletal: Normal range of motion and neck supple  Vascular: No JVD  Cardiovascular:      Rate and Rhythm: Normal rate  Pulmonary:      Effort: Pulmonary effort is normal    Abdominal:      General: There is no distension  Palpations: Abdomen is soft  Tenderness: There is no abdominal tenderness  Musculoskeletal: Normal range of motion  General: No deformity  Skin:     General: Skin is warm and dry  Neurological:      Mental Status: He is alert and oriented to person, place, and time  Cranial Nerves: No cranial nerve deficit  Gait: Gait is intact  Deep Tendon Reflexes: Reflexes are normal and symmetric  Psychiatric:         Speech: Speech normal          Behavior: Behavior normal          Thought Content: Thought content normal          Neurologic Exam     Mental Status   Oriented to person, place, and time  Attention: normal    Speech: speech is normal   Level of consciousness: alert  Knowledge: good  Normal comprehension  Cranial Nerves     CN III, IV, VI   Pupils are equal, round, and reactive to light  Extraocular motions are normal    Upgaze: normal  Downgaze: normal    CN V   Facial sensation intact  CN VII   Facial expression full, symmetric       CN VIII   CN VIII normal    Hearing: intact    CN XII CN XII normal    Tongue deviation: none    Motor Exam   Muscle bulk: normal  Right arm tone: normal  Left arm tone: normal  Right leg tone: normal  Left leg tone: normal    Strength   Right deltoid: 5/5  Left deltoid: 5/5  Right biceps: 5/5  Left biceps: 5/5  Right triceps: 5/5  Left triceps: 5/5  Right wrist flexion: 5/5  Left wrist flexion: 5/5  Right wrist extension: 5/5  Left wrist extension: 5/5  Right iliopsoas: 5/5  Left iliopsoas: 5/5  Right quadriceps: 5/5  Left quadriceps: 5/5  Right hamstrin/5  Left hamstrin/5  Right anterior tibial: 5/5  Left anterior tibial: 5/5  Right gastroc: 5/5  Left gastroc: 5/5    Sensory Exam   Light touch normal      Gait, Coordination, and Reflexes     Gait  Gait: normal    Tremor   Resting tremor: absent  Action tremor: absent        MEDICAL DECISION MAKING    Imaging Studies:     Ct Head Wo Contrast    Result Date: 2/3/2021  Narrative: CT BRAIN - WITHOUT CONTRAST INDICATION:   S06  5X9A: Traumatic subdural hemorrhage with loss of consciousness of unspecified duration, initial encounter  COMPARISON:  2021 TECHNIQUE:  CT examination of the brain was performed  In addition to axial images, sagittal and coronal 2D reformatted images were created and submitted for interpretation  Radiation dose length product (DLP) for this visit:  895 mGy-cm   This examination, like all CT scans performed in the [facility], was performed utilizing techniques to minimize radiation dose exposure, including the use of iterative reconstruction and automated exposure control  IMAGE QUALITY:  Diagnostic  FINDINGS: PARENCHYMA:  Stable arachnoid cyst posterior to the left cerebellar hemisphere and cerebellar vermis  Small subacute subdural hematomas are identified within each hemisphere on the left there is a small frontal parietal collection measuring 3 to 4 mm in thickness without significant mass effect upon the adjacent brain parenchyma    This is unchanged in size and configuration  On the right side there is an even smaller subdural collection measuring only approximately 2 mm from the inner table of the calvarium towards the posterior frontal vertex  Stable underlying brain parenchyma  Normal basilar cisterns  Brainstem and cerebellum demonstrate normal density  VENTRICLES:  No obstructive hydrocephalus  VISUALIZED ORBITS AND PARANASAL SINUSES:  Unremarkable  CALVARIUM AND EXTRACRANIAL SOFT TISSUES:  Normal      Impression: Small bilateral subacute subdural collections which are isodense to adjacent brain parenchyma suggestive of chronic subdural hematomas  These are unchanged in size and configuration measuring less than 4 mm in thickness from the inner table of the calvarium without significant mass effect upon the underlying brain parenchyma  Workstation performed: [id number]       I have personally reviewed pertinent reports     and I have personally reviewed pertinent films in PACS

## 2021-03-11 ENCOUNTER — OFFICE VISIT (OUTPATIENT)
Dept: UROLOGY | Facility: AMBULATORY SURGERY CENTER | Age: 66
End: 2021-03-11
Payer: MEDICARE

## 2021-03-11 VITALS
SYSTOLIC BLOOD PRESSURE: 114 MMHG | HEART RATE: 55 BPM | DIASTOLIC BLOOD PRESSURE: 78 MMHG | WEIGHT: 206.4 LBS | HEIGHT: 74 IN | BODY MASS INDEX: 26.49 KG/M2

## 2021-03-11 DIAGNOSIS — C64.2 RENAL CELL CARCINOMA OF LEFT KIDNEY (HCC): Primary | ICD-10-CM

## 2021-03-11 DIAGNOSIS — Z12.5 SCREENING FOR PROSTATE CANCER: ICD-10-CM

## 2021-03-11 PROCEDURE — 99214 OFFICE O/P EST MOD 30 MIN: CPT | Performed by: UROLOGY

## 2021-03-11 NOTE — PROGRESS NOTES
3/11/2021    Jose Hyman  1955  [id number]      Assessment  -T1b left renal cell carcinoma s/p left robot assisted laparoscopic partial nephrectomy  -Prostate cancer screening    Discussion/Plan  Luis Alberto Menjivar is a 76 y o  male being managed by Dr Ezequiel Connor  1  T1b left renal cell carcinoma s/p left robot assisted laparoscopic partial nephrectomy-   Reviewed the results of his recent CT scan which was unremarkable  There is no evidence of residual or recurrent disease  His recent creatinine was 1 10  He will continue to follow under Nephrology  Based on pathology, we will repeat imaging in 6 months  2  Prostate cancer screening-   No abnormal findings noted on SOPHIA today  His recent PSA was 1 5  We will continue annual prostate cancer screening  We did discuss dietary recommendations for management of increased urinary frequency  He is not interested in medication management at this time     Follow-up in 6 months with CT renal protocol, chest x-ray, and BMP  Patient was instructed to call sooner with any issues     -All questions answered, patient agrees with plan      History of Present Illness  76 y o  male with a history of left RCC and prostate cancer screening presents today for follow up  Patient status post left robot assisted laparoscopic partial nephrectomy performed in October 2020  He was initially found to have an almost 7 cm upper pole left renal mass  Final pathology revealed stage T1 B papillary renal cell carcinoma with negative surgical margins  He has been doing well without any complaints  Patient denies any episodes of flank pain or gross hematuria  He does report occasional episodes of increased urinary frequency and urgency  His last PSA from January 2021 was 1 5  He denies any family history of prostate cancer  Patient states he has not had a recent digital rectal examination  Review of Systems  Review of Systems   Constitutional: Negative  HENT: Negative  Respiratory: Negative  Cardiovascular: Negative  Gastrointestinal: Negative  Genitourinary: Negative for decreased urine volume, difficulty urinating, dysuria, flank pain, frequency (occasional), hematuria and urgency  Musculoskeletal: Negative  Skin: Negative  Neurological: Negative  Psychiatric/Behavioral: Negative          Past Medical History  Past Medical History:   Diagnosis Date    [facility] fibrillation ([facility] )     Cancer ([facility] ) 08/2020    Kidney cancer    Cancer (HCC)     melanoma    Chronic kidney disease     left kidney cancer    CPAP (continuous positive airway pressure) dependence     Hyperlipidemia     Hypertension     Irregular heart beat     Afib    Pneumonia     Skin cancer     Sleep apnea     Stroke ([place] [address] )     Supraventricular tachycardia ([place] [address] )     Wears glasses     Wears partial dentures     upper partial       Past Social History  Past Surgical History:   Procedure Laterality Date    APPENDECTOMY      CARDIOVERSION      COLONOSCOPY      fiberoptic, also 2009, both negative, resolved 2004    COLONOSCOPY  08/2019    EGD      HERNIA REPAIR      umbilical and bilateral inguinal    KNEE ARTHROSCOPY Left     CO LAP,PARTIAL NEPHRECTOMY Left 10/7/2020    Procedure: ROBOTIC PARTIAL NEPHRECTOMY;  Surgeon: Perla Vanegas MD;  Location: [facility];  Service: Urology    SKIN GRAFT      left ear, skin cancer    TONSILLECTOMY         Past Family History  Family History   Problem Relation Age of Onset    Cancer Father         stomach    Hypertension Father     Stroke Father         syndrome    Cancer Family     Heart disease Family     Hypertension Family     Stroke Family         syndrome    Throat cancer Mother     Diabetes Mother     Asthma Sister        Past Social history  Social History     Socioeconomic History    Marital status: /Civil Union     Spouse name: Not on file    Number of children: Not on file    Years of education: Not on file   [place] Highest education level: Not on file   Occupational History    Not on file   Social Needs    Financial resource strain: Not on file    Food insecurity     Worry: Not on file     Inability: Not on file    Transportation needs     Medical: Not on file     Non-medical: Not on file   Tobacco Use    Smoking status: Never Smoker    Smokeless tobacco: Never Used   Substance and Sexual Activity    Alcohol use: Not Currently     Frequency: 2-4 times a month     Drinks per session: 1 or 2    Drug use: No    Sexual activity: Not on file   Lifestyle    Physical activity     Days per week: Not on file     Minutes per session: Not on file    Stress: Not on file   Relationships    Social connections     Talks on phone: Not on file     Gets together: Not on file     Attends Mu-ism service: Not on file     Active member of club or organization: Not on file     Attends meetings of clubs or organizations: Not on file     Relationship status: Not on file    Intimate partner violence     Fear of current or ex partner: Not on file     Emotionally abused: Not on file     Physically abused: Not on file     Forced sexual activity: Not on file   Other Topics Concern    Not on file   Social History Narrative    Not on file       Current Medications  Current Outpatient Medications   Medication Sig Dispense Refill    amLODIPine (NORVASC) 5 mg tablet Take 5 mg by mouth daily      atorvastatin (LIPITOR) 10 mg tablet Take 1 tablet (10 mg total) by mouth daily 90 tablet 1    Coenzyme Q10 (CO Q-10 PO) Take 1 caplet by mouth every morning      dabigatran etexilate (PRADAXA) 75 mg capsule Take 1 capsule (75 mg total) by mouth every 12 (twelve) hours 60 capsule 3    flecainide (TAMBOCOR) 100 mg tablet Take 1 tablet (100 mg total) by mouth 2 (two) times a day 60 tablet 0    metoprolol succinate (TOPROL-XL) 50 mg 24 hr tablet Take 1 tablet (50 mg total) by mouth every morning 90 tablet 1    Multiple Vitamins-Minerals (MULTIVITAMIN ADULT PO) Take 1 tablet by mouth every morning       No current facility-administered medications for this visit  Allergies  No Known Allergies    Past Medical History, Social History, Family History, medications and allergies were reviewed  Vitals  Vitals:    03/11/21 0838   Pulse: 55   Weight: 93 6 kg (206 lb 6 4 oz)   Height: 6' 2" (1 88 m)       Physical Exam  Physical Exam  Constitutional:       Appearance: Normal appearance  He is well-developed  HENT:      Head: Normocephalic  Eyes:      Pupils: Pupils are equal, round, and reactive to light  Neck:      Musculoskeletal: Normal range of motion  Pulmonary:      Effort: Pulmonary effort is normal    Abdominal:      Palpations: Abdomen is soft  Genitourinary:     Prostate: Normal       Comments: Prostate 40 g, smooth, nontender, no nodules  Musculoskeletal: Normal range of motion  Skin:     General: Skin is warm and dry  Neurological:      General: No focal deficit present  Mental Status: He is alert and oriented to person, place, and time  Psychiatric:         Mood and Affect: Mood normal          Behavior: Behavior normal          Thought Content: Thought content normal          Judgment: Judgment normal          Results    I have personally reviewed all pertinent lab results and reviewed with patient  Lab Results   Component Value Date    PSA 1 5 01/19/2021    PSA 2 0 09/10/2019    PSA 2 0 09/25/2018     Lab Results   Component Value Date    CALCIUM 9 0 01/19/2021     09/19/2017    K 3 7 01/19/2021    CO2 32 01/19/2021     01/19/2021    BUN 12 01/19/2021    CREATININE 1 10 01/19/2021     Lab Results   Component Value Date    WBC 4 87 01/19/2021    HGB 14 5 01/19/2021    HCT 44 7 01/19/2021    MCV 92 01/19/2021     01/19/2021     No results found for this or any previous visit (from the past 1 hour(s))

## 2021-03-15 ENCOUNTER — TELEPHONE (OUTPATIENT)
Dept: CARDIAC SURGERY | Facility: CLINIC | Age: 66
End: 2021-03-15

## 2021-03-15 NOTE — TELEPHONE ENCOUNTER
Called patient to review pre Watchman instructions  Patient aware procedure is scheduled for Thursday 3/25/21  Instructed patient to:  -stop CO Q 10 and Multivitamins today  -Continue to take all other medication ,including Pradaxa up to midnight on 3/24/21  -Instructed patient nothing to eat or drink after midnight on 3/24/21  -Go to [facility] on 3/19/2021 for labs   -Patient states he had first COVID Vaccine 2/2/21 and second on 3/3/21  Will bring card with him to be scanned into medical record  -No need for COVID testing  -informed PAT would call him on 3/24/21 after 2 PM with instructions regarding arrival time, location, etc  -informed patient to expect a one night stay in hospital  -Instructed to call Dr Mary Jimenez office with any change in health status or questions  Patient verbalized understanding of all

## 2021-03-18 ENCOUNTER — LAB REQUISITION (OUTPATIENT)
Dept: LAB | Facility: HOSPITAL | Age: 66
End: 2021-03-18
Payer: MEDICARE

## 2021-03-18 ENCOUNTER — OFFICE VISIT (OUTPATIENT)
Dept: NEUROLOGY | Facility: CLINIC | Age: 66
End: 2021-03-18
Payer: MEDICARE

## 2021-03-18 ENCOUNTER — TRANSCRIBE ORDERS (OUTPATIENT)
Dept: ADMINISTRATIVE | Facility: HOSPITAL | Age: 66
End: 2021-03-18

## 2021-03-18 VITALS
WEIGHT: 207.4 LBS | HEIGHT: 74 IN | RESPIRATION RATE: 14 BRPM | SYSTOLIC BLOOD PRESSURE: 122 MMHG | HEART RATE: 80 BPM | BODY MASS INDEX: 26.62 KG/M2 | DIASTOLIC BLOOD PRESSURE: 80 MMHG

## 2021-03-18 DIAGNOSIS — I48.0 PAROXYSMAL ATRIAL FIBRILLATION (HCC): ICD-10-CM

## 2021-03-18 DIAGNOSIS — S06.5X9A SDH (SUBDURAL HEMATOMA) (HCC): Primary | ICD-10-CM

## 2021-03-18 DIAGNOSIS — I48.0 PAROXYSMAL ATRIAL FIBRILLATION (HCC): Primary | ICD-10-CM

## 2021-03-18 DIAGNOSIS — Z86.73 HISTORY OF CVA (CEREBROVASCULAR ACCIDENT): ICD-10-CM

## 2021-03-18 LAB
ABO GROUP BLD: NORMAL
ALBUMIN SERPL BCP-MCNC: 4 G/DL (ref 3.5–5)
ALP SERPL-CCNC: 88 U/L (ref 46–116)
ALT SERPL W P-5'-P-CCNC: 34 U/L (ref 12–78)
ANION GAP SERPL CALCULATED.3IONS-SCNC: 0 MMOL/L (ref 4–13)
AST SERPL W P-5'-P-CCNC: 20 U/L (ref 5–45)
BILIRUB SERPL-MCNC: 0.94 MG/DL (ref 0.2–1)
BLD GP AB SCN SERPL QL: NEGATIVE
BUN SERPL-MCNC: 14 MG/DL (ref 5–25)
CALCIUM SERPL-MCNC: 9.1 MG/DL (ref 8.3–10.1)
CHLORIDE SERPL-SCNC: 106 MMOL/L (ref 100–108)
CO2 SERPL-SCNC: 34 MMOL/L (ref 21–32)
CREAT SERPL-MCNC: 1.15 MG/DL (ref 0.6–1.3)
GFR SERPL CREATININE-BSD FRML MDRD: 66 ML/MIN/1.73SQ M
GLUCOSE P FAST SERPL-MCNC: 76 MG/DL (ref 65–99)
INR PPP: 1.28 (ref 0.84–1.19)
MAGNESIUM SERPL-MCNC: 2.5 MG/DL (ref 1.6–2.6)
POTASSIUM SERPL-SCNC: 4 MMOL/L (ref 3.5–5.3)
PROT SERPL-MCNC: 7.4 G/DL (ref 6.4–8.2)
PROTHROMBIN TIME: 16 SECONDS (ref 11.6–14.5)
RH BLD: POSITIVE
SODIUM SERPL-SCNC: 140 MMOL/L (ref 136–145)
SPECIMEN EXPIRATION DATE: NORMAL
URATE SERPL-MCNC: 4.3 MG/DL (ref 4.2–8)

## 2021-03-18 PROCEDURE — 86901 BLOOD TYPING SEROLOGIC RH(D): CPT | Performed by: INTERNAL MEDICINE

## 2021-03-18 PROCEDURE — 80053 COMPREHEN METABOLIC PANEL: CPT

## 2021-03-18 PROCEDURE — 86900 BLOOD TYPING SEROLOGIC ABO: CPT | Performed by: INTERNAL MEDICINE

## 2021-03-18 PROCEDURE — 99214 OFFICE O/P EST MOD 30 MIN: CPT | Performed by: PHYSICIAN ASSISTANT

## 2021-03-18 PROCEDURE — 84550 ASSAY OF BLOOD/URIC ACID: CPT

## 2021-03-18 PROCEDURE — 86850 RBC ANTIBODY SCREEN: CPT | Performed by: INTERNAL MEDICINE

## 2021-03-18 PROCEDURE — 83735 ASSAY OF MAGNESIUM: CPT

## 2021-03-18 PROCEDURE — 36415 COLL VENOUS BLD VENIPUNCTURE: CPT

## 2021-03-18 PROCEDURE — 85610 PROTHROMBIN TIME: CPT

## 2021-03-18 NOTE — PROGRESS NOTES
Patient ID: Carmella Darden is a 76 y o  male  Assessment:  76year old male with history of HTN, HLD, renal cancer s/p partial left nephrectomy 10/2020, R sided cortical infarction in 2014, Afib discovered post-stroke and started on Eliquis at that time, presents for a hospital follow up  He presented on 1/8/21 with acute onset of right sided headache, dizziness, N/V  He was found to have acute/subacute L > R subdural hematomas  Etiology felt to be spontaneous due to anticoagulation use, as there was no head trauma and BP controlled  Anticoagulation held at that time  He currently feels well and denies any new neurologic symptoms at this time  His headache and dizziness had resolved while in the hospital     He has followed with neurosurgery and cardiology since discharge and has had several repeat CTs with stability seen  He was cleared to start [facility] and was started on low dose Pradaxa by cardiology on 2/18, and is scheduled to have a Watchman next week on 3/25  Repeat CTH is scheduled for 3/24  Agree with Watchman to minimize his use of anticoagulation long-term given spontaneous SDH  Plan:  -continue to follow with neurosurgery regarding surveillance of SDH  -continue to follow with cardiology for Afib; Watchman planned for 3/25/21  -we discussed maintaining good control of vascular risk factors such as blood pressure (goal BP >130/80 routinely), lipids, and blood sugar, under the direction of his PCP  -at this time, he is doing well from a neurologic standpoint, so no further follow up needed through our office on a routine basis  Will see him back PRN  Signs and symptoms of stroke were discussed with the patient in detail today  In addition, he was informed that if he were to fall and strike his head while on anticoagulation, he needs to be seen in the ED urgently for evaluation             Diagnoses and all orders for this visit:    SDH (subdural hematoma) ([facility] [address] )    History of CVA (cerebrovascular accident)    Paroxysmal atrial fibrillation [facility])           Subjective:    HPI    Mindi Grayson is a 76y o  year old male with PMH of prior R sided cortical CVA in 2014, AFib on Eliquis since 2014, HTN, HLD, FELISA, renal cancer s/p partial left nephrectomy Oct 2020, who presents today for a hospital follow up  Patient presented to the ED on 1/8/2021 with complaints of dizziness, clarified as unsteadiness, along with right-sided headache, onset occurred suddenly while sitting watching television  Patient was concerned about possible recurrent stroke and presented to the ED at [facility]  On route he developed some nauseousness and vomited on arrival there, after which he reports the prior symptoms of headache and dizziness seemed to resolve  BP was 133/93  CTH showed suggestion of left convexity subdural hematoma, acute/subacute  He was transferred to [facility] for neurosurgical evaluation  No neurosurgical intervention was advised  His AC was held  Repeat CTH the next day was similar, no worsening  It was advised he discuss Watchman with cardiology given concern for spontaneous SDH on AC  Repeat CTH in 2 weeks advised, and then f/u with neurosurgery to discuss resumption of AC  Patient has followed with both neurosurgery and cardiology  He had a repeat [address] on 1/21/21 which was stable  He was told to repeat imaging in 2 weeks and continue to hold AP/AC  Repeat CTH on 2/3/21, followed with NS at that time and cleared to resume AC  He saw cardiology on 2/18 and Watchman was discussed and advised  He was started back on Pradaxa 75mg BID (low dose) due to the reversing agent is on formulary at the hospital   Repeat CTH again on 2/26/21 was stable  Delayed CTH in 1 month advised  He is currently doing well and denies any new neurologic symptoms today  He denies any headaches, dizziness, vision changes, speech or swallowing changes, weakness, paresthesias    He is scheduled for the Watchman on 3/25/2021  Repeat CTH is scheduled for 3/24/2021  He checks BP at home and reports it is always "good"  No falls  He notes he has very minimal dysarthria from his prior CVA  The following portions of the patient's history were reviewed and updated as appropriate: current medications, past family history, past medical history, past social history, past surgical history and problem list          Objective:    Blood pressure 122/80, pulse 80, resp  rate 14, height 6' 2" (1 88 m), weight 94 1 kg (207 lb 6 4 oz)  Physical Exam  Constitutional:       Appearance: Normal appearance  He is well-developed  HENT:      Head: Normocephalic and atraumatic  Eyes:      Extraocular Movements: EOM normal       Pupils: Pupils are equal, round, and reactive to light  Pulmonary:      Effort: Pulmonary effort is normal    Skin:     General: Skin is warm and dry  Neurological:      Mental Status: He is alert  Cranial Nerves: Dysarthria (baseline for patient) present  Coordination: Coordination is intact  Deep Tendon Reflexes: Strength normal    Psychiatric:         Mood and Affect: Mood normal          Behavior: Behavior normal          Neurological Exam  Mental Status  Alert  Oriented to person, place, time and situation  Recent and remote memory are intact  Mild dysarthria (baseline for patient) present  Language is fluent with no aphasia  Attention and concentration are normal     Cranial Nerves  CN II: Visual fields full to confrontation  CN III, IV, VI: Extraocular movements intact bilaterally  Pupils equal round and reactive to light bilaterally  CN V: Facial sensation is normal   CN VII: Full and symmetric facial movement  CN VIII: Hearing is normal   CN IX, X: Palate elevates symmetrically  CN XI: Shoulder shrug strength is normal   CN XII: Tongue midline without atrophy or fasciculations  Motor   Normal muscle tone   Strength is 5/5 throughout all four extremities  Sensory  Light touch is normal in upper and lower extremities  Reflexes  Brisk reflexes throughout, symmetric   Coordination  Finger-to-nose, rapid alternating movements and heel-to-shin normal bilaterally without dysmetria  Gait  Casual gait is normal including stance, stride, and arm swing  No assistive device   ROS:    Review of Systems   Constitutional: Negative  Negative for appetite change and fever  HENT: Negative for hearing loss, trouble swallowing and voice change  Eyes: Negative  Negative for photophobia and pain  Respiratory: Negative  Negative for shortness of breath  Cardiovascular: Negative  Negative for palpitations  Gastrointestinal: Negative  Negative for nausea and vomiting  Endocrine: Negative  Negative for cold intolerance  Genitourinary: Negative for dysuria and frequency  Musculoskeletal: Negative  Negative for myalgias and neck pain  Skin: Negative  Negative for rash  Allergic/Immunologic: Negative  Neurological: Negative  Negative for dizziness, tremors, seizures, syncope, facial asymmetry, speech difficulty, weakness, light-headedness, numbness and headaches  Hematological: Negative  Does not bruise/bleed easily  Psychiatric/Behavioral: Negative  Negative for confusion, hallucinations and sleep disturbance       I personally reviewed and updated the ROS as appropriate

## 2021-03-18 NOTE — PATIENT INSTRUCTIONS
Continue with plan through cardiology for Watchman  Continue to follow with neurosurgery until released  Continue to work with PCP for management of blood pressure, cholesterol and blood sugar  Follow up with us as need    If you experience any facial droop, weakness on one side of the body, speech or swallowing difficulty, painless loss of vision in one eye, double vision, vertigo that does not resolve quickly/imbalance, go to the ER/call 911  In addition, if you were to have a fall and strike your head, you should be seen in the ER urgently for evaluation due to your use of anticoagulation

## 2021-03-19 ENCOUNTER — PREP FOR PROCEDURE (OUTPATIENT)
Dept: CARDIOLOGY CLINIC | Facility: CLINIC | Age: 66
End: 2021-03-19

## 2021-03-19 DIAGNOSIS — I48.91 ATRIAL FIBRILLATION, UNSPECIFIED TYPE (HCC): Primary | ICD-10-CM

## 2021-03-24 ENCOUNTER — ANESTHESIA EVENT (OUTPATIENT)
Dept: PERIOP | Facility: HOSPITAL | Age: 66
DRG: 274 | End: 2021-03-24
Payer: MEDICARE

## 2021-03-24 ENCOUNTER — HOSPITAL ENCOUNTER (OUTPATIENT)
Dept: RADIOLOGY | Age: 66
Discharge: HOME | DRG: 274 | End: 2021-03-24
Payer: MEDICARE

## 2021-03-24 DIAGNOSIS — S06.5X9A SDH (SUBDURAL HEMATOMA) (HCC): ICD-10-CM

## 2021-03-24 PROCEDURE — 70450 CT HEAD/BRAIN W/O DYE: CPT

## 2021-03-24 PROCEDURE — NC001 PR NO CHARGE: Performed by: INTERNAL MEDICINE

## 2021-03-24 PROCEDURE — G1004 CDSM NDSC: HCPCS

## 2021-03-24 NOTE — H&P
H&P Exam - Cardiology   Emily Tang 76 y o  male MRN: [id number]  Unit/Bed#:  Encounter: [id number]     Office cardiologist:  Dr Trish Leija    PCP:  Xochitl Smith, DO      Assessment/Plan   Paroxysmal atrial fibrillation since December 2019 status post DCCV on flecainide and metoprolol [id number] (age, HTN, CVA) now on  Pradaxa  An atrial fibrillation ablation was planned but was canceled secondary to incidental fnding of right renal cell carcinoma  Status post robotic partial nephrectomy on 10/07/2020  Status post subdural hematoma January 2021  Hypertension  CVA - 6 years ago  Chronic kidney disease stage 3  Obstructive sleep apnea -       History of Present Illness   HPI:  Emily Tang is a 76 y o  male history of PAF status post cardioversion and currently on flecainide and metoprolol  Patient's  [id number] score and HAS BLED Score: 4  He was scheduled for an ablation but renal cell carcinoma was found on a CTA  Patient underwent a robotic right partial nephrectomy in October 2020  In January 2021 patient had a spontaneous subdural hematoma  Patient has was initially on Eliquis but has been switched to Pradaxa  The patient is now electively admitted for Watchman implantation  Patient and family understand that he will require 45 days of anticoagulant postprocedure  Patient's last dose of Pradaxa was last PM     Secondary evaluation for Watchman device was performed by Dr Trish Leija    See his letter below   +++++++++++++++++++++++++++++++++++++++++++++++++++++++++++++++  Watchman Device Letter     Name: Emily Tang  YOB: 1955  MRN: [id number]     The above referenced patient is being referred to the [facility]'s Structural Heart and Valve program for evaluation for Left Atrial Appendage Closure with Watchman device for management of stroke risk resulting from non-valvular atrial fibrillation      Based on the patient's past history, it has been determined that he/she is a poor candidate for long-term oral anticoagulation, however may be tolerant of short-term treatment with warfarin as necessary      Specifically regarding anticoagulation, the patient has demonstrated:  History of bleeding, specifically: intracranial bleeding while on anticoagulation      We have discussed the patient's unique stroke and bleeding risk both on and off oral anticoagulation and the rationale for this referral      Based on the patient history, the patient has:     CHADS 2 VASC Score: 4  HAS BLED Score: 4     The following tool was utilized in the share decision process for this patient:     [facility] shared decision tool     Dr Adeola Wolff MD    +++++++++++++++++++++++++++++++++++++++++++++++++++++++    Historical Information    Past Medical History:   Diagnosis Date    Atrial fibrillation ([facility] [address] )     Cancer ([facility] [address] ) 08/2020    Kidney cancer    Cancer ([facility] [address] )     melanoma    Chronic kidney disease     left kidney cancer    CPAP (continuous positive airway pressure) dependence     Hyperlipidemia     Hypertension     Irregular heart beat     Afib    Pneumonia     Skin cancer     Sleep apnea     Stroke ([facility] [address] )     Supraventricular tachycardia ([facility] [address] )     Wears glasses     Wears partial dentures     upper partial     Past Surgical History:   Procedure Laterality Date    APPENDECTOMY      CARDIOVERSION      COLONOSCOPY      fiberoptic, also 2009, both negative, resolved 2004    COLONOSCOPY  08/2019    EGD      HERNIA REPAIR      umbilical and bilateral inguinal    KNEE ARTHROSCOPY Left     VA LAP,PARTIAL NEPHRECTOMY Left 10/7/2020    Procedure: ROBOTIC PARTIAL NEPHRECTOMY;  Surgeon: Rajeev Brown MD;  Location: [facility] OR;  Service: Urology    SKIN GRAFT      left ear, skin cancer    TONSILLECTOMY       Social History   Social History     Substance and Sexual Activity   Alcohol Use Not Currently    Frequency: 2-4 times a month    Drinks per session: 1 or 2     Social History     Substance and Sexual Activity Drug Use No     Social History     Tobacco Use   Smoking Status Never Smoker   Smokeless Tobacco Never Used     Family History:   Family History   Problem Relation Age of Onset    Cancer Father         stomach    Hypertension Father     Stroke Father         syndrome    Cancer Family     Heart disease Family     Hypertension Family     Stroke Family         syndrome    Throat cancer Mother     Diabetes Mother     Asthma Sister        Meds/Allergies   PTA meds:   Prior to Admission Medications   Prescriptions Last Dose Informant Patient Reported? Taking? Coenzyme Q10 (CO Q-10 PO) 3/18/2021 Self Yes No   Sig: Take 1 caplet by mouth every morning   Multiple Vitamins-Minerals (MULTIVITAMIN ADULT PO) 3/18/2021 at Unknown time Self Yes Yes   Sig: Take 1 tablet by mouth every morning   amLODIPine (NORVASC) 5 mg tablet 3/24/2021 at Unknown time Self Yes Yes   Sig: Take 5 mg by mouth daily   atorvastatin (LIPITOR) 10 mg tablet 3/24/2021 at Unknown time Self No Yes   Sig: Take 1 tablet (10 mg total) by mouth daily   dabigatran etexilate (PRADAXA) 75 mg capsule 3/24/2021 at Unknown time Self No Yes   Sig: Take 1 capsule (75 mg total) by mouth every 12 (twelve) hours   flecainide (TAMBOCOR) 100 mg tablet 3/24/2021 at Unknown time Self No Yes   Sig: Take 1 tablet (100 mg total) by mouth 2 (two) times a day   metoprolol succinate (TOPROL-XL) 50 mg 24 hr tablet 3/24/2021 at Unknown time Self No Yes   Sig: Take 1 tablet (50 mg total) by mouth every morning      Facility-Administered Medications: None     No Known Allergies    Review of Systems   All other systems reviewed and are negative  Physical Exam  Constitutional:       Appearance: Normal appearance  HENT:      Head: Normocephalic and atraumatic  Eyes:      Pupils: Pupils are equal, round, and reactive to light  Neck:      Musculoskeletal: Neck supple  Cardiovascular:      Rate and Rhythm: Regular rhythm  Bradycardia present        Pulses: Normal pulses  Heart sounds: Normal heart sounds  Comments: bradycardia  Pulmonary:      Effort: Pulmonary effort is normal       Breath sounds: Normal breath sounds  Abdominal:      General: Abdomen is flat  Bowel sounds are normal       Palpations: Abdomen is soft  Musculoskeletal:         General: No swelling  Skin:     General: Skin is warm and dry  Capillary Refill: Capillary refill takes 2 to 3 seconds  Neurological:      General: No focal deficit present  Mental Status: He is alert and oriented to person, place, and time  Psychiatric:         Mood and Affect: Mood normal          EKG: Sinus bradycardia  ECHO: 4072  Normal systolic function with ejection fraction 50%  There were no regional wall motion abnormalities    Bilateral atrial size was normal

## 2021-03-24 NOTE — ANESTHESIA PREPROCEDURE EVALUATION
Procedure:  LEFT ATRIAL APPENDAGE OCCLUSION (N/A Chest)    Relevant Problems   CARDIO   (+) Essential hypertension   (+) Mixed hyperlipidemia   (+) Paroxysmal atrial fibrillation (HCC)      /RENAL   (+) Benign prostatic hyperplasia with lower urinary tract symptoms   (+) Renal cell carcinoma of left kidney (HCC)   (+) Stage 3a chronic kidney disease      NEURO/PSYCH   (+) History of CVA (cerebrovascular accident)      PULMONARY   (+) FELISA (obstructive sleep apnea)      Cr 1 15, hgb 14 5, plt 243    2019: normal biventricular systolic function, mild-mod MR, trace MR    Prior 2A view with MAC3           Anesthesia Plan  ASA Score- 3     Anesthesia Type- general with ASA Monitors  Additional Monitors: arterial line  Airway Plan: ETT  Comment: General anesthesia, endotracheal tube; standard ASA monitors  Risks and benefits discussed with patient; patient consented and agrees to proceed  I saw and evaluated the patient  If seen with CRNA, we have discussed the anesthetic plan and I am in agreement that the plan is appropriate for the patient  Sonia  ALONZO  Plan Factors-    Induction- intravenous  Postoperative Plan- Plan for postoperative opioid use  Planned trial extubation    Informed Consent- Anesthetic plan and risks discussed with patient  I personally reviewed this patient with the CRNA  Discussed and agreed on the Anesthesia Plan with the CRNA  Justen Ambriz

## 2021-03-25 ENCOUNTER — APPOINTMENT (OUTPATIENT)
Dept: NON INVASIVE DIAGNOSTICS | Facility: HOSPITAL | Age: 66
DRG: 274 | End: 2021-03-25
Payer: MEDICARE

## 2021-03-25 ENCOUNTER — HOSPITAL ENCOUNTER (INPATIENT)
Facility: HOSPITAL | Age: 66
LOS: 1 days | Discharge: HOME | DRG: 274 | End: 2021-03-26
Attending: INTERNAL MEDICINE | Admitting: INTERNAL MEDICINE
Payer: MEDICARE

## 2021-03-25 ENCOUNTER — ANESTHESIA (OUTPATIENT)
Dept: PERIOP | Facility: HOSPITAL | Age: 66
DRG: 274 | End: 2021-03-25
Payer: MEDICARE

## 2021-03-25 DIAGNOSIS — I25.10 CAD IN NATIVE ARTERY: ICD-10-CM

## 2021-03-25 DIAGNOSIS — I48.91 ATRIAL FIBRILLATION (HCC): ICD-10-CM

## 2021-03-25 DIAGNOSIS — I48.20 CHRONIC ATRIAL FIBRILLATION, UNSPECIFIED (HCC): ICD-10-CM

## 2021-03-25 DIAGNOSIS — I48.91 A-FIB (HCC): ICD-10-CM

## 2021-03-25 DIAGNOSIS — S06.5X9A SDH (SUBDURAL HEMATOMA) (HCC): ICD-10-CM

## 2021-03-25 DIAGNOSIS — D68.9 COAGULOPATHY (HCC): ICD-10-CM

## 2021-03-25 DIAGNOSIS — Z95.818 PRESENCE OF WATCHMAN LEFT ATRIAL APPENDAGE CLOSURE DEVICE: Primary | ICD-10-CM

## 2021-03-25 LAB
ABO GROUP BLD: NORMAL
ANION GAP SERPL CALCULATED.3IONS-SCNC: 5 MMOL/L (ref 4–13)
ATRIAL RATE: 50 BPM
ATRIAL RATE: 58 BPM
BUN SERPL-MCNC: 16 MG/DL (ref 5–25)
CALCIUM SERPL-MCNC: 8.5 MG/DL (ref 8.3–10.1)
CHLORIDE SERPL-SCNC: 106 MMOL/L (ref 100–108)
CO2 SERPL-SCNC: 30 MMOL/L (ref 21–32)
CREAT SERPL-MCNC: 1.22 MG/DL (ref 0.6–1.3)
ERYTHROCYTE [DISTWIDTH] IN BLOOD BY AUTOMATED COUNT: 13.1 % (ref 11.6–15.1)
GFR SERPL CREATININE-BSD FRML MDRD: 61 ML/MIN/1.73SQ M
GLUCOSE P FAST SERPL-MCNC: 83 MG/DL (ref 65–99)
GLUCOSE SERPL-MCNC: 83 MG/DL (ref 65–140)
HCT VFR BLD AUTO: 42.7 % (ref 36.5–49.3)
HGB BLD-MCNC: 13.6 G/DL (ref 12–17)
KCT BLD-ACNC: 397 SEC (ref 89–137)
MAGNESIUM SERPL-MCNC: 2.5 MG/DL (ref 1.6–2.6)
MCH RBC QN AUTO: 29.1 PG (ref 26.8–34.3)
MCHC RBC AUTO-ENTMCNC: 31.9 G/DL (ref 31.4–37.4)
MCV RBC AUTO: 91 FL (ref 82–98)
P AXIS: 53 DEGREES
P AXIS: 80 DEGREES
PLATELET # BLD AUTO: 216 THOUSANDS/UL (ref 149–390)
PMV BLD AUTO: 9.5 FL (ref 8.9–12.7)
POTASSIUM SERPL-SCNC: 4.9 MMOL/L (ref 3.5–5.3)
PR INTERVAL: 238 MS
PR INTERVAL: 246 MS
QRS AXIS: 50 DEGREES
QRS AXIS: 88 DEGREES
QRSD INTERVAL: 90 MS
QRSD INTERVAL: 96 MS
QT INTERVAL: 483 MS
QT INTERVAL: 486 MS
QTC INTERVAL: 443 MS
QTC INTERVAL: 475 MS
RBC # BLD AUTO: 4.68 MILLION/UL (ref 3.88–5.62)
RH BLD: POSITIVE
SODIUM SERPL-SCNC: 141 MMOL/L (ref 136–145)
SPECIMEN SOURCE: ABNORMAL
T WAVE AXIS: 52 DEGREES
T WAVE AXIS: 85 DEGREES
VENTRICULAR RATE: 50 BPM
VENTRICULAR RATE: 58 BPM
WBC # BLD AUTO: 4.68 THOUSAND/UL (ref 4.31–10.16)

## 2021-03-25 PROCEDURE — 85027 COMPLETE CBC AUTOMATED: CPT | Performed by: INTERNAL MEDICINE

## 2021-03-25 PROCEDURE — 83735 ASSAY OF MAGNESIUM: CPT | Performed by: INTERNAL MEDICINE

## 2021-03-25 PROCEDURE — 93355 ECHO TRANSESOPHAGEAL (TEE): CPT

## 2021-03-25 PROCEDURE — 86920 COMPATIBILITY TEST SPIN: CPT

## 2021-03-25 PROCEDURE — C1894 INTRO/SHEATH, NON-LASER: HCPCS | Performed by: INTERNAL MEDICINE

## 2021-03-25 PROCEDURE — 86901 BLOOD TYPING SEROLOGIC RH(D): CPT | Performed by: INTERNAL MEDICINE

## 2021-03-25 PROCEDURE — 33340 PERQ CLSR TCAT L ATR APNDGE: CPT | Performed by: INTERNAL MEDICINE

## 2021-03-25 PROCEDURE — 99024 POSTOP FOLLOW-UP VISIT: CPT | Performed by: INTERNAL MEDICINE

## 2021-03-25 PROCEDURE — 86900 BLOOD TYPING SEROLOGIC ABO: CPT | Performed by: INTERNAL MEDICINE

## 2021-03-25 PROCEDURE — C1769 GUIDE WIRE: HCPCS | Performed by: INTERNAL MEDICINE

## 2021-03-25 PROCEDURE — 02L73DK OCCLUSION OF LEFT ATRIAL APPENDAGE WITH INTRALUMINAL DEVICE, PERCUTANEOUS APPROACH: ICD-10-PCS | Performed by: INTERNAL MEDICINE

## 2021-03-25 PROCEDURE — 85347 COAGULATION TIME ACTIVATED: CPT

## 2021-03-25 PROCEDURE — 93005 ELECTROCARDIOGRAM TRACING: CPT

## 2021-03-25 PROCEDURE — C1760 CLOSURE DEV, VASC: HCPCS | Performed by: INTERNAL MEDICINE

## 2021-03-25 PROCEDURE — 93010 ELECTROCARDIOGRAM REPORT: CPT | Performed by: INTERNAL MEDICINE

## 2021-03-25 PROCEDURE — 80048 BASIC METABOLIC PNL TOTAL CA: CPT | Performed by: INTERNAL MEDICINE

## 2021-03-25 PROCEDURE — C1887 CATHETER, GUIDING: HCPCS | Performed by: INTERNAL MEDICINE

## 2021-03-25 DEVICE — LEFT ATRIAL APPENDAGE CLOSURE DEVICE WITH DELIVERY SYSTEM
Type: IMPLANTABLE DEVICE | Site: HEART | Status: FUNCTIONAL
Brand: WATCHMAN®

## 2021-03-25 DEVICE — PERCLOSE PROGLIDE™ SUTURE-MEDIATED CLOSURE SYSTEM
Type: IMPLANTABLE DEVICE | Site: ARTERIAL | Status: FUNCTIONAL
Brand: PERCLOSE PROGLIDE™

## 2021-03-25 RX ORDER — CEFAZOLIN SODIUM 2 G/50ML
2000 SOLUTION INTRAVENOUS ONCE
Status: COMPLETED | OUTPATIENT
Start: 2021-03-25 | End: 2021-03-25

## 2021-03-25 RX ORDER — ONDANSETRON 2 MG/ML
INJECTION INTRAMUSCULAR; INTRAVENOUS AS NEEDED
Status: DISCONTINUED | OUTPATIENT
Start: 2021-03-25 | End: 2021-03-25

## 2021-03-25 RX ORDER — FENTANYL CITRATE 50 UG/ML
INJECTION, SOLUTION INTRAMUSCULAR; INTRAVENOUS AS NEEDED
Status: DISCONTINUED | OUTPATIENT
Start: 2021-03-25 | End: 2021-03-25

## 2021-03-25 RX ORDER — PROPOFOL 10 MG/ML
INJECTION, EMULSION INTRAVENOUS AS NEEDED
Status: DISCONTINUED | OUTPATIENT
Start: 2021-03-25 | End: 2021-03-25

## 2021-03-25 RX ORDER — GLYCOPYRROLATE 0.2 MG/ML
INJECTION INTRAMUSCULAR; INTRAVENOUS AS NEEDED
Status: DISCONTINUED | OUTPATIENT
Start: 2021-03-25 | End: 2021-03-25

## 2021-03-25 RX ORDER — ONDANSETRON 2 MG/ML
4 INJECTION INTRAMUSCULAR; INTRAVENOUS ONCE AS NEEDED
Status: DISCONTINUED | OUTPATIENT
Start: 2021-03-25 | End: 2021-03-25 | Stop reason: HOSPADM

## 2021-03-25 RX ORDER — SODIUM CHLORIDE 9 MG/ML
INJECTION, SOLUTION INTRAVENOUS CONTINUOUS PRN
Status: DISCONTINUED | OUTPATIENT
Start: 2021-03-25 | End: 2021-03-25

## 2021-03-25 RX ORDER — FLECAINIDE ACETATE 100 MG/1
100 TABLET ORAL 2 TIMES DAILY
Status: DISCONTINUED | OUTPATIENT
Start: 2021-03-25 | End: 2021-03-26 | Stop reason: HOSPADM

## 2021-03-25 RX ORDER — NEOSTIGMINE METHYLSULFATE 1 MG/ML
INJECTION INTRAVENOUS AS NEEDED
Status: DISCONTINUED | OUTPATIENT
Start: 2021-03-25 | End: 2021-03-25

## 2021-03-25 RX ORDER — METOPROLOL SUCCINATE 50 MG/1
50 TABLET, EXTENDED RELEASE ORAL EVERY MORNING
Status: DISCONTINUED | OUTPATIENT
Start: 2021-03-25 | End: 2021-03-26 | Stop reason: HOSPADM

## 2021-03-25 RX ORDER — ROCURONIUM BROMIDE 10 MG/ML
INJECTION, SOLUTION INTRAVENOUS AS NEEDED
Status: DISCONTINUED | OUTPATIENT
Start: 2021-03-25 | End: 2021-03-25

## 2021-03-25 RX ORDER — ATORVASTATIN CALCIUM 10 MG/1
10 TABLET, FILM COATED ORAL DAILY
Status: DISCONTINUED | OUTPATIENT
Start: 2021-03-25 | End: 2021-03-26 | Stop reason: HOSPADM

## 2021-03-25 RX ORDER — HEPARIN SODIUM 1000 [USP'U]/ML
INJECTION, SOLUTION INTRAVENOUS; SUBCUTANEOUS AS NEEDED
Status: DISCONTINUED | OUTPATIENT
Start: 2021-03-25 | End: 2021-03-25

## 2021-03-25 RX ORDER — DABIGATRAN ETEXILATE 75 MG/1
75 CAPSULE, COATED PELLETS ORAL EVERY 12 HOURS SCHEDULED
Status: DISCONTINUED | OUTPATIENT
Start: 2021-03-25 | End: 2021-03-26 | Stop reason: HOSPADM

## 2021-03-25 RX ORDER — AMLODIPINE BESYLATE 5 MG/1
5 TABLET ORAL DAILY
Status: DISCONTINUED | OUTPATIENT
Start: 2021-03-25 | End: 2021-03-26 | Stop reason: HOSPADM

## 2021-03-25 RX ORDER — SODIUM CHLORIDE 9 MG/ML
125 INJECTION, SOLUTION INTRAVENOUS CONTINUOUS
Status: DISPENSED | OUTPATIENT
Start: 2021-03-25 | End: 2021-03-25

## 2021-03-25 RX ORDER — ASPIRIN 81 MG/1
81 TABLET ORAL DAILY
Status: DISCONTINUED | OUTPATIENT
Start: 2021-03-25 | End: 2021-03-26 | Stop reason: HOSPADM

## 2021-03-25 RX ORDER — FENTANYL CITRATE/PF 50 MCG/ML
25 SYRINGE (ML) INJECTION
Status: DISCONTINUED | OUTPATIENT
Start: 2021-03-25 | End: 2021-03-25 | Stop reason: HOSPADM

## 2021-03-25 RX ADMIN — FENTANYL CITRATE 100 MCG: 50 INJECTION INTRAMUSCULAR; INTRAVENOUS at 09:17

## 2021-03-25 RX ADMIN — IOHEXOL 14 ML: 350 INJECTION, SOLUTION INTRAVENOUS at 10:30

## 2021-03-25 RX ADMIN — ASPIRIN 81 MG: 81 TABLET, COATED ORAL at 14:35

## 2021-03-25 RX ADMIN — FLECAINIDE ACETATE 100 MG: 100 TABLET ORAL at 15:51

## 2021-03-25 RX ADMIN — ONDANSETRON 4 MG: 2 INJECTION INTRAMUSCULAR; INTRAVENOUS at 10:08

## 2021-03-25 RX ADMIN — HEPARIN SODIUM 14000 UNITS: 1000 INJECTION INTRAVENOUS; SUBCUTANEOUS at 09:45

## 2021-03-25 RX ADMIN — CEFAZOLIN SODIUM 2000 MG: 2 SOLUTION INTRAVENOUS at 09:17

## 2021-03-25 RX ADMIN — METOPROLOL SUCCINATE 50 MG: 50 TABLET, EXTENDED RELEASE ORAL at 14:35

## 2021-03-25 RX ADMIN — SODIUM CHLORIDE 1000 ML: 0.9 INJECTION, SOLUTION INTRAVENOUS at 08:25

## 2021-03-25 RX ADMIN — AMLODIPINE BESYLATE 5 MG: 5 TABLET ORAL at 14:35

## 2021-03-25 RX ADMIN — PROPOFOL 160 MG: 10 INJECTION, EMULSION INTRAVENOUS at 09:17

## 2021-03-25 RX ADMIN — DABIGATRAN ETEXILATE MESYLATE 75 MG: 75 CAPSULE ORAL at 20:00

## 2021-03-25 RX ADMIN — SODIUM CHLORIDE: 0.9 INJECTION, SOLUTION INTRAVENOUS at 09:19

## 2021-03-25 RX ADMIN — ATORVASTATIN CALCIUM 10 MG: 10 TABLET, FILM COATED ORAL at 14:35

## 2021-03-25 RX ADMIN — ROCURONIUM BROMIDE 50 MG: 50 INJECTION, SOLUTION INTRAVENOUS at 09:17

## 2021-03-25 RX ADMIN — PHENYLEPHRINE HYDROCHLORIDE 100 MCG: 10 INJECTION INTRAVENOUS at 09:23

## 2021-03-25 RX ADMIN — SODIUM CHLORIDE: 0.9 INJECTION, SOLUTION INTRAVENOUS at 10:16

## 2021-03-25 RX ADMIN — PHENYLEPHRINE HYDROCHLORIDE 100 MCG: 10 INJECTION INTRAVENOUS at 09:17

## 2021-03-25 RX ADMIN — NEOSTIGMINE METHYLSULFATE 4 MG: 1 INJECTION INTRAVENOUS at 10:12

## 2021-03-25 RX ADMIN — GLYCOPYRROLATE 0.4 MG: 0.2 INJECTION, SOLUTION INTRAMUSCULAR; INTRAVENOUS at 10:12

## 2021-03-25 NOTE — OP NOTE
OPERATIVE REPORT  PATIENT NAME: Vee Gayle    :  1955  MRN: [id number]  Pt Location: [facility]BR[facility] OR ROOM 02    SURGERY DATE: 3/25/2021    Surgeon(s) and Role:     * Lizette Ochoa MD - Primary     * Tasia Phoenix DO    Preop Diagnosis:  Atrial fibrillation, unspecified type ([address] ) [I48 91]    Post-Op Diagnosis Codes:     * Atrial fibrillation, unspecified type ([address] ) [I48 91]    Procedure(s) (LRB):  LEFT ATRIAL APPENDAGE OCCLUSION (N/A)    Specimen(s):  * No specimens in log *    Estimated Blood Loss:   Minimal    Drains:  Closed/Suction Drain Left Abdomen Bulb 10 Fr  (Active)   Number of days: 169       Anesthesia Type:   General    Operative Indications:  Atrial fibrillation, paroxysmal  Intracranial hemorrhage        Complications:   None    Procedure and Technique:      The patient was draped in normal sterile fashion chest and bilateral groins  An 8F sheath was placed in right femoral vein with Seldinger technique  Heparin was infused, weight based, to keep -400s  A transseptal puncture with SLO sheath was performed with Jesusita Morin transseptal needed in posterior/inferior portion of septum used Transesphageal echo views, we crossed, confirmed w microbubbles, and placed Hitesh pigtail exchange wire in left atrium  Once across we exchanged the SLO for the 14F Double Curve Watchman delivery sheath  We then used a 5mm pigtail catheter and placed in anterior lobe of appendage and did venogram to measure appendage depth  A 30mm Watchman device was used, the pigtail was removed and the watch to tip of sheath, marker bands aligned with fluoroscopy  We then unsheathed the Watchman  one deployments was required  After the final deployment we checked usual echo views at 0,45,90, 135 degrees w ALONZO and no leak >5mm seen, and there was not excess shoulder of device seen in any view  The device was compressed approximately 15% appropriately  A tug test showed normal recoil without dislodgement   After this the device was release from delivery cable and sheaths removed  Perclose was used for hemostasis          I was present for the entire procedure    Patient Disposition:  PACU     SIGNATURE: Aylin Boyd MD  DATE: March 25, 2021  TIME: 10:25 AM

## 2021-03-25 NOTE — ANESTHESIA PROCEDURE NOTES
Procedure Performed: ALONZO Anesthesia  Start Time:  3/25/2021 9:37 AM        Preanesthesia Checklist    Patient identified, IV assessed, risks and benefits discussed, monitors and equipment assessed, procedure being performed at surgeon's request and anesthesia consent obtained  Procedure    Diagnostic Indications for ALONZO:  defect repair evaluation, hemodynamic monitoring and suspected pericardial effusion  Type of ALONZO: interventional ALONZO with real time guidance of intracardiac procedure  Images Saved: ultrasound permanent image saved  Physician Requesting Echo: Leonidas Garcia MD  Location performed: OR  Intubated  Bite block not placed  Heart visualized  Insertion of ALONZO Probe:  Atraumatic  Probe Type:  Multiplane  Modalities:  3D, color flow mapping, continuous wave Doppler and pulse wave Doppler  Echocardiographic and Doppler Measurements    PREPROCEDURE    LEFT VENTRICLE:  Systolic Function: normal  Ejection Fraction: 60%  Cavity size: normal      RIGHT VENTRICLE:  Systolic Function: normal   Cavity size normal              AORTIC VALVE:  Leaflets: normal  Leaflet motions normal and normal  Stenosis: none  Regurgitation: none  MITRAL VALVE:  Leaflets: normal  Regurgitation: trace  Stenosis: none  TRICUSPID VALVE:  Leaflets: normal  Regurgitation: trace  ASCENDING AORTA:  Dissection not present  AORTIC ARCH:  dissection not present  Grade 3: atheroma protruding < 0 5 cm into lumen  DESCENDING AORTA:  Dissection not present  Grade 3: atheroma protruding < 0 5 cm into lumen  OTHER ATRIAL FINDINGS:  No NING clot  ATRIAL SEPTUM:  Intra-atrial septal morphology: no PFO by CFD  POSTPROCEDURE    LEFT VENTRICLE: Unchanged   RIGHT VENTRICLE: Unchanged   AORTIC VALVE: Unchanged   TRICUSPID VALVE: Unchanged   AORTA: Unchanged   Dissection: Dissection not present  REMOVAL:  Probe Removal: atraumatic  ECHOCARDIOGRAM COMMENTS:  Watchman present in NING, no residual flow    Iatrogenic ASD present with left to right flow    No new pericardial window

## 2021-03-25 NOTE — DISCHARGE INSTRUCTIONS
1  Please see the post cardiac catheterization dishcarge instructions  No heavy lifting, greater than 10 lbs  or strenuous  activity for 1 week  2 Remove band aid tomorrow  Shower and wash area- groin gently with soap and water- beginning tomorrow  Rinse and pat dry  Apply new water seal band aid  Repeat this process for 5 days  No powders, creams lotions or antibiotic ointments  for 5 days  No tub baths, hot tubs or swimming for 5 days  3  Please call our office ([phone]) if you have any fever, redness, swelling, discharge from your groin access site  4 No driving for 1 day    5   Perclose Booklet     Watchman Device Book    ALONZO 5/10/21 @10:00 am  [address] [facility] [address] [facility], [facility]

## 2021-03-25 NOTE — OP NOTE
OPERATIVE REPORT  PATIENT NAME: Serafin Jacobson    :  1955  MRN: [id number]  Pt Location: [facility] OR ROOM 02    SURGERY DATE: 3/25/2021    SURGEON: Kana Salazar DO     CO-SURGEON: Dr Rusty Jennings MD    PREOPERATIVE DIAGNOSIS: Elective left atrial appendage occluder, 30mm Watchman device, percutaneously    POSTOPERATIVE DIAGNOSIS: same        PROCEDURE: Left atrial appendage occluder, via a percutaneous right transfemoral approach  ANESTHESIA Dr Tala Lane  general endotracheal anesthesia with transesophageal echocardiogram guidance  Prodedure Note:  After informed consent was obtained, patient brought to the hybrid operating room in a fasting state  Plans for left atrial appendage occlusion via percutaneous approach due to patient's inability to take anticoagulation in the setting of atrial fibrillation  Time-out performed  Transesophageal images reviewed  Using modified Seldinger technique sheath placed the right common femoral vein  ProGlide closure device was then inserted and  used for pre close technique  This was ultimately upsized to the Watchman delivery sheath (double curved)  Transseptal technique was performed using [employer] system  Pigtail wire was then advanced once across the septum  Through this sheath the Watchman left atrial appendage occluder device system sheath was positioned into the appropriate lobe of the left atrium  This was over a pigtail catheter and a 035 J wire  Using fluoroscopy, transesophageal echocardiogram,  cine angiography as well as dye injection in the left atrial appendage to guide position of the delivery sheath the Watchman occluder device was deployed  Upon deployment, device was deployed and in appropriate position without any evidence of leak around the occluder device  After review of fluoroscopic and transesophageal echocardiogram images, the device was released and in stable position    Sheath was pulled and hemostasis achieved with ProGlide closure  Patient left the hybrid OR in stable condition    Impression 1    Successful placement of a 30 mm left atrial appendage occluder device, Watchman device, percutaneous approach              SIGNATURE: Ran Medina DO  DATE: March 25, 2021  TIME: 10:25 AM

## 2021-03-25 NOTE — ANESTHESIA POSTPROCEDURE EVALUATION
Post-Op Assessment Note    CV Status:  Stable    Pain management: adequate     Mental Status:  Alert and awake   Hydration Status:  Euvolemic   PONV Controlled:  Controlled   Airway Patency:  Patent      Post Op Vitals Reviewed: Yes      Staff: CRNA         No complications documented      /62 (03/25/21 1030)    Temp (!) 97 3 °F (36 3 °C) (03/25/21 1030)    Pulse 58 (03/25/21 1030)   Resp 14 (03/25/21 1030)    SpO2 100 % (03/25/21 1030)

## 2021-03-25 NOTE — DISCHARGE SUMMARY
Discharge Summary - Cally March 76 y o  male MRN: [id number]    Unit/Bed#: [facility] 424-01 Encounter: [id number]    Admission Date: 3/25/2021   Discharge Date:  03/26/2021    Disposition: Home    Condition at Discharge: good     PCP:Reggie Chirinos DO    OP Cardiologist: Dr Kim    Interventional cardiologist:  Maria Dolores Menard    Admitting Diagnosis:  Paroxysmal atrial fibrillation  Status post subdural hematoma January 2021    Secondary Diagnoses:   Status post robotic partial nephrectomy on 10/07/2020  Status post subdural hematoma January 2021  Hypertension  CVA - 6 years ago  Chronic kidney disease stage 3  Obstructive sleep apnea -       Discharge Diagnosis:  Successful implantation of left atrial appendage occluder device (Watchman)      /66   Pulse 60   Temp 98 4 °F (36 9 °C)   Resp 18   Ht 6' 2" (1 88 m)   Wt 93 kg (205 lb)   SpO2 100%   BMI 26 32 kg/m²       Review of Systems   All other systems reviewed and are negative  Physical Exam   Constitutional: He is oriented to person, place, and time  He appears well-developed and well-nourished  HENT:   Head: Normocephalic and atraumatic  Neck: Neck supple  Cardiovascular: Normal rate, regular rhythm, normal heart sounds and intact distal pulses  Pulmonary/Chest: Effort normal and breath sounds normal    Abdominal: Soft  Bowel sounds are normal    Musculoskeletal:         General: No edema  Neurological: He is alert and oriented to person, place, and time  Skin: Skin is warm  Psychiatric: His behavior is normal    Right groin negative hematoma or ecchymosis  HPI and Hospital Course:   76 y o  male history of PAF status post cardioversion and currently on flecainide and metoprolol  Patient's  [id number] score and HAS BLED Score: 4  He was scheduled for an ablation but renal cell carcinoma was found on a CTA  Patient underwent a robotic right partial nephrectomy in October 2020   In January 2021 patient had a spontaneous subdural hematoma  Patient has was initially on Eliquis but has been switched to Pradaxa  The patient iwas electively admitted for Watchman implantatio    On 03/25/2021 the patient underwent a successful implantation of a left atrial appendage occluder device (Watchman) with the use of general anesthesia, transesophageal echocardiogram, fluoroscopy and cine angiography  A 30 mm atrial appendage occluder device was implanted via right common femoral vein  The access site was closed with Perclose  Patient's chest x-ray on 03/26/2021 revealed Watchman device in the expected location of the left atrial appendage  [place] [facility] [place] 04[place]13/2021 at 2:20 PM with JERMAINE Brandt     ++++++++++++++++++++++++++++++++++++++++++++++++++++++++++++++++++++  Discharge Anticoagulation Strategy:  Continue Pradaxa 75 mg b i d  and ASA 81 mg for 45 days  ALONZO will be performed on 05/10/2021  Based on the results the ALONZO will determine if Pradaxa can be safely discontinued  If Pradaxa can be discontinued, then patient will be prescribed Plavix 75 mg and ASA 81 mg daily for 6 months  At 6 month Plavix will be discontinued and patient will be prescribed  mg    Patient will followup in one year and ALONZO will be repeated       Variations of this maybe changed based on physician discretion      +++++++++++++++++++++++++++++++++++++++++++++++++++++++++++++++++++    Current Facility-Administered Medications   Medication Dose Route Frequency    amLODIPine (NORVASC) tablet 5 mg  5 mg Oral Daily    aspirin (ECOTRIN LOW STRENGTH) EC tablet 81 mg  81 mg Oral Daily    atorvastatin (LIPITOR) tablet 10 mg  10 mg Oral Daily    dabigatran etexilate (PRADAXA) capsule 75 mg  75 mg Oral Q12H [address]    flecainide (TAMBOCOR) tablet 100 mg  100 mg Oral BID    metoprolol succinate (TOPROL-XL) 24 hr tablet 50 mg  50 mg Oral QAM       Pertinent Labs/diagnostics:        Lab Results:   CBC with diff: Results from last 7 days   Lab Units 03/25/21  0815   WBC Thousand/uL 4 68   HEMOGLOBIN g/dL 13 6   HEMATOCRIT % 42 7   MCV fL 91   PLATELETS Thousands/uL 216   MCH pg 29 1   MCHC g/dL 31 9   RDW % 13 1   MPV fL 9 5     This SmartLink has not been configured with any valid records  BMP       CMP:  Results from last 7 days   Lab Units 03/25/21  0815   POTASSIUM mmol/L 4 9   CHLORIDE mmol/L 106   CO2 mmol/L 30   BUN mg/dL 16   CREATININE mg/dL 1 22   CALCIUM mg/dL 8 5   EGFR ml/min/1 73sq m 61       Lipid Profile:   Lab Results   Component Value Date    CHOL 136 09/19/2017    CHOL 132 05/31/2017    CHOL 124 (L) 03/30/2016     Lab Results   Component Value Date    HDL 49 01/23/2020    HDL 62 12/06/2018    HDL 59 09/19/2017     Lab Results   Component Value Date    LDLCALC 66 01/23/2020    LDLCALC 69 12/06/2018     Lab Results   Component Value Date    TRIG 53 01/23/2020    TRIG 51 12/06/2018    TRIG 54 09/19/2017       Tele:  Sinus rhythm sinus bradycardia      Discharge instructions/Information to patient and family:   See after visit summary for information provided to patient and family  Provisions for Follow-Up Care:  See after visit summary for information related to follow-up care and any pertinent home health orders  Planned Readmission: No    Discharge Statement:  I spent 45 minutes minutes discharging the patient  This time was spent on the day of discharge  I had direct contact with the patient on the day of discharge  Additional documentation is required if more than 30 minutes were spent on discharge  ** Please Note: Fluency Direct Dictation voice to text software may have been used in the creation of this document   **

## 2021-03-25 NOTE — ANESTHESIA PROCEDURE NOTES
Arterial Line Insertion  Performed by: Asia Fu MD  Authorized by: Asia Fu MD   Consent: Verbal consent obtained  Written consent obtained  Risks and benefits: risks, benefits and alternatives were discussed  Consent given by: patient  Patient understanding: patient states understanding of the procedure being performed  Patient consent: the patient's understanding of the procedure matches consent given  Required items: required blood products, implants, devices, and special equipment available  Patient identity confirmed: arm band  Preparation: Patient was prepped and draped in the usual sterile fashion  Indications: multiple ABGs and hemodynamic monitoring  Orientation:  Left  Location: radial arteryMedication group details: ga    Sedation:  Patient sedated: yes  Analgesia: see MAR for details    Procedure Details:  Needle gauge: 20  Seldinger technique: Seldinger technique used  Number of attempts: 2    Post-procedure:  Post-procedure: dressing applied  Waveform: good waveform and waveform confirmed  Patient tolerance: Patient tolerated the procedure well with no immediate complications  Comments: Post-induction

## 2021-03-26 ENCOUNTER — TELEPHONE (OUTPATIENT)
Dept: NEUROSURGERY | Facility: CLINIC | Age: 66
End: 2021-03-26

## 2021-03-26 ENCOUNTER — APPOINTMENT (INPATIENT)
Dept: RADIOLOGY | Facility: HOSPITAL | Age: 66
DRG: 274 | End: 2021-03-26
Payer: MEDICARE

## 2021-03-26 VITALS
SYSTOLIC BLOOD PRESSURE: 121 MMHG | OXYGEN SATURATION: 94 % | DIASTOLIC BLOOD PRESSURE: 64 MMHG | BODY MASS INDEX: 26.31 KG/M2 | HEART RATE: 66 BPM | TEMPERATURE: 98.3 F | RESPIRATION RATE: 16 BRPM | HEIGHT: 74 IN | WEIGHT: 205 LBS

## 2021-03-26 PROBLEM — Z95.818 PRESENCE OF WATCHMAN LEFT ATRIAL APPENDAGE CLOSURE DEVICE: Status: ACTIVE | Noted: 2021-03-26

## 2021-03-26 LAB
ABO GROUP BLD BPU: NORMAL
ANION GAP SERPL CALCULATED.3IONS-SCNC: 6 MMOL/L (ref 4–13)
BPU ID: NORMAL
BUN SERPL-MCNC: 13 MG/DL (ref 5–25)
CALCIUM SERPL-MCNC: 8.5 MG/DL (ref 8.3–10.1)
CHLORIDE SERPL-SCNC: 109 MMOL/L (ref 100–108)
CO2 SERPL-SCNC: 26 MMOL/L (ref 21–32)
CREAT SERPL-MCNC: 1.05 MG/DL (ref 0.6–1.3)
CROSSMATCH: NORMAL
ERYTHROCYTE [DISTWIDTH] IN BLOOD BY AUTOMATED COUNT: 13.1 % (ref 11.6–15.1)
GFR SERPL CREATININE-BSD FRML MDRD: 74 ML/MIN/1.73SQ M
GLUCOSE SERPL-MCNC: 80 MG/DL (ref 65–140)
HCT VFR BLD AUTO: 40.1 % (ref 36.5–49.3)
HGB BLD-MCNC: 13.2 G/DL (ref 12–17)
MAGNESIUM SERPL-MCNC: 2.3 MG/DL (ref 1.6–2.6)
MCH RBC QN AUTO: 29.9 PG (ref 26.8–34.3)
MCHC RBC AUTO-ENTMCNC: 32.9 G/DL (ref 31.4–37.4)
MCV RBC AUTO: 91 FL (ref 82–98)
PLATELET # BLD AUTO: 192 THOUSANDS/UL (ref 149–390)
PMV BLD AUTO: 9.5 FL (ref 8.9–12.7)
POTASSIUM SERPL-SCNC: 3.7 MMOL/L (ref 3.5–5.3)
RBC # BLD AUTO: 4.42 MILLION/UL (ref 3.88–5.62)
SODIUM SERPL-SCNC: 141 MMOL/L (ref 136–145)
UNIT DISPENSE STATUS: NORMAL
UNIT PRODUCT CODE: NORMAL
UNIT RH: NORMAL
WBC # BLD AUTO: 5.87 THOUSAND/UL (ref 4.31–10.16)

## 2021-03-26 PROCEDURE — 99222 1ST HOSP IP/OBS MODERATE 55: CPT | Performed by: PHYSICIAN ASSISTANT

## 2021-03-26 PROCEDURE — 83735 ASSAY OF MAGNESIUM: CPT | Performed by: INTERNAL MEDICINE

## 2021-03-26 PROCEDURE — 80048 BASIC METABOLIC PNL TOTAL CA: CPT | Performed by: INTERNAL MEDICINE

## 2021-03-26 PROCEDURE — 85027 COMPLETE CBC AUTOMATED: CPT | Performed by: INTERNAL MEDICINE

## 2021-03-26 PROCEDURE — 71046 X-RAY EXAM CHEST 2 VIEWS: CPT

## 2021-03-26 RX ORDER — ASPIRIN 81 MG/1
81 TABLET ORAL DAILY
Qty: 30 TABLET | Refills: 2
Start: 2021-03-27

## 2021-03-26 RX ADMIN — ASPIRIN 81 MG: 81 TABLET, COATED ORAL at 08:14

## 2021-03-26 RX ADMIN — DABIGATRAN ETEXILATE MESYLATE 75 MG: 75 CAPSULE ORAL at 08:15

## 2021-03-26 RX ADMIN — FLECAINIDE ACETATE 100 MG: 100 TABLET ORAL at 08:15

## 2021-03-26 RX ADMIN — ATORVASTATIN CALCIUM 10 MG: 10 TABLET, FILM COATED ORAL at 08:14

## 2021-03-26 RX ADMIN — METOPROLOL SUCCINATE 50 MG: 50 TABLET, EXTENDED RELEASE ORAL at 08:14

## 2021-03-26 RX ADMIN — AMLODIPINE BESYLATE 5 MG: 5 TABLET ORAL at 08:14

## 2021-03-26 NOTE — ASSESSMENT & PLAN NOTE
Patient seen today in follow-up for SDH  · History of prior CVA and AFib who was on Eliquis which was held then resumed Pradaxa AC given reversability  · Patient originally presented to the ED on 01/08 with dizziness and stomach discomfort  Imaging at that time demonstrated a left frontal parietal subdural hematoma  Patient denies any history of falls or striking his head  Imaging:  · CT head wo 3/24/2021: stable 5mm left F/P subdural collection  No new hemorrhage  Plan:  · Continue to monitor neurological exam  · Reviewed imaging results with patient  · Patient started Pradaxa on 2/18 per cardiology after being on eliquis for a few days as pradaxa has a reversal agent  · Patient made aware to return for worsening headaches, vision or speech changes, seizures, dizziness, nausea, vomiting, confusion, slurred speech, or sleepiness  · Follow-up with CT head wo in 1 month to evaluate left subdural hematoma  Patient made aware to contact Neurosurgery with any further questions or concerns    · Per prior notes, will need 6 month follow-up for posterior fossa arachnoid cyst

## 2021-03-26 NOTE — CONSULTS
[address] 1955, 76 y o  male MRN: [id number]  Unit/Bed#: [facility] 424-01 Encounter: [id number]  Primary Care Provider: Farhad Armstrong DO   Date and time admitted to hospital: 3/25/2021  6:47 AM    Consults    SDH (subdural hematoma) [facility])  Assessment & Plan  Patient seen today in follow-up for SDH  · History of prior CVA and AFib who was on Eliquis which was held then resumed Pradaxa AC given reversability  · Patient originally presented to the ED on 01/08 with dizziness and stomach discomfort  Imaging at that time demonstrated a left frontal parietal subdural hematoma  Patient denies any history of falls or striking his head  Imaging:  · CT head wo 3/24/2021: stable 5mm left F/P subdural collection  No new hemorrhage  Plan:  · Continue to monitor neurological exam  · Reviewed imaging results with patient  · Patient started Pradaxa on 2/18 per cardiology after being on eliquis for a few days as pradaxa has a reversal agent  · Patient made aware to return for worsening headaches, vision or speech changes, seizures, dizziness, nausea, vomiting, confusion, slurred speech, or sleepiness  · Follow-up with CT head wo in 1 month to evaluate left subdural hematoma  Patient made aware to contact Neurosurgery with any further questions or concerns  · Per prior notes, will need 6 month follow-up for posterior fossa arachnoid cyst        History of Present Illness     HPI: Cookie May is a 76 y o  male with PMH including   Hypertension, hyperlipidemia, obstructive sleep apnea on CPAP, kidney cancer, melanoma, history of stroke atrial fibrillation on anticoagulation with prior subdural hematoma who presented for Watchman procedure  Patient originally known to our service following evaluation in January  Patient presented the emergency room January 8th complaints of dizziness and stomach discomfort    Imaging demonstrated subdural hematoma the patient had denied any trauma  Repeat imaging shows bilateral subdurals and Eliquis was discontinued in the setting with spontaneous subdural hemorrhage  He has been followed in the outpatient setting and has been restarted on anticoagulation but switch to a reversal agent, Pradaxa  Patient had scheduled follow-up in our office this week and had undergone his repeat CT head on 03/24  Patient was seen during his hospitalization for follow-up of his subdural hematoma  Patient is without any neurological complaints  He specifically denies any headaches vision or speech deficits  Denies any seizures  Denies any weakness or sensory deficits  Denies any dizziness lightheadedness  Patient is tolerating food well  He remains on anticoagulation at least until 45 days from his procedure  Review of Systems   Constitutional: Negative for activity change, fatigue and fever  HENT: Negative for trouble swallowing and voice change  Eyes: Negative for photophobia and visual disturbance  Respiratory: Negative for cough and shortness of breath  Cardiovascular: Negative for chest pain and leg swelling  Gastrointestinal: Negative for abdominal pain, nausea and vomiting  Genitourinary: Negative for decreased urine volume and difficulty urinating  Musculoskeletal: Negative for back pain, gait problem and neck pain  Skin: Negative for pallor, rash and wound  Neurological: Negative for dizziness, tremors, seizures, speech difficulty, weakness, light-headedness, numbness and headaches  Psychiatric/Behavioral: Negative for agitation and confusion         Historical Information   Past Medical History:   Diagnosis Date    Atrial fibrillation [facility] )     Cancer ([facility] ) 08/2020    Kidney cancer    Cancer [facility])     melanoma    Chronic kidney disease     left kidney cancer    CPAP (continuous positive airway pressure) dependence     Hyperlipidemia     Hypertension     Irregular heart beat     Afib    Pneumonia  Skin cancer     Sleep apnea     Stroke [facility] [address] [facility]     Supraventricular tachycardia ([facility] [facility] )     Wears glasses     Wears partial dentures     upper partial     Past Surgical History:   Procedure Laterality Date    APPENDECTOMY      CARDIOVERSION      COLONOSCOPY      fiberoptic, also 2009, both negative, resolved 2004    COLONOSCOPY  08/2019    EGD      HERNIA REPAIR      umbilical and bilateral inguinal    KNEE ARTHROSCOPY Left     MA LAP,PARTIAL NEPHRECTOMY Left 10/7/2020    Procedure: ROBOTIC PARTIAL NEPHRECTOMY;  Surgeon: Katina Dyer MD;  Location: [facility];  Service: Urology    MA PERQ CLSR TCAT L R [address] IMPLNT N/A 3/25/2021    Procedure: LEFT ATRIAL APPENDAGE OCCLUSION;  Surgeon: Phani Comer MD;  Location: [facility];  Service: Cardiology    SKIN GRAFT      left ear, skin cancer    TONSILLECTOMY       Social History     Substance and Sexual Activity   Alcohol Use Never    Frequency: 2-4 times a month    Drinks per session: 1 or 2     Social History     Substance and Sexual Activity   Drug Use No     Social History     Tobacco Use   Smoking Status Never Smoker   Smokeless Tobacco Never Used     Family History   Problem Relation Age of Onset    Cancer Father         stomach    Hypertension Father     Stroke Father         syndrome    Cancer Family     Heart disease Family     Hypertension Family     Stroke Family         syndrome    Throat cancer Mother     Diabetes Mother     Asthma Sister        Meds/Allergies   all current active meds have been reviewed, current meds:   Current Facility-Administered Medications   Medication Dose Route Frequency    amLODIPine (NORVASC) tablet 5 mg  5 mg Oral Daily    aspirin (ECOTRIN LOW STRENGTH) EC tablet 81 mg  81 mg Oral Daily    atorvastatin (LIPITOR) tablet 10 mg  10 mg Oral Daily    dabigatran etexilate (PRADAXA) capsule 75 mg  75 mg Oral Q12H [address]    flecainide (TAMBOCOR) tablet 100 mg  100 mg Oral BID    metoprolol succinate (TOPROL-XL) 24 hr tablet 50 mg  50 mg Oral QAM    and PTA meds:   Prior to Admission Medications   Prescriptions Last Dose Informant Patient Reported? Taking? Coenzyme Q10 (CO Q-10 PO) 3/18/2021 Self Yes No   Sig: Take 1 caplet by mouth every morning   Multiple Vitamins-Minerals (MULTIVITAMIN ADULT PO) 3/18/2021 at Unknown time Self Yes Yes   Sig: Take 1 tablet by mouth every morning   amLODIPine (NORVASC) 5 mg tablet 3/24/2021 at Unknown time Self Yes Yes   Sig: Take 5 mg by mouth daily   atorvastatin (LIPITOR) 10 mg tablet 3/24/2021 at Unknown time Self No Yes   Sig: Take 1 tablet (10 mg total) by mouth daily   dabigatran etexilate (PRADAXA) 75 mg capsule 3/24/2021 at Unknown time Self No Yes   Sig: Take 1 capsule (75 mg total) by mouth every 12 (twelve) hours   flecainide (TAMBOCOR) 100 mg tablet 3/24/2021 at Unknown time Self No Yes   Sig: Take 1 tablet (100 mg total) by mouth 2 (two) times a day   metoprolol succinate (TOPROL-XL) 50 mg 24 hr tablet 3/24/2021 at Unknown time Self No Yes   Sig: Take 1 tablet (50 mg total) by mouth every morning      Facility-Administered Medications: None     No Known Allergies    Objective   I/O       03/24 0701 - 03/25 0700 03/25 0701 - 03/26 0700 03/26 0701 - 03/27 0700    I V  (mL/kg)  500 (5 4)     IV Piggyback  1000     Total Intake(mL/kg)  1500 (16 1)     Urine (mL/kg/hr)  1780 (0 8) 100 (0 1)    Total Output  1780 100    Net  -280 -100                 Physical Exam  Constitutional:       General: He is not in acute distress  Appearance: Normal appearance  He is well-developed  He is not ill-appearing  HENT:      Head: Normocephalic and atraumatic  Nose: Nose normal    Eyes:      General: No scleral icterus  Right eye: No discharge  Extraocular Movements: Extraocular movements intact and EOM normal       Conjunctiva/sclera: Conjunctivae normal       Pupils: Pupils are equal, round, and reactive to light     Neck: Musculoskeletal: Normal range of motion and neck supple  Cardiovascular:      Rate and Rhythm: Normal rate  Pulmonary:      Effort: Pulmonary effort is normal  No respiratory distress  Abdominal:      General: There is no distension  Musculoskeletal:         General: No deformity  Skin:     General: Skin is warm and dry  Neurological:      Mental Status: He is alert and oriented to person, place, and time  Coordination: Finger-Nose-Finger Test normal    Psychiatric:         Speech: Speech normal          Behavior: Behavior normal          Thought Content: Thought content normal          Judgment: Judgment normal        Neurologic Exam     Mental Status   Oriented to person, place, and time  Follows 3 step commands  Attention: normal  Concentration: normal    Speech: speech is normal   Level of consciousness: alert  Knowledge: good  Able to perform simple calculations  Normal comprehension  Cranial Nerves     CN III, IV, VI   Pupils are equal, round, and reactive to light  Extraocular motions are normal    Nystagmus: none   Upgaze: normal  Conjugate gaze: present    CN V   Facial sensation intact  CN VII   Facial expression full, symmetric  CN VIII   Hearing: intact    CN XI   Right trapezius strength: normal  Left trapezius strength: normal    CN XII   Tongue: not atrophic  Fasciculations: absent  Tongue deviation: none    Motor Exam   Muscle bulk: normal  Overall muscle tone: normal  Right arm pronator drift: absent  Left arm pronator drift: absent    Strength   Strength 5/5 except as noted   Right  4+     Sensory Exam   Light touch normal      Gait, Coordination, and Reflexes     Coordination   Finger to nose coordination: normal    Tremor   Resting tremor: absent  Intention tremor: absent  Action tremor: absent    Reflexes   Right Encarnacion: absent  Left Encarnacion: absent  Right ankle clonus: absent  Left ankle clonus: absent        Vitals:Blood pressure 121/64, pulse 66, temperature 98 3 °F (36 8 °C), resp  rate 16, height 6' 2" (1 88 m), weight 93 kg (205 lb), SpO2 94 %  ,Body mass index is 26 32 kg/m²  Lab Results:   Results from last 7 days   Lab Units 03/26/21  0519 03/25/21  0815   WBC Thousand/uL 5 87 4 68   HEMOGLOBIN g/dL 13 2 13 6   HEMATOCRIT % 40 1 42 7   PLATELETS Thousands/uL 192 216     Results from last 7 days   Lab Units 03/26/21  0519 03/25/21  0815   POTASSIUM mmol/L 3 7 4 9   CHLORIDE mmol/L 109* 106   CO2 mmol/L 26 30   BUN mg/dL 13 16   CREATININE mg/dL 1 05 1 22   CALCIUM mg/dL 8 5 8 5     Results from last 7 days   Lab Units 03/26/21  0519 03/25/21  0815   MAGNESIUM mg/dL 2 3 2 5             No results found for: TROPONINT  ABG:No results found for: PHART, YUY8QRO, PO2ART, UZX1JLE, P8BLIGGD, BEART, SOURCE    Imaging Studies: I have personally reviewed pertinent reports  and I have personally reviewed pertinent films in PACS    Ct Head Wo Contrast    Result Date: 3/24/2021  Impression: Stable size 5 mm left frontoparietal extra-axial collection compatible subacute subdural hemorrhage  No new interval hemorrhage identified  Workstation performed: [id number]       EKG, Pathology, and Other Studies: none    VTE Prophylaxis: pradaxa    Code Status: Level 1 - Full Code  Advance Directive and Living Will:      Power of :    POLST:      Counseling / Coordination of Care  I spent 20 minutes with the patient

## 2021-03-26 NOTE — RESTORATIVE TECHNICIAN NOTE
Restorative Specialist Mobility Note       Activity: Ambulate in gardner, Chair     Assistive Device: None

## 2021-03-26 NOTE — PLAN OF CARE
Problem: Potential for Falls  Goal: Patient will remain free of falls  Description: INTERVENTIONS:  - Assess patient frequently for physical needs  -  Identify cognitive and physical deficits and behaviors that affect risk of falls    -  [place] fall precautions as indicated by assessment   - Educate patient/family on patient safety including physical limitations  - Instruct patient to call for assistance with activity based on assessment  - Modify environment to reduce risk of injury  - Consider OT/PT consult to assist with strengthening/mobility  Outcome: Progressing     Problem: PAIN - ADULT  Goal: Verbalizes/displays adequate comfort level or baseline comfort level  Description: Interventions:  - Encourage patient to monitor pain and request assistance  - Assess pain using appropriate pain scale  - Administer analgesics based on type and severity of pain and evaluate response  - Implement non-pharmacological measures as appropriate and evaluate response  - Consider cultural and social influences on pain and pain management  - Notify physician/advanced practitioner if interventions unsuccessful or patient reports new pain  Outcome: Progressing     Problem: INFECTION - ADULT  Goal: Absence or prevention of progression during hospitalization  Description: INTERVENTIONS:  - Assess and monitor for signs and symptoms of infection  - Monitor lab/diagnostic results  - Monitor all insertion sites, i e  indwelling lines, tubes, and drains  - Monitor endotracheal if appropriate and nasal secretions for changes in amount and color  - [place] appropriate cooling/warming therapies per order  - Administer medications as ordered  - Instruct and encourage patient and family to use good hand hygiene technique  - Identify and instruct in appropriate isolation precautions for identified infection/condition  Outcome: Progressing  Goal: Absence of fever/infection during neutropenic period  Description: INTERVENTIONS:  - Monitor WBC    Outcome: Progressing     Problem: SAFETY ADULT  Goal: Patient will remain free of falls  Description: INTERVENTIONS:  - Assess patient frequently for physical needs  -  Identify cognitive and physical deficits and behaviors that affect risk of falls    -  [place] fall precautions as indicated by assessment   - Educate patient/family on patient safety including physical limitations  - Instruct patient to call for assistance with activity based on assessment  - Modify environment to reduce risk of injury  - Consider OT/PT consult to assist with strengthening/mobility  Outcome: Progressing  Goal: Maintain or return to baseline ADL function  Description: INTERVENTIONS:  -  Assess patient's ability to carry out ADLs; assess patient's baseline for ADL function and identify physical deficits which impact ability to perform ADLs (bathing, care of mouth/teeth, toileting, grooming, dressing, etc )  - Assess/evaluate cause of self-care deficits   - Assess range of motion  - Assess patient's mobility; develop plan if impaired  - Assess patient's need for assistive devices and provide as appropriate  - Encourage maximum independence but intervene and supervise when necessary  - Involve family in performance of ADLs  - Assess for home care needs following discharge   - Consider OT consult to assist with ADL evaluation and planning for discharge  - Provide patient education as appropriate  Outcome: Progressing  Goal: Maintain or return mobility status to optimal level  Description: INTERVENTIONS:  - Assess patient's baseline mobility status (ambulation, transfers, stairs, etc )    - Identify cognitive and physical deficits and behaviors that affect mobility  - Identify mobility aids required to assist with transfers and/or ambulation (gait belt, sit-to-stand, lift, walker, cane, etc )  - [place] fall precautions as indicated by assessment  - Record patient progress and toleration of activity level on Mobility SBAR; progress patient to next Phase/Stage  - Instruct patient to call for assistance with activity based on assessment  - Consider rehabilitation consult to assist with strengthening/weightbearing, etc   Outcome: Progressing     Problem: DISCHARGE PLANNING  Goal: Discharge to home or other facility with appropriate resources  Description: INTERVENTIONS:  - Identify barriers to discharge w/patient and caregiver  - Arrange for needed discharge resources and transportation as appropriate  - Identify discharge learning needs (meds, wound care, etc )  - Arrange for interpretive services to assist at discharge as needed  - Refer to Case Management Department for coordinating discharge planning if the patient needs post-hospital services based on physician/advanced practitioner order or complex needs related to functional status, cognitive ability, or social support system  Outcome: Progressing     Problem: Knowledge Deficit  Goal: Patient/family/caregiver demonstrates understanding of disease process, treatment plan, medications, and discharge instructions  Description: Complete learning assessment and assess knowledge base    Interventions:  - Provide teaching at level of understanding  - Provide teaching via preferred learning methods  Outcome: Progressing

## 2021-03-26 NOTE — TELEPHONE ENCOUNTER
03/30/2021-3/29 APT CX-Hospitalized (Patient being seen inpt)  CALLED PT, CONFIRMED 04/26 CT APT AND 04/28 F/U APT WITH PT       03/26/2021-PT [address] YESTERDAY 3/25  PT HAS NOT BEEN CONSULTED BY [facility] YET, BUT HAS APT ON Monday 03/29/2021  KEEPING AND ETE ON THIS  Lisa Ybarra

## 2021-03-29 ENCOUNTER — TRANSITIONAL CARE MANAGEMENT (OUTPATIENT)
Dept: FAMILY MEDICINE CLINIC | Facility: CLINIC | Age: 66
End: 2021-03-29

## 2021-03-29 DIAGNOSIS — I48.19 PERSISTENT ATRIAL FIBRILLATION (HCC): ICD-10-CM

## 2021-03-29 RX ORDER — FLECAINIDE ACETATE 100 MG/1
100 TABLET ORAL 2 TIMES DAILY
Qty: 60 TABLET | Refills: 11 | Status: SHIPPED | OUTPATIENT
Start: 2021-03-29 | End: 2022-04-16 | Stop reason: SDUPTHER

## 2021-04-08 ENCOUNTER — OFFICE VISIT (OUTPATIENT)
Dept: FAMILY MEDICINE CLINIC | Facility: CLINIC | Age: 66
End: 2021-04-08
Payer: MEDICARE

## 2021-04-08 VITALS
BODY MASS INDEX: 26.9 KG/M2 | DIASTOLIC BLOOD PRESSURE: 80 MMHG | WEIGHT: 209.6 LBS | HEIGHT: 74 IN | SYSTOLIC BLOOD PRESSURE: 120 MMHG | TEMPERATURE: 95.8 F

## 2021-04-08 DIAGNOSIS — Z86.73 HISTORY OF CVA (CEREBROVASCULAR ACCIDENT): ICD-10-CM

## 2021-04-08 DIAGNOSIS — G47.33 OSA (OBSTRUCTIVE SLEEP APNEA): ICD-10-CM

## 2021-04-08 DIAGNOSIS — E78.2 MIXED HYPERLIPIDEMIA: ICD-10-CM

## 2021-04-08 DIAGNOSIS — S06.5X9A SDH (SUBDURAL HEMATOMA) (HCC): ICD-10-CM

## 2021-04-08 DIAGNOSIS — I48.0 PAROXYSMAL ATRIAL FIBRILLATION (HCC): Primary | ICD-10-CM

## 2021-04-08 DIAGNOSIS — C64.2 RENAL CELL CARCINOMA OF LEFT KIDNEY (HCC): ICD-10-CM

## 2021-04-08 PROCEDURE — 99495 TRANSJ CARE MGMT MOD F2F 14D: CPT | Performed by: FAMILY MEDICINE

## 2021-04-08 NOTE — PROGRESS NOTES
Assessment/Plan:     No problem-specific Assessment & Plan notes found for this encounter  Diagnoses and all orders for this visit:    Paroxysmal atrial fibrillation (HCC)    SDH (subdural hematoma) (HCC)    FELISA (obstructive sleep apnea)    Renal cell carcinoma of left kidney (HCC)    Mixed hyperlipidemia    History of CVA (cerebrovascular accident)         Subjective:     Patient ID: Jeanine Felipe is a 78 y o  male  HPI    Review of Systems      Objective:     Physical Exam      Vitals:    04/08/21 0927   BP: 120/80   BP Location: Right arm   Patient Position: Sitting   Cuff Size: Standard   Temp: (!) 95 8 °F (35 4 °C)   TempSrc: Tympanic   Weight: 95 1 kg (209 lb 9 6 oz)   Height: 6' 2" (1 88 m)       Transitional Care Management Review:  Jeanine Felipe is a 78 y o  male here for TCM follow up  During the TCM phone call patient stated:    TCM Call (since 3/8/2021)     Date and time call was made  3/29/2021  8:51 AM    Patient was hospitialized at  [facility]        Date of Admission  03/25/21    Date of discharge  03/26/21    Diagnosis  WATCHMAN    Disposition  Home    Were the patients medications reviewed and updated  Yes    Current Symptoms  None      TCM Call (since 3/8/2021)     Post hospital issues  None    Should patient be enrolled in anticoag monitoring? No    Scheduled for follow up?   Yes    Did you obtain your prescribed medications  Yes    Do you need help managing your prescriptions or medications  No    Is transportation to your appointment needed  No    I have advised the patient to call PCP with any new or worsening symptoms  JESSICA DE LA VEGA CMA QL    Living Arrangements  Spouse or Significiant other    Support System  Spouse    The type of support provided  Emotional; Physical    Do you have social support  Yes, as much as I need    Are you recieving any outpatient services  No    Are you recieving home care services  No    Are you using any community resources  No Current waiver services  No    Have you fallen in the last 12 months  No    Interperter language line needed  No    Counseling  Patient          Tata Sims

## 2021-04-08 NOTE — PROGRESS NOTES
Assessment/Plan:  Patient will continue with current list of medications for chronic conditions listed  Patient status post Watchman doing well this time  Patient follow-up with  Interventional Cardiology per routine  This will happen on the 13th of April  Other guidance given  Patient will follow-up per routine  Diagnoses and all orders for this visit:    Paroxysmal atrial fibrillation (HCC)    SDH (subdural hematoma) (HCC)    FELISA (obstructive sleep apnea)    Renal cell carcinoma of left kidney (HCC)    Mixed hyperlipidemia    History of CVA (cerebrovascular accident)            Subjective:        Patient ID: Ingrid Hall is a 78 y o  male  Patient is here status post hospitalization from March 25th through the 26 for Watchman procedure  Patient with paroxysmal Afib  Patient also with history of subdural hematoma  Patient also with history of CVA chronic kidney disease stage 3 and sleep apnea  Patient is on flecainide and metoprolol  Patient will be seeing Cardiothoracic surgery in follow-up on the 13th  Patient is presently on Pradaxa as well as aspirin  Patient did have T in chest x-ray which were reviewed  All hospital records reviewed  Patient does notice some fatigue with flecainide  No chest pain shortness of breath no palpitations  No syncope or dizziness  No problems urinating or defecating  No other stroke-like symptoms  No other new complaints at present time  The following portions of the patient's history were reviewed and updated as appropriate: allergies, current medications, past family history, past medical history, past social history, past surgical history and problem list       Review of Systems   Constitutional: Positive for fatigue  HENT: Negative  Eyes: Negative  Respiratory: Negative  Cardiovascular: Negative  Gastrointestinal: Negative  Endocrine: Negative  Genitourinary: Negative  Musculoskeletal: Negative  Skin: Negative  Allergic/Immunologic: Negative  Neurological: Negative  Hematological: Negative  Psychiatric/Behavioral: Negative  Objective:      BMI Counseling: Body mass index is 26 91 kg/m²  The BMI is above normal  Nutrition recommendations include decreasing portion sizes  Exercise recommendations include moderate physical activity 150 minutes/week  Depression Screening and Follow-up Plan: Clincally patient does not have depression  No treatment is required  /80 (BP Location: Right arm, Patient Position: Sitting, Cuff Size: Standard)   Temp (!) 95 8 °F (35 4 °C) (Tympanic)   Ht 6' 2" (1 88 m)   Wt 95 1 kg (209 lb 9 6 oz)   BMI 26 91 kg/m²          Physical Exam  Vitals signs and nursing note reviewed  Constitutional:       General: He is not in acute distress  Appearance: Normal appearance  He is not ill-appearing, toxic-appearing or diaphoretic  HENT:      Head: Normocephalic and atraumatic  Right Ear: Tympanic membrane, ear canal and external ear normal  There is no impacted cerumen  Left Ear: Tympanic membrane, ear canal and external ear normal  There is no impacted cerumen  Nose: Nose normal  No congestion or rhinorrhea  Mouth/Throat:      Mouth: Mucous membranes are moist       Pharynx: No oropharyngeal exudate or posterior oropharyngeal erythema  Eyes:      General: No scleral icterus  Right eye: No discharge  Left eye: No discharge  Extraocular Movements: Extraocular movements intact  Conjunctiva/sclera: Conjunctivae normal       Pupils: Pupils are equal, round, and reactive to light  Neck:      Musculoskeletal: Normal range of motion and neck supple  No neck rigidity or muscular tenderness  Vascular: No carotid bruit  Cardiovascular:      Rate and Rhythm: Normal rate and regular rhythm  Pulses: Normal pulses  Heart sounds: Normal heart sounds  No murmur  No friction rub  No gallop      Pulmonary: Effort: Pulmonary effort is normal  No respiratory distress  Breath sounds: Normal breath sounds  No stridor  No wheezing, rhonchi or rales  Chest:      Chest wall: No tenderness  Abdominal:      General: Abdomen is flat  Bowel sounds are normal  There is no distension  Palpations: Abdomen is soft  Tenderness: There is no abdominal tenderness  There is no guarding or rebound  Musculoskeletal: Normal range of motion  General: No swelling, tenderness, deformity or signs of injury  Right lower leg: No edema  Left lower leg: No edema  Lymphadenopathy:      Cervical: No cervical adenopathy  Skin:     General: Skin is warm and dry  Capillary Refill: Capillary refill takes less than 2 seconds  Coloration: Skin is not jaundiced  Findings: No bruising, erythema, lesion or rash  Neurological:      General: No focal deficit present  Mental Status: He is alert and oriented to person, place, and time  Cranial Nerves: No cranial nerve deficit  Sensory: No sensory deficit  Motor: No weakness  Coordination: Coordination normal       Gait: Gait normal    Psychiatric:         Mood and Affect: Mood normal          Behavior: Behavior normal          Thought Content:  Thought content normal          Judgment: Judgment normal

## 2021-04-12 ENCOUNTER — PATIENT OUTREACH (OUTPATIENT)
Dept: FAMILY MEDICINE CLINIC | Facility: CLINIC | Age: 66
End: 2021-04-12

## 2021-04-13 ENCOUNTER — OFFICE VISIT (OUTPATIENT)
Dept: CARDIOLOGY CLINIC | Facility: CLINIC | Age: 66
End: 2021-04-13
Payer: MEDICARE

## 2021-04-13 VITALS
BODY MASS INDEX: 26.73 KG/M2 | HEIGHT: 74 IN | WEIGHT: 208.3 LBS | SYSTOLIC BLOOD PRESSURE: 110 MMHG | OXYGEN SATURATION: 97 % | DIASTOLIC BLOOD PRESSURE: 60 MMHG | HEART RATE: 59 BPM

## 2021-04-13 DIAGNOSIS — Z95.818 PRESENCE OF WATCHMAN LEFT ATRIAL APPENDAGE CLOSURE DEVICE: Primary | ICD-10-CM

## 2021-04-13 DIAGNOSIS — G47.33 OSA ON CPAP: ICD-10-CM

## 2021-04-13 DIAGNOSIS — I48.0 PAROXYSMAL ATRIAL FIBRILLATION (HCC): ICD-10-CM

## 2021-04-13 DIAGNOSIS — Z99.89 OSA ON CPAP: ICD-10-CM

## 2021-04-13 DIAGNOSIS — I10 HYPERTENSION, UNSPECIFIED TYPE: ICD-10-CM

## 2021-04-13 DIAGNOSIS — N18.31 STAGE 3A CHRONIC KIDNEY DISEASE (HCC): ICD-10-CM

## 2021-04-13 PROCEDURE — 99214 OFFICE O/P EST MOD 30 MIN: CPT | Performed by: INTERNAL MEDICINE

## 2021-04-13 NOTE — PROGRESS NOTES
Cardiology Follow Up    Jeanine Nixont  1955  [id number]  [facility]  [address] [address] [phone]    Atrial fibrillation sp Watchman procedure    Interval History:   Mr Jose Haines was admitted to [facility] on 3/25 - 3/26/21 sp placement of NING closure device Wathcman  Mr Tania Richard was  Electively admitted and underwent placement of 30 mm left atrial appendage occluder device Watchman procedure via right femoral vein  He will continue on Pradaxa 75 mg b i d  and aspirin 81 mg until TTE on May 10, 2021  TTE  will determine if Pradaxa can be safely be discontinued  If Pradaxa can be discontinued then Plavix 75 mg daily and aspirin 81 mg daily will be ordered for 6 months  After 6 months Pradaxa will be discontinued aspirin 325 mg daily will be prescribed  One year ALONZO will be done  Mr Jose Haines presents to our office for a recent hospitalization follow up visit  He is accompanied by his wife  Ethelle Phoenix to slight lightheadedness and dizziness associated with fatigue throughout the day  He is associates  His symptoms with increased dose of flecainide since January at 100 mg b i d  He denies CP, palpitations or shortness of breath              HPI:  Paroxysmal atrial fibrillation 12/2019 sp DCCV on Flecainide and Metoprolol  Initially on Eliquis which Pradaxa  10/07/20 sp Partial Nephrectomy   1/2021 sp spontaneous subdural hematome  HTN  Hx of CVA 6 yrs ago  CKD III 1 10 to 1 22  FELISA compliant with CPAP   Patient Active Problem List   Diagnosis    History of CVA (cerebrovascular accident)    Essential hypertension    Atrial fibrillation (HCC)    Mixed hyperlipidemia    Pain of toe of right foot    Benign prostatic hyperplasia with lower urinary tract symptoms    Screening for colon cancer    Acute bacterial conjunctivitis of both eyes    FELISA (obstructive sleep apnea)    Renal cell carcinoma of left kidney (HCC)    Stage 3a chronic kidney disease    SDH (subdural hematoma) (HCC)    Presence of Watchman left atrial appendage closure device     Past Medical History:   Diagnosis Date    Atrial fibrillation ([facility] )     Cancer ([facility] ) 08/2020    Kidney cancer    Cancer ([facility] )     melanoma    Chronic kidney disease     left kidney cancer    CPAP (continuous positive airway pressure) dependence     Hyperlipidemia     Hypertension     Irregular heart beat     Afib    Pneumonia     Skin cancer     Sleep apnea     Stroke ([facility] )     Supraventricular tachycardia ([facility] )     Wears glasses     Wears partial dentures     upper partial     Social History     Socioeconomic History    Marital status: /Civil Union     Spouse name: Not on file    Number of children: Not on file    Years of education: Not on file    Highest education level: Not on file   Occupational History    Occupation: retired    Social Needs    Financial resource strain: Not on file    Food insecurity     Worry: Not on file     Inability: Not on file   [employer] needs     Medical: Not on file     Non-medical: Not on file   Tobacco Use    Smoking status: Never Smoker    Smokeless tobacco: Never Used   Substance and Sexual Activity    Alcohol use: Never     Frequency: 2-4 times a month     Drinks per session: 1 or 2    Drug use: No    Sexual activity: Not on file   Lifestyle    Physical activity     Days per week: Not on file     Minutes per session: Not on file    Stress: Not on file   Relationships    Social connections     Talks on phone: Not on file     Gets together: Not on file     Attends Gnosticism service: Not on file     Active member of club or organization: Not on file     Attends meetings of clubs or organizations: Not on file     Relationship status: Not on file    Intimate partner violence     Fear of current or ex partner: Not on file     Emotionally abused: Not on file     Physically abused: Not on file     Forced sexual activity: Not on file   Other Topics Concern    Not on file   Social History Narrative    Not on file      Family History   Problem Relation Age of Onset    Cancer Father         stomach    Hypertension Father     Stroke Father         syndrome    Cancer Family     Heart disease Family     Hypertension Family     Stroke Family         syndrome    Throat cancer Mother     Diabetes Mother     Asthma Sister      Past Surgical History:   Procedure Laterality Date    APPENDECTOMY      CARDIOVERSION      COLONOSCOPY      fiberoptic, also 2009, both negative, resolved 2004    COLONOSCOPY  08/2019    EGD      HERNIA REPAIR      umbilical and bilateral inguinal    KNEE ARTHROSCOPY Left     MT LAP,PARTIAL NEPHRECTOMY Left 10/7/2020    Procedure: ROBOTIC PARTIAL NEPHRECTOMY;  Surgeon: Maliha Lorenzo MD;  Location: [facility];  Service: Urology    MT PERQ CLSR TCAT L ATR APNDGE W/ENDOCARDIAL IMPLNT N/A 3/25/2021    Procedure: LEFT ATRIAL APPENDAGE OCCLUSION;  Surgeon: Niki Castillo MD;  Location: [facility];  Service: Cardiology    SKIN GRAFT      left ear, skin cancer    TONSILLECTOMY         Current Outpatient Medications:     amLODIPine (NORVASC) 5 mg tablet, Take 5 mg by mouth daily, Disp: , Rfl:     aspirin (ECOTRIN LOW STRENGTH) 81 mg EC tablet, Take 1 tablet (81 mg total) by mouth daily, Disp: 30 tablet, Rfl: 2    atorvastatin (LIPITOR) 10 mg tablet, Take 1 tablet (10 mg total) by mouth daily, Disp: 90 tablet, Rfl: 1    Coenzyme Q10 (CO Q-10 PO), Take 1 caplet by mouth every morning, Disp: , Rfl:     dabigatran etexilate (PRADAXA) 75 mg capsule, Take 1 capsule (75 mg total) by mouth every 12 (twelve) hours, Disp: 60 capsule, Rfl: 3    flecainide (TAMBOCOR) 100 mg tablet, Take 1 tablet (100 mg total) by mouth 2 (two) times a day, Disp: 60 tablet, Rfl: 11    metoprolol succinate (TOPROL-XL) 50 mg 24 hr tablet, Take 1 tablet (50 mg total) by mouth every morning, Disp: 90 tablet, Rfl: 1    Multiple Vitamins-Minerals (MULTIVITAMIN ADULT PO), Take 1 tablet by mouth every morning, Disp: , Rfl:   No Known Allergies    Labs:  Admission on 03/25/2021, Discharged on 03/26/2021   Component Date Value    Sodium 03/25/2021 141     Potassium 03/25/2021 4 9     Chloride 03/25/2021 106     CO2 03/25/2021 30     ANION GAP 03/25/2021 5     BUN 03/25/2021 16     Creatinine 03/25/2021 1 22     Glucose 03/25/2021 83     Glucose, Fasting 03/25/2021 83     Calcium 03/25/2021 8 5     eGFR 03/25/2021 61     Magnesium 03/25/2021 2 5     WBC 03/25/2021 4 68     RBC 03/25/2021 4 68     Hemoglobin 03/25/2021 13 6     Hematocrit 03/25/2021 42 7     MCV 03/25/2021 91     MCH 03/25/2021 29 1     MCHC 03/25/2021 31 9     RDW 03/25/2021 13 1     Platelets 65/98/5019 216     MPV 03/25/2021 9 5     ABO Grouping 03/25/2021 O     Rh Factor 03/25/2021 Positive     Unit Product Code 03/26/2021 [id number]     Unit Number 03/26/2021 [id number]     Unit ABO 03/26/2021 O     Unit RH 03/26/2021 POS     Crossmatch 03/26/2021 Compatible     Unit Dispense Status 03/26/2021 Return to [address] [place] [address] Unit Product Code 03/26/2021 [id number]     Unit Number 03/26/2021 [id number]     Unit ABO 03/26/2021 O     Unit RH 03/26/2021 POS     Crossmatch 03/26/2021 Compatible     Unit Dispense Status 03/26/2021 Return to [address] [place] [address] Unit Product Code 03/26/2021 [id number]     Unit Number 03/26/2021 [id number]     Unit ABO 03/26/2021 O     Unit RH 03/26/2021 POS     Crossmatch 03/26/2021 Compatible     Unit Dispense Status 03/26/2021 Return to [address] [place] [address] Unit Product Code 03/26/2021 [id number]     Unit Number 03/26/2021 [id number]     Unit ABO 03/26/2021 O     Unit RH 03/26/2021 POS     Crossmatch 03/26/2021 Compatible     Unit Dispense Status 03/26/2021 Return to Inv     Ventricular Rate 03/25/2021 58     Atrial Rate 03/25/2021 58     NJ Interval 03/25/2021 246     QRSD Interval 03/25/2021 96     QT Interval 03/25/2021 483     QTC Interval 03/25/2021 475     P Axis 03/25/2021 80     QRS Axis 03/25/2021 88     T Wave Axis 03/25/2021 85     Ventricular Rate 03/25/2021 50     Atrial Rate 03/25/2021 50     NE Interval 03/25/2021 238     QRSD Interval 03/25/2021 90     QT Interval 03/25/2021 486     QTC Interval 03/25/2021 443     P Axis 03/25/2021 53     QRS Axis 03/25/2021 50     T Wave Axis 03/25/2021 52     Activated Clotting Time,* 03/25/2021 397*    Specimen Type 03/25/2021 ARTERIAL     Sodium 03/26/2021 141     Potassium 03/26/2021 3 7     Chloride 03/26/2021 109*    CO2 03/26/2021 26     ANION GAP 03/26/2021 6     BUN 03/26/2021 13     Creatinine 03/26/2021 1 05     Glucose 03/26/2021 80     Calcium 03/26/2021 8 5     eGFR 03/26/2021 74     Magnesium 03/26/2021 2 3     WBC 03/26/2021 5 87     RBC 03/26/2021 4 42     Hemoglobin 03/26/2021 13 2     Hematocrit 03/26/2021 40 1     MCV 03/26/2021 91     MCH 03/26/2021 29 9     MCHC 03/26/2021 32 9     RDW 03/26/2021 13 1     Platelets 79/48/0902 192     MPV 03/26/2021 9 5    Lab Requisition on 03/18/2021   Component Date Value    ABO Grouping 03/18/2021 O     Rh Factor 03/18/2021 Positive     Antibody Screen 03/18/2021 Negative     Specimen Expiration Date 03/18/2021 [id number]    Orders Only on 03/18/2021   Component Date Value    Sodium 03/18/2021 140     Potassium 03/18/2021 4 0     Chloride 03/18/2021 106     CO2 03/18/2021 34*    ANION GAP 03/18/2021 0*    BUN 03/18/2021 14     Creatinine 03/18/2021 1 15     Glucose, Fasting 03/18/2021 76     Calcium 03/18/2021 9 1     AST 03/18/2021 20     ALT 03/18/2021 34     Alkaline Phosphatase 03/18/2021 88     Total Protein 03/18/2021 7 4     Albumin 03/18/2021 4 0     Total Bilirubin 03/18/2021 0 94     eGFR 03/18/2021 66     Magnesium 03/18/2021 2 5     Protime 03/18/2021 16 0*    INR 03/18/2021 1 28*    Uric Acid 03/18/2021 4 3      Imaging: Xr Chest Pa & Lateral    Result Date: 3/26/2021  Narrative: CHEST INDICATION:   Post watchman  COMPARISON:  CT for pulmonary vein mapping from 8/20/2020  EXAM PERFORMED/VIEWS:  XR CHEST PA & LATERAL  DUAL ENERGY SUBTRACTION  FINDINGS: Cardiomediastinal silhouette normal  Watchman device in the expected location of the left atrial appendage  Lungs clear  No effusion or pneumothorax  Mild degenerative disease in the spine  Impression: No acute cardiopulmonary disease  Watchman device in the expected location of the left atrial appendage  Workstation performed: [id number]     Ct Head Wo Contrast    Result Date: 3/24/2021  Narrative: CT BRAIN - WITHOUT CONTRAST INDICATION:   S06  5X9A: Traumatic subdural hemorrhage with loss of consciousness of unspecified duration, initial encounter  COMPARISON:  None  TECHNIQUE:  CT examination of the brain was performed  In addition to axial images, sagittal and coronal 2D reformatted images were created and submitted for interpretation  Radiation dose length product (DLP) for this visit:  949 mGy-cm   This examination, like all CT scans performed in the [facility], was performed utilizing techniques to minimize radiation dose exposure, including the use of iterative reconstruction and automated exposure control  IMAGE QUALITY:  Diagnostic  FINDINGS: PARENCHYMA:  5 mm left frontal parietal extracranial low dense collection compatible with subacute subdural hemorrhage stable in transverse dimension from prior study measuring 5 mm  There is no intracranial hemorrhage  There is no new hemorrhage or parenchymal hemorrhage seen  VENTRICLES AND EXTRA-AXIAL SPACES:  Normal for the patient's age  VISUALIZED ORBITS AND PARANASAL SINUSES:  Unremarkable  CALVARIUM AND EXTRACRANIAL SOFT TISSUES:  Normal      Impression: Stable size 5 mm left frontoparietal extra-axial collection compatible subacute subdural hemorrhage    No new interval hemorrhage identified  Workstation performed: [id number]       Review of Systems:  Review of Systems   Constitutional: Positive for fatigue  Neurological: Positive for dizziness and light-headedness  All other systems reviewed and are negative  Physical Exam:  Physical Exam  Vitals signs reviewed  Constitutional:       Appearance: Normal appearance  HENT:      Head: Normocephalic  Eyes:      Pupils: Pupils are equal, round, and reactive to light  Neck:      Musculoskeletal: Normal range of motion and neck supple  Cardiovascular:      Rate and Rhythm: Normal rate  Pulmonary:      Effort: Pulmonary effort is normal       Breath sounds: Normal breath sounds  Abdominal:      General: Bowel sounds are normal       Palpations: Abdomen is soft  Musculoskeletal:      Right lower leg: Edema present  Left lower leg: Edema present  Comments: Trace rocco LE edema   Skin:     General: Skin is warm and dry  Capillary Refill: Capillary refill takes less than 2 seconds  Neurological:      General: No focal deficit present  Mental Status: He is alert and oriented to person, place, and time  Psychiatric:         Mood and Affect: Mood normal          Behavior: Behavior normal          Discussion/Summary:  1  3/25/21 sp placement of 30 mm left atrial appendage occluder device Watchman procedure via right femoral vein  He will continue on Pradaxa 75 mg b i d  and aspirin 81 mg until TTE on May 10, 2021  TTE  will determine if Pradaxa can be safely be discontinued  If Pradaxa can be discontinued then Plavix 75 mg daily and aspirin 81 mg daily will be ordered for 6 months  After 6 months Pradaxa will be discontinued aspirin 325 mg daily will be prescribed  One year ALONZO will be done     2  Paroxysmal atrial fibrillation 12/2019 sp DCCV on Flecainide 100mg BID and Metoprolol   Succinate 50 mg daily,  [id number]= 4, continue on Pradaxa 75mg BIDfor stroke prevention util ALONZO on 5/10/21  It will be determined if safe to stop Pradaxa    3  HTN /60, continue on  Metoprolol succinate 50 mg daily  4  FELISA compliant with CPAP  5   CKD III  1 10- 1 22

## 2021-04-26 ENCOUNTER — HOSPITAL ENCOUNTER (OUTPATIENT)
Dept: RADIOLOGY | Age: 66
Discharge: HOME | End: 2021-04-26
Payer: MEDICARE

## 2021-04-26 DIAGNOSIS — S06.5X9A SDH (SUBDURAL HEMATOMA) (HCC): ICD-10-CM

## 2021-04-26 DIAGNOSIS — D68.9 COAGULOPATHY (HCC): ICD-10-CM

## 2021-04-26 PROCEDURE — 70450 CT HEAD/BRAIN W/O DYE: CPT

## 2021-04-26 PROCEDURE — G1004 CDSM NDSC: HCPCS

## 2021-04-28 ENCOUNTER — OFFICE VISIT (OUTPATIENT)
Dept: NEUROSURGERY | Facility: CLINIC | Age: 66
End: 2021-04-28
Payer: MEDICARE

## 2021-04-28 VITALS
SYSTOLIC BLOOD PRESSURE: 112 MMHG | RESPIRATION RATE: 16 BRPM | TEMPERATURE: 97.3 F | HEIGHT: 74 IN | HEART RATE: 60 BPM | WEIGHT: 208 LBS | BODY MASS INDEX: 26.69 KG/M2 | DIASTOLIC BLOOD PRESSURE: 60 MMHG

## 2021-04-28 DIAGNOSIS — I48.0 PAROXYSMAL ATRIAL FIBRILLATION (HCC): ICD-10-CM

## 2021-04-28 DIAGNOSIS — G93.0 ARACHNOID CYST: ICD-10-CM

## 2021-04-28 DIAGNOSIS — S06.5X9A SDH (SUBDURAL HEMATOMA) (HCC): Primary | ICD-10-CM

## 2021-04-28 PROCEDURE — 99214 OFFICE O/P EST MOD 30 MIN: CPT | Performed by: PHYSICIAN ASSISTANT

## 2021-04-28 NOTE — ASSESSMENT & PLAN NOTE
Patient seen outpatient office today for follow-up for B/L SDHs L > R   · History of prior CVA and AFib who was on Eliquis previously, currently on pradaxa and asa therapy s/p watchman procedure on 3/25/2021  · Patient originally presented to the ED on 01/08 with dizziness and stomach discomfort  Imaging at that time demonstrated a left frontal parietal subdural hematoma  Patient denies any history of falls or striking his head  Imaging:  · CT head wo 04/26/2021: No change in CT appearance of the brain  Very thin bilateral subdural hemorrhages are identified, without mass effect, given underlying volume loss  No evidence for new hemorrhage  Plan:  · Exam: GCS 15, nonfocal  · Reviewed imaging with patient and wife  Demonstrates stable if not decreased size of subdural hematoma  No new hemorrhage identified     · Will continue with serial surveillance at this time given patient's original subdural was found without mention of trauma and in the next few months he will transition from several blood thinning agents and we would like to keep a close eye on his subdural hematoma  · He is to return to the office in about 3 months time with repeat MRI brain without contrast to also account for arachnoid cyst with AP or sooner should he develop worsening symptoms or red flag signs

## 2021-04-28 NOTE — PATIENT INSTRUCTIONS
Patient is to return to the office in 3 months time with repeat CT head or sooner should he develop worsening symptoms or red flag signs

## 2021-04-28 NOTE — ASSESSMENT & PLAN NOTE
· Follows with cardiology in the outpatient setting   · Was admitted on 03/20 5-326 status post placement of NING closure device watchman  · Continues on Pradaxa 75 mg b i d  and aspirin 81 mg until TTE on 05/10/2021  TTE will determine if Pradaxa can be safely discontinued  If Pradaxa can be discontinued then Plavix 75 mg daily and aspirin 81 mg daily will be ordered for 6 months  After 6 months Pradaxa will be discontinued and aspirin 325 mg will be ordered  · Given patient will be on additional blood thinning agents, would like to keep a close eye on subdural hematoma for the time being with serial imaging follow-up

## 2021-04-28 NOTE — ASSESSMENT & PLAN NOTE
· Incidental finding on imaging completed in January 2021   · Plan for MRI brain without contrast in about 3 months for follow-up

## 2021-04-28 NOTE — PROGRESS NOTES
Neurosurgery Office Note  Palak Roche 78 y o  male MRN: [id number]      Assessment/Plan     SDH (subdural hematoma) [facility])  Patient seen outpatient office today for follow-up for B/L SDHs L > R   · History of prior CVA and AFib who was on Eliquis previously, currently on pradaxa and asa therapy s/p watchman procedure on 3/25/2021  · Patient originally presented to the ED on 01/08 with dizziness and stomach discomfort  Imaging at that time demonstrated a left frontal parietal subdural hematoma  Patient denies any history of falls or striking his head  Imaging:  · CT head wo 04/26/2021: No change in CT appearance of the brain  Very thin bilateral subdural hemorrhages are identified, without mass effect, given underlying volume loss  No evidence for new hemorrhage  Plan:  · Exam: GCS 15, nonfocal  · Reviewed imaging with patient and wife  Demonstrates stable if not decreased size of subdural hematoma  No new hemorrhage identified  · Will continue with serial surveillance at this time given patient's original subdural was found without mention of trauma and in the next few months he will transition from several blood thinning agents and we would like to keep a close eye on his subdural hematoma  · He is to return to the office in about 3 months time with repeat MRI brain without contrast to also account for arachnoid cyst with AP or sooner should he develop worsening symptoms or red flag signs    Atrial fibrillation ([address] )  · Follows with cardiology in the outpatient setting   · Was admitted on 03/20 5-326 status post placement of NING closure device watchman  · Continues on Pradaxa 75 mg b i d  and aspirin 81 mg until TTE on 05/10/2021  TTE will determine if Pradaxa can be safely discontinued  If Pradaxa can be discontinued then Plavix 75 mg daily and aspirin 81 mg daily will be ordered for 6 months  After 6 months Pradaxa will be discontinued and aspirin 325 mg will be ordered    · Given patient will be on additional blood thinning agents, would like to keep a close eye on subdural hematoma for the time being with serial imaging follow-up  Arachnoid cyst  · Incidental finding on imaging completed in January 2021   · Plan for MRI brain without contrast in about 3 months for follow-up       Diagnoses and all orders for this visit:    SDH (subdural hematoma) ([facility] )  -     Cancel: CT head wo contrast; Future  -     MRI brain without contrast; Future    Paroxysmal atrial fibrillation (HCC)    Arachnoid cyst  -     MRI brain without contrast; Future            CHIEF COMPLAINT    Chief Complaint   Patient presents with    Follow-up       HISTORY    History of Present Illness     78y o  year old male with past medical history significant for hypertension, hyperlipidemia, obstructive sleep apnea on CPAP, kidney cancer, melanoma, history of stroke, atrial fibrillation on Pradaxa and aspirin status post Watchman procedure who presents for follow-up of subdural hematoma  Patient is known to us originally since January 8, 2021 when he came in complaining of dizziness and stomach discomfort  Imaging demonstrated a subdural hematoma at that time however patient denied any trauma  Eliquis was discontinued at that time and patient was transitioned to Pradaxa as this was a reversible agent  He has been maintained on this alongside aspirin per Cardiology recommendations  Overall patient states he is doing well  Recently seen by Cardiology on 04/13/2021 where he is to continue Pradaxa and aspirin daily with TTE on May 10, 2021 which will determine moving forward which antiplatelet agents he will need to continue  He denies headache, dizziness, chest pain, shortness of breath, abdominal pain, nausea or vomiting, bowel or bladder issues, numbness/tingling/weakness, seizure-like activity  See Discussion    REVIEW OF SYSTEMS    Review of Systems   Constitutional: Negative  HENT: Negative  Eyes: Negative  Respiratory: Negative  Cardiovascular: Negative  Gastrointestinal: Negative  Endocrine: Negative  Genitourinary: Negative  Musculoskeletal: Negative  Skin: Negative  Allergic/Immunologic: Negative  Neurological: Negative  Hematological: Negative  Psychiatric/Behavioral: Negative  Meds/Allergies     Current Outpatient Medications   Medication Sig Dispense Refill    amLODIPine (NORVASC) 5 mg tablet Take 5 mg by mouth daily      aspirin (ECOTRIN LOW STRENGTH) 81 mg EC tablet Take 1 tablet (81 mg total) by mouth daily 30 tablet 2    atorvastatin (LIPITOR) 10 mg tablet Take 1 tablet (10 mg total) by mouth daily 90 tablet 1    Coenzyme Q10 (CO Q-10 PO) Take 1 caplet by mouth every morning      dabigatran etexilate (PRADAXA) 75 mg capsule Take 1 capsule (75 mg total) by mouth every 12 (twelve) hours 60 capsule 3    flecainide (TAMBOCOR) 100 mg tablet Take 1 tablet (100 mg total) by mouth 2 (two) times a day 60 tablet 11    metoprolol succinate (TOPROL-XL) 50 mg 24 hr tablet Take 1 tablet (50 mg total) by mouth every morning 90 tablet 1    Multiple Vitamins-Minerals (MULTIVITAMIN ADULT PO) Take 1 tablet by mouth every morning       No current facility-administered medications for this visit          No Known Allergies    PAST HISTORY    Past Medical History:   Diagnosis Date    Atrial fibrillation ([facility] [address] )     Cancer ([facility] [address] ) 08/2020    Kidney cancer    Cancer (HCC)     melanoma    Chronic kidney disease     left kidney cancer    CPAP (continuous positive airway pressure) dependence     Hyperlipidemia     Hypertension     Irregular heart beat     Afib    Pneumonia     Skin cancer     Sleep apnea     Stroke ([facility] [address] )     Supraventricular tachycardia ([facility] [address] )     Wears glasses     Wears partial dentures     upper partial       Past Surgical History:   Procedure Laterality Date    APPENDECTOMY      CARDIOVERSION      COLONOSCOPY      fiberoptic, also 2009, both negative, resolved 2004    COLONOSCOPY  08/2019    EGD      HERNIA REPAIR      umbilical and bilateral inguinal    KNEE ARTHROSCOPY Left     TN LAP,PARTIAL NEPHRECTOMY Left 10/7/2020    Procedure: ROBOTIC PARTIAL NEPHRECTOMY;  Surgeon: Marleny Chavez MD;  [facility];  Service: Urology    TN PERQ CLSR TCAT L ATR APNDGE W/ENDOCARDIAL IMPLNT N/A 3/25/2021    Procedure: LEFT ATRIAL APPENDAGE OCCLUSION;  Surgeon: Jan Dorsey MD;  [facility];  Service: Cardiology    SKIN GRAFT      left ear, skin cancer    TONSILLECTOMY         Social History     Tobacco Use    Smoking status: Never Smoker    Smokeless tobacco: Never Used   Substance Use Topics    Alcohol use: Never     Frequency: 2-4 times a month     Drinks per session: 1 or 2    Drug use: No       Family History   Problem Relation Age of Onset    Cancer Father         stomach    Hypertension Father     Stroke Father         syndrome    Cancer Family     Heart disease Family     Hypertension Family     Stroke Family         syndrome    Throat cancer Mother     Diabetes Mother     Asthma Sister          Above history personally reviewed  EXAM    Vitals:Blood pressure 112/60, pulse 60, temperature (!) 97 3 °F (36 3 °C), temperature source Tympanic, resp  rate 16, height 6' 2" (1 88 m), weight 94 3 kg (208 lb)  ,Body mass index is 26 71 kg/m²  Physical Exam  Constitutional:       General: He is awake  Appearance: Normal appearance  HENT:      Head: Normocephalic and atraumatic  Eyes:      Extraocular Movements: Extraocular movements intact and EOM normal       Conjunctiva/sclera: Conjunctivae normal    Cardiovascular:      Rate and Rhythm: Regular rhythm  Pulmonary:      Effort: Pulmonary effort is normal  No respiratory distress  Skin:     General: Skin is warm and dry  Neurological:      Mental Status: He is alert and oriented to person, place, and time        Coordination: Finger-Nose-Finger Test normal  Gait: Gait is intact  Deep Tendon Reflexes: Strength normal    Psychiatric:         Attention and Perception: Attention and perception normal          Mood and Affect: Mood and affect normal          Speech: Speech normal          Behavior: Behavior normal  Behavior is cooperative  Thought Content: Thought content normal          Cognition and Memory: Cognition and memory normal          Judgment: Judgment normal          Neurologic Exam     Mental Status   Oriented to person, place, and time  Follows 1 step commands  Attention: normal  Concentration: normal    Speech: speech is normal   Level of consciousness: alert  Knowledge: good  Normal comprehension  Cranial Nerves     CN III, IV, VI   Extraocular motions are normal    CN III: no CN III palsy  CN VI: no CN VI palsy  Nystagmus: none   Diplopia: none  Ophthalmoparesis: none  Upgaze: normal  Downgaze: normal  Conjugate gaze: present    CN V   Right facial sensation deficit: none  Left facial sensation deficit: none    CN VII   Right facial weakness: none  Left facial weakness: none    CN VIII   Hearing: intact    CN IX, X   CN IX normal    CN X normal      CN XI   Right trapezius strength: normal  Left trapezius strength: normal    CN XII   CN XII normal      Motor Exam   Muscle bulk: normal  Overall muscle tone: normal  Right arm pronator drift: absent  Left arm pronator drift: absent    Strength   Strength 5/5 throughout  Sensory Exam   Light touch normal      Gait, Coordination, and Reflexes     Gait  Gait: normal    Coordination   Finger to nose coordination: normal    Tremor   Resting tremor: absent  Intention tremor: absent  Action tremor: absent    Reflexes   Right : 2+  Left : 2+  Right Encarnacion: absent  Left Encarnacion: absent  Right ankle clonus: absent  Left ankle clonus: absent        MEDICAL DECISION MAKING    Imaging Studies:     No results found  I have personally reviewed pertinent reports     and I have personally reviewed pertinent films in PACS

## 2021-05-04 ENCOUNTER — OFFICE VISIT (OUTPATIENT)
Dept: FAMILY MEDICINE CLINIC | Facility: CLINIC | Age: 66
End: 2021-05-04
Payer: MEDICARE

## 2021-05-04 VITALS
DIASTOLIC BLOOD PRESSURE: 64 MMHG | BODY MASS INDEX: 26.82 KG/M2 | HEIGHT: 74 IN | TEMPERATURE: 97.2 F | SYSTOLIC BLOOD PRESSURE: 108 MMHG | WEIGHT: 209 LBS

## 2021-05-04 DIAGNOSIS — Z00.00 MEDICARE ANNUAL WELLNESS VISIT, SUBSEQUENT: ICD-10-CM

## 2021-05-04 DIAGNOSIS — Z23 ENCOUNTER FOR IMMUNIZATION: ICD-10-CM

## 2021-05-04 PROCEDURE — 1123F ACP DISCUSS/DSCN MKR DOCD: CPT

## 2021-05-04 PROCEDURE — G0009 ADMIN PNEUMOCOCCAL VACCINE: HCPCS

## 2021-05-04 PROCEDURE — 90732 PPSV23 VACC 2 YRS+ SUBQ/IM: CPT

## 2021-05-04 PROCEDURE — G0439 PPPS, SUBSEQ VISIT: HCPCS | Performed by: FAMILY MEDICINE

## 2021-05-04 NOTE — PATIENT INSTRUCTIONS

## 2021-05-04 NOTE — PROGRESS NOTES
Assessment and Plan:     Problem List Items Addressed This Visit     None      Visit Diagnoses     Medicare annual wellness visit, subsequent        Relevant Orders    PNEUMOCOCCAL POLYSACCHARIDE VACCINE 23-VALENT =>1YO SQ IM (Pneumovax)    Encounter for immunization        Relevant Orders    PNEUMOCOCCAL POLYSACCHARIDE VACCINE 23-VALENT =>1YO SQ IM (Pneumovax)           Preventive health issues were discussed with patient, and age appropriate screening tests were ordered as noted in patient's After Visit Summary  Personalized health advice and appropriate referrals for health education or preventive services given if needed, as noted in patient's After Visit Summary       History of Present Illness:     Patient presents for Medicare Annual Wellness visit    Patient Care Team:  Giselle Byrd DO as PCP - MD Joselito Tang MD     Problem List:     Patient Active Problem List   Diagnosis    History of CVA (cerebrovascular accident)    Essential hypertension    Atrial fibrillation ([address] )    Mixed hyperlipidemia    Pain of toe of right foot    Benign prostatic hyperplasia with lower urinary tract symptoms    Screening for colon cancer    Acute bacterial conjunctivitis of both eyes    FELISA (obstructive sleep apnea)    Renal cell carcinoma of left kidney ([address] )    Stage 3a chronic kidney disease ([address] )    SDH (subdural hematoma) ([address] )    Presence of Watchman left atrial appendage closure device    Arachnoid cyst      Past Medical and Surgical History:     Past Medical History:   Diagnosis Date    Atrial fibrillation ([address] )     Cancer ([address] ) 08/2020    Kidney cancer    Cancer ([address] )     melanoma    Chronic kidney disease     left kidney cancer    CPAP (continuous positive airway pressure) dependence     Hyperlipidemia     Hypertension     Irregular heart beat     Afib    Pneumonia     Skin cancer     Sleep apnea     Stroke ([address] )     Supraventricular tachycardia ([address] )     Wears glasses     Wears partial dentures     upper partial     Past Surgical History:   Procedure Laterality Date    APPENDECTOMY      CARDIOVERSION      COLONOSCOPY      fiberoptic, also 2009, both negative, resolved 2004    COLONOSCOPY  08/2019    EGD      HERNIA REPAIR      umbilical and bilateral inguinal    KNEE ARTHROSCOPY Left     NE LAP,PARTIAL NEPHRECTOMY Left 10/7/2020    Procedure: ROBOTIC PARTIAL NEPHRECTOMY;  Surgeon: Deysi Johnston MD;  [facility];  Service: Urology    NE PERQ CLSR TCAT L [address] IMPLNT N/A 3/25/2021    Procedure: LEFT ATRIAL APPENDAGE OCCLUSION;  Surgeon: Miles Garcia MD;  [facility];  Service: Cardiology    SKIN GRAFT      left ear, skin cancer    TONSILLECTOMY        Family History:     Family History   Problem Relation Age of Onset    Cancer Father         stomach    Hypertension Father     Stroke Father         syndrome    Cancer Family     Heart disease Family     Hypertension Family     Stroke Family         syndrome    Throat cancer Mother     Diabetes Mother     Asthma Sister       Social History:     E-Cigarette/Vaping    E-Cigarette Use Never User      E-Cigarette/Vaping Substances    Nicotine No     THC No     CBD No     Flavoring No     Other No     Unknown No      Social History     Socioeconomic History    Marital status: /Civil Union     Spouse name: None    Number of children: None    Years of education: None    Highest education level: None   Occupational History    Occupation: retired    Social Needs    Financial resource strain: None    Food insecurity     Worry: None     Inability: None    Transportation needs     Medical: None     Non-medical: None   Tobacco Use    Smoking status: Never Smoker    Smokeless tobacco: Never Used   Substance and Sexual Activity    Alcohol use: Never     Frequency: 2-4 times a month     Drinks per session: 1 or 2    Drug use: No    Sexual activity: None   Lifestyle    Physical activity     Days per week: None     Minutes per session: None    Stress: None   Relationships    Social connections     Talks on phone: None     Gets together: None     Attends Hindu service: None     Active member of club or organization: None     Attends meetings of clubs or organizations: None     Relationship status: None    Intimate partner violence     Fear of current or ex partner: None     Emotionally abused: None     Physically abused: None     Forced sexual activity: None   Other Topics Concern    None   Social History Narrative    None      Medications and Allergies:     Current Outpatient Medications   Medication Sig Dispense Refill    amLODIPine (NORVASC) 5 mg tablet Take 5 mg by mouth daily      aspirin (ECOTRIN LOW STRENGTH) 81 mg EC tablet Take 1 tablet (81 mg total) by mouth daily 30 tablet 2    atorvastatin (LIPITOR) 10 mg tablet Take 1 tablet (10 mg total) by mouth daily 90 tablet 1    Coenzyme Q10 (CO Q-10 PO) Take 1 caplet by mouth every morning      dabigatran etexilate (PRADAXA) 75 mg capsule Take 1 capsule (75 mg total) by mouth every 12 (twelve) hours 60 capsule 3    metoprolol succinate (TOPROL-XL) 50 mg 24 hr tablet Take 1 tablet (50 mg total) by mouth every morning 90 tablet 1    Multiple Vitamins-Minerals (MULTIVITAMIN ADULT PO) Take 1 tablet by mouth every morning      flecainide (TAMBOCOR) 100 mg tablet Take 1 tablet (100 mg total) by mouth 2 (two) times a day 60 tablet 11     No current facility-administered medications for this visit        No Known Allergies   Immunizations:     Immunization History   Administered Date(s) Administered    INFLUENZA 02/27/2014    Influenza Quadrivalent Preservative Free 3 years and older IM 10/20/2016, 10/05/2017    Influenza, high dose seasonal 0 7 mL 10/23/2019, 10/14/2020    Influenza, injectable, quadrivalent, pediatric 09/10/2018    Influenza, seasonal, injectable 1955    Pneumococcal Conjugate 13-Valent 04/17/2020    Tdap 1955      Health Maintenance:         Topic Date Due    Colonoscopy Surveillance  10/22/2022    Colorectal Cancer Screening  10/22/2029    Hepatitis C Screening  Completed         Topic Date Due    DTaP,Tdap,and Td Vaccines (1 - Tdap) 04/02/1975    Pneumococcal Vaccine: 65+ Years (2 of 2 - PPSV23) 04/17/2021      Medicare Health Risk Assessment:     /64 (BP Location: Right arm, Patient Position: Sitting, Cuff Size: Adult)   Temp (!) 97 2 °F (36 2 °C) (Tympanic)   Ht 6' 2" (1 88 m)   Wt 94 8 kg (209 lb)   BMI 26 83 kg/m²      Mel Face is here for his Subsequent Wellness visit  Health Risk Assessment:   Patient rates overall health as good  Patient feels that their physical health rating is same  Patient is satisfied with their life  Eyesight was rated as slightly worse  Hearing was rated as slightly worse  Patient feels that their emotional and mental health rating is same  Patients states they are never, rarely angry  Patient states they are often unusually tired/fatigued  Pain experienced in the last 7 days has been some  Patient's pain rating has been 2/10  Patient states that he has experienced no weight loss or gain in last 6 months  Depression Screening:   PHQ-2 Score: 0      Fall Risk Screening: In the past year, patient has experienced: no history of falling in past year      Home Safety:  Patient does not have trouble with stairs inside or outside of their home  Patient has working smoke alarms and has working carbon monoxide detector  Home safety hazards include: none  Nutrition:   Current diet is Regular  Medications:   Patient is currently taking over-the-counter supplements  OTC medications include: see medication list  Patient is able to manage medications       Activities of Daily Living (ADLs)/Instrumental Activities of Daily Living (IADLs):   Walk and transfer into and out of bed and chair?: Yes  Dress and groom yourself?: Yes    Bathe or shower yourself?: Yes    Feed yourself? Yes  Do your laundry/housekeeping?: Yes  Manage your money, pay your bills and track your expenses?: Yes  Make your own meals?: Yes    Do your own shopping?: Yes    Previous Hospitalizations:   Any hospitalizations or ED visits within the last 12 months?: Yes    How many hospitalizations have you had in the last year?: 1-2    Advance Care Planning:   Living will: Yes    Durable POA for healthcare: Yes    Advanced directive: Yes      Cognitive Screening:   Provider or family/friend/caregiver concerned regarding cognition?: No    PREVENTIVE SCREENINGS      Cardiovascular Screening:    General: Screening Not Indicated, History Lipid Disorder and Risks and Benefits Discussed    Due for: Lipid Panel      Diabetes Screening:     General: Screening Current and Risks and Benefits Discussed      Colorectal Cancer Screening:     General: Screening Current      Prostate Cancer Screening:    General: Screening Current and Risks and Benefits Discussed      Osteoporosis Screening:    General: Risks and Benefits Discussed and Screening Not Indicated      Abdominal Aortic Aneurysm (AAA) Screening:    Risk factors include: age between 73-69 yo        General: Risks and Benefits Discussed and Screening Not Indicated      Lung Cancer Screening:     General: Screening Not Indicated and Risks and Benefits Discussed      Hepatitis C Screening:    General: Screening Current and Risks and Benefits Discussed    Other Counseling Topics:   Regular weightbearing exercise         Daniel Villagran DO

## 2021-05-06 ENCOUNTER — TELEPHONE (OUTPATIENT)
Dept: FAMILY MEDICINE CLINIC | Facility: CLINIC | Age: 66
End: 2021-05-06

## 2021-05-06 NOTE — TELEPHONE ENCOUNTER
Pt called and c/o that his arm is red and sore from getting the Prevnar shot 2 days ago  I suggested to him to put some ice on, and if the redness and swelling do not go down by tomorrow, to give us a call - perhaps he will need an OV

## 2021-05-09 ENCOUNTER — ANESTHESIA EVENT (OUTPATIENT)
Dept: NON INVASIVE DIAGNOSTICS | Facility: HOSPITAL | Age: 66
End: 2021-05-09

## 2021-05-09 NOTE — ANESTHESIA PREPROCEDURE EVALUATION
Procedure:  ALONZO    Relevant Problems   ANESTHESIA (within normal limits)      CARDIO   (+) Atrial fibrillation (HCC)   (+) Essential hypertension   (+) Mixed hyperlipidemia      ENDO (within normal limits)      GI/HEPATIC (within normal limits)      /RENAL   (+) Benign prostatic hyperplasia with lower urinary tract symptoms   (+) Renal cell carcinoma of left kidney (HCC)   (+) Stage 3a chronic kidney disease (HCC)      HEMATOLOGY (within normal limits)      NEURO/PSYCH   (+) History of CVA (cerebrovascular accident)      PULMONARY   (+) FELISA (obstructive sleep apnea)      Other   (+) Arachnoid cyst   (+) Presence of Watchman left atrial appendage closure device      ALONZO 3/25/2021:  Intraoperative post-WATCHMAN deployment:    LEFT VENTRICLE:  Systolic Function: normal  Ejection Fraction: 60%  Cavity size: normal       RIGHT VENTRICLE:  Systolic Function: normal   Cavity size normal       AORTIC VALVE:  Leaflets: normal  Leaflet motions normal and normal  Stenosis: none  Regurgitation: none       MITRAL VALVE:  Leaflets: normal  Regurgitation: trace  Stenosis: none       TRICUSPID VALVE:  Leaflets: normal  Regurgitation: trace      ATRIAL FINDINGS:  Watchman present in NING, no residual flow     Iatrogenic ASD present with left to right flow    Lab Results   Component Value Date    WBC 5 87 03/26/2021    HGB 13 2 03/26/2021    HCT 40 1 03/26/2021    MCV 91 03/26/2021     03/26/2021     Lab Results   Component Value Date    SODIUM 141 03/26/2021    K 3 7 03/26/2021     (H) 03/26/2021    CO2 26 03/26/2021    BUN 13 03/26/2021    CREATININE 1 05 03/26/2021    GLUC 80 03/26/2021    CALCIUM 8 5 03/26/2021     Lab Results   Component Value Date    INR 1 28 (H) 03/18/2021    INR 1 20 (H) 01/10/2021    INR 1 21 (H) 01/09/2021    PROTIME 16 0 (H) 03/18/2021    PROTIME 15 2 (H) 01/10/2021    PROTIME 15 3 (H) 01/09/2021     No results found for: HGBA1C         Physical Exam    Airway    Mallampati score: II  TM Distance: >3 FB  Neck ROM: full     Dental   No notable dental hx     Cardiovascular  Cardiovascular exam normal    Pulmonary  Pulmonary exam normal     Other Findings        Anesthesia Plan  ASA Score- 3     Anesthesia Type- IV sedation with anesthesia with ASA Monitors  Additional Monitors:   Airway Plan:           Plan Factors-Exercise tolerance (METS): >4 METS  Chart reviewed  EKG reviewed  Imaging results reviewed  Existing labs reviewed  Patient summary reviewed  Induction- intravenous  Postoperative Plan-     Informed Consent- Anesthetic plan and risks discussed with patient  I personally reviewed this patient with the CRNA  Discussed and agreed on the Anesthesia Plan with the CHRISTOPHER Marcial

## 2021-05-10 ENCOUNTER — HOSPITAL ENCOUNTER (OUTPATIENT)
Dept: NON INVASIVE DIAGNOSTICS | Facility: HOSPITAL | Age: 66
Discharge: HOME | End: 2021-05-10
Payer: MEDICARE

## 2021-05-10 ENCOUNTER — ANESTHESIA (OUTPATIENT)
Dept: NON INVASIVE DIAGNOSTICS | Facility: HOSPITAL | Age: 66
End: 2021-05-10

## 2021-05-10 VITALS
HEART RATE: 63 BPM | DIASTOLIC BLOOD PRESSURE: 73 MMHG | OXYGEN SATURATION: 98 % | RESPIRATION RATE: 18 BRPM | SYSTOLIC BLOOD PRESSURE: 124 MMHG

## 2021-05-10 DIAGNOSIS — Z95.818 PRESENCE OF WATCHMAN LEFT ATRIAL APPENDAGE CLOSURE DEVICE: ICD-10-CM

## 2021-05-10 DIAGNOSIS — I48.91 ATRIAL FIBRILLATION (HCC): ICD-10-CM

## 2021-05-10 DIAGNOSIS — I48.20 CHRONIC ATRIAL FIBRILLATION, UNSPECIFIED (HCC): ICD-10-CM

## 2021-05-10 PROCEDURE — 93312 ECHO TRANSESOPHAGEAL: CPT

## 2021-05-10 PROCEDURE — 93320 DOPPLER ECHO COMPLETE: CPT | Performed by: INTERNAL MEDICINE

## 2021-05-10 PROCEDURE — 93312 ECHO TRANSESOPHAGEAL: CPT | Performed by: INTERNAL MEDICINE

## 2021-05-10 PROCEDURE — 93325 DOPPLER ECHO COLOR FLOW MAPG: CPT | Performed by: INTERNAL MEDICINE

## 2021-05-10 RX ORDER — SODIUM CHLORIDE 9 MG/ML
INJECTION, SOLUTION INTRAVENOUS CONTINUOUS PRN
Status: DISCONTINUED | OUTPATIENT
Start: 2021-05-10 | End: 2021-05-10

## 2021-05-10 RX ORDER — PROPOFOL 10 MG/ML
INJECTION, EMULSION INTRAVENOUS CONTINUOUS PRN
Status: DISCONTINUED | OUTPATIENT
Start: 2021-05-10 | End: 2021-05-10

## 2021-05-10 RX ORDER — LIDOCAINE HYDROCHLORIDE 10 MG/ML
INJECTION, SOLUTION EPIDURAL; INFILTRATION; INTRACAUDAL; PERINEURAL AS NEEDED
Status: DISCONTINUED | OUTPATIENT
Start: 2021-05-10 | End: 2021-05-10

## 2021-05-10 RX ORDER — PROPOFOL 10 MG/ML
INJECTION, EMULSION INTRAVENOUS AS NEEDED
Status: DISCONTINUED | OUTPATIENT
Start: 2021-05-10 | End: 2021-05-10

## 2021-05-10 RX ORDER — KETAMINE HYDROCHLORIDE 50 MG/ML
INJECTION, SOLUTION, CONCENTRATE INTRAMUSCULAR; INTRAVENOUS AS NEEDED
Status: DISCONTINUED | OUTPATIENT
Start: 2021-05-10 | End: 2021-05-10

## 2021-05-10 RX ORDER — GLYCOPYRROLATE 0.2 MG/ML
INJECTION INTRAMUSCULAR; INTRAVENOUS AS NEEDED
Status: DISCONTINUED | OUTPATIENT
Start: 2021-05-10 | End: 2021-05-10

## 2021-05-10 RX ADMIN — PROPOFOL 100 MCG/KG/MIN: 10 INJECTION, EMULSION INTRAVENOUS at 10:07

## 2021-05-10 RX ADMIN — KETAMINE HYDROCHLORIDE 30 MG: 50 INJECTION, SOLUTION INTRAMUSCULAR; INTRAVENOUS at 10:07

## 2021-05-10 RX ADMIN — PROPOFOL 80 MG: 10 INJECTION, EMULSION INTRAVENOUS at 10:07

## 2021-05-10 RX ADMIN — GLYCOPYRROLATE 0.2 MG: 0.2 INJECTION, SOLUTION INTRAMUSCULAR; INTRAVENOUS at 10:07

## 2021-05-10 RX ADMIN — LIDOCAINE HYDROCHLORIDE 50 MG: 10 INJECTION, SOLUTION EPIDURAL; INFILTRATION; INTRACAUDAL; PERINEURAL at 10:07

## 2021-05-10 RX ADMIN — SODIUM CHLORIDE: 9 INJECTION, SOLUTION INTRAVENOUS at 09:49

## 2021-05-10 NOTE — ANESTHESIA POSTPROCEDURE EVALUATION
Post-Op Assessment Note    CV Status:  Stable    Pain management: adequate     Mental Status:  Alert and awake   Hydration Status:  Euvolemic   PONV Controlled:  Controlled   Airway Patency:  Patent      Post Op Vitals Reviewed: Yes      Staff: Anesthesiologist, CRNA         No complications documented      BP   123/58   Temp      Pulse 63   Resp 14   SpO2 99% on RA

## 2021-05-10 NOTE — DISCHARGE INSTRUCTIONS
Transesophageal Echocardiogram   WHAT YOU NEED TO KNOW:   A transesophageal echocardiogram (ALONZO) is a procedure used to check for problems with your heart  It will also show any problems in the blood vessels near your heart  Sound waves are sent to the heart through a tube inserted into your throat  The sound waves show the structure and function of your heart through pictures on a monitor  DISCHARGE INSTRUCTIONS:   Call your local emergency number (911 in the [address] [facility]) if:   · You have any of the following signs of a heart attack:      ? Squeezing, pressure, or pain in your chest    ? You may  also have any of the following:     § Discomfort or pain in your back, neck, jaw, stomach, or arm    § Shortness of breath    § Nausea or vomiting    § Lightheadedness or a sudden cold sweat      Call your doctor or cardiologist if:   · You have a fever or chills  · You taste blood  · You have a severe sore throat or trouble swallowing  · You have questions or concerns about your condition or care  Do not eat or drink anything until you are able to swallow normally:  Start with soft foods, such as oatmeal, yogurt, or applesauce  When you can eat soft foods easily, you may begin to eat solid foods  What you can do to keep your heart healthy:   · Eat heart-healthy foods  Eat whole grains, fruits, and vegetables every day  Limit salt and high-fat foods  Ask your healthcare provider for more information on a heart-healthy diet  · Exercise as directed  Your healthcare provider may suggest an exercise program to help improve your heart health  It is best to start slowly, and do more as you get stronger  Do not start an exercise program without talking with your healthcare provider  · Maintain a healthy weight  Keep a healthy weight so your heart does not have to work so hard  Ask what weight is healthy for you  Your healthcare provider can help you create a safe weight-loss plan, if needed      · Manage your medical conditions  High blood pressure, high cholesterol, and diabetes can lead to heart problems  Work with your healthcare provider to manage your medical conditions and decrease your risk for heart problems  · Do not smoke  Nicotine and other chemicals in cigarettes and cigars can cause lung damage  Ask your healthcare provider for information if you currently smoke and need help to quit  E-cigarettes or smokeless tobacco still contain nicotine  Talk to your healthcare provider before you use these products  Follow up with your doctor or cardiologist as directed:  Write down your questions so you remember to ask them during your visits  © Copyright [address] Information is for End User's use only and may not be sold, redistributed or otherwise used for commercial purposes  All illustrations and images included in CareNotes® are the copyrighted property of A D A M , Inc  or [facility] Cally Arias   The above information is an  only  It is not intended as medical advice for individual conditions or treatments  Talk to your doctor, nurse or pharmacist before following any medical regimen to see if it is safe and effective for you  Moderate Sedation   WHAT YOU NEED TO KNOW:   Moderate sedation, or conscious sedation, is medicine used during procedures to help you feel relaxed and calm  You will be awake and able to follow directions without anxiety or pain  You will remember little to none of the procedure  You may feel tired, weak, or unsteady on your feet after you get sedation  You may also have trouble concentrating or short-term memory loss  These symptoms should go away in 24 hours or less  DISCHARGE INSTRUCTIONS:   Call 911 or have someone else call for any of the following:   · You have sudden trouble breathing  · You cannot be woken  Seek care immediately if:   · You have a severe headache or dizziness      · Your heart is beating faster than usual     Contact your healthcare provider if:   · You have a fever  · You have nausea or are vomiting for more than 8 hours after the procedure  · Your skin is itchy, swollen, or you have a rash  · You have questions or concerns about your condition or care  Self-care:   · Have someone stay with you for 24 hours  This person can drive you to errands and help you do things around the house  This person can also watch for problems  · Rest and do quiet activities for 24 hours  Do not exercise, ride a bike, or play sports  Stand up slowly to prevent dizziness and falls  Take short walks around the house with another person  Slowly return to your usual activities the next day  · Do not drive or use dangerous machines or tools for 24 hours  You may injure yourself or others  Examples include a lawnmower, saw, or drill  Do not return to work for 24 hours if you use dangerous machines or tools for work  · Do not make important decisions for 24 hours  For example, do not sign important papers or invest money  · Drink liquids as directed  Liquids help flush the sedation medicine out of your body  Ask how much liquid to drink each day and which liquids are best for you  · Eat small, frequent meals to prevent nausea and vomiting  Start with clear liquids such as juice or broth  If you do not vomit after clear liquids, you can eat your usual foods  · Do not drink alcohol or take medicines that make you drowsy  This includes medicines that help you sleep and anxiety medicines  Ask your healthcare provider if it is safe for you to take pain medicine  Follow up with your healthcare provider as directed:  Write down your questions so you remember to ask them during your visits  © Copyright [address] Information is for End User's use only and may not be sold, redistributed or otherwise used for commercial purposes   All illustrations and images included in CareNotes® are the copyrighted property of A D A M , Inc  or [facility] Cally Arias   The above information is an  only  It is not intended as medical advice for individual conditions or treatments  Talk to your doctor, nurse or pharmacist before following any medical regimen to see if it is safe and effective for you

## 2021-05-11 ENCOUNTER — TELEPHONE (OUTPATIENT)
Dept: NON INVASIVE DIAGNOSTICS | Facility: HOSPITAL | Age: 66
End: 2021-05-11

## 2021-05-14 ENCOUNTER — TELEPHONE (OUTPATIENT)
Dept: CARDIOLOGY CLINIC | Facility: CLINIC | Age: 66
End: 2021-05-14

## 2021-05-14 DIAGNOSIS — Z95.818 PRESENCE OF WATCHMAN LEFT ATRIAL APPENDAGE CLOSURE DEVICE: Primary | ICD-10-CM

## 2021-05-14 RX ORDER — CLOPIDOGREL BISULFATE 75 MG/1
75 TABLET ORAL DAILY
Qty: 30 TABLET | Refills: 5 | Status: SHIPPED | OUTPATIENT
Start: 2021-05-14 | End: 2022-01-27

## 2021-05-14 NOTE — TELEPHONE ENCOUNTER
Per Dr Carolyn Babinski, "ALONZO looks good, he can stop Eliquis  Start plavix 75mg daily for 4 5 months and asa 81mg daily long term "    I called the pt and left a detailed message on his phone about the results and med instructions  I entered a Plavix 75mg daily Rx and forwarded to Dr Carolyn Babinski to go to [facility] on [address] in [place]  I advised the pt this is where I was sending it because there were 2 pharmacies listed  I asked the pt to call back on Monday and ask for Manny Castañeda, since I will not be available

## 2021-05-14 NOTE — TELEPHONE ENCOUNTER
Pt called to ask for the results of the ALONZO he had on 5/10/2021, and the plan for meds moving forward  Please advise, thank you

## 2021-05-17 NOTE — TELEPHONE ENCOUNTER
I also left message for patient to call me to confirm that he received the Plavix and understands all med instructions

## 2021-05-17 NOTE — TELEPHONE ENCOUNTER
Spoke with patient, he understands all instructions related to his [facility]  He has 2 questions:    1  Does he need SBE for dental visits as he states he was told this in the hospital     2   Any restrictions at this time due to the [facility]?

## 2021-06-02 DIAGNOSIS — I48.19 PERSISTENT ATRIAL FIBRILLATION (HCC): ICD-10-CM

## 2021-06-02 RX ORDER — METOPROLOL SUCCINATE 50 MG/1
50 TABLET, EXTENDED RELEASE ORAL EVERY MORNING
Qty: 90 TABLET | Refills: 1 | Status: SHIPPED | OUTPATIENT
Start: 2021-06-02 | End: 2021-12-13 | Stop reason: SDUPTHER

## 2021-06-08 ENCOUNTER — TELEPHONE (OUTPATIENT)
Dept: CARDIOLOGY CLINIC | Facility: CLINIC | Age: 66
End: 2021-06-08

## 2021-06-08 ENCOUNTER — OFFICE VISIT (OUTPATIENT)
Dept: CARDIOLOGY CLINIC | Facility: CLINIC | Age: 66
End: 2021-06-08
Payer: MEDICARE

## 2021-06-08 VITALS
WEIGHT: 210.6 LBS | DIASTOLIC BLOOD PRESSURE: 68 MMHG | HEIGHT: 74 IN | HEART RATE: 60 BPM | SYSTOLIC BLOOD PRESSURE: 112 MMHG | BODY MASS INDEX: 27.03 KG/M2

## 2021-06-08 DIAGNOSIS — Z95.818 PRESENCE OF WATCHMAN LEFT ATRIAL APPENDAGE CLOSURE DEVICE: ICD-10-CM

## 2021-06-08 DIAGNOSIS — Z86.73 HISTORY OF CVA (CEREBROVASCULAR ACCIDENT): ICD-10-CM

## 2021-06-08 DIAGNOSIS — C64.2 RENAL CELL CARCINOMA OF LEFT KIDNEY (HCC): ICD-10-CM

## 2021-06-08 DIAGNOSIS — I48.0 PAROXYSMAL ATRIAL FIBRILLATION (HCC): Primary | ICD-10-CM

## 2021-06-08 DIAGNOSIS — S06.5X9A SDH (SUBDURAL HEMATOMA) (HCC): ICD-10-CM

## 2021-06-08 DIAGNOSIS — I10 ESSENTIAL HYPERTENSION: ICD-10-CM

## 2021-06-08 DIAGNOSIS — E78.2 MIXED HYPERLIPIDEMIA: ICD-10-CM

## 2021-06-08 PROCEDURE — 99214 OFFICE O/P EST MOD 30 MIN: CPT | Performed by: INTERNAL MEDICINE

## 2021-06-08 NOTE — TELEPHONE ENCOUNTER
----- Message from aSrkis Roland MD sent at 6/8/2021  2:16 PM EDT -----  Maria Del Carmen Dietrich came for follow up, had 2 questions I just told him I would ask the EP team post Watchman  He said someone mentioned something about abx prophylaxis for dental procedures  Any idea about that? Would imagine only temporary if necessary  Also said neuro wanted to do a follow up MRI, and checking to make sure there wasn't timeframe that MRI was not recommended after implantation  Please let him or me know     Thanks,  Prakash Lay

## 2021-06-08 NOTE — PROGRESS NOTES
Cardiology Follow Up    Wesly Garibay  1955  [id number]  [address] [facility] [address]  [address] [address]  [phone]    1  Paroxysmal atrial fibrillation (HCC)     2  Essential hypertension     3  SDH (subdural hematoma) (HCC)     4  Renal cell carcinoma of left kidney (HCC)     5  History of CVA (cerebrovascular accident)     6  Presence of Watchman left atrial appendage closure device     7  Mixed hyperlipidemia         Discussion/Summary:    PAF -  Follows with Dr Barry Lee  Currently, he is on metoprolol and flecainide maintaining sinus rhythm  His plans for ablation are on hold  He has a Watchman device in place  Off of Pradaxa and on aspirin Plavix  Eventually, will come off of the Plavix and remain on aspirin alone  Feels some fatigue, but overall tolerable  If issues, could proceed with the afib ablation  BP is controlled with metoprolol, amlodipine  History of Present Illness:   Pleasant 74-year-old man  History of atrial fibrillation with a prior CVA  Was previously maintaining sinus rhythm with Toprol XL, but at visit with me 12/2019, was found to be in afib, more persistent  We did a Holter, which showed rate controlled afib, but he was feeling palpitations  After discussing options, he was referred to EP  Initially started on Flecainide, and then underwent cardioversion in 7/2020  Was having fatigue, so was planned for ablation, but the preoperative CT scan showed a renal mass  He is now s/p partial nephrectomy  Unfortunately, then had a subdural hematoma  He was back on medical therapy for afib, and has now undergone Watchman Device implantation    Interval History:  Wendy Wilkins returns for a regular visit  is undergone implantation of a Watchman device  His follow-up ALONZO was stable, so he has now off of his anticoagulation and is on dual antiplatelet therapy      This will eventually be tapered down to aspirin alone  He does continue to have some fatigue and tiredness being on both metoprolol and flecainide, but is maintaining sinus rhythm and overall feels okay  The plans for ablation are currently on hold        Problem List     Cerebrovascular disease, ill-defined, acute    Essential hypertension    Paroxysmal atrial fibrillation ([facility] )    Overview Signed 3/15/2018  2:07 PM by Pinky Needle, DO     Transitioned From: Atrial fibrillation         Mixed hyperlipidemia    Pain of toe of right foot        Past Medical History:   Diagnosis Date    [facility]     Cancer ([facility] ) 08/2020    Kidney cancer    Cancer ([facility] )     melanoma    Chronic kidney disease     left kidney cancer    CPAP (continuous positive airway pressure) dependence     Hyperlipidemia     Hypertension     Irregular heart beat     Afib    Pneumonia     Skin cancer     Sleep apnea     Stroke ([facility] )     Supraventricular tachycardia ([facility] )     Wears glasses     Wears partial dentures     upper partial     Social History     Tobacco Use    Smoking status: Never Smoker    Smokeless tobacco: Never Used   Substance Use Topics    Alcohol use: Never     Frequency: 2-4 times a month     Drinks per session: 1 or 2     Family History   Problem Relation Age of Onset    Cancer Father         stomach    Hypertension Father     Stroke Father         syndrome    Cancer Family     Heart disease Family     Hypertension Family     Stroke Family         syndrome    Throat cancer Mother     Diabetes Mother     Asthma Sister      Past Surgical History:   Procedure Laterality Date    APPENDECTOMY      CARDIOVERSION      COLONOSCOPY      fiberoptic, also 2009, both negative, resolved 2004    COLONOSCOPY  08/2019    EGD      HERNIA REPAIR      umbilical and bilateral inguinal    KNEE ARTHROSCOPY Left     RI LAP,PARTIAL NEPHRECTOMY Left 10/7/2020    Procedure: ROBOTIC PARTIAL NEPHRECTOMY;  Surgeon: Del Naidu MD;  Location: [facility];  Service: Urology    TX PERQ CLSR TCAT L ATR APNDGE W/ENDOCARDIAL IMPLNT N/A 3/25/2021    Procedure: LEFT ATRIAL APPENDAGE OCCLUSION;  Surgeon: Beryl Pang MD;  Location:  MAIN OR;  Service: Cardiology    SKIN GRAFT      left ear, skin cancer    TONSILLECTOMY         Current Outpatient Medications:     amLODIPine (NORVASC) 5 mg tablet, Take 5 mg by mouth daily, Disp: , Rfl:     aspirin (ECOTRIN LOW STRENGTH) 81 mg EC tablet, Take 1 tablet (81 mg total) by mouth daily, Disp: 30 tablet, Rfl: 2    atorvastatin (LIPITOR) 10 mg tablet, Take 1 tablet (10 mg total) by mouth daily, Disp: 90 tablet, Rfl: 1    clopidogrel (PLAVIX) 75 mg tablet, Take 1 tablet (75 mg total) by mouth daily, Disp: 30 tablet, Rfl: 5    Coenzyme Q10 (CO Q-10 PO), Take 1 caplet by mouth every morning, Disp: , Rfl:     flecainide (TAMBOCOR) 100 mg tablet, Take 1 tablet (100 mg total) by mouth 2 (two) times a day, Disp: 60 tablet, Rfl: 11    metoprolol succinate (TOPROL-XL) 50 mg 24 hr tablet, Take 1 tablet (50 mg total) by mouth every morning, Disp: 90 tablet, Rfl: 1    Multiple Vitamins-Minerals (MULTIVITAMIN ADULT PO), Take 1 tablet by mouth every morning, Disp: , Rfl:   No Known Allergies    Vitals:    06/08/21 1404   BP: 112/68   BP Location: Right arm   Patient Position: Sitting   Cuff Size: Standard   Pulse: 60   Weight: 95 5 kg (210 lb 9 6 oz)   Height: 6' 2" (1 88 m)     Vitals:    06/08/21 1404   Weight: 95 5 kg (210 lb 9 6 oz)      Height: 6' 2" (188 cm)   Body mass index is 27 04 kg/m²      Physical Exam:  GEN: Sharon Chirinos appears well, alert and oriented x 3, pleasant and cooperative   HEENT: pupils equal, round, and reactive to light; extraocular muscles intact  NECK: supple, no carotid bruits   HEART: bradycardic,  S1 and S2, no murmurs, clicks, gallops or rubs   LUNGS: clear to auscultation bilaterally; no wheezes, rales, or rhonchi   ABDOMEN: normal bowel sounds, soft, no tenderness, no distention  EXTREMITIES: peripheral pulses normal; no clubbing, cyanosis, or edema  NEURO: no focal findings   SKIN: normal without suspicious lesions on exposed skin    ROS:  Fatigue, tired during the day  Except as noted in HPI, is otherwise reviewed in detail and a 12 point review of systems is negative  ROS reviewed and is unchanged    Labs:  Lab Results   Component Value Date     09/19/2017    K 3 7 03/26/2021     (H) 03/26/2021    CREATININE 1 05 03/26/2021    BUN 13 03/26/2021    CO2 26 03/26/2021    ALT 34 03/18/2021    AST 20 03/18/2021    INR 1 28 (H) 03/18/2021    GLUF 83 03/25/2021    WBC 5 87 03/26/2021    HGB 13 2 03/26/2021    HCT 40 1 03/26/2021     03/26/2021       Lab Results   Component Value Date    CHOL 136 09/19/2017    CHOL 132 05/31/2017    CHOL 124 (L) 03/30/2016     Lab Results   Component Value Date    LDLCALC 66 01/23/2020    LDLCALC 69 12/06/2018     Lab Results   Component Value Date    HDL 49 01/23/2020    HDL 62 12/06/2018    HDL 59 09/19/2017     Lab Results   Component Value Date    TRIG 53 01/23/2020    TRIG 51 12/06/2018    TRIG 54 09/19/2017     ALONZO 5/10/21:  LEFT VENTRICLE:  Systolic function was normal  Ejection fraction was estimated to be 55 %  Although no diagnostic regional wall motion abnormality was identified, this possibility cannot be completely excluded on the basis of this study      LEFT ATRIUM:  The atrium was mildly dilated      LEFT ATRIAL APPENDAGE:  A 30 mm left atrial appendage occluder was visualized and was well seated without associated thrombus  There were 2 small residual defects (<3 5 mm in diameter) in the medial and lateral aspects of the device      MITRAL VALVE:  There was mild regurgitation      AORTIC VALVE:  There was trace regurgitation      TRICUSPID VALVE:  There was mild to moderate regurgitation      Echo 10/2019:  LEFT VENTRICLE:  Systolic function was normal  Ejection fraction was estimated to be 55 %   There were no regional wall motion abnormalities  Wall thickness was at the upper limits of normal      MITRAL VALVE:  There was mild to moderate regurgitation      TRICUSPID VALVE:  There was mild regurgitation  Pulmonary artery systolic pressure was within the normal range      PULMONIC VALVE:  There was trace regurgitation      AORTA:  The root exhibited mild dilatation  3 8 cm  There was mild dilatation of the ascending aorta  3 7 cm     Holter 5/27/2020:  IMPRESSION:  1  Predominantly atrial fibrillation with an average heart rate of 78 bpm    2  Rare PVCs, consisting of 0 5% of total beats  No sustained ventricular arrhythmias  3  No significant pauses  4  Patient's symptoms of fluttering correlated with atrial fibrillation at controlled rates

## 2021-06-08 NOTE — TELEPHONE ENCOUNTER
Called and spoke with Modesta Duarte in regards to questions he had in office as per Dr Ron Noguera

## 2021-07-27 ENCOUNTER — HOSPITAL ENCOUNTER (OUTPATIENT)
Dept: RADIOLOGY | Age: 66
Discharge: HOME | End: 2021-07-27
Payer: MEDICARE

## 2021-07-27 DIAGNOSIS — S06.5X9A SDH (SUBDURAL HEMATOMA) (HCC): ICD-10-CM

## 2021-07-27 DIAGNOSIS — G93.0 ARACHNOID CYST: ICD-10-CM

## 2021-07-27 PROCEDURE — G1004 CDSM NDSC: HCPCS

## 2021-07-27 PROCEDURE — 70551 MRI BRAIN STEM W/O DYE: CPT

## 2021-07-30 ENCOUNTER — OFFICE VISIT (OUTPATIENT)
Dept: NEUROSURGERY | Facility: CLINIC | Age: 66
End: 2021-07-30
Payer: MEDICARE

## 2021-07-30 VITALS
HEART RATE: 53 BPM | TEMPERATURE: 97.4 F | BODY MASS INDEX: 26.18 KG/M2 | WEIGHT: 204 LBS | HEIGHT: 74 IN | RESPIRATION RATE: 16 BRPM | SYSTOLIC BLOOD PRESSURE: 132 MMHG | DIASTOLIC BLOOD PRESSURE: 79 MMHG

## 2021-07-30 DIAGNOSIS — G93.0 ARACHNOID CYST: ICD-10-CM

## 2021-07-30 DIAGNOSIS — S06.5X9A SDH (SUBDURAL HEMATOMA) (HCC): Primary | ICD-10-CM

## 2021-07-30 PROCEDURE — 99213 OFFICE O/P EST LOW 20 MIN: CPT | Performed by: PHYSICIAN ASSISTANT

## 2021-07-30 NOTE — ASSESSMENT & PLAN NOTE
· Incidental finding on imaging completed in January 2021   · MRI brain shows stable occipital/posterior arachnoid cyst    · No intervention anticipated for this  · Further follow up will be on a PRN basis

## 2021-07-30 NOTE — PROGRESS NOTES
Neurosurgery Office Note  King Alvarado 78 y o  male MRN: [id number]      Assessment/Plan     SDH (subdural hematoma) [facility])  Patient seen outpatient office today 3 month follow-up for B/L SDHs L > R   · History of prior CVA and AFib who was on Eliquis previously, currently on Plavix and asa therapy s/p watchman procedure on 3/25/2021  · Patient originally presented to the ED on 01/08/21 with dizziness and stomach discomfort  Imaging at that time demonstrated a left frontal parietal subdural hematoma  Patient denies any history of falls or striking his head  Imaging:  · CT head wo 04/26/2021: No change in CT appearance of the brain  Very thin bilateral subdural hemorrhages are identified, without mass effect, given underlying volume loss  No evidence for new hemorrhage  Plan    · Reviewed imaging with patient  Demonstrates stable chronic subdural hematoma and arachnoid cyst   No new hemorrhage identified  · Pt okay to continue on ASA/Plavix  · At this time, no nsx intervention anticipated  · Discussed with pt that should he notice any new headache, dizziness, lightheadedness, nausea, vomiting, numbness, tingling, weakness, AMS, to present to the ED for evaluation or contact our office  · Further follow up with neurosurgery will be on a PRN basis  · Pt made aware to contact nsx with any further questions or concerns  Arachnoid cyst  · Incidental finding on imaging completed in January 2021   · MRI brain shows stable occipital/posterior arachnoid cyst    · No intervention anticipated for this  · Further follow up will be on a PRN basis          Diagnoses and all orders for this visit:    SDH (subdural hematoma) ([facility] [address] )    Arachnoid cyst        CHIEF COMPLAINT    Chief Complaint   Patient presents with    Follow-up     3 month follow up with MRI       HISTORY    History of Present Illness     78y o  year old male     Patient seen outpatient office today 3 month follow-up for B/L SDHs L > R as well as follow up for arachnoid cyst History of prior CVA and AFib who was on Eliquis previously, currently on Plavix and asa therapy s/p watchman procedure on 3/25/2021  Patient originally presented to the ED on 01/08/21 with dizziness and stomach discomfort  Imaging at that time demonstrated a left frontal parietal subdural hematoma  Pt has been followed with serial imaging since then  Patient denies any history of falls or striking his head  Today patient denies any headache, dizziness, lightheadedness or visual disturbance  Patient denies any new numbness, tingling or weakness  Pt denies changes in gait or balance  Pt denies nausea or vomiting  Pt was accompanied by his wife to this visit  REVIEW OF SYSTEMS    Review of Systems   Neurological: Negative for dizziness, speech difficulty, weakness, light-headedness, numbness and headaches  All other systems reviewed and are negative  Meds/Allergies     Current Outpatient Medications   Medication Sig Dispense Refill    amLODIPine (NORVASC) 5 mg tablet Take 5 mg by mouth daily      aspirin (ECOTRIN LOW STRENGTH) 81 mg EC tablet Take 1 tablet (81 mg total) by mouth daily 30 tablet 2    atorvastatin (LIPITOR) 10 mg tablet Take 1 tablet (10 mg total) by mouth daily 90 tablet 1    clopidogrel (PLAVIX) 75 mg tablet Take 1 tablet (75 mg total) by mouth daily 30 tablet 5    Coenzyme Q10 (CO Q-10 PO) Take 1 caplet by mouth every morning      flecainide (TAMBOCOR) 100 mg tablet Take 1 tablet (100 mg total) by mouth 2 (two) times a day 60 tablet 11    metoprolol succinate (TOPROL-XL) 50 mg 24 hr tablet Take 1 tablet (50 mg total) by mouth every morning 90 tablet 1    Multiple Vitamins-Minerals (MULTIVITAMIN ADULT PO) Take 1 tablet by mouth every morning       No current facility-administered medications for this visit         No Known Allergies    PAST HISTORY    Past Medical History:   Diagnosis Date    Atrial fibrillation ([facility] [address] )     Cancer ([facility] [address] ) 08/2020    Kidney cancer    Cancer [facility])     melanoma    Chronic kidney disease     left kidney cancer    CPAP (continuous positive airway pressure) dependence     Hyperlipidemia     Hypertension     Irregular heart beat     Afib    Pneumonia     Skin cancer     Sleep apnea     Stroke ([address] )     Supraventricular tachycardia (HCC)     Wears glasses     Wears partial dentures     upper partial       Past Surgical History:   Procedure Laterality Date    APPENDECTOMY      CARDIOVERSION      COLONOSCOPY      fiberoptic, also 2009, both negative, resolved 2004    COLONOSCOPY  08/2019    EGD      HERNIA REPAIR      umbilical and bilateral inguinal    KNEE ARTHROSCOPY Left     GA LAP,PARTIAL NEPHRECTOMY Left 10/7/2020    Procedure: ROBOTIC PARTIAL NEPHRECTOMY;  Surgeon: Lindell Pallas, MD;  Location: [facility];  Service: Urology    GA PERQ CLSR TCAT L ATR APNDGE W/ENDOCARDIAL IMPLNT N/A 3/25/2021    Procedure: LEFT ATRIAL APPENDAGE OCCLUSION;  Surgeon: Krystal Henry MD;  [facility];  Service: Cardiology    SKIN GRAFT      left ear, skin cancer    TONSILLECTOMY         Social History     Tobacco Use    Smoking status: Never Smoker    Smokeless tobacco: Never Used   Vaping Use    Vaping Use: Never used   Substance Use Topics    Alcohol use: Never    Drug use: No       Family History   Problem Relation Age of Onset    Cancer Father         stomach    Hypertension Father     Stroke Father         syndrome    Cancer Family     Heart disease Family     Hypertension Family     Stroke Family         syndrome    Throat cancer Mother     Diabetes Mother     Asthma Sister          Above history personally reviewed  EXAM    Vitals:Blood pressure 132/79, pulse (!) 53, temperature (!) 97 4 °F (36 3 °C), temperature source Tympanic, resp  rate 16, height 6' 2" (1 88 m), weight 92 5 kg (204 lb)  ,Body mass index is 26 19 kg/m²       Physical Exam  Constitutional:       Appearance: He is well-developed  HENT:      Head: Normocephalic and atraumatic  Eyes:      General: No scleral icterus  Conjunctiva/sclera: Conjunctivae normal       Pupils: Pupils are equal, round, and reactive to light  Neck:      Trachea: No tracheal deviation  Cardiovascular:      Rate and Rhythm: Normal rate  Pulmonary:      Effort: Pulmonary effort is normal    Abdominal:      Palpations: Abdomen is soft  Tenderness: There is no abdominal tenderness  There is no guarding  Musculoskeletal:      Cervical back: Neck supple  Skin:     General: Skin is warm and dry  Coloration: Skin is not pale  Findings: No rash  Neurological:      Mental Status: He is alert and oriented to person, place, and time  Comments: GCS 15, Awake, Alert, Oriented x 3    Motor: QUINONES, strength BUE/BLE  5/5  Sensation:  intact to LT X 4     Reflexes: 2+ and symmetric, no bowne's or clonus     Coordination: no drift bilateral upper extremities  Psychiatric:         Behavior: Behavior normal          Neurologic Exam     Mental Status   Oriented to person, place, and time  Cranial Nerves     CN III, IV, VI   Pupils are equal, round, and reactive to light  MEDICAL DECISION MAKING    Imaging Studies:     MRI brain without contrast    Result Date: 7/29/2021  Narrative: MRI BRAIN WITHOUT CONTRAST INDICATION: S06  5X9A: Traumatic subdural hemorrhage with loss of consciousness of unspecified duration, initial encounter G93 0: Cerebral cysts  COMPARISON:   CT 4/26/2021 TECHNIQUE:  Sagittal T1, axial T2, axial FLAIR, axial T1, axial Brogan and axial diffusion imaging  Imaging performed on 3 0T MRI  IMAGE QUALITY:  Diagnostic  FINDINGS: BRAIN PARENCHYMA:  No acute disease  There is no acute ischemia, intracranial mass, mass effect or edema  There is been the parietal subdural collection L3-4 mm in width within the left parietal region 4:27  No mass effect  No contralateral disease   Several tiny foci of high signal supratentorial parenchyma and brainstem likely reflecting sequela of chronic small vessel disease  Prominent cisterna magna  VENTRICLES:  Normal for the patient's age  SELLA AND PITUITARY GLAND:  Normal  ORBITS:  Normal  PARANASAL SINUSES:  Trace sinus mucosal disease  VASCULATURE:  Evaluation of the major intracranial vasculature demonstrates appropriate flow voids  CALVARIUM AND SKULL BASE:  Normal  EXTRACRANIAL SOFT TISSUES:  Normal      Impression: No acute disease  Thin left posterior parietal subdural collection is stable when compared to a prior CT exam   Thickened, reactive dural membranes not excluded  No new disease  No significant mass effect  Workstation performed: [id number]       I have personally reviewed pertinent reports     and I have personally reviewed pertinent films in PACS

## 2021-07-30 NOTE — ASSESSMENT & PLAN NOTE
Patient seen outpatient office today 3 month follow-up for B/L SDHs L > R   · History of prior CVA and AFib who was on Eliquis previously, currently on Plavix and asa therapy s/p watchman procedure on 3/25/2021  · Patient originally presented to the ED on 01/08/21 with dizziness and stomach discomfort  Imaging at that time demonstrated a left frontal parietal subdural hematoma  Patient denies any history of falls or striking his head  Imaging:  · CT head wo 04/26/2021: No change in CT appearance of the brain  Very thin bilateral subdural hemorrhages are identified, without mass effect, given underlying volume loss  No evidence for new hemorrhage  Plan    · Reviewed imaging with patient  Demonstrates stable chronic subdural hematoma and arachnoid cyst   No new hemorrhage identified  · Pt okay to continue on ASA/Plavix  · At this time, no nsx intervention anticipated  · Discussed with pt that should he notice any new headache, dizziness, lightheadedness, nausea, vomiting, numbness, tingling, weakness, AMS, to present to the ED for evaluation or contact our office  · Further follow up with neurosurgery will be on a PRN basis  · Pt made aware to contact nsx with any further questions or concerns

## 2021-07-30 NOTE — PATIENT INSTRUCTIONS
Neurosurgery  instructions following a head bleed:      Okay for your to continue to take Aspirin and Plavix as directed   Refrain from activity that increases chance of trauma to head or falls  Recommend you take fall precaution   No strenuous activity or sports   Return to hospital Emergency Room if you experience worsening / new headache, nausea/vomiting, speech/vision change, seizure, confusion / mental status change, weakness, or other neurological changes   Further follow up with our office will be on a PRN basis

## 2021-08-03 DIAGNOSIS — I10 ESSENTIAL HYPERTENSION: Primary | ICD-10-CM

## 2021-08-03 RX ORDER — AMLODIPINE BESYLATE 5 MG/1
5 TABLET ORAL DAILY
Qty: 90 TABLET | Refills: 1 | Status: SHIPPED | OUTPATIENT
Start: 2021-08-03 | End: 2022-02-28 | Stop reason: SDUPTHER

## 2021-08-13 ENCOUNTER — HOSPITAL ENCOUNTER (INPATIENT)
Facility: HOSPITAL | Age: 66
LOS: 1 days | Discharge: HOME | DRG: 149 | End: 2021-08-14
Attending: EMERGENCY MEDICINE | Admitting: INTERNAL MEDICINE
Payer: MEDICARE

## 2021-08-13 ENCOUNTER — APPOINTMENT (EMERGENCY)
Dept: CT IMAGING | Facility: HOSPITAL | Age: 66
DRG: 149 | End: 2021-08-13
Payer: MEDICARE

## 2021-08-13 DIAGNOSIS — R42 DIZZINESS: ICD-10-CM

## 2021-08-13 DIAGNOSIS — R42 VERTIGO: Primary | ICD-10-CM

## 2021-08-13 DIAGNOSIS — R11.2 NAUSEA & VOMITING: ICD-10-CM

## 2021-08-13 PROBLEM — Z90.5 H/O PARTIAL NEPHRECTOMY: Chronic | Status: ACTIVE | Noted: 2021-08-13

## 2021-08-13 LAB
ALBUMIN SERPL BCP-MCNC: 4 G/DL (ref 3.5–5)
ALP SERPL-CCNC: 97 U/L (ref 46–116)
ALT SERPL W P-5'-P-CCNC: 35 U/L (ref 12–78)
ANION GAP SERPL CALCULATED.3IONS-SCNC: 10 MMOL/L (ref 4–13)
AST SERPL W P-5'-P-CCNC: 27 U/L (ref 5–45)
ATRIAL RATE: 55 BPM
BACTERIA UR QL AUTO: NORMAL /HPF
BASOPHILS # BLD AUTO: 0.02 THOUSANDS/ΜL (ref 0–0.1)
BASOPHILS NFR BLD AUTO: 0 % (ref 0–1)
BILIRUB SERPL-MCNC: 0.39 MG/DL (ref 0.2–1)
BILIRUB UR QL STRIP: NEGATIVE
BUN SERPL-MCNC: 16 MG/DL (ref 5–25)
CALCIUM SERPL-MCNC: 9 MG/DL (ref 8.3–10.1)
CHLORIDE SERPL-SCNC: 105 MMOL/L (ref 100–108)
CLARITY UR: CLEAR
CO2 SERPL-SCNC: 29 MMOL/L (ref 21–32)
COLOR UR: YELLOW
CREAT SERPL-MCNC: 1.23 MG/DL (ref 0.6–1.3)
EOSINOPHIL # BLD AUTO: 0.14 THOUSAND/ΜL (ref 0–0.61)
EOSINOPHIL NFR BLD AUTO: 2 % (ref 0–6)
ERYTHROCYTE [DISTWIDTH] IN BLOOD BY AUTOMATED COUNT: 13.2 % (ref 11.6–15.1)
GFR SERPL CREATININE-BSD FRML MDRD: 60 ML/MIN/1.73SQ M
GLUCOSE SERPL-MCNC: 99 MG/DL (ref 65–140)
GLUCOSE UR STRIP-MCNC: NEGATIVE MG/DL
HCT VFR BLD AUTO: 45.7 % (ref 36.5–49.3)
HGB BLD-MCNC: 14.9 G/DL (ref 12–17)
HGB UR QL STRIP.AUTO: ABNORMAL
IMM GRANULOCYTES # BLD AUTO: 0.01 THOUSAND/UL (ref 0–0.2)
IMM GRANULOCYTES NFR BLD AUTO: 0 % (ref 0–2)
KETONES UR STRIP-MCNC: NEGATIVE MG/DL
LEUKOCYTE ESTERASE UR QL STRIP: NEGATIVE
LYMPHOCYTES # BLD AUTO: 2.1 THOUSANDS/ΜL (ref 0.6–4.47)
LYMPHOCYTES NFR BLD AUTO: 34 % (ref 14–44)
MCH RBC QN AUTO: 29.7 PG (ref 26.8–34.3)
MCHC RBC AUTO-ENTMCNC: 32.6 G/DL (ref 31.4–37.4)
MCV RBC AUTO: 91 FL (ref 82–98)
MONOCYTES # BLD AUTO: 0.52 THOUSAND/ΜL (ref 0.17–1.22)
MONOCYTES NFR BLD AUTO: 9 % (ref 4–12)
NEUTROPHILS # BLD AUTO: 3.36 THOUSANDS/ΜL (ref 1.85–7.62)
NEUTS SEG NFR BLD AUTO: 55 % (ref 43–75)
NITRITE UR QL STRIP: NEGATIVE
NON-SQ EPI CELLS URNS QL MICRO: NORMAL /HPF
NRBC BLD AUTO-RTO: 0 /100 WBCS
P AXIS: 85 DEGREES
PH UR STRIP.AUTO: 7.5 [PH] (ref 4.5–8)
PLATELET # BLD AUTO: 243 THOUSANDS/UL (ref 149–390)
PMV BLD AUTO: 9.3 FL (ref 8.9–12.7)
POTASSIUM SERPL-SCNC: 3.5 MMOL/L (ref 3.5–5.3)
PR INTERVAL: 272 MS
PROT SERPL-MCNC: 7.6 G/DL (ref 6.4–8.2)
PROT UR STRIP-MCNC: NEGATIVE MG/DL
QRS AXIS: 74 DEGREES
QRSD INTERVAL: 108 MS
QT INTERVAL: 462 MS
QTC INTERVAL: 441 MS
RBC # BLD AUTO: 5.01 MILLION/UL (ref 3.88–5.62)
RBC #/AREA URNS AUTO: NORMAL /HPF
SODIUM SERPL-SCNC: 144 MMOL/L (ref 136–145)
SP GR UR STRIP.AUTO: 1.02 (ref 1–1.03)
T WAVE AXIS: 73 DEGREES
TROPONIN I SERPL-MCNC: <0.02 NG/ML
UROBILINOGEN UR QL STRIP.AUTO: 0.2 E.U./DL
VENTRICULAR RATE: 55 BPM
WBC # BLD AUTO: 6.15 THOUSAND/UL (ref 4.31–10.16)
WBC #/AREA URNS AUTO: NORMAL /HPF

## 2021-08-13 PROCEDURE — 97163 PT EVAL HIGH COMPLEX 45 MIN: CPT

## 2021-08-13 PROCEDURE — 84484 ASSAY OF TROPONIN QUANT: CPT | Performed by: EMERGENCY MEDICINE

## 2021-08-13 PROCEDURE — 99285 EMERGENCY DEPT VISIT HI MDM: CPT

## 2021-08-13 PROCEDURE — 96361 HYDRATE IV INFUSION ADD-ON: CPT

## 2021-08-13 PROCEDURE — 93010 ELECTROCARDIOGRAM REPORT: CPT | Performed by: INTERNAL MEDICINE

## 2021-08-13 PROCEDURE — 99223 1ST HOSP IP/OBS HIGH 75: CPT | Performed by: STUDENT IN AN ORGANIZED HEALTH CARE EDUCATION/TRAINING PROGRAM

## 2021-08-13 PROCEDURE — 85025 COMPLETE CBC W/AUTO DIFF WBC: CPT | Performed by: EMERGENCY MEDICINE

## 2021-08-13 PROCEDURE — 96376 TX/PRO/DX INJ SAME DRUG ADON: CPT

## 2021-08-13 PROCEDURE — 81001 URINALYSIS AUTO W/SCOPE: CPT

## 2021-08-13 PROCEDURE — 36415 COLL VENOUS BLD VENIPUNCTURE: CPT | Performed by: EMERGENCY MEDICINE

## 2021-08-13 PROCEDURE — 80053 COMPREHEN METABOLIC PANEL: CPT | Performed by: EMERGENCY MEDICINE

## 2021-08-13 PROCEDURE — 93005 ELECTROCARDIOGRAM TRACING: CPT

## 2021-08-13 PROCEDURE — 99221 1ST HOSP IP/OBS SF/LOW 40: CPT | Performed by: PSYCHIATRY & NEUROLOGY

## 2021-08-13 PROCEDURE — 96374 THER/PROPH/DIAG INJ IV PUSH: CPT

## 2021-08-13 PROCEDURE — 99285 EMERGENCY DEPT VISIT HI MDM: CPT | Performed by: EMERGENCY MEDICINE

## 2021-08-13 PROCEDURE — 96375 TX/PRO/DX INJ NEW DRUG ADDON: CPT

## 2021-08-13 PROCEDURE — 70450 CT HEAD/BRAIN W/O DYE: CPT

## 2021-08-13 RX ORDER — ONDANSETRON 2 MG/ML
4 INJECTION INTRAMUSCULAR; INTRAVENOUS EVERY 6 HOURS PRN
Status: DISCONTINUED | OUTPATIENT
Start: 2021-08-13 | End: 2021-08-14 | Stop reason: HOSPADM

## 2021-08-13 RX ORDER — SODIUM CHLORIDE 9 MG/ML
125 INJECTION, SOLUTION INTRAVENOUS CONTINUOUS
Status: CANCELLED | OUTPATIENT
Start: 2021-08-13

## 2021-08-13 RX ORDER — FLECAINIDE ACETATE 100 MG/1
100 TABLET ORAL 2 TIMES DAILY
Status: DISCONTINUED | OUTPATIENT
Start: 2021-08-13 | End: 2021-08-14 | Stop reason: HOSPADM

## 2021-08-13 RX ORDER — DIAZEPAM 5 MG/ML
5 INJECTION, SOLUTION INTRAMUSCULAR; INTRAVENOUS ONCE
Status: COMPLETED | OUTPATIENT
Start: 2021-08-13 | End: 2021-08-13

## 2021-08-13 RX ORDER — METOPROLOL SUCCINATE 50 MG/1
50 TABLET, EXTENDED RELEASE ORAL EVERY MORNING
Status: DISCONTINUED | OUTPATIENT
Start: 2021-08-13 | End: 2021-08-14 | Stop reason: HOSPADM

## 2021-08-13 RX ORDER — AMLODIPINE BESYLATE 5 MG/1
5 TABLET ORAL DAILY
Status: DISCONTINUED | OUTPATIENT
Start: 2021-08-13 | End: 2021-08-14 | Stop reason: HOSPADM

## 2021-08-13 RX ORDER — CLOPIDOGREL BISULFATE 75 MG/1
75 TABLET ORAL DAILY
Status: DISCONTINUED | OUTPATIENT
Start: 2021-08-13 | End: 2021-08-14 | Stop reason: HOSPADM

## 2021-08-13 RX ORDER — SIMETHICONE 80 MG
80 TABLET,CHEWABLE ORAL 4 TIMES DAILY PRN
Status: DISCONTINUED | OUTPATIENT
Start: 2021-08-13 | End: 2021-08-14 | Stop reason: HOSPADM

## 2021-08-13 RX ORDER — MECLIZINE HCL 12.5 MG/1
25 TABLET ORAL EVERY 8 HOURS PRN
Status: DISCONTINUED | OUTPATIENT
Start: 2021-08-13 | End: 2021-08-14 | Stop reason: HOSPADM

## 2021-08-13 RX ORDER — SODIUM CHLORIDE 9 MG/ML
100 INJECTION, SOLUTION INTRAVENOUS CONTINUOUS
Status: DISCONTINUED | OUTPATIENT
Start: 2021-08-13 | End: 2021-08-13

## 2021-08-13 RX ORDER — ONDANSETRON 2 MG/ML
4 INJECTION INTRAMUSCULAR; INTRAVENOUS ONCE
Status: COMPLETED | OUTPATIENT
Start: 2021-08-13 | End: 2021-08-13

## 2021-08-13 RX ORDER — ASPIRIN 81 MG/1
81 TABLET ORAL DAILY
Status: DISCONTINUED | OUTPATIENT
Start: 2021-08-13 | End: 2021-08-14 | Stop reason: HOSPADM

## 2021-08-13 RX ORDER — ACETAMINOPHEN 325 MG/1
650 TABLET ORAL EVERY 6 HOURS PRN
Status: DISCONTINUED | OUTPATIENT
Start: 2021-08-13 | End: 2021-08-14 | Stop reason: HOSPADM

## 2021-08-13 RX ORDER — ATORVASTATIN CALCIUM 10 MG/1
10 TABLET, FILM COATED ORAL
Status: DISCONTINUED | OUTPATIENT
Start: 2021-08-13 | End: 2021-08-14 | Stop reason: HOSPADM

## 2021-08-13 RX ORDER — MAGNESIUM HYDROXIDE/ALUMINUM HYDROXICE/SIMETHICONE 120; 1200; 1200 MG/30ML; MG/30ML; MG/30ML
30 SUSPENSION ORAL EVERY 6 HOURS PRN
Status: DISCONTINUED | OUTPATIENT
Start: 2021-08-13 | End: 2021-08-14 | Stop reason: HOSPADM

## 2021-08-13 RX ADMIN — ASPIRIN 81 MG: 81 TABLET ORAL at 09:13

## 2021-08-13 RX ADMIN — AMLODIPINE BESYLATE 5 MG: 5 TABLET ORAL at 09:13

## 2021-08-13 RX ADMIN — MULTIPLE VITAMINS W/ MINERALS TAB 1 TABLET: TAB ORAL at 09:13

## 2021-08-13 RX ADMIN — METOPROLOL SUCCINATE 50 MG: 50 TABLET, EXTENDED RELEASE ORAL at 09:13

## 2021-08-13 RX ADMIN — SODIUM CHLORIDE 1000 ML: 0.9 INJECTION, SOLUTION INTRAVENOUS at 04:56

## 2021-08-13 RX ADMIN — ONDANSETRON 4 MG: 2 INJECTION INTRAMUSCULAR; INTRAVENOUS at 05:57

## 2021-08-13 RX ADMIN — SODIUM CHLORIDE 100 ML/HR: 0.9 INJECTION, SOLUTION INTRAVENOUS at 09:15

## 2021-08-13 RX ADMIN — ONDANSETRON 4 MG: 2 INJECTION INTRAMUSCULAR; INTRAVENOUS at 04:58

## 2021-08-13 RX ADMIN — ATORVASTATIN CALCIUM 10 MG: 10 TABLET, FILM COATED ORAL at 17:22

## 2021-08-13 RX ADMIN — CLOPIDOGREL BISULFATE 75 MG: 75 TABLET ORAL at 09:13

## 2021-08-13 RX ADMIN — FLECAINIDE ACETATE 100 MG: 100 TABLET ORAL at 11:24

## 2021-08-13 RX ADMIN — FLECAINIDE ACETATE 100 MG: 100 TABLET ORAL at 17:22

## 2021-08-13 RX ADMIN — DIAZEPAM 5 MG: 5 INJECTION, SOLUTION INTRAMUSCULAR; INTRAVENOUS at 05:05

## 2021-08-13 RX ADMIN — Medication 30 MG: at 11:24

## 2021-08-13 NOTE — ASSESSMENT & PLAN NOTE
- Left posterior parietal SDH in January 2021 while taking Eliquis  - No longer on [facility], is s/p Watchman device for atrial fibrillation

## 2021-08-13 NOTE — PHYSICAL THERAPY NOTE
PHYSICAL THERAPY EVALUATION          Patient Name: Helene Barclay  JMHCM'C Date: 8/13/2021   PT EVALUATION    78 y o     [id number]    Vertigo [R42]  Nausea & vomiting [R11 2]  Dizzy [R42]    Past Medical History:   Diagnosis Date    Atrial fibrillation ([facility] [address] )     Cancer ([facility] [address] ) 08/2020    Kidney cancer    Cancer ([facility] [address] )     melanoma    Chronic kidney disease     left kidney cancer    CPAP (continuous positive airway pressure) dependence     Hyperlipidemia     Hypertension     Irregular heart beat     Afib    Pneumonia     Skin cancer     Sleep apnea     Stroke ([facility] [address] )     Supraventricular tachycardia ([facility] [address] )     Wears glasses     Wears partial dentures     upper partial     Past Surgical History:   Procedure Laterality Date    APPENDECTOMY      CARDIOVERSION      COLONOSCOPY      fiberoptic, also 2009, both negative, resolved 2004    COLONOSCOPY  08/2019    EGD      HERNIA REPAIR      umbilical and bilateral inguinal    KNEE ARTHROSCOPY Left     VT LAP,PARTIAL NEPHRECTOMY Left 10/7/2020    Procedure: ROBOTIC PARTIAL NEPHRECTOMY;  Surgeon: Galindo Dickerson MD;  [facility];  Service: Urology    VT PERQ CLSR TCAT L ATR APNDGE W/ENDOCARDIAL IMPLNT N/A 3/25/2021    Procedure: LEFT ATRIAL APPENDAGE OCCLUSION;  Surgeon: Courtney Glaser MD;  Location:  MAIN OR;  Service: Cardiology    SKIN GRAFT      left ear, skin cancer    TONSILLECTOMY          08/13/21 1027   PT Last Visit   PT Visit Date 08/13/21   Note Type   Note type Evaluation   Pain Assessment   Pain Assessment Tool Pain Assessment not indicated - pt denies pain   Pain Score No Pain   Home Living   Type of [address] Two level;1/2 bath on main level;Bed/bath upstairs;Stairs to enter without rails   Bathroom Shower/Tub Tub/shower unit   Bathroom Toilet Standard   Home Equipment Walker   Additional Comments 1 NIKO   Prior Function   Level of [place] Independent with ADLs and functional mobility   Lives With Spouse   ADL Assistance Independent   IADLs Independent   Falls in the last 6 months 0   Vocational Part time employment   Comments indep pta without an AD   Restrictions/Precautions   Other Precautions Multiple lines;Telemetry   General   Additional Pertinent History pt admitted 8/13/21 for dizziness  denies sx of dizziness or tinnitus on exam  up w assist orders  does have pmhx of SDH in Jan 2021, f/u w neurology outpatient   Cognition   Overall Cognitive Status [facility]   Arousal/Participation Cooperative   Orientation Level Oriented X4   Memory Within functional limits   Following Commands Follows all commands and directions without difficulty   Comments flat affect  pleasant   RLE Assessment   RLE Assessment WFL   LLE Assessment   LLE Assessment WFL   Transfers   Sit to Stand 7  Independent   Stand to Sit 7  Independent   Ambulation/Elevation   Gait pattern WNL; Wide CHICA   Gait Assistance 7  Independent   Additional items Assist x 1  (for IV pole)   Assistive Device None   Distance 200'   Stair Management Assistance 7  Independent   Stair Management Technique Alternating pattern; Foreward   Number of Stairs 4  (limited by IV lines)   Balance   Dynamic Standing Good   Ambulatory Good   Endurance Deficit   Endurance Deficit No   Activity Tolerance   Activity Tolerance Patient tolerated treatment well   Medical Staff Made Aware Charlie OT   Nurse Made Aware Deena RN   Assessment   Prognosis Good   Assessment Jean Paul Oden is a 78 y o  male admitted to [facility] on [facility]21 for Dizziness  Pt denies sx on evaluation  PT was consulted and pt was seen on 8/13/2021 for mobility assessment and d/c planning  Pt presents w multiple lines  At baseline is indep without an AD  Pt is currently functioning at a independent level for transfers, ambulation without an AD and stair negotiation  Demonstrates no significant deficits in strength, balance or endurance   Does not demonstrate need for skilled IPPT at this time  At this time PT recommendations for d/c are home  Pt denies concerns w dc home from functional standpoint  Encouraged indep ambulation during hospital stay as tolerated; advised to monitor for sx of dizziness and have supervision should sx be present during mobility  Barriers to Discharge None   Plan   PT Frequency   (d/c PT)   Recommendation   PT Discharge Recommendation No rehabilitation needs   PT - OK to Discharge Yes   Additional Comments The patient's AM-PAC Basic Mobility Inpatient Short Form Raw Score is 24, Standardized Score is 57 68  A standardized score greater than 42 9 suggests the patient may benefit from discharge to home  Please also refer to the recommendation of the Physical Therapist for safe discharge planning     AM-PAC Basic Mobility Inpatient   Turning in Bed Without Bedrails 4   Lying on Back to Sitting on Edge of Flat Bed 4   Moving Bed to Chair 4   Standing Up From Chair 4   Walk in Room 4   Climb 3-5 Stairs 4   Basic Mobility Inpatient Raw Score 24   Basic Mobility Standardized Score 57 68   History: co - morbidities, age, social background (steps), multiple lines  Exam: impairments in systems including musculoskeletal (strength), neuromuscular (balance, gait, transfers, motor function), am-pac, cognition  Clinical: unstable/unpredictable; ongoing medical management for admitting dx  Complexity:high      Elliott Raiza, PT

## 2021-08-13 NOTE — H&P
[facility]  H&P- Murleen Halsted 1955, 78 y o  male MRN: [id number]  Unit/Bed#: E4 -01 Encounter: [id number]  Primary Care Provider: Catalina Hua DO   Date and time admitted to hospital: 8/13/2021  4:33 AM    * Dizziness  Assessment & Plan  · Patient reports dizziness and tinnitus right ear  · History of subdural hematoma in January  Advised by neurosurgery if he develops dizziness to present to ED for evaluation  · CT head today negative for acute intracranial abnormality  Tiny left posterior parietal subacute to chronic subdural collection, decreased in size when compared to a CT brain dated April 26, 2021  · Neurochecks  · Monitor on telemetry  · P r n  Meclizine  · PT OT consult  · Neurology consult    SDH (subdural hematoma) [facility])  Assessment & Plan  · History of subdural hematoma January 2021  Followed outpatient by Neurosurgery, seen on Jul 30:  Stable chronic subdural hematoma, okay to continue ASA and Plavix    H/O partial nephrectomy  Assessment & Plan  · History of partial nephrectomy October 2020    Paroxysmal atrial fibrillation [facility])  Assessment & Plan  · PAF history of cardioversion  S/p Watchman device implantation  · Continue Toprol, flecainide, ASA and Plavix    Mixed hyperlipidemia  Assessment & Plan  · Continue statin    VTE Prophylaxis: SAH  / sequential compression device   Code Status: FC  POLST: POLST is not applicable to this patient  Discussion with family:  Spouse at bedside    Anticipated Length of Stay:  Patient will be admitted on an Inpatient basis with an anticipated length of stay of  > 2 midnights  Justification for Hospital Stay:  Dizziness history of SAH    Total Time for Visit, including Counseling / Coordination of Care: 60 minutes  Greater than 50% of this total time spent on direct patient counseling and coordination of care      Chief Complaint:   Dizziness    History of Present Illness:    Murleen Halsted is a 78 y o  male who presents with c/o dizziness  States he was walking to the bathroom this morning and developed dizziness, lightheadedness and severe ringing in his right ear  Was unable to ambulate appropriately, felt "wobbly " Developed nausea and emesis when he arrived to ED  Reports symptoms somewhat similar to symptoms in January when he was hospitalized for [facility], although tinnitus is new  Denies headache, visual changes, weakness or numbness  Denies ear pain, discharge or hearing loss  Reports dizziness improved, received IV Valium in ED  Review of Systems:    Review of Systems   Constitutional: Negative  HENT: Negative for congestion, ear discharge, ear pain, hearing loss, sinus pressure and sinus pain  Tinnitus right ear   Respiratory: Negative  Cardiovascular: Negative  Gastrointestinal: Positive for nausea and vomiting  Negative for abdominal pain, constipation and diarrhea  Genitourinary: Negative  Musculoskeletal: Positive for gait problem  Skin: Negative  Neurological: Positive for dizziness and light-headedness  Negative for facial asymmetry, speech difficulty, weakness, numbness and headaches  Psychiatric/Behavioral: Negative          Past Medical and Surgical History:     Past Medical History:   Diagnosis Date    Atrial fibrillation ([facility] )     Cancer ([facility] ) 08/2020    Kidney cancer    Cancer ([facility] )     melanoma    Chronic kidney disease     left kidney cancer    CPAP (continuous positive airway pressure) dependence     Hyperlipidemia     Hypertension     Irregular heart beat     Afib    Pneumonia     Skin cancer     Sleep apnea     Stroke ([facility] )     Supraventricular tachycardia ([address] )     Wears glasses     Wears partial dentures     upper partial       Past Surgical History:   Procedure Laterality Date    APPENDECTOMY      CARDIOVERSION      COLONOSCOPY      fiberoptic, also 2009, both negative, resolved 2004    COLONOSCOPY  08/2019    EGD      HERNIA REPAIR umbilical and bilateral inguinal    KNEE ARTHROSCOPY Left     VA LAP,PARTIAL NEPHRECTOMY Left 10/7/2020    Procedure: ROBOTIC PARTIAL NEPHRECTOMY;  Surgeon: Janet Whitten MD;  [facility];  Service: Urology    VA PERQ CLSR TCAT L [address] IMPLNT N/A 3/25/2021    Procedure: LEFT ATRIAL APPENDAGE OCCLUSION;  Surgeon: Deric Dalton MD;  [facility];  Service: Cardiology    SKIN GRAFT      left ear, skin cancer    TONSILLECTOMY         Meds/Allergies:    Prior to Admission medications    Medication Sig Start Date End Date Taking? Authorizing Provider   amLODIPine (NORVASC) 5 mg tablet Take 1 tablet (5 mg total) by mouth daily 8/3/21  Yes Idalia Loya, DO   aspirin (ECOTRIN LOW STRENGTH) 81 mg EC tablet Take 1 tablet (81 mg total) by mouth daily 3/27/21  Yes JERMAINE Bateman   atorvastatin (LIPITOR) 10 mg tablet Take 1 tablet (10 mg total) by mouth daily 2/15/21  Yes Idalia Loya, DO   clopidogrel (PLAVIX) 75 mg tablet Take 1 tablet (75 mg total) by mouth daily 5/14/21  Yes Deric Dalton MD   Coenzyme Q10 (CO Q-10 PO) Take 1 caplet by mouth every morning   Yes Historical Provider, MD   metoprolol succinate (TOPROL-XL) 50 mg 24 hr tablet Take 1 tablet (50 mg total) by mouth every morning 6/2/21  Yes Donna Castrejon, DO   Multiple Vitamins-Minerals (MULTIVITAMIN ADULT PO) Take 1 tablet by mouth every morning   Yes Historical Provider, MD   flecainide (TAMBOCOR) 100 mg tablet Take 1 tablet (100 mg total) by mouth 2 (two) times a day 3/29/21 7/30/21  Zechariah Kendall MD     I have reviewed home medications with patient personally      Allergies: No Known Allergies    Social History:     Marital Status: /Civil Union   Occupation: retired  Patient Pre-hospital Living Situation:  Resides at home with spouse  Patient Pre-hospital Level of Mobility:  Ambulatory  Patient Pre-hospital Diet Restrictions:   Substance Use History:   Social History     Substance and Sexual Activity Alcohol Use Never     Social History     Tobacco Use   Smoking Status Never Smoker   Smokeless Tobacco Never Used     Social History     Substance and Sexual Activity   Drug Use No       Family History:    Family History   Problem Relation Age of Onset    Cancer Father         stomach    Hypertension Father     Stroke Father         syndrome    Cancer Family     Heart disease Family     Hypertension Family     Stroke Family         syndrome    Throat cancer Mother     Diabetes Mother     Asthma Sister        Physical Exam:     Vitals:   Blood Pressure: 157/90 (08/13/21 0636)  Pulse: 85 (08/13/21 0636)  Temperature: (!) 96 2 °F (35 7 °C) (08/13/21 0636)  Temp Source: Temporal (08/13/21 0636)  Respirations: 22 (08/13/21 0636)  Height: 6' 2" (188 cm) (08/13/21 0636)  Weight - Scale: 94 1 kg (207 lb 7 3 oz) (08/13/21 0636)  SpO2: 98 % (08/13/21 0636)    Physical Exam  Constitutional:       General: He is not in acute distress  Appearance: Normal appearance  He is normal weight  He is not ill-appearing, toxic-appearing or diaphoretic  HENT:      Head: Normocephalic and atraumatic  Nose: No congestion or rhinorrhea  Mouth/Throat:      Mouth: Mucous membranes are moist    Eyes:      Extraocular Movements: Extraocular movements intact  Conjunctiva/sclera: Conjunctivae normal       Pupils: Pupils are equal, round, and reactive to light  Cardiovascular:      Rate and Rhythm: Normal rate  Rhythm irregular  Pulmonary:      Effort: Pulmonary effort is normal       Breath sounds: Normal breath sounds  Abdominal:      General: Bowel sounds are normal       Palpations: Abdomen is soft  Musculoskeletal:      Right lower leg: No edema  Left lower leg: No edema  Skin:     General: Skin is warm and dry  Coloration: Skin is not pale  Neurological:      General: No focal deficit present  Mental Status: He is alert and oriented to person, place, and time        Comments: Equal strength B/L UE 5/5, B/LLE 4/5 EOMI PERRLA, clear speech, no facial droop, chronic dysarthria     Additional Data:     Lab Results: I have personally reviewed pertinent reports  Results from last 7 days   Lab Units 08/13/21  0459   WBC Thousand/uL 6 15   HEMOGLOBIN g/dL 14 9   HEMATOCRIT % 45 7   PLATELETS Thousands/uL 243   NEUTROS PCT % 55   LYMPHS PCT % 34   MONOS PCT % 9   EOS PCT % 2     Results from last 7 days   Lab Units 08/13/21  0459   SODIUM mmol/L 144   POTASSIUM mmol/L 3 5   CHLORIDE mmol/L 105   CO2 mmol/L 29   BUN mg/dL 16   CREATININE mg/dL 1 23   ANION GAP mmol/L 10   CALCIUM mg/dL 9 0   ALBUMIN g/dL 4 0   TOTAL BILIRUBIN mg/dL 0 39   ALK PHOS U/L 97   ALT U/L 35   AST U/L 27   GLUCOSE RANDOM mg/dL 99                       Imaging: I have personally reviewed pertinent reports  CT head without contrast   Final Result by Kourtney Villalba MD (08/13 1804)      No acute intracranial abnormality  Tiny left posterior parietal subacute to chronic subdural collection, decreased in size when compared to a CT brain dated April 26, 2021  Workstation performed: [id number]             EKG, Pathology, and Other Studies Reviewed on Admission:   · EKG: ct    Allscripts / Epic Records Reviewed: Yes     ** Please Note: This note has been constructed using a voice recognition system   **

## 2021-08-13 NOTE — CONSULTS
Consultation - Neurology   Loreto Medina 78 y o  male MRN: [id number]  Unit/Bed#: E4 -01 Encounter: [id number]      Assessment/Plan     * Dizziness  Assessment & Plan  78year old male with recent SDH in January 2021, atrial fibrillation s/p Watchman device, HTN, and renal cell carcinoma s/p partial nephrectomy, presented to the ED for sudden onset lightheadedness, gait dysfunction, tinnitus, and nausea/vomiting  Episode occurred after patient had ambulated the bathroom, upon coming back to bed  [address] on arrival stable- prior SDH decreased compared to CT in April  Symptoms improved with meclizine, Zofran, and Valium  Lower suspicion for acute vascular event, however cannot entirely exclude  There is slight clumsiness with heel to shin testing on the left  Will obtain MRI brain  Plan:  - MRI brain pending   - If evidence of acute infarct, will need to complete remainder of stroke pathway  - Continue meclizine and Zofran PRN   - PT/OT evaluation  - Medical management and supportive care per primary team  Correction of any metabolic or infectious disturbances  Recommendations for outpatient neurological follow up have yet to be determined  History of Present Illness     Reason for Consult / Principal Problem: Dizziness     HPI: Loreto Medina is a 78 y o  male with prior SDH, prior stroke 2/2 afib,  hypertension, atrial fibrillation s/p Watchman device, CKD, renal cell carcinoma, melanoma, and sleep apnea with use of CPAP, who presented to the ED for evaluation of dizziness  Patient was in bed and woke up to go to the bathroom  He was able to ambulate without difficulty  Upon coming back to bed, patient felt acutely lightheaded and off balance with severe right ear tinnitus and nausea/ vomiting  He was actively vomiting in the ED and appeared diaphoretic  He could not ambulate and required use of a wheelchair to come into the ED  He denies any hearing loss    He denied any focal neurologic symptoms  CT head was negative  There was still evidence of a tiny left posterior parietal chronic subdural collection, decreased in size compared to April scan  He received meclizine, Zofran, and Valium in the ED and improved after that  However, per chart, patient had another episode of dizziness and vomiting when he sat up in bed to use the urinal while in the ED  Blood pressure in the ED was 188/93  It has since improved to 157/90  Patient reports that he was out in the heat all day yesterday  At time of neurologic evaluation this AM, patient's symptoms have nearly resolved       Inpatient consult to Neurology  Consult performed by: Becca Brewer PA-C  Consult ordered by: JERMAINE Ellis        Review of Systems   Gastrointestinal:        Nausea/vomiting resolved   Musculoskeletal:        Has not yet ambulated since admission   Neurological:        Dizziness resolved       Historical Information   Past Medical History:   Diagnosis Date    Atrial fibrillation ([address] )     Cancer ([address] ) 08/2020    Kidney cancer    Cancer ([address] )     melanoma    Chronic kidney disease     left kidney cancer    CPAP (continuous positive airway pressure) dependence     Hyperlipidemia     Hypertension     Irregular heart beat     Afib    Pneumonia     Skin cancer     Sleep apnea     Stroke ([address]     Supraventricular tachycardia ([address] )     Wears glasses     Wears partial dentures     upper partial     Past Surgical History:   Procedure Laterality Date    APPENDECTOMY      CARDIOVERSION      COLONOSCOPY      fiberoptic, also 2009, both negative, resolved 2004    COLONOSCOPY  08/2019    EGD      HERNIA REPAIR      umbilical and bilateral inguinal    KNEE ARTHROSCOPY Left     IA LAP,PARTIAL NEPHRECTOMY Left 10/7/2020    Procedure: ROBOTIC PARTIAL NEPHRECTOMY;  Surgeon: Lisa Perez MD;  [facility];  Service: Urology    IA PERQ CLSR TCAT L ATR APNDGE W/ENDOCARDIAL IMPLNT N/A 3/25/2021    Procedure: LEFT ATRIAL APPENDAGE OCCLUSION;  Surgeon: Ander Mahan MD;  [facility];  Service: Cardiology    SKIN GRAFT      left ear, skin cancer    TONSILLECTOMY       Social History   Social History     Substance and Sexual Activity   Alcohol Use Never     Social History     Substance and Sexual Activity   Drug Use No     E-Cigarette/Vaping    E-Cigarette Use Never User      E-Cigarette/Vaping Substances    Nicotine No     THC No     CBD No     Flavoring No     Other No     Unknown No      Social History     Tobacco Use   Smoking Status Never Smoker   Smokeless Tobacco Never Used     Family History:   Family History   Problem Relation Age of Onset    Cancer Father         stomach    Hypertension Father     Stroke Father         syndrome    Cancer Family     Heart disease Family     Hypertension Family     Stroke Family         syndrome    Throat cancer Mother     Diabetes Mother     Asthma Sister        Review of previous medical records was completed       Meds/Allergies   all current active meds have been reviewed, current meds:   Current Facility-Administered Medications   Medication Dose Route Frequency    acetaminophen (TYLENOL) tablet 650 mg  650 mg Oral Q6H PRN    aluminum-magnesium hydroxide-simethicone (MYLANTA) oral suspension 30 mL  30 mL Oral Q6H PRN    amLODIPine (NORVASC) tablet 5 mg  5 mg Oral Daily    aspirin (ECOTRIN LOW STRENGTH) EC tablet 81 mg  81 mg Oral Daily    atorvastatin (LIPITOR) tablet 10 mg  10 mg Oral After Dinner    clopidogrel (PLAVIX) tablet 75 mg  75 mg Oral Daily    co-enzyme Q-10 capsule 30 mg  30 mg Oral QAM    flecainide (TAMBOCOR) tablet 100 mg  100 mg Oral BID    meclizine (ANTIVERT) tablet 25 mg  25 mg Oral Q8H PRN    metoprolol succinate (TOPROL-XL) 24 hr tablet 50 mg  50 mg Oral QAM    multivitamin-minerals (CENTRUM) tablet 1 tablet  1 tablet Oral Daily    ondansetron (ZOFRAN) injection 4 mg  4 mg Intravenous Q6H PRN  simethicone (MYLICON) chewable tablet 80 mg  80 mg Oral 4x Daily PRN    and PTA meds:   Prior to Admission Medications   Prescriptions Last Dose Informant Patient Reported? Taking? Coenzyme Q10 (CO Q-10 PO) 8/12/2021 at Unknown time Self Yes Yes   Sig: Take 1 caplet by mouth every morning   Multiple Vitamins-Minerals (MULTIVITAMIN ADULT PO) 8/12/2021 at Unknown time Self Yes Yes   Sig: Take 1 tablet by mouth every morning   amLODIPine (NORVASC) 5 mg tablet 8/12/2021 at Unknown time  No Yes   Sig: Take 1 tablet (5 mg total) by mouth daily   aspirin (ECOTRIN LOW STRENGTH) 81 mg EC tablet 8/12/2021 at Unknown time Self No Yes   Sig: Take 1 tablet (81 mg total) by mouth daily   atorvastatin (LIPITOR) 10 mg tablet 8/12/2021 at Unknown time Self No Yes   Sig: Take 1 tablet (10 mg total) by mouth daily   clopidogrel (PLAVIX) 75 mg tablet 8/12/2021 at Unknown time Self No Yes   Sig: Take 1 tablet (75 mg total) by mouth daily   flecainide (TAMBOCOR) 100 mg tablet  Self No No   Sig: Take 1 tablet (100 mg total) by mouth 2 (two) times a day   metoprolol succinate (TOPROL-XL) 50 mg 24 hr tablet 8/12/2021 at Unknown time Self No Yes   Sig: Take 1 tablet (50 mg total) by mouth every morning      Facility-Administered Medications: None       No Known Allergies    Objective   Vitals:Blood pressure 122/64, pulse 59, temperature 98 °F (36 7 °C), temperature source Temporal, resp  rate 20, height 6' 2" (1 88 m), weight 94 1 kg (207 lb 7 3 oz), SpO2 97 %  ,Body mass index is 26 64 kg/m²  Intake/Output Summary (Last 24 hours) at 8/13/2021 1604  Last data filed at 8/13/2021 0915  Gross per 24 hour   Intake --   Output 1775 ml   Net -1775 ml       Invasive Devices: Invasive Devices     Peripheral Intravenous Line            Peripheral IV 08/13/21 Left Wrist <1 day          Drain            Closed/Suction Drain Left Abdomen Bulb 10 Fr  310 days              Physical Exam:   Vital signs and nursing notes reviewed  Constitutional: Patient is resting comfortably  Well developed, well nourished, in no acute distress  No diaphoresis  HENT: Normocephalic, atraumatic  Right and left external ear normal  Oropharynx clear and moist  Nose normal    Eyes:  EOMs intact without nystagmus  No scleral icterus or injection  No discharge  Neck: Supple, normal ROM  No stridor noted  Cardiovascular: Regular rate  Pulmonary: No respiratory distress  Effort normal    Musculoskeletal: Normal ROM  No edema or deformities noted  Neurological: A&Ox3  Follows all commands  Skin: Warm and dry  No erythema or rashes  Psychiatric: Normal mood and affect  Normal thought process and thought content, without hallucinations  Behavior and judgement normal     Neurologic Exam:  Mental Status: Patient is awake and alert  Oriented x 3  Follows all commands without difficulty  Mild dysarthria  No aphasia  Cranial Nerves: Cranial nerves 2-12 intact  Motor: 5/5 strength throughout bilateral upper and lower extremities  Sensation: Sensation to light touch intact throughout  Coordination: Finger to nose testing normal  Heel to shin normal when evaluating in bed this AM, however on repeat evaluation with attending, patient demonstrated some clumsiness of the LLE when he was sitting on edge of bed performing this test    No tremors noted  Gait: Deferred       Lab Results: I have personally reviewed pertinent reports  Imaging Studies: I have personally reviewed pertinent reports  and I have personally reviewed pertinent films in PACS  EKG, Pathology, and Other Studies: I have personally reviewed pertinent reports      VTE Prophylaxis: Sequential compression device (Venodyne)     Code Status: Level 1 - Full Code

## 2021-08-13 NOTE — ASSESSMENT & PLAN NOTE
· History of subdural hematoma January 2021   Followed outpatient by Neurosurgery, seen on Jul 30:  Stable chronic subdural hematoma, okay to continue ASA and Plavix

## 2021-08-13 NOTE — ASSESSMENT & PLAN NOTE
78year old male with recent SDH in January 2021, atrial fibrillation s/p Watchman device, HTN, and renal cell carcinoma s/p partial nephrectomy, presented to the ED for sudden onset lightheadedness, unsteadiness, tinnitus, diaphoresis and nausea/vomiting  Episode occurred after patient had ambulated the bathroom, upon coming back to [facility] on arrival stable- prior SDH decreased compared to CT in April  Symptoms improved with meclizine, Zofran, and Valium  Symptoms were initially independent of movement then seemed to subside with rest and were exacerbated/reoccurred with change in position  Symptoms had resolved at some point in the emergency room  - upon my exam patient had slight cerebellar ataxia of the right lower extremity on heel-to-shin testing   - blood pressure initially was elevated at 180 8/93 on arrival, has been normal since  Plan:  - MRI brain was without any new/acute pathology  - no need for any additional neurologic recommendations at this time    -okay to discharge from a neurologic standpoint   -no need for new outpatient Neurology follow-up at this time   -please call questions/concerns

## 2021-08-13 NOTE — ASSESSMENT & PLAN NOTE
· PAF history of cardioversion  S/p Watchman device implantation     · Continue Toprol, flecainide, ASA and Plavix

## 2021-08-13 NOTE — ED PROVIDER NOTES
History  Chief Complaint   Patient presents with    Dizziness     reports sudden onset of dizziness and right sided ear ringing, actively vomiting in triage, diaphortic, hx of "bleeding in my head" per pt     77 yo male presenting with sudden onset of R sided tinnitis, dizziness, nausea/vomiting while standing up to go to the bathroom just PTA  Brought in by wife  Pt unable to ambulate and wheeled in vomiting  Pt had been on ASA and eliquis for afib in January when he had atraumatic SDH  Has been followed with CT's and more recently MRI which has shown improvement  States he had headache with that and none with this  History provided by:  Patient and spouse   used: No    Dizziness  Quality:  Unable to specify  Severity:  Moderate  Onset quality:  Sudden  Duration: just PTA  Timing:  Constant  Progression:  Unchanged  Chronicity:  New  Context: standing up (to go to BR)    Relieved by:  Being still  Worsened by: Movement and standing up  Ineffective treatments:  None tried  Associated symptoms: nausea, tinnitus (R ear) and vomiting    Associated symptoms: no chest pain, no diarrhea, no headaches, no palpitations and no shortness of breath    Associated symptoms comment:  Sweating  Nausea:     Severity:  Moderate    Onset quality:  Sudden    Timing:  Constant  Vomiting:     Quality:  Stomach contents    Severity:  Moderate      Prior to Admission Medications   Prescriptions Last Dose Informant Patient Reported? Taking?    Coenzyme Q10 (CO Q-10 PO)  Self Yes Yes   Sig: Take 1 caplet by mouth every morning   Multiple Vitamins-Minerals (MULTIVITAMIN ADULT PO)  Self Yes Yes   Sig: Take 1 tablet by mouth every morning   amLODIPine (NORVASC) 5 mg tablet   No Yes   Sig: Take 1 tablet (5 mg total) by mouth daily   aspirin (ECOTRIN LOW STRENGTH) 81 mg EC tablet  Self No Yes   Sig: Take 1 tablet (81 mg total) by mouth daily   atorvastatin (LIPITOR) 10 mg tablet  Self No Yes   Sig: Take 1 tablet (10 mg total) by mouth daily   clopidogrel (PLAVIX) 75 mg tablet  Self No Yes   Sig: Take 1 tablet (75 mg total) by mouth daily   flecainide (TAMBOCOR) 100 mg tablet  Self No No   Sig: Take 1 tablet (100 mg total) by mouth 2 (two) times a day   metoprolol succinate (TOPROL-XL) 50 mg 24 hr tablet  Self No Yes   Sig: Take 1 tablet (50 mg total) by mouth every morning      Facility-Administered Medications: None       Past Medical History:   Diagnosis Date    Atrial fibrillation (HCC)     Cancer ([facility] ) 08/2020    Kidney cancer    Cancer ([facility] )     melanoma    Chronic kidney disease     left kidney cancer    CPAP (continuous positive airway pressure) dependence     Hyperlipidemia     Hypertension     Irregular heart beat     Afib    Pneumonia     Skin cancer     Sleep apnea     [place] ([place] [address] )     Supraventricular tachycardia ([place] [address] )     Wears glasses     Wears partial dentures     upper partial       Past Surgical History:   Procedure Laterality Date    APPENDECTOMY      CARDIOVERSION      COLONOSCOPY      fiberoptic, also 2009, both negative, resolved 2004    COLONOSCOPY  08/2019    EGD      HERNIA REPAIR      umbilical and bilateral inguinal    KNEE ARTHROSCOPY Left     [place] LAP,PARTIAL NEPHRECTOMY Left 10/7/2020    Procedure: ROBOTIC PARTIAL NEPHRECTOMY;  Surgeon: Cristy Washington MD;  [facility];  Service: Urology    [place] PERQ CLSR TCAT L ATR APNDGE W/ENDOCARDIAL IMPLNT N/A 3/25/2021    Procedure: LEFT ATRIAL APPENDAGE OCCLUSION;  Surgeon: Dom Cardenas MD;  [facility];  Service: Cardiology    SKIN GRAFT      left ear, skin cancer    TONSILLECTOMY         Family History   Problem Relation Age of Onset    Cancer Father         stomach    Hypertension Father     Stroke Father         syndrome    Cancer Family     Heart disease Family     Hypertension Family     Stroke Family         syndrome    Throat cancer Mother     Diabetes Mother     Asthma Sister      I have reviewed and agree with the history as documented  E-Cigarette/Vaping    E-Cigarette Use Never User      E-Cigarette/Vaping Substances    Nicotine No     THC No     CBD No     Flavoring No     Other No     Unknown No      Social History     Tobacco Use    Smoking status: Never Smoker    Smokeless tobacco: Never Used   Vaping Use    Vaping Use: Never used   Substance Use Topics    Alcohol use: Never    Drug use: No       Review of Systems   Constitutional: Negative for chills and fever  HENT: Positive for tinnitus (R ear)  Negative for congestion and sore throat  Eyes: Negative for visual disturbance  Respiratory: Negative for shortness of breath and wheezing  Cardiovascular: Negative for chest pain and palpitations  Gastrointestinal: Positive for nausea and vomiting  Negative for abdominal pain and diarrhea  Genitourinary: Negative for dysuria  Musculoskeletal: Negative for neck pain and neck stiffness  Skin: Negative for pallor and rash  Neurological: Positive for dizziness  Negative for seizures, syncope, numbness and headaches  Speech difficulty: chronic dysarthria since after previous CVA  Psychiatric/Behavioral: Negative for confusion  All other systems reviewed and are negative  Physical Exam  Physical Exam  Vitals and nursing note reviewed  Constitutional:       General: He is in acute distress (appears to not feel well)  Appearance: He is well-developed  He is diaphoretic  HENT:      Head: Normocephalic and atraumatic  Right Ear: External ear normal       Left Ear: External ear normal    Eyes:      Extraocular Movements: Extraocular movements intact  Pupils: Pupils are equal, round, and reactive to light  Cardiovascular:      Rate and Rhythm: Regular rhythm  Bradycardia present  Heart sounds: No murmur heard  Pulmonary:      Effort: Pulmonary effort is normal       Breath sounds: Normal breath sounds     Abdominal:      General: Bowel sounds are normal  There is no distension  Palpations: Abdomen is soft  Tenderness: There is no abdominal tenderness  Musculoskeletal:         General: Normal range of motion  Cervical back: Neck supple  Skin:     General: Skin is warm  Coloration: Skin is not pale  Findings: No rash  Neurological:      General: No focal deficit present  Mental Status: He is alert and oriented to person, place, and time  Mental status is at baseline  Cranial Nerves: No cranial nerve deficit  Motor: No weakness        Comments: Dysarthria - baseline for pt   Psychiatric:         Mood and Affect: Mood normal          Behavior: Behavior normal          Vital Signs  ED Triage Vitals   Temperature Pulse Respirations Blood Pressure SpO2   08/13/21 0441 08/13/21 0441 08/13/21 0442 08/13/21 0441 08/13/21 0441   (!) 97 4 °F (36 3 °C) 58 (!) 23 (!) 188/93 99 %      Temp Source Heart Rate Source Patient Position - Orthostatic VS BP Location FiO2 (%)   08/13/21 0441 08/13/21 0515 08/13/21 0515 08/13/21 0515 --   Oral Monitor Lying Right arm       Pain Score       08/13/21 0441       4           Vitals:    08/13/21 0441 08/13/21 0515 08/13/21 0601   BP: (!) 188/93 138/82 161/93   Pulse: 58 66 81   Patient Position - Orthostatic VS:  Lying Lying         Visual Acuity      ED Medications  Medications   sodium chloride 0 9 % bolus 1,000 mL (1,000 mL Intravenous New Bag 8/13/21 0456)   ondansetron (ZOFRAN) injection 4 mg (4 mg Intravenous Given 8/13/21 0458)   diazepam (VALIUM) injection 5 mg (5 mg Intravenous Given 8/13/21 0505)   ondansetron (ZOFRAN) injection 4 mg (4 mg Intravenous Given 8/13/21 0557)       Diagnostic Studies  Results Reviewed     Procedure Component Value Units Date/Time    Troponin I [[id number]]  (Normal) Collected: 08/13/21 0459    Lab Status: Final result Specimen: Blood from Arm, Left Updated: 08/13/21 0542     Troponin I <0 02 ng/mL     Comprehensive metabolic panel [[id number]] Collected: 08/13/21 0459    Lab Status: Final result Specimen: Blood from Arm, Left Updated: 08/13/21 0541     Sodium 144 mmol/L      Potassium 3 5 mmol/L      Chloride 105 mmol/L      CO2 29 mmol/L      ANION GAP 10 mmol/L      BUN 16 mg/dL      Creatinine 1 23 mg/dL      Glucose 99 mg/dL      Calcium 9 0 mg/dL      AST 27 U/L      ALT 35 U/L      Alkaline Phosphatase 97 U/L      Total Protein 7 6 g/dL      Albumin 4 0 g/dL      Total Bilirubin 0 39 mg/dL      eGFR 60 ml/min/1 73sq m     Narrative:      National Kidney Disease Foundation guidelines for Chronic Kidney Disease (CKD):     Stage 1 with normal or high GFR (GFR > 90 mL/min/1 73 square meters)    Stage 2 Mild CKD (GFR = 60-89 mL/min/1 73 square meters)    Stage 3A Moderate CKD (GFR = 45-59 mL/min/1 73 square meters)    Stage 3B Moderate CKD (GFR = 30-44 mL/min/1 73 square meters)    Stage 4 Severe CKD (GFR = 15-29 mL/min/1 73 square meters)    Stage 5 End Stage CKD (GFR <15 mL/min/1 73 square meters)  Note: GFR calculation is accurate only with a steady state creatinine    CBC and differential [[id number]] Collected: 08/13/21 0459    Lab Status: Final result Specimen: Blood from Arm, Left Updated: 08/13/21 0512     WBC 6 15 Thousand/uL      RBC 5 01 Million/uL      Hemoglobin 14 9 g/dL      Hematocrit 45 7 %      MCV 91 fL      MCH 29 7 pg      MCHC 32 6 g/dL      RDW 13 2 %      MPV 9 3 fL      Platelets 983 Thousands/uL      nRBC 0 /100 WBCs      Neutrophils Relative 55 %      Immat GRANS % 0 %      Lymphocytes Relative 34 %      Monocytes Relative 9 %      Eosinophils Relative 2 %      Basophils Relative 0 %      Neutrophils Absolute 3 36 Thousands/µL      Immature Grans Absolute 0 01 Thousand/uL      Lymphocytes Absolute 2 10 Thousands/µL      Monocytes Absolute 0 52 Thousand/µL      Eosinophils Absolute 0 14 Thousand/µL      Basophils Absolute 0 02 Thousands/µL                  CT head without contrast   Final Result by Lencho Monk MD (08/13 0544)      No acute intracranial abnormality  Tiny left posterior parietal subacute to chronic subdural collection, decreased in size when compared to a CT brain dated April 26, 2021  Workstation performed: [id number]                    Procedures  ECG 12 Lead Documentation Only    Date/Time: 8/13/2021 4:50 AM  Performed by: Dinesh Tirado DO  Authorized by: Dinesh Tirado DO     Indications / Diagnosis:  Dizziness, vomiting  Patient location:  ED  Interpretation:     Interpretation: normal    Rate:     ECG rate:  55    ECG rate assessment: bradycardic    Rhythm:     Rhythm: sinus bradycardia      Rhythm comment:  1st degree block  Ectopy:     Ectopy: none    QRS:     QRS axis:  Normal    QRS intervals:  Normal  Conduction:     Conduction: normal    ST segments:     ST segments:  Normal  T waves:     T waves: normal               ED Course  ED Course as of Aug 13 0620   Fri Aug 13, 2021   0440 Pt seen and examined  79 yo male presenting with sudden onset of R sided tinnitis, dizziness, nausea/vomiting while standing up to go to the bathroom just PTA  Brought in by wife  Pt unable to ambulate and wheeled in vomiting  Pt had been on ASA and eliquis for afib in January when he had atraumatic SDH  Taken off eliquis and now on ASA and plavix  Has been followed with CT's and more recently MRI which has shown improvement  States he had headache with that and none with this  Will give IV zofran, valium and check labs, CT head STAT       0525 Pt taken for CT head  0543 Labs all WNL, radiologist reading CT head  9244 CT head - No acute intracranial abnormality      Tiny left posterior parietal subacute to chronic subdural collection, decreased in size when compared to a CT brain dated April 26, 2021       0552 Went to talk to pt about his CT results and he had sat up to use urinal and began vomiting, still dizzy  Will give another dose of zofran and admit  MDM    Disposition  Final diagnoses:   Vertigo   Nausea & vomiting     Time reflects when diagnosis was documented in both MDM as applicable and the Disposition within this note     Time User Action Codes Description Comment    8/13/2021  6:00 AM Naoma Dy Add [R42] Vertigo     8/13/2021  6:01 AM Artelia Brain M Add [R11 2] Nausea & vomiting       ED Disposition     ED Disposition Condition Date/Time Comment    Admit Stable Fri Aug 13, 2021  6:00 AM Case was discussed with Roger Bennett and the patient's admission status was agreed to be Admission Status: inpatient status to the service of Dr Thomas Carlisle   Follow-up Information    None         Patient's Medications   Discharge Prescriptions    No medications on file     No discharge procedures on file      PDMP Review     None          ED Provider  Electronically Signed by           Ana Maria Zuñiga DO  08/13/21 5697

## 2021-08-13 NOTE — ASSESSMENT & PLAN NOTE
· Patient reports dizziness and tinnitus right ear  · History of subdural hematoma in January  Advised by neurosurgery if he develops dizziness to present to ED for evaluation  · CT head today negative for acute intracranial abnormality  Tiny left posterior parietal subacute to chronic subdural collection, decreased in size when compared to a CT brain dated April 26, 2021  · Neurochecks  · Monitor on telemetry  · P r n   Meclizine  · PT OT consult  · Neurology consult

## 2021-08-13 NOTE — OCCUPATIONAL THERAPY NOTE
Occupational Therapy Evaluation     Patient Name: Helene JONES'S Date: 8/13/2021  Problem List  Principal Problem:    Dizziness  Active Problems:    Paroxysmal atrial fibrillation (HCC)    Mixed hyperlipidemia    SDH (subdural hematoma) (HCC)    H/O partial nephrectomy    Past Medical History  Past Medical History:   Diagnosis Date    [facility]     Cancer ([facility] ) 08/2020    Kidney cancer    Cancer [facility] )     melanoma    Chronic kidney disease     left kidney cancer    CPAP (continuous positive airway pressure) dependence     Hyperlipidemia     Hypertension     Irregular heart beat     Afib    Pneumonia     Skin cancer     Sleep apnea     Stroke [facility]     [facility]raventricular tachycardia [facility]     Wears glasses     Wears partial dentures     upper partial     Past Surgical History  Past Surgical History:   Procedure Laterality Date    APPENDECTOMY      CARDIOVERSION      COLONOSCOPY      fiberoptic, also 2009, both negative, resolved 2004    COLONOSCOPY  08/2019    EGD      HERNIA REPAIR      umbilical and bilateral inguinal    KNEE ARTHROSCOPY Left     [place] LAP,PARTIAL NEPHRECTOMY Left 10/7/2020    Procedure: ROBOTIC PARTIAL NEPHRECTOMY;  Surgeon: Galindo Dickerson MD;  [facility];  Service: Urology    [place] PERQ CLSR TCAT L ATR APNDGE W/ENDOCARDIAL IMPLNT N/A 3/25/2021    Procedure: LEFT ATRIAL APPENDAGE OCCLUSION;  Surgeon: Courtney Glaser MD;  [facility];  Service: Cardiology    SKIN GRAFT      left ear, skin cancer    TONSILLECTOMY               08/13/21 1031   OT Last Visit   OT Visit Date 08/13/21   Note Type   Note type Evaluation   Restrictions/Precautions   Other Precautions Multiple lines;Telemetry   Pain Assessment   Pain Assessment Tool Pain Assessment not indicated - pt denies pain   Home Living   Type of [address] Two level;1/2 bath on main level;Bed/bath upstairs;Stairs to enter without rails  (1 NIKO )   Bathroom Shower/Tub Tub/shower unit   Bathroom Toilet Standard   Bathroom Equipment Grab bars in shower   [address]  (Doesn't use )   Prior Function   Lives With Spouse   ADL Assistance Independent   IADLs Independent   Falls in the last 6 months 0   Vocational Part time employment   Lifestyle   Autonomy (I) PLOF  Ambulates without AD  - falls  + drives  PT employed  Psychosocial   Psychosocial (WDL) WDL   Subjective   Subjective " I don't have any dizziness"    ADL   Eating Assistance 7  Independent   Grooming Assistance 7  Independent   UB Bathing Assistance 7  Independent   LB Bathing Assistance 7  Independent   UB Dressing Assistance 7  Independent   LB Dressing Assistance 7  Independent   Toileting Assistance  7  Independent   Bed Mobility   Additional Comments Received standing at bedside  Remains in chair at end of session  Transfers   Sit to Stand 7  Independent   Stand to Sit 7  Independent   Stand pivot 7  Independent   Functional Mobility   Functional Mobility 7  Independent   Balance   Static Sitting Normal   Dynamic Sitting Good   Static Standing Good   Dynamic Standing Good   Ambulatory Good   Activity Tolerance   Activity Tolerance Patient tolerated treatment well   Medical Staff Made Aware PTJane    Nurse Made Aware Deena ANGLIN    RUYASMINE Assessment   RUE Assessment WFL  (4+/5)   LUE Assessment   LUE Assessment WFL  (4+/5)   Hand Function   Gross Motor Coordination Functional   Fine Motor Coordination Functional   Sensation   Light Touch No apparent deficits   Cognition   Arousal/Participation Cooperative   Attention Within functional limits   Orientation Level Oriented X4   Memory Within functional limits   Following Commands Follows all commands and directions without difficulty   Comments Flact affect  Assessment   Assessment Pt admit 8/13/21 with dizziness  H/o SDH in January and was told to come to ED if became dizzy   Imaging revealed tiny L posterior parietal subacute to chronic collection  OT completed expanded review of pt's medical and social history  Pt with h/o SDH, partial nephrectomy, and a-fib  Prior to admit was living w/ wife in [facility] w/ 1 NIKO  Bed and bath are on 2nd fl  (I) PLOF  Pt presents to OT at functional baseline  Demonstrates [place] w/ ADLS and functional mobility without AD  Therefore, no IP OT needs at this time  Based on findings, pt is of moderate complexity  The patient's raw score on the AM-PAC Daily Activity inpatient short form is 24, standardized score is 57 54, greater than 39 4  Patients at this level are likely to benefit from discharge to home  Recommend pt return home w/ no rehab needs once medically cleared      Goals   Patient Goals To go home    Recommendation   OT Discharge Recommendation No rehabilitation needs   AM-PAC Daily Activity Inpatient   Lower Body Dressing 4   Bathing 4   Toileting 4   Upper Body Dressing 4   Grooming 4   Eating 4   Daily Activity Raw Score 24   Daily Activity Standardized Score (Calc for Raw Score >=11) 57 54   AM-PAC Applied Cognition Inpatient   Following a Speech/Presentation 4   Understanding Ordinary Conversation 4   Taking Medications 4   Remembering Where Things Are Placed or Put Away 4   Remembering List of 4-5 Errands 4   Taking Care of Complicated Tasks 4   Applied Cognition Raw Score 24   Applied Cognition Standardized Score 62 21       Charlie Marvin MS, OTR/L

## 2021-08-13 NOTE — PLAN OF CARE
Problem: PAIN - ADULT  Goal: Verbalizes/displays adequate comfort level or baseline comfort level  Description: Interventions:  - Encourage patient to monitor pain and request assistance  - Assess pain using appropriate pain scale  - Administer analgesics based on type and severity of pain and evaluate response  - Implement non-pharmacological measures as appropriate and evaluate response  - Consider cultural and social influences on pain and pain management  - Notify physician/advanced practitioner if interventions unsuccessful or patient reports new pain  Outcome: Progressing     Problem: INFECTION - ADULT  Goal: Absence or prevention of progression during hospitalization  Description: INTERVENTIONS:  - Assess and monitor for signs and symptoms of infection  - Monitor lab/diagnostic results  - Monitor all insertion sites, i e  indwelling lines, tubes, and drains  - Monitor endotracheal if appropriate and nasal secretions for changes in amount and color  - [place] appropriate cooling/warming therapies per order  - Administer medications as ordered  - Instruct and encourage patient and family to use good hand hygiene technique  - Identify and instruct in appropriate isolation precautions for identified infection/condition  Outcome: Progressing  Goal: Absence of fever/infection during neutropenic period  Description: INTERVENTIONS:  - Monitor WBC    Outcome: Progressing     Problem: SAFETY ADULT  Goal: Patient will remain free of falls  Description: INTERVENTIONS:  - Educate patient/family on patient safety including physical limitations  - Instruct patient to call for assistance with activity   - Consult OT/PT to assist with strengthening/mobility   - Keep Call bell within reach  - Keep bed low and locked with side rails adjusted as appropriate  - Keep care items and personal belongings within reach  - Initiate and maintain comfort rounds  - Make Fall Risk Sign visible to staff  - Offer Toileting every 2 Hours, in advance of need  - Apply yellow socks and bracelet for high fall risk patients  - Consider moving patient to room near nurses station  Outcome: Progressing  Goal: Maintain or return to baseline ADL function  Description: INTERVENTIONS:  -  Assess patient's ability to carry out ADLs; assess patient's baseline for ADL function and identify physical deficits which impact ability to perform ADLs (bathing, care of mouth/teeth, toileting, grooming, dressing, etc )  - Assess/evaluate cause of self-care deficits   - Assess range of motion  - Assess patient's mobility; develop plan if impaired  - Assess patient's need for assistive devices and provide as appropriate  - Encourage maximum independence but intervene and supervise when necessary  - Involve family in performance of ADLs  - Assess for home care needs following discharge   - Consider OT consult to assist with ADL evaluation and planning for discharge  - Provide patient education as appropriate  Outcome: Progressing  Goal: Maintains/Returns to pre admission functional level  Description: INTERVENTIONS:  - Perform BMAT or MOVE assessment daily    - Set and communicate daily mobility goal to care team and patient/family/caregiver     - Collaborate with rehabilitation services on mobility goals if consulted  - Out of bed for toileting  - Record patient progress and toleration of activity level   Outcome: Progressing     Problem: DISCHARGE PLANNING  Goal: Discharge to home or other facility with appropriate resources  Description: INTERVENTIONS:  - Identify barriers to discharge w/patient and caregiver  - Arrange for needed discharge resources and transportation as appropriate  - Identify discharge learning needs (meds, wound care, etc )  - Arrange for interpretive services to assist at discharge as needed  - Refer to Case Management Department for coordinating discharge planning if the patient needs post-hospital services based on physician/advanced practitioner order or complex needs related to functional status, cognitive ability, or social support system  Outcome: Progressing     Problem: Knowledge Deficit  Goal: Patient/family/caregiver demonstrates understanding of disease process, treatment plan, medications, and discharge instructions  Description: Complete learning assessment and assess knowledge base    Interventions:  - Provide teaching at level of understanding  - Provide teaching via preferred learning methods  Outcome: Progressing     Problem: NEUROSENSORY - ADULT  Goal: Achieves stable or improved neurological status  Description: INTERVENTIONS  - Monitor and report changes in neurological status  - Monitor vital signs such as temperature, blood pressure, glucose, and any other labs ordered   - Initiate measures to prevent increased intracranial pressure  - Monitor for seizure activity and implement precautions if appropriate      Outcome: Progressing

## 2021-08-14 ENCOUNTER — APPOINTMENT (INPATIENT)
Dept: MRI IMAGING | Facility: HOSPITAL | Age: 66
DRG: 149 | End: 2021-08-14
Payer: MEDICARE

## 2021-08-14 VITALS
OXYGEN SATURATION: 97 % | HEART RATE: 56 BPM | TEMPERATURE: 97.9 F | DIASTOLIC BLOOD PRESSURE: 71 MMHG | WEIGHT: 207.45 LBS | SYSTOLIC BLOOD PRESSURE: 139 MMHG | RESPIRATION RATE: 18 BRPM | HEIGHT: 74 IN | BODY MASS INDEX: 26.62 KG/M2

## 2021-08-14 LAB
ANION GAP SERPL CALCULATED.3IONS-SCNC: 8 MMOL/L (ref 4–13)
BASOPHILS # BLD AUTO: 0.03 THOUSANDS/ΜL (ref 0–0.1)
BASOPHILS NFR BLD AUTO: 0 % (ref 0–1)
BUN SERPL-MCNC: 16 MG/DL (ref 5–25)
CALCIUM SERPL-MCNC: 8.6 MG/DL (ref 8.3–10.1)
CHLORIDE SERPL-SCNC: 108 MMOL/L (ref 100–108)
CO2 SERPL-SCNC: 28 MMOL/L (ref 21–32)
CREAT SERPL-MCNC: 1.22 MG/DL (ref 0.6–1.3)
EOSINOPHIL # BLD AUTO: 0.1 THOUSAND/ΜL (ref 0–0.61)
EOSINOPHIL NFR BLD AUTO: 1 % (ref 0–6)
ERYTHROCYTE [DISTWIDTH] IN BLOOD BY AUTOMATED COUNT: 13.3 % (ref 11.6–15.1)
GFR SERPL CREATININE-BSD FRML MDRD: 61 ML/MIN/1.73SQ M
GLUCOSE SERPL-MCNC: 93 MG/DL (ref 65–140)
HCT VFR BLD AUTO: 43.8 % (ref 36.5–49.3)
HGB BLD-MCNC: 14.2 G/DL (ref 12–17)
IMM GRANULOCYTES # BLD AUTO: 0.02 THOUSAND/UL (ref 0–0.2)
IMM GRANULOCYTES NFR BLD AUTO: 0 % (ref 0–2)
LYMPHOCYTES # BLD AUTO: 1.34 THOUSANDS/ΜL (ref 0.6–4.47)
LYMPHOCYTES NFR BLD AUTO: 19 % (ref 14–44)
MCH RBC QN AUTO: 29.5 PG (ref 26.8–34.3)
MCHC RBC AUTO-ENTMCNC: 32.4 G/DL (ref 31.4–37.4)
MCV RBC AUTO: 91 FL (ref 82–98)
MONOCYTES # BLD AUTO: 0.59 THOUSAND/ΜL (ref 0.17–1.22)
MONOCYTES NFR BLD AUTO: 8 % (ref 4–12)
NEUTROPHILS # BLD AUTO: 5 THOUSANDS/ΜL (ref 1.85–7.62)
NEUTS SEG NFR BLD AUTO: 72 % (ref 43–75)
NRBC BLD AUTO-RTO: 0 /100 WBCS
PLATELET # BLD AUTO: 220 THOUSANDS/UL (ref 149–390)
PMV BLD AUTO: 9.5 FL (ref 8.9–12.7)
POTASSIUM SERPL-SCNC: 3.7 MMOL/L (ref 3.5–5.3)
RBC # BLD AUTO: 4.82 MILLION/UL (ref 3.88–5.62)
SODIUM SERPL-SCNC: 144 MMOL/L (ref 136–145)
WBC # BLD AUTO: 7.08 THOUSAND/UL (ref 4.31–10.16)

## 2021-08-14 PROCEDURE — G1004 CDSM NDSC: HCPCS

## 2021-08-14 PROCEDURE — 99239 HOSP IP/OBS DSCHRG MGMT >30: CPT | Performed by: STUDENT IN AN ORGANIZED HEALTH CARE EDUCATION/TRAINING PROGRAM

## 2021-08-14 PROCEDURE — 99231 SBSQ HOSP IP/OBS SF/LOW 25: CPT | Performed by: PSYCHIATRY & NEUROLOGY

## 2021-08-14 PROCEDURE — 70551 MRI BRAIN STEM W/O DYE: CPT

## 2021-08-14 PROCEDURE — 80048 BASIC METABOLIC PNL TOTAL CA: CPT | Performed by: NURSE PRACTITIONER

## 2021-08-14 PROCEDURE — 85025 COMPLETE CBC W/AUTO DIFF WBC: CPT | Performed by: NURSE PRACTITIONER

## 2021-08-14 RX ADMIN — AMLODIPINE BESYLATE 5 MG: 5 TABLET ORAL at 09:12

## 2021-08-14 RX ADMIN — CLOPIDOGREL BISULFATE 75 MG: 75 TABLET ORAL at 09:13

## 2021-08-14 RX ADMIN — Medication 30 MG: at 09:13

## 2021-08-14 RX ADMIN — FLECAINIDE ACETATE 100 MG: 100 TABLET ORAL at 09:13

## 2021-08-14 RX ADMIN — ASPIRIN 81 MG: 81 TABLET ORAL at 09:13

## 2021-08-14 RX ADMIN — MULTIPLE VITAMINS W/ MINERALS TAB 1 TABLET: TAB ORAL at 09:12

## 2021-08-14 RX ADMIN — METOPROLOL SUCCINATE 50 MG: 50 TABLET, EXTENDED RELEASE ORAL at 09:12

## 2021-08-14 NOTE — ASSESSMENT & PLAN NOTE
· History of subdural hematoma January 2021   Followed outpatient by Neurosurgery, Stable chronic subdural hematoma  · continue ASA and Plavix

## 2021-08-14 NOTE — PROGRESS NOTES
Progress Note - Neurology   Helene Barclay 78 y o  male MRN: [id number]  Unit/Bed#: E4 -01 Encounter: [id number]      Assessment/Plan     * Dizziness  Assessment & Plan  78year old male with recent SDH in January 2021, atrial fibrillation s/p Watchman device, HTN, and renal cell carcinoma s/p partial nephrectomy, presented to the ED for sudden onset lightheadedness, unsteadiness, tinnitus, diaphoresis and nausea/vomiting  Episode occurred after patient had ambulated the bathroom, upon coming back to [facility] on arrival stable- prior SDH decreased compared to CT in April  Symptoms improved with meclizine, Zofran, and Valium  Symptoms were initially independent of movement then seemed to subside with rest and were exacerbated/reoccurred with change in position  Symptoms had resolved at some point in the emergency room  - upon my exam patient had slight cerebellar ataxia of the right lower extremity on heel-to-shin testing   - blood pressure initially was elevated at 180 8/93 on arrival, has been normal since  Plan:  - MRI brain was without any new/acute pathology  - no need for any additional neurologic recommendations at this time  -okay to discharge from a neurologic standpoint   -no need for new outpatient Neurology follow-up at this time   -please call questions/concerns          Helene Barclay will not need outpatient follow up with Neurology  He will not require outpatient neurological testing  Subjective:   "I feel fine"    ROS:  Review of Systems   Constitutional: Negative  HENT: Negative for hearing loss  Eyes: Negative for photophobia and visual disturbance  Respiratory: Negative for wheezing  Cardiovascular: Negative for chest pain and palpitations  Genitourinary: Negative for dysuria and urgency  Neurological: Negative for dizziness, weakness, light-headedness, numbness and headaches  All other systems reviewed are negative      Physical Exam   Constitutional:  appears well-developed and well-nourished, no acute distress  HENT:  Unremarkable, Conjunctiva and sclera appear normal   Head: Normocephalic and atraumatic  Eyes: EOM are normal, gaze is conjugate  Pupils are equal, round, and reactive to light  Cardiovascular: Normal rate, regular rhythm and normal heart sounds  No murmur heard  Pulmonary/Chest: Breath sounds normal   Respiratory Effort normal, no gross coughing, wheezing, or dyspnea  Neurologic Exam   Mental Status    Oriented to person, place, and time  Level of consciousness: alert  Normal comprehension  Cranial Nerves    Visual fields full to confrontation  Pupils are equal, round, and reactive to light  Extraocular motions are normal    Nystagmus: none   Face is symmetric with respect to motor and sensory  Tongue, palate, uvula midline  Motor Exam   5/5 strength in all four extremities  Muscle bulk: normal  Overall muscle tone: normal  Sensory Exam   Sensation intact to light touch, temp and vibration  Gait, Coordination, and Reflexes   DTR's symmetric  Still some slight cerebellar ataxia in the right lower extremity on heel-to-shin, compared to normal performance with the left lower extremity  Finger-to-nose is intact and symmetric  Vitals: Blood pressure 139/71, pulse 56, temperature 97 9 °F (36 6 °C), temperature source Tympanic, resp  rate 18, height 6' 2" (1 88 m), weight 94 1 kg (207 lb 7 3 oz), SpO2 97 %  ,Body mass index is 26 64 kg/m²  Lab, Imaging and other studies: I have personally reviewed pertinent reports  I personally reviewed images and PACs    VTE Prophylaxis: Sequential compression device (Venodyne)

## 2021-08-14 NOTE — ASSESSMENT & PLAN NOTE
· Patient reports dizziness and tinnitus right ear  · History of subdural hematoma in January  Advised by neurosurgery if he develops dizziness to present to ED for evaluation  · CT head today negative for acute intracranial abnormality   Tiny left posterior parietal subacute to chronic subdural collection, decreased in size compared to previous  · Neurology consult, recommending MRI brain  · MRI brain pending

## 2021-08-14 NOTE — DISCHARGE SUMMARY
[address] [facility]  Discharge- [facility] 1955, 78 y o  male MRN: [id number]  Unit/Bed#: E4 -01 Encounter: [id number]  Primary Care Provider: Coleen Montalvo DO   Date and time admitted to hospital: 8/13/2021  4:33 AM    H/O partial nephrectomy  Assessment & Plan  · History of partial nephrectomy October 2020    SDH (subdural hematoma) [facility])  Assessment & Plan  · History of subdural hematoma January 2021  Followed outpatient by Neurosurgery, Stable chronic subdural hematoma  · continue ASA and Plavix    Mixed hyperlipidemia  Assessment & Plan  · Continue statin    Paroxysmal atrial fibrillation (HCC)  Assessment & Plan  · PAF history of cardioversion  S/p Watchman device implantation  · Continue Toprol, flecainide, ASA and Plavix    * Dizziness  Assessment & Plan  · Patient reports dizziness and tinnitus right ear  · History of subdural hematoma in January  Advised by neurosurgery if he develops dizziness to present to ED for evaluation  · CT head today negative for acute intracranial abnormality  Tiny left posterior parietal subacute to chronic subdural collection, decreased in size compared to previous  · Neurology consult, recommending MRI brain  · MRI brain negative for acute stroke, reviewed by neurology  · Okay for discharge per neurology        Discharging Physician / Practitioner: Yazmin Zimmerman MD  PCP: Coleen Montalvo DO  Admission Date:   Admission Orders (From admission, onward)     Ordered        08/13/21 0603  Inpatient Admission  Once                   Discharge Date: 08/14/21    Medical Problems     Resolved Problems  Date Reviewed: 8/14/2021    None                Consultations During Hospital Stay:  · Neurology    Procedures Performed:   · None    Significant Findings / Test Results:   CT head without contrast    Result Date: 8/13/2021  · Impression: No acute intracranial abnormality   Tiny left posterior parietal subacute to chronic subdural collection, decreased in size when compared to a CT brain dated April 26, 2021  Workstation performed: [id number]     · MRI brain, read per Neurology with no acute stroke  · Formal report by Radiology is pending on discharge    Incidental Findings:   · None     Test Results Pending at Discharge (will require follow up): · None     Outpatient Tests Requested:  · None    Complications:  None    Reason for Admission:  Dizziness    Hospital Course:     Shona Alexis is a 78 y o  male patient who originally presented to the hospital on 8/13/2021 due to dizziness  Past medical history of Afib and recent subdural hematoma in January 2021  Patient feel dizzy with ringing in his ears on admission  He previously was advised by his neurosurgeon that he developed dizziness given his history of subdural hematoma to presents to the ED for further evaluation  Given his history and physical exam neurology recommended MRI brain  His initial CT imaging showed tiny left posterior parietal subacute to chronic subdural collection consistent with previous with no new stroke  MRI brain was ordered and read by Neurology which showed no acute stroke  Formal read was pending  Neurology was okay for discharge home  No change in his medications    Please see above list of diagnoses and related plan for additional information  Condition at Discharge: fair     Discharge Day Visit / Exam:     Subjective:  Patient reports doing well  Denies any complaints such as dizziness, weakness, lightheadedness or gait abnormalities    Vitals: Blood Pressure: 139/71 (08/14/21 0730)  Pulse: 56 (08/14/21 0730)  Temperature: 97 9 °F (36 6 °C) (08/14/21 0730)  Temp Source: Tympanic (08/14/21 0730)  Respirations: 18 (08/14/21 0730)  Height: 6' 2" (188 cm) (08/13/21 0636)  Weight - Scale: 94 1 kg (207 lb 7 3 oz) (08/13/21 0636)  SpO2: 97 % (08/14/21 0730)  Exam:   Physical Exam    Discussion with Family:  Patient    Discharge instructions/Information to patient and family: See after visit summary for information provided to patient and family  Provisions for Follow-Up Care:  See after visit summary for information related to follow-up care and any pertinent home health orders  Disposition:     Home    For Discharges to [facility] SNF:   · Not Applicable to this Patient - Not Applicable to this Patient    Planned Readmission:  None     Discharge Statement:  I spent 35 minutes discharging the patient  This time was spent on the day of discharge  I had direct contact with the patient on the day of discharge  Greater than 50% of the total time was spent examining patient, answering all patient questions, arranging and discussing plan of care with patient as well as directly providing post-discharge instructions  Additional time then spent on discharge activities  Discharge Medications:  See after visit summary for reconciled discharge medications provided to patient and family        ** Please Note: This note has been constructed using a voice recognition system **

## 2021-08-14 NOTE — ASSESSMENT & PLAN NOTE
· Patient reports dizziness and tinnitus right ear  · History of subdural hematoma in January  Advised by neurosurgery if he develops dizziness to present to ED for evaluation  · CT head today negative for acute intracranial abnormality   Tiny left posterior parietal subacute to chronic subdural collection, decreased in size compared to previous  · Neurology consult, recommending MRI brain  · MRI brain negative for acute stroke, reviewed by neurology  · Okay for discharge per neurology

## 2021-08-14 NOTE — PROGRESS NOTES
[address] [facility]  Progress Note Parisa Mai 1955, 78 y o  male MRN: [id number]  Unit/Bed#: E4 -01 Encounter: [id number]  Primary Care Provider: Fletcher Honeycutt DO   Date and time admitted to hospital: 8/13/2021  4:33 AM    H/O partial nephrectomy  Assessment & Plan  · History of partial nephrectomy October 2020    SDH (subdural hematoma) [facility])  Assessment & Plan  · History of subdural hematoma January 2021  Followed outpatient by Neurosurgery, Stable chronic subdural hematoma  · continue ASA and Plavix    Mixed hyperlipidemia  Assessment & Plan  · Continue statin    Paroxysmal atrial fibrillation (HCC)  Assessment & Plan  · PAF history of cardioversion  S/p Watchman device implantation  · Continue Toprol, flecainide, ASA and Plavix    * Dizziness  Assessment & Plan  · Patient reports dizziness and tinnitus right ear  · History of subdural hematoma in January  Advised by neurosurgery if he develops dizziness to present to ED for evaluation  · CT head today negative for acute intracranial abnormality  Tiny left posterior parietal subacute to chronic subdural collection, decreased in size compared to previous  · Neurology consult, recommending MRI brain  · MRI brain pending        VTE Pharmacologic Prophylaxis:   Pharmacologic: None, hx of SDH  Mechanical VTE Prophylaxis in Place: Yes    Patient Centered Rounds: I have performed bedside rounds with nursing staff today  Discussions with Specialists or Other Care Team Provider: Neurology    Education and Discussions with Family / Patient: Patient    Time Spent for Care: 30 minutes  More than 50% of total time spent on counseling and coordination of care as described above      Current Length of Stay: 1 day(s)    Current Patient Status: Inpatient   Certification Statement: The patient will continue to require additional inpatient hospital stay due to pending MRI brain    Discharge Plan: 24 hours    Code Status: Level 1 - Full Code      Subjective:   No events overnight  Reports his symptoms had mainly resolved  No complaints  Objective:     Vitals:   Temp (24hrs), Av 1 °F (36 7 °C), Min:97 6 °F (36 4 °C), Max:98 8 °F (37 1 °C)    Temp:  [97 6 °F (36 4 °C)-98 8 °F (37 1 °C)] 97 9 °F (36 6 °C)  HR:  [50-59] 56  Resp:  [16-20] 18  BP: (122-145)/(63-71) 139/71  SpO2:  [96 %-97 %] 97 %  Body mass index is 26 64 kg/m²  Input and Output Summary (last 24 hours):     No intake or output data in the 24 hours ending 21 1346    Physical Exam:     Physical Exam  Vitals and nursing note reviewed  Constitutional:       Appearance: He is normal weight  HENT:      Head: Normocephalic and atraumatic  Eyes:      General: No scleral icterus  Conjunctiva/sclera: Conjunctivae normal    Cardiovascular:      Rate and Rhythm: Regular rhythm  Bradycardia present  Heart sounds: Normal heart sounds  Pulmonary:      Breath sounds: Normal breath sounds  No wheezing or rhonchi  Abdominal:      General: Bowel sounds are normal  There is no distension  Palpations: Abdomen is soft  Tenderness: There is no abdominal tenderness  Musculoskeletal:         General: No swelling  Right lower leg: No edema  Left lower leg: No edema  Skin:     General: Skin is warm and dry  Neurological:      General: No focal deficit present  Mental Status: He is alert  Mental status is at baseline             Additional Data:     Labs:    Results from last 7 days   Lab Units 21  0602   WBC Thousand/uL 7 08   HEMOGLOBIN g/dL 14 2   HEMATOCRIT % 43 8   PLATELETS Thousands/uL 220   NEUTROS PCT % 72   LYMPHS PCT % 19   MONOS PCT % 8   EOS PCT % 1     Results from last 7 days   Lab Units 21  0602 21  0459   SODIUM mmol/L 144 144   POTASSIUM mmol/L 3 7 3 5   CHLORIDE mmol/L 108 105   CO2 mmol/L 28 29   BUN mg/dL 16 16   CREATININE mg/dL 1 22 1 23   ANION GAP mmol/L 8 10   CALCIUM mg/dL 8 6 9 0   ALBUMIN g/dL  --  4 0 TOTAL BILIRUBIN mg/dL  --  0 39   ALK PHOS U/L  --  97   ALT U/L  --  35   AST U/L  --  27   GLUCOSE RANDOM mg/dL 93 99                           * I Have Reviewed All Lab Data Listed Above  * Additional Pertinent Lab Tests Reviewed: All Labs For Current Hospital Admission Reviewed    Imaging:    CT head without contrast    Result Date: 8/13/2021  Impression: No acute intracranial abnormality  Tiny left posterior parietal subacute to chronic subdural collection, decreased in size when compared to a CT brain dated April 26, 2021  Workstation performed: [id number]     Recent Cultures (last 7 days):           Last 24 Hours Medication List:   Current Facility-Administered Medications   Medication Dose Route Frequency Provider Last Rate    acetaminophen  650 mg Oral Q6H PRN Piyush Urrutia, JERMAINE      aluminum-magnesium hydroxide-simethicone  30 mL Oral Q6H PRN Piyush Urrutia, JERMAINE      amLODIPine  5 mg Oral Daily Piyush Urrutia, CRNP      aspirin  81 mg Oral Daily Piyush Urrutia, CRNP      atorvastatin  10 mg Oral After [employer], CRNP      clopidogrel  75 mg Oral Daily Piyush Urrutia, PATNP      co-enzyme Q-10  30 mg Oral QAM Piyush Urrutia, CRNP      flecainide  100 mg Oral BID Piyush Urrutia, CRNP      meclizine  25 mg Oral Q8H PRN Piyush Urrutia, CRNP      metoprolol succinate  50 mg Oral QAM Piyush Urrutia, CRNP      multivitamin-minerals  1 tablet Oral Daily Piyush Urrutia, JERMAINE      ondansetron  4 mg Intravenous Q6H PRN Piyush Urrutia, JERMAINE      simethicone  80 mg Oral 4x Daily PRN Piyush Urrutia, JERMAINE          Today, Patient Was Seen By: Kerline Rosado MD    ** Please Note: Dictation voice to text software may have been used in the creation of this document   **

## 2021-08-16 ENCOUNTER — TRANSITIONAL CARE MANAGEMENT (OUTPATIENT)
Dept: FAMILY MEDICINE CLINIC | Facility: CLINIC | Age: 66
End: 2021-08-16

## 2021-08-19 NOTE — PHYSICIAN ADVISOR
Current patient class: Inpatient  The patient is currently on Hospital Day: 2 at [address] [place] [address]      The patient was admitted to the hospital  on 8/13/21 at  6:03 AM for the following diagnosis:  Vertigo [R42]  Nausea & vomiting [R11 2]  Dizzy [R42]     After review of the relevant documentation, labs, vital signs and test results, this is a PROVIDER LIABLE case  In this particular case the patient was admitted to the hospital as an inpatient  The patient however failed to satisfy the 2 midnight benchmark and closer scrutiny of the case was warranted  After review of the patient presentation and relevant labs the patient was most appropriate for observation or outpatient class at the time of admission  Given that this patient has already been discharged prior to this review they become a provider liable case  Rationale is as follows: The patient is a 78 yrs   Male who presented to the ED at 8/13/2021  4:33 AM with a chief complaint of Dizziness (reports sudden onset of dizziness and right sided ear ringing, actively vomiting in triage, diaphortic, hx of "bleeding in my head" per pt)Patient c/o dizziness, N/V and tinnitus right ear  She has a history of subdural hematoma in January 2021  PE nonfocal on admission to ED  She was advised by neurosurgery if he develops dizziness to present to ED for evaluation  Her CT head today negative for acute intracranial abnormality  Tiny left posterior parietal subacute to chronic subdural collection, decreased in size compared to previous  Neurology consult, recommending MRI brain and MRI brain negative for acute stroke and she was discharged home on hospital day 2          The patients vitals on arrival were   ED Triage Vitals   Temperature Pulse Respirations Blood Pressure SpO2   08/13/21 0441 08/13/21 0441 08/13/21 0442 08/13/21 0441 08/13/21 0441   (!) 97 4 °F (36 3 °C) 58 (!) 23 (!) 188/93 99 %      Temp Source Heart Rate Source Patient Position - Orthostatic VS BP Location FiO2 (%)   08/13/21 0441 08/13/21 0515 08/13/21 0515 08/13/21 0515 --   Oral Monitor Lying Right arm       Pain Score       08/13/21 0441       4           Past Medical History:   Diagnosis Date    Atrial fibrillation ([facility] )     Cancer ([facility] ) 08/2020    Kidney cancer    Cancer ([place] [address] )     melanoma    Chronic kidney disease     left kidney cancer    CPAP (continuous positive airway pressure) dependence     Hyperlipidemia     Hypertension     Irregular heart beat     Afib    Pneumonia     Skin cancer     Sleep apnea     Stroke ([place] [address] )     Supraventricular tachycardia ([place] [address] )     Wears glasses     Wears partial dentures     upper partial     Past Surgical History:   Procedure Laterality Date    APPENDECTOMY      CARDIOVERSION      COLONOSCOPY      fiberoptic, also 2009, both negative, resolved 2004    COLONOSCOPY  08/2019    EGD      HERNIA REPAIR      umbilical and bilateral inguinal    KNEE ARTHROSCOPY Left     MT LAP,PARTIAL NEPHRECTOMY Left 10/7/2020    Procedure: ROBOTIC PARTIAL NEPHRECTOMY;  Surgeon: Feliberto Martin MD;  Location: [facility];  Service: Urology    MT PERQ CLSR TCAT L ATR APNDGE W/ENDOCARDIAL IMPLNT N/A 3/25/2021    Procedure: LEFT ATRIAL APPENDAGE OCCLUSION;  Surgeon: Sophie Crain MD;  Location: [facility];  Service: Cardiology    SKIN GRAFT      left ear, skin cancer    TONSILLECTOMY             Consults have been placed to:   IP CONSULT TO NEUROLOGY    Vitals:    08/13/21 1540 08/13/21 2351 08/14/21 0300 08/14/21 0730   BP: 122/64 134/63 145/68 139/71   BP Location: Left arm Left arm Left arm Left arm   Pulse: 59 (!) 53 (!) 50 56   Resp: 20 17 16 18   Temp: 98 °F (36 7 °C) 98 8 °F (37 1 °C) 97 6 °F (36 4 °C) 97 9 °F (36 6 °C)   TempSrc: Temporal Temporal Temporal Tympanic   SpO2: 97% 97% 96% 97%   Weight:       Height:           Most recent labs:    No results for input(s): WBC, HGB, HCT, PLT, K, NA, CALCIUM, BUN, CREATININE, LIPASE, AMYLASE, INR, TROPONINI, CKTOTAL, AST, ALT, ALKPHOS, BILITOT in the last 72 hours  Scheduled Meds:  Continuous Infusions:No current facility-administered medications for this encounter  PRN Meds:      Surgical procedures (if appropriate):

## 2021-08-25 ENCOUNTER — OFFICE VISIT (OUTPATIENT)
Dept: FAMILY MEDICINE CLINIC | Facility: CLINIC | Age: 66
End: 2021-08-25
Payer: MEDICARE

## 2021-08-25 VITALS
WEIGHT: 209.2 LBS | DIASTOLIC BLOOD PRESSURE: 68 MMHG | BODY MASS INDEX: 26.85 KG/M2 | SYSTOLIC BLOOD PRESSURE: 108 MMHG | HEIGHT: 74 IN

## 2021-08-25 DIAGNOSIS — S06.5X9A SDH (SUBDURAL HEMATOMA) (HCC): ICD-10-CM

## 2021-08-25 DIAGNOSIS — I10 ESSENTIAL HYPERTENSION: ICD-10-CM

## 2021-08-25 DIAGNOSIS — R42 DIZZINESS: Primary | ICD-10-CM

## 2021-08-25 DIAGNOSIS — I48.0 PAROXYSMAL ATRIAL FIBRILLATION (HCC): ICD-10-CM

## 2021-08-25 DIAGNOSIS — Z95.818 PRESENCE OF WATCHMAN LEFT ATRIAL APPENDAGE CLOSURE DEVICE: ICD-10-CM

## 2021-08-25 DIAGNOSIS — Z86.73 HISTORY OF CVA (CEREBROVASCULAR ACCIDENT): ICD-10-CM

## 2021-08-25 PROCEDURE — 99495 TRANSJ CARE MGMT MOD F2F 14D: CPT | Performed by: FAMILY MEDICINE

## 2021-08-25 NOTE — PROGRESS NOTES
Assessment/Plan:  Patient doing very well status post hospitalization  Patient will continue to remain hydrated  Patient will let us know if dizziness reoccurs  Patient will continue with other medications per routine for chronic conditions listed  Patient will follow-up per routine   Patient will follow with Neurology appropriately       Diagnoses and all orders for this visit:    Dizziness    Paroxysmal atrial fibrillation (HCC)    SDH (subdural hematoma) (HCC)    Essential hypertension    Presence of Watchman left atrial appendage closure device    History of CVA (cerebrovascular accident)            Subjective:        Patient ID: Concepción Dailey is a 78 y o  male  Patient is here status post hospitalization from August 13th through the 14th for dizziness at night  Patient ultimately was found to be dehydrated and dizziness improved with hydration  Other diagnosis includes subdural hematoma left which is subacute to chronic  Patient status post Watchman  Patient with history of Afib  Hyperlipidemia  Patient ultimately have laboratory studies done with negative troponin and laboratory studies along with negative CT scan for acute bleed  MRI brain was negative for acute stroke  Patient does see Neurology  Patient is on aspirin and Plavix  No recurrence of dizziness  No vertigo  No headache or visual disturbance  No stroke-like symptoms  No chest pain shortness breath palpitations at this time  Patient is staying hydrated  No calf tenderness  Normal urination defecation  The following portions of the patient's history were reviewed and updated as appropriate: allergies, current medications, past family history, past medical history, past social history, past surgical history and problem list       Review of Systems   Constitutional: Negative  HENT: Negative  Eyes: Negative  Respiratory: Negative  Cardiovascular: Negative  Gastrointestinal: Negative      Endocrine: Negative  Genitourinary: Negative  Musculoskeletal: Negative  Skin: Negative  Allergic/Immunologic: Negative  Neurological: Negative  Hematological: Negative  Psychiatric/Behavioral: Negative  Objective:      BMI Counseling: Body mass index is 26 86 kg/m²  The BMI is above normal  Nutrition recommendations include decreasing portion sizes  Exercise recommendations include moderate physical activity 150 minutes/week  Depression Screening and Follow-up Plan: Clincally patient does not have depression  No treatment is required  /68 (BP Location: Right arm, Patient Position: Sitting, Cuff Size: Standard)   Ht 6' 2" (1 88 m)   Wt 94 9 kg (209 lb 3 2 oz)   BMI 26 86 kg/m²          Physical Exam  Vitals and nursing note reviewed  Constitutional:       General: He is not in acute distress  Appearance: Normal appearance  He is not ill-appearing, toxic-appearing or diaphoretic  HENT:      Head: Normocephalic and atraumatic  Right Ear: Tympanic membrane, ear canal and external ear normal  There is no impacted cerumen  Left Ear: Tympanic membrane, ear canal and external ear normal  There is no impacted cerumen  Nose: Nose normal  No congestion or rhinorrhea  Mouth/Throat:      Mouth: Mucous membranes are moist       Pharynx: No oropharyngeal exudate or posterior oropharyngeal erythema  Eyes:      General: No scleral icterus  Right eye: No discharge  Left eye: No discharge  Extraocular Movements: Extraocular movements intact  Conjunctiva/sclera: Conjunctivae normal       Pupils: Pupils are equal, round, and reactive to light  Neck:      Vascular: No carotid bruit  Cardiovascular:      Rate and Rhythm: Normal rate and regular rhythm  Pulses: Normal pulses  Heart sounds: Normal heart sounds  No murmur heard  No friction rub  No gallop      Pulmonary:      Effort: Pulmonary effort is normal  No respiratory distress  Breath sounds: Normal breath sounds  No stridor  No wheezing, rhonchi or rales  Chest:      Chest wall: No tenderness  Abdominal:      General: Abdomen is flat  Bowel sounds are normal  There is no distension  Palpations: Abdomen is soft  Tenderness: There is no abdominal tenderness  There is no guarding or rebound  Musculoskeletal:         General: No swelling, tenderness, deformity or signs of injury  Normal range of motion  Cervical back: Normal range of motion and neck supple  No rigidity  No muscular tenderness  Right lower leg: No edema  Left lower leg: No edema  Lymphadenopathy:      Cervical: No cervical adenopathy  Skin:     General: Skin is warm and dry  Capillary Refill: Capillary refill takes less than 2 seconds  Coloration: Skin is not jaundiced  Findings: No bruising, erythema, lesion or rash  Neurological:      Mental Status: He is alert and oriented to person, place, and time  Mental status is at baseline  Cranial Nerves: No cranial nerve deficit  Sensory: No sensory deficit  Motor: No weakness  Coordination: Coordination normal       Gait: Gait normal    Psychiatric:         Mood and Affect: Mood normal          Behavior: Behavior normal          Thought Content:  Thought content normal          Judgment: Judgment normal

## 2021-09-09 ENCOUNTER — APPOINTMENT (OUTPATIENT)
Dept: RADIOLOGY | Age: 66
End: 2021-09-09
Payer: MEDICARE

## 2021-09-09 ENCOUNTER — HOSPITAL ENCOUNTER (OUTPATIENT)
Dept: RADIOLOGY | Age: 66
Discharge: HOME | End: 2021-09-09
Payer: MEDICARE

## 2021-09-09 ENCOUNTER — TELEPHONE (OUTPATIENT)
Dept: CARDIOLOGY CLINIC | Facility: CLINIC | Age: 66
End: 2021-09-09

## 2021-09-09 DIAGNOSIS — C64.2 RENAL CELL CARCINOMA OF LEFT KIDNEY (HCC): ICD-10-CM

## 2021-09-09 PROCEDURE — G1004 CDSM NDSC: HCPCS

## 2021-09-09 PROCEDURE — 71046 X-RAY EXAM CHEST 2 VIEWS: CPT

## 2021-09-09 PROCEDURE — 74178 CT ABD&PLV WO CNTR FLWD CNTR: CPT

## 2021-09-09 RX ADMIN — IOHEXOL 100 ML: 350 INJECTION, SOLUTION INTRAVENOUS at 09:51

## 2021-09-09 NOTE — TELEPHONE ENCOUNTER
Patient of Yann Robledo had a watchman placed on 3/25/21  Patient states he was told to take his plavix for 3 months but was giving a script for 6 months  Patient would like to know if he should continue the plavix   Please advise, thank you

## 2021-09-17 ENCOUNTER — TELEPHONE (OUTPATIENT)
Dept: CARDIAC SURGERY | Facility: CLINIC | Age: 66
End: 2021-09-17

## 2021-09-17 NOTE — TELEPHONE ENCOUNTER
Called patient for 6 month S/P Watchman assessment  Patient states he is doing well  States he stopped taking Plavix last week and continues to take ASA 81 mg daily  Is aware ASA is lifelong   Encouraged to continue to follow with his PCP and cardiologist

## 2021-10-05 DIAGNOSIS — E78.2 MIXED HYPERLIPIDEMIA: ICD-10-CM

## 2021-10-05 RX ORDER — ATORVASTATIN CALCIUM 10 MG/1
10 TABLET, FILM COATED ORAL DAILY
Qty: 90 TABLET | Refills: 1 | Status: SHIPPED | OUTPATIENT
Start: 2021-10-05 | End: 2022-05-13 | Stop reason: SDUPTHER

## 2021-10-07 ENCOUNTER — OFFICE VISIT (OUTPATIENT)
Dept: UROLOGY | Facility: AMBULATORY SURGERY CENTER | Age: 66
End: 2021-10-07
Payer: MEDICARE

## 2021-10-07 VITALS
BODY MASS INDEX: 27.34 KG/M2 | SYSTOLIC BLOOD PRESSURE: 136 MMHG | HEART RATE: 56 BPM | HEIGHT: 74 IN | WEIGHT: 213 LBS | DIASTOLIC BLOOD PRESSURE: 88 MMHG

## 2021-10-07 DIAGNOSIS — N40.1 BENIGN PROSTATIC HYPERPLASIA WITH LOWER URINARY TRACT SYMPTOMS, SYMPTOM DETAILS UNSPECIFIED: ICD-10-CM

## 2021-10-07 DIAGNOSIS — C64.2 RENAL CELL CARCINOMA OF LEFT KIDNEY (HCC): Primary | ICD-10-CM

## 2021-10-07 PROCEDURE — 99213 OFFICE O/P EST LOW 20 MIN: CPT | Performed by: NURSE PRACTITIONER

## 2021-10-07 RX ORDER — TAMSULOSIN HYDROCHLORIDE 0.4 MG/1
0.4 CAPSULE ORAL
Qty: 30 CAPSULE | Refills: 3 | Status: SHIPPED | OUTPATIENT
Start: 2021-10-07 | End: 2022-03-03

## 2021-11-11 ENCOUNTER — IMMUNIZATIONS (OUTPATIENT)
Dept: FAMILY MEDICINE CLINIC | Facility: CLINIC | Age: 66
End: 2021-11-11
Payer: MEDICARE

## 2021-11-11 DIAGNOSIS — Z23 NEED FOR IMMUNIZATION AGAINST INFLUENZA: Primary | ICD-10-CM

## 2021-11-11 PROCEDURE — 90662 IIV NO PRSV INCREASED AG IM: CPT

## 2021-11-11 PROCEDURE — G0008 ADMIN INFLUENZA VIRUS VAC: HCPCS

## 2021-12-13 DIAGNOSIS — I48.19 PERSISTENT ATRIAL FIBRILLATION (HCC): ICD-10-CM

## 2021-12-13 RX ORDER — METOPROLOL SUCCINATE 50 MG/1
50 TABLET, EXTENDED RELEASE ORAL EVERY MORNING
Qty: 90 TABLET | Refills: 1 | Status: SHIPPED | OUTPATIENT
Start: 2021-12-13 | End: 2022-06-14 | Stop reason: SDUPTHER

## 2021-12-28 ENCOUNTER — APPOINTMENT (OUTPATIENT)
Dept: LAB | Age: 66
End: 2021-12-28
Payer: MEDICARE

## 2021-12-28 DIAGNOSIS — N18.31 STAGE 3A CHRONIC KIDNEY DISEASE (HCC): ICD-10-CM

## 2021-12-28 DIAGNOSIS — I10 ESSENTIAL HYPERTENSION: ICD-10-CM

## 2021-12-28 DIAGNOSIS — C64.2 RENAL CELL CARCINOMA OF LEFT KIDNEY (HCC): ICD-10-CM

## 2021-12-28 LAB
25(OH)D3 SERPL-MCNC: 38.4 NG/ML (ref 30–100)
ALBUMIN SERPL BCP-MCNC: 3.8 G/DL (ref 3.5–5)
ANION GAP SERPL CALCULATED.3IONS-SCNC: 3 MMOL/L (ref 4–13)
BUN SERPL-MCNC: 13 MG/DL (ref 5–25)
CALCIUM SERPL-MCNC: 9.1 MG/DL (ref 8.3–10.1)
CHLORIDE SERPL-SCNC: 104 MMOL/L (ref 100–108)
CO2 SERPL-SCNC: 32 MMOL/L (ref 21–32)
CREAT SERPL-MCNC: 1.19 MG/DL (ref 0.6–1.3)
GFR SERPL CREATININE-BSD FRML MDRD: 62 ML/MIN/1.73SQ M
GLUCOSE P FAST SERPL-MCNC: 85 MG/DL (ref 65–99)
PHOSPHATE SERPL-MCNC: 3.4 MG/DL (ref 2.3–4.1)
POTASSIUM SERPL-SCNC: 4.1 MMOL/L (ref 3.5–5.3)
SODIUM SERPL-SCNC: 139 MMOL/L (ref 136–145)

## 2021-12-28 PROCEDURE — 36415 COLL VENOUS BLD VENIPUNCTURE: CPT

## 2021-12-28 PROCEDURE — 82306 VITAMIN D 25 HYDROXY: CPT

## 2021-12-28 PROCEDURE — 80069 RENAL FUNCTION PANEL: CPT

## 2022-01-05 ENCOUNTER — OFFICE VISIT (OUTPATIENT)
Dept: NEPHROLOGY | Facility: CLINIC | Age: 67
End: 2022-01-05
Payer: MEDICARE

## 2022-01-05 VITALS
BODY MASS INDEX: 28.08 KG/M2 | WEIGHT: 218.8 LBS | DIASTOLIC BLOOD PRESSURE: 84 MMHG | HEIGHT: 74 IN | SYSTOLIC BLOOD PRESSURE: 128 MMHG

## 2022-01-05 DIAGNOSIS — N18.31 STAGE 3A CHRONIC KIDNEY DISEASE (HCC): ICD-10-CM

## 2022-01-05 DIAGNOSIS — I10 ESSENTIAL HYPERTENSION: ICD-10-CM

## 2022-01-05 DIAGNOSIS — E78.2 MIXED HYPERLIPIDEMIA: ICD-10-CM

## 2022-01-05 DIAGNOSIS — N18.2 CKD (CHRONIC KIDNEY DISEASE) STAGE 2, GFR 60-89 ML/MIN: Primary | ICD-10-CM

## 2022-01-05 PROCEDURE — 99214 OFFICE O/P EST MOD 30 MIN: CPT | Performed by: INTERNAL MEDICINE

## 2022-01-05 NOTE — PROGRESS NOTES
Nephrology Follow up Consultation  Colt Wade 78 y o  male MRN: [id number]            BACKGROUND:  Colt Wade is a 78 y o male who was referred by Antolin Rutledge DO for evaluation of Follow-up [facility])    ASSESSMENT / PLAN:   78 y o   male with pmh of multiple co-morbidities including HTN, AFib, hyperlipidemia, sleep apnea, CVA, I cc status post partial left nephrectomy in October 2020 and CKD stage 3 presented to the office for routine follow-up  CKD stage 2/3A:  - Patient has a baseline creatinine of 1-1 3 mg/dL dating as far back as 2017  Most recent labs show a Creatinine of 1 19 mg/dL on 12/28/21  Renal function remains stable at baseline    - likely has underlying CKD secondary to age-related nephron loss plus hypertensive nephrosclerosis plus decreased nephron mass due to prior partial nephrectomy in October 2020 for RCC of the left kidney  - Acid base and lytes stable  Continue to monitor  - Clinically the patient appears to be euvolemic  - CT abdomen from 01/21/2021 showing status post resection of left upper pole renal neoplasm  No evidence  - Recommend to avoid use of NSAIDs, nephrotoxins  Caution advised with regards to exposure to IV contrast dye    - Discussed with the patient in depth his renal status, including the possible etiologies for CKD  - Advised the patient that when his GFR is close to 20mL/min then will start discussing about RRT(renal replacement therapy) options such as renal transplant, peritoneal dialysis and hemodialysis  - Informed the patient about the various options for Renal Replacement therapy  - Discussed with the patient how we need to work together to delay the progression of CKD with optimal BP control based on their age and co-morbidities and trying to reduce proteinuria by the use of anti-proteinuric agents  Hypertension:  - Patient is on Norvasc 5 mg p o  Q day, Toprol-XL 50 mg p o   Q day    - Goal BP of < 140/90 based on age and comorbidities  - Instructed to follow low sodium (2gm)diet  Hemoglobin:  - Goal Hb of 10-12 g/dL  - Most recent labs suggestive of 14 2 grams/deciliter  - no role for IV iron at this time    CKD-MBD(Mineral Bone Disease): - Based on patients CKD stage following is the goal of therapy  - Maintain calcium phosphorus product of < 55   - Stage 3 CKD - Goal Ca 8 5-10 mg/dL , goal Phos 2 7-4 6 mg/dL  , goal iPTH 30-70 pg/mL  - Patient is currently at goal   - Continue patient on no vitamin-D  - most recent vitamin-D level 38 4 as of 12/28/2021  - check vitamin-D prior to next visit    Proteinuria:  - proteinuria most likely secondary to hyper filtration after nephrectomy  - most recent protein creatinine ratio of 110 mg as of January 2021  - check protein creatinine ratio    Lipids:  -on Lipitor  - goal LDL less than 70  - management per primary team    Nutrition:  - Encouraged patient to follow a renal diet comprising of moderate potassium, low phosphorus and protein restriction to 0 8gm/kg  - Will check serum albumin with next blood work  Followup:  - Patient is to follow-up in 12 months, with lab work to be performed a few days prior to the next visit  Advised patient to call me in 10 days to review the results if they do not hear back from me, as I may have not received the results  Jeffrey Stone MD, [facility], 1/5/2022, 9:25 AM             SUBJECTIVE: 78 y o  male presents to the office for routine follow-up  Patient seen by Urology last 10/07/2021 stable and to have repeat CT renal protocol in a year  Presents to the visit with his spouse feels well has no complaints last hospitalization was in March for his Watchman procedure home blood pressures have been stable no issues with edema not taking any NSAIDs no planned upcoming procedures thankful for the care information that he has gotten today  Review of Systems   Constitutional: Negative for appetite change, chills, fatigue and fever     HENT: Negative for congestion, postnasal drip, rhinorrhea and sneezing  Respiratory: Negative for cough, shortness of breath and wheezing  Cardiovascular: Negative for leg swelling  Gastrointestinal: Negative for abdominal pain, constipation, diarrhea, nausea and vomiting  Genitourinary: Negative for difficulty urinating, dysuria and hematuria  Musculoskeletal: Negative for back pain  Skin: Negative for rash and wound  Neurological: Negative for dizziness, light-headedness and headaches  Psychiatric/Behavioral: Negative for agitation and confusion  All other systems reviewed and are negative        PAST MEDICAL HISTORY:  Past Medical History:   Diagnosis Date    Atrial fibrillation ([facility] )     Cancer ([facility] ) 08/2020    Kidney cancer    Cancer (HCC)     melanoma    Chronic kidney disease     left kidney cancer    CPAP (continuous positive airway pressure) dependence     Hyperlipidemia     Hypertension     Irregular heart beat     Afib    Pneumonia     Skin cancer     Sleep apnea     Stroke ([facility] )     Supraventricular tachycardia ([facility] )     Wears glasses     Wears partial dentures     upper partial       PROBLEM LIST    Patient Active Problem List   Diagnosis    History of CVA (cerebrovascular accident)    Essential hypertension    Paroxysmal atrial fibrillation (HCC)    Mixed hyperlipidemia    Pain of toe of right foot    Benign prostatic hyperplasia with lower urinary tract symptoms    Screening for colon cancer    Acute bacterial conjunctivitis of both eyes    FELISA (obstructive sleep apnea)    Renal cell carcinoma of left kidney (HCC)    Stage 3a chronic kidney disease (HCC)    SDH (subdural hematoma) (HCC)    Presence of Watchman left atrial appendage closure device    Arachnoid cyst    Dizziness    H/O partial nephrectomy       PAST SURGICAL HISTORY:  Past Surgical History:   Procedure Laterality Date    APPENDECTOMY      CARDIOVERSION      COLONOSCOPY      fiberoptic, also 2009, both negative, resolved 2004    COLONOSCOPY  08/2019    EGD      HERNIA REPAIR      umbilical and bilateral inguinal    KNEE ARTHROSCOPY Left     OR LAP,PARTIAL NEPHRECTOMY Left 10/7/2020    Procedure: ROBOTIC PARTIAL NEPHRECTOMY;  Surgeon: Esther Wu MD;  Location: [facility];  Service: Urology    OR PERQ CLSR TCAT L R [place] [address] IMPLNT N/A 3/25/2021    Procedure: LEFT ATRIAL APPENDAGE OCCLUSION;  Surgeon: Slime Tran MD;  Location: [facility];  Service: Cardiology    SKIN GRAFT      left ear, skin cancer    TONSILLECTOMY         SOCIAL HISTORY :   reports that he has never smoked  He has never used smokeless tobacco  He reports that he does not drink alcohol and does not use drugs  FAMILY HISTORY:  Family History   Problem Relation Age of Onset   Colin Lemme Cancer Father         stomach    Hypertension Father     Stroke Father         syndrome    Cancer Family     Heart disease Family     Hypertension Family     Stroke Family         syndrome    Throat cancer Mother     Diabetes Mother     Asthma Sister        ALLERGIES:  No Known Allergies        PHYSICAL EXAM:  Vitals:    01/05/22 0900   BP: 128/84   BP Location: Left arm   Patient Position: Sitting   Cuff Size: Adult   Weight: 99 2 kg (218 lb 12 8 oz)   Height: 6' 2" (1 88 m)     Body mass index is 28 09 kg/m²  Physical Exam  Vitals reviewed  Constitutional:       General: He is not in acute distress  Appearance: Normal appearance  He is normal weight  He is not ill-appearing, toxic-appearing or diaphoretic  HENT:      Head: Normocephalic and atraumatic  Mouth/Throat:      Mouth: Mucous membranes are moist       Pharynx: Oropharynx is clear  No oropharyngeal exudate  Eyes:      General: No scleral icterus  Conjunctiva/sclera: Conjunctivae normal    Cardiovascular:      Rate and Rhythm: Normal rate  Heart sounds: Normal heart sounds  No friction rub     Pulmonary:      Effort: Pulmonary effort is normal  No respiratory distress  Breath sounds: Normal breath sounds  No stridor  No wheezing  Abdominal:      General: There is no distension  Palpations: Abdomen is soft  There is no mass  Tenderness: There is no abdominal tenderness  There is no right CVA tenderness or left CVA tenderness  Musculoskeletal:         General: No swelling  Cervical back: Normal range of motion and neck supple  Skin:     General: Skin is warm  Coloration: Skin is not jaundiced  Neurological:      General: No focal deficit present  Mental Status: He is alert and oriented to person, place, and time     Psychiatric:         Mood and Affect: Mood normal          Behavior: Behavior normal          LABORATORY DATA:     Results from last 6 Months   Lab Units 12/28/21  0836 08/14/21  0602 08/13/21  0459   WBC Thousand/uL  --  7 08 6 15   HEMOGLOBIN g/dL  --  14 2 14 9   HEMATOCRIT %  --  43 8 45 7   PLATELETS Thousands/uL  --  220 243   POTASSIUM mmol/L 4 1 3 7 3 5   CHLORIDE mmol/L 104 108 105   CO2 mmol/L 32 28 29   BUN mg/dL 13 16 16   CREATININE mg/dL 1 19 1 22 1 23   CALCIUM mg/dL 9 1 8 6 9 0   PHOSPHORUS mg/dL 3 4  --   --         rest all reviewed    RADIOLOGY:  No orders to display     Rest all reviewed        MEDICATIONS:    Current Outpatient Medications:     amLODIPine (NORVASC) 5 mg tablet, Take 1 tablet (5 mg total) by mouth daily, Disp: 90 tablet, Rfl: 1    aspirin (ECOTRIN LOW STRENGTH) 81 mg EC tablet, Take 1 tablet (81 mg total) by mouth daily, Disp: 30 tablet, Rfl: 2    atorvastatin (LIPITOR) 10 mg tablet, Take 1 tablet (10 mg total) by mouth daily, Disp: 90 tablet, Rfl: 1    Coenzyme Q10 (CO Q-10 PO), Take 1 caplet by mouth every morning, Disp: , Rfl:     flecainide (TAMBOCOR) 100 mg tablet, Take 1 tablet (100 mg total) by mouth 2 (two) times a day, Disp: 60 tablet, Rfl: 11    metoprolol succinate (TOPROL-XL) 50 mg 24 hr tablet, Take 1 tablet (50 mg total) by mouth every morning, Disp: 90 tablet, Rfl: 1    Multiple Vitamins-Minerals (MULTIVITAMIN ADULT PO), Take 1 tablet by mouth every morning, Disp: , Rfl:     clopidogrel (PLAVIX) 75 mg tablet, Take 1 tablet (75 mg total) by mouth daily, Disp: 30 tablet, Rfl: 5    tamsulosin (FLOMAX) 0 4 mg, Take 1 capsule (0 4 mg total) by mouth daily with dinner, Disp: 30 capsule, Rfl: 3          Portions of the record may have been created with voice recognition software  Occasional wrong word or "sound a like" substitutions may have occurred due to the inherent limitations of voice recognition software  Read the chart carefully and recognize, using context, where substitutions have occurred  If you have any questions, please contact the dictating provider

## 2022-01-27 ENCOUNTER — OFFICE VISIT (OUTPATIENT)
Dept: CARDIOLOGY CLINIC | Facility: CLINIC | Age: 67
End: 2022-01-27
Payer: MEDICARE

## 2022-01-27 VITALS
SYSTOLIC BLOOD PRESSURE: 132 MMHG | HEART RATE: 56 BPM | DIASTOLIC BLOOD PRESSURE: 82 MMHG | HEIGHT: 74 IN | BODY MASS INDEX: 27.98 KG/M2 | WEIGHT: 218 LBS

## 2022-01-27 DIAGNOSIS — Z95.818 PRESENCE OF WATCHMAN LEFT ATRIAL APPENDAGE CLOSURE DEVICE: ICD-10-CM

## 2022-01-27 DIAGNOSIS — I10 ESSENTIAL HYPERTENSION: ICD-10-CM

## 2022-01-27 DIAGNOSIS — S06.5X9A SDH (SUBDURAL HEMATOMA) (HCC): ICD-10-CM

## 2022-01-27 DIAGNOSIS — E78.2 MIXED HYPERLIPIDEMIA: ICD-10-CM

## 2022-01-27 DIAGNOSIS — I48.0 PAROXYSMAL ATRIAL FIBRILLATION (HCC): Primary | ICD-10-CM

## 2022-01-27 PROCEDURE — 93000 ELECTROCARDIOGRAM COMPLETE: CPT | Performed by: INTERNAL MEDICINE

## 2022-01-27 PROCEDURE — 99214 OFFICE O/P EST MOD 30 MIN: CPT | Performed by: INTERNAL MEDICINE

## 2022-01-27 NOTE — PROGRESS NOTES
Cardiology Follow Up    [facility]  1955  [id number]  [address] [facility] Graciela Uribe [phone] [address]  [phone]    1  Paroxysmal atrial fibrillation (HCC)  POCT ECG   2  Essential hypertension     3  SDH (subdural hematoma) (HCC)     4  Presence of Watchman left atrial appendage closure device     5  Mixed hyperlipidemia         Discussion/Summary:    PAF -  Remains on metoprolol and flecainide  With prior plans for ablation, he had renal mass discovered and then had a subdural so never ended up getting the ablation  For now, agree to continue metoprolol and flecainide  S/p watchman device, and is on aspirin alone now  Occasional episodes of afib  Advised he could try extra 1/2-1 tablet of flecainide with episodes to try and convert, but need to be cautious of avoiding bradycardia  If episodes are more frequent, would reevaluate for ablation  BP controlled with current regimen  History of Present Illness:   Pleasant 78-year-old man  History of atrial fibrillation with a prior CVA  Was previously maintaining sinus rhythm with Toprol XL, but at visit with me 12/2019, was found to be in afib, more persistent  We did a Holter, which showed rate controlled afib, but he was feeling palpitations  After discussing options, he was referred to EP  Initially started on Flecainide, and then underwent cardioversion in 7/2020  Was having fatigue, so was planned for ablation, but the preoperative CT scan showed a renal mass  Had partial nephrectomy  Unfortunately, then had a subdural hematoma  He was back on medical therapy for afib, and has now undergone Watchman Device implantation  Plans for ablation have been deferred, and with metoprolol and flecainide, he has maintained sinus rhythm, albeit feels tired during the day with relative bradycardia      Interval History:  Now off plavix, on aspirin, which he actually says causes more bleeding than the plavix did  Feels occasional palpitations, these last from evening to the next morning  Happened on xmas day, and then once since  No dizziness/LH, but still tired during the day          Problem List     Cerebrovascular disease, ill-defined, acute    Essential hypertension    Paroxysmal atrial fibrillation ([facility] )    Overview Signed 3/15/2018  2:07 PM by Pinky Needle, DO     Transitioned From: Atrial fibrillation         Mixed hyperlipidemia    Pain of toe of right foot        Past Medical History:   Diagnosis Date    Atrial fibrillation ([facility] )     Cancer ([facility] ) 08/2020    Kidney cancer    Cancer ([facility] )     melanoma    Chronic kidney disease     left kidney cancer    CPAP (continuous positive airway pressure) dependence     Hyperlipidemia     Hypertension     Irregular heart beat     Afib    Pneumonia     Skin cancer     Sleep apnea     [facility]     [facility]ra[facility]     Wears glasses     Wears partial dentures     upper partial     Social History     Tobacco Use    Smoking status: Never Smoker    Smokeless tobacco: Never Used   Substance Use Topics    Alcohol use: Never     Family History   Problem Relation Age of Onset    Cancer Father         stomach    Hypertension Father     Stroke Father         syndrome    Cancer Family     Heart disease Family     Hypertension Family     Stroke Family         syndrome    Throat cancer Mother     Diabetes Mother     Asthma Sister      Past Surgical History:   Procedure Laterality Date    APPENDECTOMY      CARDIOVERSION      COLONOSCOPY      fiberoptic, also 2009, both negative, resolved 2004    COLONOSCOPY  08/2019    EGD      HERNIA REPAIR      umbilical and bilateral inguinal    KNEE ARTHROSCOPY Left     IL LAP,PARTIAL NEPHRECTOMY Left 10/7/2020    Procedure: ROBOTIC PARTIAL NEPHRECTOMY;  Surgeon: Maliha Lorenzo MD;  [facility];  Service: Urology   [address] CLSR TCAT L ATR APNDGE W/ENDOCARDIAL IMPLNT N/A 3/25/2021    Procedure: LEFT ATRIAL APPENDAGE OCCLUSION;  Surgeon: Biju Verdin MD;  [facility];  Service: Cardiology    SKIN GRAFT      left ear, skin cancer    TONSILLECTOMY         Current Outpatient Medications:     amLODIPine (NORVASC) 5 mg tablet, Take 1 tablet (5 mg total) by mouth daily, Disp: 90 tablet, Rfl: 1    aspirin (ECOTRIN LOW STRENGTH) 81 mg EC tablet, Take 1 tablet (81 mg total) by mouth daily, Disp: 30 tablet, Rfl: 2    atorvastatin (LIPITOR) 10 mg tablet, Take 1 tablet (10 mg total) by mouth daily, Disp: 90 tablet, Rfl: 1    Coenzyme Q10 (CO Q-10 PO), Take 1 caplet by mouth every morning, Disp: , Rfl:     flecainide (TAMBOCOR) 100 mg tablet, Take 1 tablet (100 mg total) by mouth 2 (two) times a day, Disp: 60 tablet, Rfl: 11    metoprolol succinate (TOPROL-XL) 50 mg 24 hr tablet, Take 1 tablet (50 mg total) by mouth every morning, Disp: 90 tablet, Rfl: 1    Multiple Vitamins-Minerals (MULTIVITAMIN ADULT PO), Take 1 tablet by mouth every morning, Disp: , Rfl:     tamsulosin (FLOMAX) 0 4 mg, Take 1 capsule (0 4 mg total) by mouth daily with dinner, Disp: 30 capsule, Rfl: 3  No Known Allergies    Vitals:    01/27/22 0933   BP: 132/82   BP Location: Right arm   Patient Position: Sitting   Cuff Size: Large   Pulse: 56   Weight: 98 9 kg (218 lb)   Height: 6' 2" (1 88 m)     Vitals:    01/27/22 0933   Weight: 98 9 kg (218 lb)      Height: 6' 2" (188 cm)   Body mass index is 27 99 kg/m²      Physical Exam:  GEN: Kirit Rivas appears well, alert and oriented x 3, pleasant and cooperative   HEENT: pupils equal, round, and reactive to light; extraocular muscles intact  NECK: supple, no carotid bruits   HEART: bradycardic, normal S1 and S2, no murmurs, clicks, gallops or rubs   LUNGS: clear to auscultation bilaterally; no wheezes, rales, or rhonchi   ABDOMEN: normal bowel sounds, soft, no tenderness, no distention  EXTREMITIES: peripheral pulses normal; no clubbing, cyanosis, or edema  NEURO: no focal findings   SKIN: normal without suspicious lesions on exposed skin    ROS:  Fatigue, tired during the day  Except as noted in HPI, is otherwise reviewed in detail and a 12 point review of systems is negative  ROS reviewed and is unchanged    Labs:  Lab Results   Component Value Date     09/19/2017    K 4 1 12/28/2021     12/28/2021    CREATININE 1 19 12/28/2021    BUN 13 12/28/2021    CO2 32 12/28/2021    ALT 35 08/13/2021    AST 27 08/13/2021    INR 1 28 (H) 03/18/2021    GLUF 85 12/28/2021    WBC 7 08 08/14/2021    HGB 14 2 08/14/2021    HCT 43 8 08/14/2021     08/14/2021       Lab Results   Component Value Date    CHOL 136 09/19/2017    CHOL 132 05/31/2017    CHOL 124 (L) 03/30/2016     Lab Results   Component Value Date    LDLCALC 66 01/23/2020    LDLCALC 69 12/06/2018     Lab Results   Component Value Date    HDL 49 01/23/2020    HDL 62 12/06/2018    HDL 59 09/19/2017     Lab Results   Component Value Date    TRIG 53 01/23/2020    TRIG 51 12/06/2018    TRIG 54 09/19/2017     ALONZO 5/10/21:  LEFT VENTRICLE:  Systolic function was normal  Ejection fraction was estimated to be 55 %  Although no diagnostic regional wall motion abnormality was identified, this possibility cannot be completely excluded on the basis of this study      LEFT ATRIUM:  The atrium was mildly dilated      LEFT ATRIAL APPENDAGE:  A 30 mm left atrial appendage occluder was visualized and was well seated without associated thrombus  There were 2 small residual defects (<3 5 mm in diameter) in the medial and lateral aspects of the device      MITRAL VALVE:  There was mild regurgitation      AORTIC VALVE:  There was trace regurgitation      TRICUSPID VALVE:  There was mild to moderate regurgitation  Echo 10/2019:  LEFT VENTRICLE:  Systolic function was normal  Ejection fraction was estimated to be 55 %    There were no regional wall motion abnormalities  Wall thickness was at the upper limits of normal      MITRAL VALVE:  There was mild to moderate regurgitation      TRICUSPID VALVE:  There was mild regurgitation  Pulmonary artery systolic pressure was within the normal range      PULMONIC VALVE:  There was trace regurgitation      AORTA:  The root exhibited mild dilatation  3 8 cm  There was mild dilatation of the ascending aorta  3 7 cm     Holter 5/27/2020:  IMPRESSION:  1  Predominantly atrial fibrillation with an average heart rate of 78 bpm    2  Rare PVCs, consisting of 0 5% of total beats  No sustained ventricular arrhythmias  3  No significant pauses  4  Patient's symptoms of fluttering correlated with atrial fibrillation at controlled rates       EKG:  Sinus bradycardia, 56 BPM  First degree AV block

## 2022-02-28 DIAGNOSIS — I10 ESSENTIAL HYPERTENSION: ICD-10-CM

## 2022-02-28 RX ORDER — AMLODIPINE BESYLATE 5 MG/1
5 TABLET ORAL DAILY
Qty: 90 TABLET | Refills: 0 | Status: SHIPPED | OUTPATIENT
Start: 2022-02-28 | End: 2022-05-27 | Stop reason: SDUPTHER

## 2022-03-03 ENCOUNTER — OFFICE VISIT (OUTPATIENT)
Dept: FAMILY MEDICINE CLINIC | Facility: CLINIC | Age: 67
End: 2022-03-03
Payer: MEDICARE

## 2022-03-03 VITALS
BODY MASS INDEX: 28.12 KG/M2 | TEMPERATURE: 97.6 F | WEIGHT: 219 LBS | OXYGEN SATURATION: 97 % | HEART RATE: 52 BPM | SYSTOLIC BLOOD PRESSURE: 120 MMHG | DIASTOLIC BLOOD PRESSURE: 70 MMHG

## 2022-03-03 DIAGNOSIS — C64.2 RENAL CELL CARCINOMA OF LEFT KIDNEY (HCC): ICD-10-CM

## 2022-03-03 DIAGNOSIS — I48.0 PAROXYSMAL ATRIAL FIBRILLATION (HCC): ICD-10-CM

## 2022-03-03 DIAGNOSIS — I10 ESSENTIAL HYPERTENSION: Primary | ICD-10-CM

## 2022-03-03 DIAGNOSIS — D68.9 COAGULOPATHY (HCC): ICD-10-CM

## 2022-03-03 DIAGNOSIS — E78.2 MIXED HYPERLIPIDEMIA: ICD-10-CM

## 2022-03-03 DIAGNOSIS — L21.9 SEBORRHEIC DERMATITIS: ICD-10-CM

## 2022-03-03 PROCEDURE — 99214 OFFICE O/P EST MOD 30 MIN: CPT | Performed by: FAMILY MEDICINE

## 2022-03-03 RX ORDER — TRIAMCINOLONE ACETONIDE 1 MG/G
CREAM TOPICAL 2 TIMES DAILY
Qty: 30 G | Refills: 3 | Status: SHIPPED | OUTPATIENT
Start: 2022-03-03

## 2022-03-03 NOTE — PROGRESS NOTES
Assessment/Plan: Glucose 85  GFR 62 vitamin-D 38 LDL 66 in 2020  Patient will continue with current regimen for hypertension Afib and hyperlipidemia  Guidance given overall  Patient will use triamcinolone for seborrheic dermatitis  Other refills will be given when needed  Patient will follow-up in the fall  Patient to consider Shingrix in the future follow-up in 6-8 months       Diagnoses and all orders for this visit:    Essential hypertension    Paroxysmal atrial fibrillation (HCC)    Mixed hyperlipidemia    Seborrheic dermatitis  -     triamcinolone (KENALOG) 0 1 % cream; Apply topically 2 (two) times a day    Coagulopathy (HCC)    Renal cell carcinoma of left kidney (HCC)            Subjective:        Patient ID: Josemanuel Carbajal is a 78 y o  male  Patient follow-up on hypertension as well as history of hyperlipidemia and Afib  Patient does see Cardiology roughly every 6 months  No new chest pain shortness breath or headache or dizziness or syncope  Occasional bouts of Afib noted  Patient does get some dizziness or lightheadedness associated with this but otherwise feels well  No problems with urination or defecation  The following portions of the patient's history were reviewed and updated as appropriate: allergies, current medications, past family history, past medical history, past social history, past surgical history and problem list       Review of Systems   Constitutional: Negative  HENT: Negative  Eyes: Negative  Respiratory: Negative  Cardiovascular: Negative  Gastrointestinal: Negative  Endocrine: Negative  Genitourinary: Negative  Musculoskeletal: Negative  Skin: Negative  Allergic/Immunologic: Negative  Neurological: Negative  Hematological: Negative  Psychiatric/Behavioral: Negative  Objective:      BMI Counseling: Body mass index is 28 12 kg/m²  The BMI is above normal  Nutrition recommendations include decreasing portion sizes  Exercise recommendations include moderate physical activity 150 minutes/week  Rationale for BMI follow-up plan is due to patient being overweight or obese  Depression Screening and Follow-up Plan: Clincally patient does not have depression  No treatment is required  /70   Pulse (!) 52   Temp 97 6 °F (36 4 °C)   Wt 99 3 kg (219 lb)   SpO2 97%   BMI 28 12 kg/m²          Physical Exam  Vitals and nursing note reviewed  Constitutional:       General: He is not in acute distress  Appearance: Normal appearance  He is not ill-appearing, toxic-appearing or diaphoretic  HENT:      Head: Normocephalic and atraumatic  Right Ear: Tympanic membrane, ear canal and external ear normal  There is no impacted cerumen  Left Ear: Tympanic membrane, ear canal and external ear normal  There is no impacted cerumen  Nose: Nose normal  No congestion or rhinorrhea  Mouth/Throat:      Mouth: Mucous membranes are moist       Pharynx: No oropharyngeal exudate or posterior oropharyngeal erythema  Eyes:      General: No scleral icterus  Right eye: No discharge  Left eye: No discharge  Extraocular Movements: Extraocular movements intact  Conjunctiva/sclera: Conjunctivae normal       Pupils: Pupils are equal, round, and reactive to light  Neck:      Vascular: No carotid bruit  Cardiovascular:      Rate and Rhythm: Normal rate and regular rhythm  Pulses: Normal pulses  Heart sounds: Normal heart sounds  No murmur heard  No friction rub  No gallop  Pulmonary:      Effort: Pulmonary effort is normal  No respiratory distress  Breath sounds: Normal breath sounds  No stridor  No wheezing, rhonchi or rales  Chest:      Chest wall: No tenderness  Abdominal:      General: Abdomen is flat  Bowel sounds are normal  There is no distension  Palpations: Abdomen is soft  Tenderness: There is no abdominal tenderness   There is no guarding or rebound  Musculoskeletal:         General: No swelling, tenderness, deformity or signs of injury  Normal range of motion  Cervical back: Normal range of motion and neck supple  No rigidity  No muscular tenderness  Right lower leg: Edema present  Left lower leg: Edema present  Comments: Trace lower extremity edema   Lymphadenopathy:      Cervical: No cervical adenopathy  Skin:     General: Skin is warm and dry  Capillary Refill: Capillary refill takes less than 2 seconds  Coloration: Skin is not jaundiced  Findings: Rash present  No bruising, erythema or lesion  Comments: Seborrheic dermatitis   Neurological:      Mental Status: He is alert and oriented to person, place, and time  Cranial Nerves: No cranial nerve deficit  Sensory: No sensory deficit  Motor: No weakness  Coordination: Coordination normal       Gait: Gait normal    Psychiatric:         Mood and Affect: Mood normal          Behavior: Behavior normal          Thought Content:  Thought content normal          Judgment: Judgment normal

## 2022-03-15 ENCOUNTER — OFFICE VISIT (OUTPATIENT)
Dept: URGENT CARE | Age: 67
End: 2022-03-15
Payer: MEDICARE

## 2022-03-15 VITALS
DIASTOLIC BLOOD PRESSURE: 82 MMHG | HEIGHT: 73 IN | BODY MASS INDEX: 28.49 KG/M2 | SYSTOLIC BLOOD PRESSURE: 164 MMHG | RESPIRATION RATE: 18 BRPM | HEART RATE: 58 BPM | OXYGEN SATURATION: 98 % | TEMPERATURE: 97.7 F | WEIGHT: 215 LBS

## 2022-03-15 DIAGNOSIS — B02.9 HERPES ZOSTER WITHOUT COMPLICATION: Primary | ICD-10-CM

## 2022-03-15 PROCEDURE — G0463 HOSPITAL OUTPT CLINIC VISIT: HCPCS

## 2022-03-15 PROCEDURE — 99213 OFFICE O/P EST LOW 20 MIN: CPT

## 2022-03-15 RX ORDER — VALACYCLOVIR HYDROCHLORIDE 1 G/1
1000 TABLET, FILM COATED ORAL 2 TIMES DAILY
Qty: 20 TABLET | Refills: 0 | Status: SHIPPED | OUTPATIENT
Start: 2022-03-15 | End: 2022-03-25

## 2022-03-15 NOTE — PROGRESS NOTES
[facility]        NAME: Anika Bhatt is a 78 y o  male  : 1955    MRN: [id number]  DATE: March 15, 2022  TIME: 8:07 PM    Assessment and Plan   Herpes zoster without complication [J10 4]  1  Herpes zoster without complication  valACYclovir (VALTREX) 1,000 mg tablet         Patient Instructions     Antiviral as directed  Follow up with PCP in 3-5 days  Proceed to ER if symptoms worsen  Chief Complaint     Chief Complaint   Patient presents with    Rash     pt reports rash that started on right abd and has now spread to right flank         History of Present Illness     78 y o  M presents with complaint of R abdominal rash starting 3 days ago that has now spread to his R flank  Denies pain or itching  Not using anything OTC  Not vaccinated against Shingles  Denies changes in medications  Denies exposure to known allergens  Denies being outside or having contact with any ivy  Review of Systems   Review of Systems   Constitutional: Negative for chills, fatigue and fever  HENT: Negative for congestion, ear discharge, ear pain, facial swelling, rhinorrhea, sinus pain, sore throat and trouble swallowing  Eyes: Negative for photophobia and visual disturbance  Respiratory: Negative for cough, chest tightness, shortness of breath and wheezing  Cardiovascular: Negative for chest pain, palpitations and leg swelling  Gastrointestinal: Negative for abdominal pain, diarrhea, nausea and vomiting  Genitourinary: Negative for dysuria, flank pain and hematuria  Musculoskeletal: Negative for arthralgias, back pain and myalgias  Skin: Positive for rash  Negative for pallor  Neurological: Negative for dizziness, seizures, syncope, weakness, light-headedness, numbness and headaches  Psychiatric/Behavioral: Negative for sleep disturbance           Current Medications       Current Outpatient Medications:     amLODIPine (NORVASC) 5 mg tablet, Take 1 tablet (5 mg total) by mouth daily, Disp: 90 tablet, Rfl: 0    aspirin (ECOTRIN LOW STRENGTH) 81 mg EC tablet, Take 1 tablet (81 mg total) by mouth daily, Disp: 30 tablet, Rfl: 2    atorvastatin (LIPITOR) 10 mg tablet, Take 1 tablet (10 mg total) by mouth daily, Disp: 90 tablet, Rfl: 1    Coenzyme Q10 (CO Q-10 PO), Take 1 caplet by mouth every morning, Disp: , Rfl:     metoprolol succinate (TOPROL-XL) 50 mg 24 hr tablet, Take 1 tablet (50 mg total) by mouth every morning, Disp: 90 tablet, Rfl: 1    Multiple Vitamins-Minerals (MULTIVITAMIN ADULT PO), Take 1 tablet by mouth every morning, Disp: , Rfl:     triamcinolone (KENALOG) 0 1 % cream, Apply topically 2 (two) times a day, Disp: 30 g, Rfl: 3    flecainide (TAMBOCOR) 100 mg tablet, Take 1 tablet (100 mg total) by mouth 2 (two) times a day, Disp: 60 tablet, Rfl: 11    valACYclovir (VALTREX) 1,000 mg tablet, Take 1 tablet (1,000 mg total) by mouth 2 (two) times a day for 10 days, Disp: 20 tablet, Rfl: 0    Current Allergies     Allergies as of 03/15/2022    (No Known Allergies)            The following portions of the patient's history were reviewed and updated as appropriate: allergies, current medications, past family history, past medical history, past social history, past surgical history and problem list      Past Medical History:   Diagnosis Date    Atrial fibrillation ([facility] )     Cancer ([facility] ) 08/2020    Kidney cancer    Cancer ([facility] )     melanoma    Chronic kidney disease     left kidney cancer    CPAP (continuous positive airway pressure) dependence     Hyperlipidemia     Hypertension     Irregular heart beat     Afib    Pneumonia     Skin cancer     Sleep apnea     Stroke ([facility]     Supraventricular tachycardia ([address] )     Wears glasses     Wears partial dentures     upper partial       Past Surgical History:   Procedure Laterality Date    APPENDECTOMY      CARDIOVERSION      COLONOSCOPY      fiberoptic, also 2009, both negative, resolved 2004    COLONOSCOPY  08/2019    EGD  HERNIA REPAIR      umbilical and bilateral inguinal    KNEE ARTHROSCOPY Left     FL LAP,PARTIAL NEPHRECTOMY Left 10/7/2020    Procedure: ROBOTIC PARTIAL NEPHRECTOMY;  Surgeon: David Murray MD;  [facility];  Service: Urology    FL PERQ CLSR TCAT L ATR APNDGE W/ENDOCARDIAL IMPLNT N/A 3/25/2021    Procedure: LEFT ATRIAL APPENDAGE OCCLUSION;  Surgeon: Shi Hawkins MD;  [facility];  Service: Cardiology    SKIN GRAFT      left ear, skin cancer    TONSILLECTOMY         Family History   Problem Relation Age of Onset    Cancer Father         stomach    Hypertension Father     Stroke Father         syndrome    Cancer Family     Heart disease Family     Hypertension Family     Stroke Family         syndrome    Throat cancer Mother     Diabetes Mother     Asthma Sister          Medications have been verified  Objective   /82   Pulse 58   Temp 97 7 °F (36 5 °C)   Resp 18   Ht 6' 1" (1 854 m)   Wt 97 5 kg (215 lb)   SpO2 98%   BMI 28 37 kg/m²   No LMP for male patient  Physical Exam     Physical Exam  Vitals reviewed  Constitutional:       General: He is not in acute distress  Appearance: He is normal weight  He is not ill-appearing or toxic-appearing  HENT:      Head: Normocephalic  Right Ear: Tympanic membrane normal  No middle ear effusion  Tympanic membrane is not erythematous  Left Ear: Tympanic membrane normal   No middle ear effusion  Tympanic membrane is not erythematous  Nose: Nose normal       Right Sinus: No maxillary sinus tenderness or frontal sinus tenderness  Left Sinus: No maxillary sinus tenderness or frontal sinus tenderness  Mouth/Throat:      Mouth: Mucous membranes are moist       Pharynx: No oropharyngeal exudate or posterior oropharyngeal erythema  Tonsils: No tonsillar exudate or tonsillar abscesses  Eyes:      Pupils: Pupils are equal, round, and reactive to light     Cardiovascular:      Rate and Rhythm: Normal rate and regular rhythm  Pulses: Normal pulses  Heart sounds: Normal heart sounds  Pulmonary:      Effort: Pulmonary effort is normal  No respiratory distress  Breath sounds: Normal breath sounds  No wheezing or rales  Abdominal:      General: Bowel sounds are normal       Palpations: Abdomen is soft  Musculoskeletal:         General: Normal range of motion  Cervical back: Normal range of motion and neck supple  Lymphadenopathy:      Cervical: No cervical adenopathy  Skin:     General: Skin is warm and dry  Capillary Refill: Capillary refill takes less than 2 seconds  Findings: Rash present  Rash is vesicular  Comments:   Vesicular rash following L1/L2 dermatone  Blistering   Neurological:      General: No focal deficit present  Mental Status: He is alert  Cranial Nerves: No cranial nerve deficit

## 2022-03-15 NOTE — PATIENT INSTRUCTIONS
Shingles    Antiviral as directed  Follow up with your PCP in 3-5 days  Proceed to ER if symptoms worsen  Hope you feel better! AMBULATORY CARE:   Shingles  is a painful rash  Shingles is caused by the same virus that causes chickenpox (varicella-zoster)  After you get chickenpox, the virus stays in your body for several years without causing any symptoms  Shingles occurs when the virus becomes active again  The active virus travels along a nerve to your skin and causes a rash  Common signs and symptoms include the following:  Shingles often starts with pain in the back, chest, neck, or face  A rash then develops in the same area  The rash is usually found on only one side of the body  The rash may feel itchy or painful  It starts as red dots that become blisters filled with fluid  The blisters usually grow bigger, become filled with pus, and then crust over after a few days  You may also have any of the following:  · Fatigue and muscle weakness    · Pain when your skin is lightly touched    · Headache    · Fever    · Eye pain when exposed to light       Call your local emergency number (911 in the [address] [facility]) if:   · You have trouble moving your arms, legs, or face  · You become confused, or have difficulty speaking  · You have a seizure  Seek care immediately if:   · You have weakness in an arm or leg  · You have dizziness, a severe headache, or hearing or vision loss  · You have painful, red, warm skin around the blisters, or the blisters drain pus  · Your neck is stiff or you have trouble moving it  Call your doctor if:   · You feel weak or have a headache  · You have a cough, chills, or a fever  · You have abdominal pain or nausea, or you are vomiting  · Your rash becomes more itchy or painful  · Your rash spreads to other parts of your body  · Your pain worsens and does not go away even after you take medicine      · You have questions or concerns about your condition or care     Medicines: You may need any of the following:  · Antiviral medicine  helps decrease symptoms and healing time  They may also decrease your risk of developing nerve pain  You will need to start taking them within 3 days of the start of symptoms to prevent nerve pain  · Pain medicine  may be prescribed or suggested by your healthcare provider  You may need NSAIDs, acetaminophen, or opioid medicine depending on how much pain you are in  Do not wait until the pain is severe before you take more pain medicine  · Topical anesthetics  are used to numb the skin and decrease pain  They can be a cream, gel, spray, or patch  · Anticonvulsants  decrease nerve pain and may help you sleep at night  · Antidepressants  may be used to decrease nerve pain  Self-care:  Keep your rash clean and dry  Cover your rash with a bandage or clothing  Do not use bandages that stick to your skin  The sticky part may irritate your skin and make your rash last longer  Prevent the spread of shingles:       · Wash your hands often  Wash your hands several times each day  Wash after you use the bathroom, change a child's diaper, and before you prepare or eat food  Use soap and water every time  Rub your soapy hands together, lacing your fingers  Wash the front and back of your hands, and in between your fingers  Use the fingers of one hand to scrub under the fingernails of the other hand  Wash for at least 20 seconds  Rinse with warm, running water for several seconds  Then dry your hands with a clean towel or paper towel  Use hand  that contains alcohol if soap and water are not available  Do not touch your eyes, nose, or mouth without washing your hands first          · Cover a sneeze or cough  Use a tissue that covers your mouth and nose  Throw the tissue away in a trash can right away  Use the bend of your arm if a tissue is not available   Wash your hands well with soap and water or use a hand   · Stay away from others while you are sick  Avoid crowds as much as possible  · Ask about vaccines you may need  Talk to your healthcare provider about your vaccine history  He or she will tell you which vaccines you need, and when to get them  Prevent shingles or another shingles outbreak:  A vaccine may be given to help prevent shingles  You can get the vaccine even if you already had shingles  The vaccine can help prevent a future outbreak  If you do get shingles again, the vaccine can keep it from becoming severe  The vaccine comes in 2 forms  Your healthcare provider will tell you which form is right for you  The decision is based on your age and any medical conditions you have  A 2-dose vaccine is usually given to adults 48 years or older  A 1-dose vaccine may be given to adults 61 years or older  Follow up with your doctor as directed:  Write down your questions so you remember to ask them during your visits  For more information:   · Centers for Disease Control and Prevention  [address] [place] , [address]  Phone: 3- 194 - 8170762  Phone: 8- 696 - 5308829  Web Address: [employer] com br    © Copyright [employer] 2022 Information is for End User's use only and may not be sold, redistributed or otherwise used for commercial purposes  All illustrations and images included in CareNotes® are the copyrighted property of A D A M , Inc  or Merlin Ruff  The above information is an  only  It is not intended as medical advice for individual conditions or treatments  Talk to your doctor, nurse or pharmacist before following any medical regimen to see if it is safe and effective for you

## 2022-03-18 ENCOUNTER — OFFICE VISIT (OUTPATIENT)
Dept: FAMILY MEDICINE CLINIC | Facility: CLINIC | Age: 67
End: 2022-03-18
Payer: MEDICARE

## 2022-03-18 VITALS
OXYGEN SATURATION: 98 % | HEIGHT: 73 IN | BODY MASS INDEX: 28.71 KG/M2 | SYSTOLIC BLOOD PRESSURE: 126 MMHG | WEIGHT: 216.6 LBS | HEART RATE: 56 BPM | TEMPERATURE: 96.2 F | DIASTOLIC BLOOD PRESSURE: 68 MMHG

## 2022-03-18 DIAGNOSIS — B02.9 HERPES ZOSTER WITHOUT COMPLICATION: Primary | ICD-10-CM

## 2022-03-18 PROCEDURE — 99213 OFFICE O/P EST LOW 20 MIN: CPT | Performed by: FAMILY MEDICINE

## 2022-03-18 RX ORDER — GABAPENTIN 100 MG/1
100 CAPSULE ORAL 3 TIMES DAILY
Qty: 60 CAPSULE | Refills: 0 | Status: SHIPPED | OUTPATIENT
Start: 2022-03-18

## 2022-03-18 NOTE — PROGRESS NOTES
Assessment/Plan: patient will continue with Valtrex  Cool compresses recommended  Patient use gabapentin as needed  Follow-up as needed       Diagnoses and all orders for this visit:    Herpes zoster without complication  -     gabapentin (Neurontin) 100 mg capsule; Take 1 capsule (100 mg total) by mouth 3 (three) times a day            Subjective:        Patient ID: Drew Baeza is a 78 y o  male  Patient with rash on right side lower abdomen / pelvic region radiating to the back  Patient does notice irritation  Rash started roughly 1 week ago  No new topical agents noted by the patient  No treatment use  Patient is on Valtrex        The following portions of the patient's history were reviewed and updated as appropriate: allergies, current medications, past family history, past medical history, past social history, past surgical history and problem list       Review of Systems   Constitutional: Negative  HENT: Negative  Eyes: Negative  Respiratory: Negative  Cardiovascular: Negative  Gastrointestinal: Negative  Endocrine: Negative  Genitourinary: Negative  Musculoskeletal: Negative  Skin: Positive for rash  Allergic/Immunologic: Negative  Neurological: Negative  Hematological: Negative  Psychiatric/Behavioral: Negative  Objective:               /68 (BP Location: Right arm, Patient Position: Sitting, Cuff Size: Large)   Pulse 56   Temp (!) 96 2 °F (35 7 °C) (Tympanic)   Ht 6' 1" (1 854 m)   Wt 98 2 kg (216 lb 9 6 oz)   SpO2 98%   BMI 28 58 kg/m²          Physical Exam  Vitals and nursing note reviewed  Skin:     Findings: Rash present        Comments: Rash consistent with shingles in the T12 and L1 dermatome on the right

## 2022-04-16 DIAGNOSIS — I48.19 PERSISTENT ATRIAL FIBRILLATION (HCC): ICD-10-CM

## 2022-04-18 NOTE — TELEPHONE ENCOUNTER
Requested medication(s) are due for refill today: Yes  Patient has already received a courtesy refill: Yes  Other reason request has been forwarded to provider:    Off-Protocol Failed 04/16/2022 07:10 AM   Protocol Details  Medication not assigned to a protocol, review manually      Valid encounter within last 12 months

## 2022-04-19 DIAGNOSIS — I48.19 PERSISTENT ATRIAL FIBRILLATION (HCC): ICD-10-CM

## 2022-04-19 RX ORDER — FLECAINIDE ACETATE 100 MG/1
100 TABLET ORAL 2 TIMES DAILY
Qty: 60 TABLET | Refills: 10 | Status: SHIPPED | OUTPATIENT
Start: 2022-04-19 | End: 2022-05-19

## 2022-04-19 RX ORDER — FLECAINIDE ACETATE 100 MG/1
100 TABLET ORAL 2 TIMES DAILY
Qty: 60 TABLET | Refills: 11 | Status: SHIPPED | OUTPATIENT
Start: 2022-04-19 | End: 2022-04-19 | Stop reason: SDUPTHER

## 2022-04-19 NOTE — TELEPHONE ENCOUNTER
Requested medication(s) are due for refill today: Yes  Patient has already received a courtesy refill: No  Other reason request has been forwarded to provider:   Off-Protocol Failed 04/19/2022 12:14 PM   Protocol Details  Medication not assigned to a protocol, review manually

## 2022-05-13 DIAGNOSIS — E78.2 MIXED HYPERLIPIDEMIA: ICD-10-CM

## 2022-05-13 RX ORDER — ATORVASTATIN CALCIUM 10 MG/1
10 TABLET, FILM COATED ORAL DAILY
Qty: 90 TABLET | Refills: 1 | Status: SHIPPED | OUTPATIENT
Start: 2022-05-13

## 2022-05-27 DIAGNOSIS — I10 ESSENTIAL HYPERTENSION: ICD-10-CM

## 2022-05-27 RX ORDER — AMLODIPINE BESYLATE 5 MG/1
5 TABLET ORAL DAILY
Qty: 90 TABLET | Refills: 0 | Status: SHIPPED | OUTPATIENT
Start: 2022-05-27

## 2022-06-14 DIAGNOSIS — I48.19 PERSISTENT ATRIAL FIBRILLATION (HCC): ICD-10-CM

## 2022-06-14 RX ORDER — METOPROLOL SUCCINATE 50 MG/1
50 TABLET, EXTENDED RELEASE ORAL EVERY MORNING
Qty: 90 TABLET | Refills: 1 | Status: SHIPPED | OUTPATIENT
Start: 2022-06-14

## 2022-07-06 ENCOUNTER — OFFICE VISIT (OUTPATIENT)
Dept: FAMILY MEDICINE CLINIC | Facility: CLINIC | Age: 67
End: 2022-07-06
Payer: MEDICARE

## 2022-07-06 VITALS
DIASTOLIC BLOOD PRESSURE: 76 MMHG | OXYGEN SATURATION: 97 % | WEIGHT: 221 LBS | BODY MASS INDEX: 29.29 KG/M2 | SYSTOLIC BLOOD PRESSURE: 124 MMHG | HEIGHT: 73 IN | RESPIRATION RATE: 18 BRPM | TEMPERATURE: 96.8 F | HEART RATE: 55 BPM

## 2022-07-06 DIAGNOSIS — H10.32 ACUTE CONJUNCTIVITIS OF LEFT EYE, UNSPECIFIED ACUTE CONJUNCTIVITIS TYPE: Primary | ICD-10-CM

## 2022-07-06 PROCEDURE — 99213 OFFICE O/P EST LOW 20 MIN: CPT | Performed by: FAMILY MEDICINE

## 2022-07-06 PROCEDURE — 1124F ACP DISCUSS-NO DSCNMKR DOCD: CPT | Performed by: FAMILY MEDICINE

## 2022-07-06 RX ORDER — OFLOXACIN 3 MG/ML
1 SOLUTION/ DROPS OPHTHALMIC 4 TIMES DAILY
Qty: 5 ML | Refills: 0 | Status: SHIPPED | OUTPATIENT
Start: 2022-07-06 | End: 2022-09-07

## 2022-07-07 NOTE — PROGRESS NOTES
Assessment/Plan:    59-year-old male with:  Acute conjunctivitis will give topical antibiotic discussed supportive care return parameters advised to call back if not improving worsening    No problem-specific Assessment & Plan notes found for this encounter  Diagnoses and all orders for this visit:    Acute conjunctivitis of left eye, unspecified acute conjunctivitis type  -     ofloxacin (OCUFLOX) 0 3 % ophthalmic solution; Administer 1 drop into the left eye 4 (four) times a day          Subjective:     Chief Complaint   Patient presents with    Conjunctivitis     Patient is complaining of discomfort, itchy,red and discharge of the left eye  Patient ID: Anika Bhatt is a 75 y o  male  Patient is a 59-year-old male who presents complaining of redness and drainage with some irritation in his left eye no fevers chills nausea vomiting tolerating p o  intake no blurry vision double vision or sensitivity to light      The following portions of the patient's history were reviewed and updated as appropriate: allergies, current medications, past family history, past medical history, past social history, past surgical history and problem list     Review of Systems   Constitutional: Negative  HENT: Negative  Eyes: Positive for discharge and redness  Respiratory: Negative  Cardiovascular: Negative  Gastrointestinal: Negative  Endocrine: Negative  Genitourinary: Negative  Musculoskeletal: Negative  Allergic/Immunologic: Negative  Neurological: Negative  Hematological: Negative  Psychiatric/Behavioral: Negative  All other systems reviewed and are negative  Objective:      /76 (BP Location: Right arm, Patient Position: Sitting, Cuff Size: Adult)   Pulse 55   Temp (!) 96 8 °F (36 °C) (Tympanic)   Resp 18   Ht 6' 1" (1 854 m)   Wt 100 kg (221 lb)   SpO2 97%   BMI 29 16 kg/m²          Physical Exam  Constitutional:       Appearance: He is well-developed  HENT:      Head: Atraumatic  Right Ear: External ear normal       Left Ear: External ear normal    Eyes:      General:         Left eye: Discharge present  Pupils: Pupils are equal, round, and reactive to light  Pulmonary:      Effort: Pulmonary effort is normal  No respiratory distress  Abdominal:      General: There is no distension  Musculoskeletal:         General: Normal range of motion  Cervical back: Normal range of motion  Skin:     General: Skin is warm and dry  Neurological:      Mental Status: He is alert and oriented to person, place, and time  Cranial Nerves: No cranial nerve deficit  Psychiatric:         Behavior: Behavior normal          Thought Content:  Thought content normal          Judgment: Judgment normal

## 2022-08-05 ENCOUNTER — TELEPHONE (OUTPATIENT)
Dept: NEPHROLOGY | Facility: CLINIC | Age: 67
End: 2022-08-05

## 2022-08-05 NOTE — TELEPHONE ENCOUNTER
Left message to schedule Jab follow up appointment with Dr Zane Finn in [place]  This is the 1st attempt

## 2022-08-29 DIAGNOSIS — I10 ESSENTIAL HYPERTENSION: ICD-10-CM

## 2022-08-29 RX ORDER — AMLODIPINE BESYLATE 5 MG/1
5 TABLET ORAL DAILY
Qty: 90 TABLET | Refills: 0 | Status: SHIPPED | OUTPATIENT
Start: 2022-08-29 | End: 2022-09-07 | Stop reason: SDUPTHER

## 2022-09-07 ENCOUNTER — OFFICE VISIT (OUTPATIENT)
Dept: FAMILY MEDICINE CLINIC | Facility: CLINIC | Age: 67
End: 2022-09-07
Payer: MEDICARE

## 2022-09-07 VITALS
WEIGHT: 221 LBS | DIASTOLIC BLOOD PRESSURE: 70 MMHG | SYSTOLIC BLOOD PRESSURE: 120 MMHG | BODY MASS INDEX: 29.29 KG/M2 | TEMPERATURE: 97.9 F | HEIGHT: 73 IN | HEART RATE: 59 BPM | OXYGEN SATURATION: 97 %

## 2022-09-07 DIAGNOSIS — I10 ESSENTIAL HYPERTENSION: Primary | ICD-10-CM

## 2022-09-07 DIAGNOSIS — Z00.00 MEDICARE ANNUAL WELLNESS VISIT, SUBSEQUENT: ICD-10-CM

## 2022-09-07 DIAGNOSIS — N18.31 STAGE 3A CHRONIC KIDNEY DISEASE (HCC): ICD-10-CM

## 2022-09-07 DIAGNOSIS — E78.2 MIXED HYPERLIPIDEMIA: ICD-10-CM

## 2022-09-07 DIAGNOSIS — C64.2 RENAL CELL CARCINOMA OF LEFT KIDNEY (HCC): ICD-10-CM

## 2022-09-07 DIAGNOSIS — Z12.5 SCREENING FOR PROSTATE CANCER: ICD-10-CM

## 2022-09-07 DIAGNOSIS — Z13.6 SCREENING FOR CARDIOVASCULAR CONDITION: ICD-10-CM

## 2022-09-07 PROCEDURE — G0439 PPPS, SUBSEQ VISIT: HCPCS | Performed by: FAMILY MEDICINE

## 2022-09-07 RX ORDER — PIMECROLIMUS 10 MG/G
1 CREAM TOPICAL 2 TIMES DAILY
COMMUNITY
Start: 2022-08-18

## 2022-09-07 RX ORDER — AMLODIPINE BESYLATE 5 MG/1
5 TABLET ORAL DAILY
Qty: 90 TABLET | Refills: 0 | Status: SHIPPED | OUTPATIENT
Start: 2022-09-07

## 2022-09-07 RX ORDER — ATORVASTATIN CALCIUM 10 MG/1
10 TABLET, FILM COATED ORAL DAILY
Qty: 90 TABLET | Refills: 1 | Status: SHIPPED | OUTPATIENT
Start: 2022-09-07 | End: 2022-10-19 | Stop reason: SDUPTHER

## 2022-09-07 NOTE — PROGRESS NOTES
Nicole Gilbert is here for his Subsequent Wellness visit  Health Risk Assessment:   Patient rates overall health as good  Patient feels that their physical health rating is same  Patient is satisfied with their life  Eyesight was rated as same  Hearing was rated as slightly worse  Patient feels that their emotional and mental health rating is same  Patients states they are never, rarely angry  Patient states they are sometimes unusually tired/fatigued  Patient states that he has experienced no weight loss or gain in last 6 months  Depression Screening:   PHQ-2 Score: 0      Fall Risk Screening: In the past year, patient has experienced: no history of falling in past year      Home Safety:  Patient does not have trouble with stairs inside or outside of their home  Patient has working smoke alarms and has working carbon monoxide detector  Nutrition:   Current diet is Regular  Medications:   Patient is currently taking over-the-counter supplements  OTC medications include: see medication list  Patient is able to manage medications  Activities of Daily Living (ADLs)/Instrumental Activities of Daily Living (IADLs):   Walk and transfer into and out of bed and chair?: Yes  Dress and groom yourself?: Yes    Bathe or shower yourself?: Yes    Feed yourself? Yes  Do your laundry/housekeeping?: Yes  Manage your money, pay your bills and track your expenses?: Yes  Make your own meals?: Yes    Do your own shopping?: Yes    Previous Hospitalizations:   Any hospitalizations or ED visits within the last 12 months?: No      Advance Care Planning:   Living will: Yes    Durable POA for healthcare:  Yes    Advanced directive: Yes      Cognitive Screening:   Provider or family/friend/caregiver concerned regarding cognition?: No    PREVENTIVE SCREENINGS      Cardiovascular Screening:    General: Screening Not Indicated, History Lipid Disorder and Risks and Benefits Discussed    Due for: Lipid Panel      Diabetes Screening: General: Screening Current and Risks and Benefits Discussed    Due for: Blood Glucose      Colorectal Cancer Screening:     General: Screening Current      Prostate Cancer Screening:    General: Risks and Benefits Discussed    Due for: PSA      Osteoporosis Screening:    General: Risks and Benefits Discussed and Screening Not Indicated      Abdominal Aortic Aneurysm (AAA) Screening:    Risk factors include: age between 73-67 yo        General: Risks and Benefits Discussed and Screening Not Indicated      Lung Cancer Screening:     General: Screening Not Indicated and Risks and Benefits Discussed      Hepatitis C Screening:    General: Screening Current and Risks and Benefits Discussed    Other Counseling Topics:   Regular weightbearing exercise

## 2022-09-07 NOTE — PATIENT INSTRUCTIONS
Medicare Preventive Visit Patient Instructions  Thank you for completing your Welcome to Medicare Visit or Medicare Annual Wellness Visit today  Your next wellness visit will be due in one year (9/8/2023)  The screening/preventive services that you may require over the next 5-10 years are detailed below  Some tests may not apply to you based off risk factors and/or age  Screening tests ordered at today's visit but not completed yet may show as past due  Also, please note that scanned in results may not display below  Preventive Screenings:  Service Recommendations Previous Testing/Comments   Colorectal Cancer Screening  · Colonoscopy    · Fecal Occult Blood Test (FOBT)/Fecal Immunochemical Test (FIT)  · Fecal DNA/Cologuard Test  · Flexible Sigmoidoscopy Age: 39-70 years old   Colonoscopy: every 10 years (May be performed more frequently if at higher risk)  OR  FOBT/FIT: every 1 year  OR  Cologuard: every 3 years  OR  Sigmoidoscopy: every 5 years  Screening may be recommended earlier than age 39 if at higher risk for colorectal cancer  Also, an individualized decision between you and your healthcare provider will decide whether screening between the ages of 74-80 would be appropriate   Colonoscopy: 10/22/2019  FOBT/FIT: Not on file  Cologuard: Not on file  Sigmoidoscopy: Not on file    Screening Current     Prostate Cancer Screening Individualized decision between patient and health care provider in men between ages of 53-78   Medicare will cover every 12 months beginning on the day after your 50th birthday PSA: 1 5 ng/mL           Hepatitis C Screening Once for adults born between 1945 and 1965  More frequently in patients at high risk for Hepatitis C Hep C Antibody: 01/19/2021    Screening Current   Diabetes Screening 1-2 times per year if you're at risk for diabetes or have pre-diabetes Fasting glucose: 85 mg/dL (12/28/2021)  A1C: No results in last 5 years (No results in last 5 years)  Screening Current Cholesterol Screening Once every 5 years if you don't have a lipid disorder  May order more often based on risk factors  Lipid panel: 01/23/2020  Screening Not Indicated  History Lipid Disorder      Other Preventive Screenings Covered by Medicare:  1  Abdominal Aortic Aneurysm (AAA) Screening: covered once if your at risk  You're considered to be at risk if you have a family history of AAA or a male between the age of 73-68 who smoking at least 100 cigarettes in your lifetime  2  Lung Cancer Screening: covers low dose CT scan once per year if you meet all of the following conditions: (1) Age 50-69; (2) No signs or symptoms of lung cancer; (3) Current smoker or have quit smoking within the last 15 years; (4) You have a tobacco smoking history of at least 20 pack years (packs per day x number of years you smoked); (5) You get a written order from a healthcare provider  3  Glaucoma Screening: covered annually if you're considered high risk: (1) You have diabetes OR (2) Family history of glaucoma OR (3)  aged 48 and older OR (3)  American aged 72 and older  3  Osteoporosis Screening: covered every 2 years if you meet one of the following conditions: (1) Have a vertebral abnormality; (2) On glucocorticoid therapy for more than 3 months; (3) Have primary hyperparathyroidism; (4) On osteoporosis medications and need to assess response to drug therapy  5  HIV Screening: covered annually if you're between the age of 12-76  Also covered annually if you are younger than 13 and older than 72 with risk factors for HIV infection  For pregnant patients, it is covered up to 3 times per pregnancy      Immunizations:  Immunization Recommendations   Influenza Vaccine Annual influenza vaccination during flu season is recommended for all persons aged >= 6 months who do not have contraindications   Pneumococcal Vaccine   * Pneumococcal conjugate vaccine = PCV13 (Prevnar 13), PCV15 (Vaxneuvance), PCV20 (Prevnar 20)  * Pneumococcal polysaccharide vaccine = PPSV23 (Pneumovax) Adults 2364 years old: 1-3 doses may be recommended based on certain risk factors  Adults 72 years old: 1-2 doses may be recommended based off what pneumonia vaccine you previously received   Hepatitis B Vaccine 3 dose series if at intermediate or high risk (ex: diabetes, end stage renal disease, liver disease)   Tetanus (Td) Vaccine - COST NOT COVERED BY MEDICARE PART B Following completion of primary series, a booster dose should be given every 10 years to maintain immunity against tetanus  Td may also be given as tetanus wound prophylaxis  Tdap Vaccine - COST NOT COVERED BY MEDICARE PART B Recommended at least once for all adults  For pregnant patients, recommended with each pregnancy  Shingles Vaccine (Shingrix) - COST NOT COVERED BY MEDICARE PART B  2 shot series recommended in those aged 48 and above     Health Maintenance Due:      Topic Date Due    Colorectal Cancer Screening  10/22/2022    Hepatitis C Screening  Completed     Immunizations Due:      Topic Date Due    Influenza Vaccine (1) 09/01/2022     Advance Directives   What are advance directives? Advance directives are legal documents that state your wishes and plans for medical care  These plans are made ahead of time in case you lose your ability to make decisions for yourself  Advance directives can apply to any medical decision, such as the treatments you want, and if you want to donate organs  What are the types of advance directives? There are many types of advance directives, and each state has rules about how to use them  You may choose a combination of any of the following:  · Living will: This is a written record of the treatment you want  You can also choose which treatments you do not want, which to limit, and which to stop at a certain time  This includes surgery, medicine, IV fluid, and tube feedings  · Durable power of  for healthcare [facility]):   This is a written record that states who you want to make healthcare choices for you when you are unable to make them for yourself  This person, called a proxy, is usually a family member or a friend  You may choose more than 1 proxy  · Do not resuscitate (DNR) order:  A DNR order is used in case your heart stops beating or you stop breathing  It is a request not to have certain forms of treatment, such as CPR  A DNR order may be included in other types of advance directives  · Medical directive: This covers the care that you want if you are in a coma, near death, or unable to make decisions for yourself  You can list the treatments you want for each condition  Treatment may include pain medicine, surgery, blood transfusions, dialysis, IV or tube feedings, and a ventilator (breathing machine)  · Values history: This document has questions about your views, beliefs, and how you feel and think about life  This information can help others choose the care that you would choose  Why are advance directives important? An advance directive helps you control your care  Although spoken wishes may be used, it is better to have your wishes written down  Spoken wishes can be misunderstood, or not followed  Treatments may be given even if you do not want them  An advance directive may make it easier for your family to make difficult choices about your care  Weight Management   Why it is important to manage your weight:  Being overweight increases your risk of health conditions such as heart disease, high blood pressure, type 2 diabetes, and certain types of cancer  It can also increase your risk for osteoarthritis, sleep apnea, and other respiratory problems  Aim for a slow, steady weight loss  Even a small amount of weight loss can lower your risk of health problems  How to lose weight safely:  A safe and healthy way to lose weight is to eat fewer calories and get regular exercise   You can lose up about 1 pound a week by decreasing the number of calories you eat by 500 calories each day  Healthy meal plan for weight management:  A healthy meal plan includes a variety of foods, contains fewer calories, and helps you stay healthy  A healthy meal plan includes the following:  · Eat whole-grain foods more often  A healthy meal plan should contain fiber  Fiber is the part of grains, fruits, and vegetables that is not broken down by your body  Whole-grain foods are healthy and provide extra fiber in your diet  Some examples of whole-grain foods are whole-wheat breads and pastas, oatmeal, brown rice, and bulgur  · Eat a variety of vegetables every day  Include dark, leafy greens such as spinach, kale, cary greens, and mustard greens  Eat yellow and orange vegetables such as carrots, sweet potatoes, and winter squash  · Eat a variety of fruits every day  Choose fresh or canned fruit (canned in its own juice or light syrup) instead of juice  Fruit juice has very little or no fiber  · Eat low-fat dairy foods  Drink fat-free (skim) milk or 1% milk  Eat fat-free yogurt and low-fat cottage cheese  Try low-fat cheeses such as mozzarella and other reduced-fat cheeses  · Choose meat and other protein foods that are low in fat  Choose beans or other legumes such as split peas or lentils  Choose fish, skinless poultry (chicken or turkey), or lean cuts of red meat (beef or pork)  Before you cook meat or poultry, cut off any visible fat  · Use less fat and oil  Try baking foods instead of frying them  Add less fat, such as margarine, sour cream, regular salad dressing and mayonnaise to foods  Eat fewer high-fat foods  Some examples of high-fat foods include french fries, doughnuts, ice cream, and cakes  · Eat fewer sweets  Limit foods and drinks that are high in sugar  This includes candy, cookies, regular soda, and sweetened drinks  Exercise:  Exercise at least 30 minutes per day on most days of the week   Some examples of exercise include walking, biking, dancing, and swimming  You can also fit in more physical activity by taking the stairs instead of the elevator or parking farther away from stores  Ask your healthcare provider about the best exercise plan for you  © Copyright [employer] 2018 Information is for End User's use only and may not be sold, redistributed or otherwise used for commercial purposes   All illustrations and images included in CareNotes® are the copyrighted property of A D A M , Inc  or [address] [place] [address]

## 2022-09-09 ENCOUNTER — APPOINTMENT (OUTPATIENT)
Dept: LAB | Age: 67
End: 2022-09-09
Payer: MEDICARE

## 2022-09-09 DIAGNOSIS — N18.31 STAGE 3A CHRONIC KIDNEY DISEASE (HCC): ICD-10-CM

## 2022-09-09 DIAGNOSIS — C64.2 RENAL CELL CARCINOMA OF LEFT KIDNEY (HCC): ICD-10-CM

## 2022-09-09 DIAGNOSIS — I10 ESSENTIAL HYPERTENSION: ICD-10-CM

## 2022-09-09 DIAGNOSIS — E78.2 MIXED HYPERLIPIDEMIA: ICD-10-CM

## 2022-09-09 DIAGNOSIS — Z12.5 SCREENING FOR PROSTATE CANCER: ICD-10-CM

## 2022-09-09 DIAGNOSIS — Z13.6 SCREENING FOR CARDIOVASCULAR CONDITION: ICD-10-CM

## 2022-09-09 LAB
ALBUMIN SERPL BCP-MCNC: 3.4 G/DL (ref 3.5–5)
ALP SERPL-CCNC: 78 U/L (ref 46–116)
ALT SERPL W P-5'-P-CCNC: 28 U/L (ref 12–78)
ANION GAP SERPL CALCULATED.3IONS-SCNC: 3 MMOL/L (ref 4–13)
AST SERPL W P-5'-P-CCNC: 13 U/L (ref 5–45)
BASOPHILS # BLD AUTO: 0.01 THOUSANDS/ΜL (ref 0–0.1)
BASOPHILS NFR BLD AUTO: 0 % (ref 0–1)
BILIRUB SERPL-MCNC: 0.81 MG/DL (ref 0.2–1)
BUN SERPL-MCNC: 16 MG/DL (ref 5–25)
CALCIUM ALBUM COR SERPL-MCNC: 9.4 MG/DL (ref 8.3–10.1)
CALCIUM SERPL-MCNC: 8.9 MG/DL (ref 8.3–10.1)
CHLORIDE SERPL-SCNC: 106 MMOL/L (ref 96–108)
CHOLEST SERPL-MCNC: 129 MG/DL
CO2 SERPL-SCNC: 30 MMOL/L (ref 21–32)
CREAT SERPL-MCNC: 1.37 MG/DL (ref 0.6–1.3)
EOSINOPHIL # BLD AUTO: 0.12 THOUSAND/ΜL (ref 0–0.61)
EOSINOPHIL NFR BLD AUTO: 2 % (ref 0–6)
ERYTHROCYTE [DISTWIDTH] IN BLOOD BY AUTOMATED COUNT: 13.4 % (ref 11.6–15.1)
GFR SERPL CREATININE-BSD FRML MDRD: 52 ML/MIN/1.73SQ M
GLUCOSE P FAST SERPL-MCNC: 91 MG/DL (ref 65–99)
HCT VFR BLD AUTO: 45.5 % (ref 36.5–49.3)
HDLC SERPL-MCNC: 63 MG/DL
HGB BLD-MCNC: 14.6 G/DL (ref 12–17)
IMM GRANULOCYTES # BLD AUTO: 0.02 THOUSAND/UL (ref 0–0.2)
IMM GRANULOCYTES NFR BLD AUTO: 0 % (ref 0–2)
LDLC SERPL CALC-MCNC: 56 MG/DL (ref 0–100)
LYMPHOCYTES # BLD AUTO: 1.41 THOUSANDS/ΜL (ref 0.6–4.47)
LYMPHOCYTES NFR BLD AUTO: 23 % (ref 14–44)
MCH RBC QN AUTO: 29.8 PG (ref 26.8–34.3)
MCHC RBC AUTO-ENTMCNC: 32.1 G/DL (ref 31.4–37.4)
MCV RBC AUTO: 93 FL (ref 82–98)
MONOCYTES # BLD AUTO: 0.49 THOUSAND/ΜL (ref 0.17–1.22)
MONOCYTES NFR BLD AUTO: 8 % (ref 4–12)
NEUTROPHILS # BLD AUTO: 4.17 THOUSANDS/ΜL (ref 1.85–7.62)
NEUTS SEG NFR BLD AUTO: 67 % (ref 43–75)
NONHDLC SERPL-MCNC: 66 MG/DL
NRBC BLD AUTO-RTO: 0 /100 WBCS
PLATELET # BLD AUTO: 253 THOUSANDS/UL (ref 149–390)
PMV BLD AUTO: 9.7 FL (ref 8.9–12.7)
POTASSIUM SERPL-SCNC: 4.4 MMOL/L (ref 3.5–5.3)
PROT SERPL-MCNC: 6.9 G/DL (ref 6.4–8.4)
PSA SERPL-MCNC: 1.8 NG/ML (ref 0–4)
RBC # BLD AUTO: 4.9 MILLION/UL (ref 3.88–5.62)
SODIUM SERPL-SCNC: 139 MMOL/L (ref 135–147)
TRIGL SERPL-MCNC: 48 MG/DL
TSH SERPL DL<=0.05 MIU/L-ACNC: 2.4 UIU/ML (ref 0.45–4.5)
WBC # BLD AUTO: 6.22 THOUSAND/UL (ref 4.31–10.16)

## 2022-09-09 PROCEDURE — 80053 COMPREHEN METABOLIC PANEL: CPT

## 2022-09-09 PROCEDURE — 85025 COMPLETE CBC W/AUTO DIFF WBC: CPT

## 2022-09-09 PROCEDURE — 80061 LIPID PANEL: CPT

## 2022-09-09 PROCEDURE — 36415 COLL VENOUS BLD VENIPUNCTURE: CPT

## 2022-09-09 PROCEDURE — 84443 ASSAY THYROID STIM HORMONE: CPT

## 2022-09-09 PROCEDURE — G0103 PSA SCREENING: HCPCS

## 2022-09-21 ENCOUNTER — HOSPITAL ENCOUNTER (OUTPATIENT)
Dept: RADIOLOGY | Age: 67
Discharge: HOME | End: 2022-09-21
Payer: MEDICARE

## 2022-09-21 ENCOUNTER — APPOINTMENT (OUTPATIENT)
Dept: RADIOLOGY | Age: 67
End: 2022-09-21
Payer: MEDICARE

## 2022-09-21 DIAGNOSIS — C64.2 RENAL CELL CARCINOMA OF LEFT KIDNEY (HCC): ICD-10-CM

## 2022-09-21 PROCEDURE — 74178 CT ABD&PLV WO CNTR FLWD CNTR: CPT

## 2022-09-21 PROCEDURE — G1004 CDSM NDSC: HCPCS

## 2022-09-21 PROCEDURE — 71046 X-RAY EXAM CHEST 2 VIEWS: CPT

## 2022-09-21 RX ADMIN — IOHEXOL 100 ML: 350 INJECTION, SOLUTION INTRAVENOUS at 09:00

## 2022-10-11 ENCOUNTER — OFFICE VISIT (OUTPATIENT)
Dept: UROLOGY | Facility: AMBULATORY SURGERY CENTER | Age: 67
End: 2022-10-11

## 2022-10-11 VITALS
HEIGHT: 73 IN | WEIGHT: 221 LBS | DIASTOLIC BLOOD PRESSURE: 82 MMHG | BODY MASS INDEX: 29.29 KG/M2 | HEART RATE: 83 BPM | OXYGEN SATURATION: 98 % | SYSTOLIC BLOOD PRESSURE: 120 MMHG | RESPIRATION RATE: 18 BRPM

## 2022-10-11 DIAGNOSIS — C64.2 RENAL CELL CARCINOMA OF LEFT KIDNEY (HCC): Primary | ICD-10-CM

## 2022-10-11 RX ORDER — IVERMECTIN 10 MG/G
1 CREAM TOPICAL DAILY
COMMUNITY
Start: 2022-09-13

## 2022-10-11 NOTE — PROGRESS NOTES
10/11/2022      Chief Complaint   Patient presents with   • renal cell carcinoma     Assessment and Plan    75 y o  male managed by Dr Wendi Becker    1  Renal Cell Carcinoma  · Status post left robotic assisted laparoscopic partial nephrectomy 10/2020  · CT renal protocol  · Chest x-ray unremarkable  · Will repeat CT and chest x-ray in 1 year  · BMP-renal function  · PCP monitoring with repeat BMP in a few weeks  Patient may benefit from referral to Nephrology  · We discussed the need to maintain adequate hydration, avoiding nephrotoxic agents-NSAIDS    2  Prostate Cancer Screening   · PSA and SOPHIA performed by PCP  · Most recent PSA performed 09/09/2022 resulted 1 8    History of Present Illness  Guy Restrepo is a 75 y o  male here for follow up evaluation of  left RCC and prostate cancer screening presents today for follow up   Patient status post left robot assisted laparoscopic partial nephrectomy performed in October 2020 by Dr Wendi Becker  He was initially found to have an almost 7 cm upper pole left renal mass   Final pathology revealed stage T1 B papillary renal cell carcinoma with negative surgical margins   He has been doing well without any complaints   Patient denies any episodes of flank pain or gross hematuria        Review of Systems   Constitutional: Negative for chills and fever  Respiratory: Negative for cough and shortness of breath  Cardiovascular: Negative for chest pain  Gastrointestinal: Negative for abdominal distention, abdominal pain, blood in stool, nausea and vomiting  Genitourinary: Negative for difficulty urinating, dysuria, enuresis, flank pain, frequency, hematuria and urgency  Skin: Negative for rash       AUA SYMPTOM SCORE    Flowsheet Row Most Recent Value   AUA SYMPTOM SCORE    How often have you had a sensation of not emptying your bladder completely after you finished urinating? 2 (P)     How often have you had to urinate again less than two hours after you finished urinating? 3 (P)     How often have you found you stopped and started again several times when you urinate? 1 (P)     How often have you found it difficult to postpone urination? 4 (P)     How often have you had a weak urinary stream? 1 (P)     How often have you had to push or strain to begin urination? 0 (P)     How many times did you most typically get up to urinate from the time you went to bed at night until the time you got up in the morning? 4 (P)     Quality of Life: If you were to spend the rest of your life with your urinary condition just the way it is now, how would you feel about that? 3 (P)     AUA SYMPTOM SCORE 15 (P)              Past Medical History  Past Medical History:   Diagnosis Date   • Arthritis    • Atrial fibrillation ([facility] [address] )    • Cancer ([facility] [address] ) 08/2020    Kidney cancer   • Cancer ([facility] [address] )     melanoma   • Chronic kidney disease     left kidney cancer   • CPAP (continuous positive airway pressure) dependence    • Hyperlipidemia    • Hypertension    • Irregular heart beat     Afib   • Pneumonia    • Skin cancer    • Sleep apnea    • Stroke [facility])    • Supraventricular tachycardia ([facility] [address] )    • Wears glasses    • Wears partial dentures     upper partial       Past Social History  Past Surgical History:   Procedure Laterality Date   • APPENDECTOMY     • CARDIOVERSION     • COLONOSCOPY      fiberoptic, also 2009, both negative, resolved 2004   • COLONOSCOPY  08/2019   • EGD     • HERNIA REPAIR      umbilical and bilateral inguinal   • KNEE ARTHROSCOPY Left    • MO LAP,PARTIAL NEPHRECTOMY Left 10/7/2020    Procedure: ROBOTIC PARTIAL NEPHRECTOMY;  Surgeon: Johnathan Chambers MD;  [facility];  Service: Urology   • MO PERQ CLSR TCAT L ATR APNDGE W/ENDOCARDIAL IMPLNT N/A 3/25/2021    Procedure: LEFT ATRIAL APPENDAGE OCCLUSION;  Surgeon: Sonal Rai MD;  [facility];  Service: Cardiology   • SKIN GRAFT      left ear, skin cancer   • TONSILLECTOMY       Social History     Tobacco Use Smoking Status Never Smoker   Smokeless Tobacco Never Used       Past Family History  Family History   Problem Relation Age of Onset   • Cancer Father         stomach   • Hypertension Father    • Stroke Father         syndrome   • Cancer Family    • Heart disease Family    • Hypertension Family    • Stroke Family         syndrome   • Throat cancer Mother    • Diabetes Mother    • Asthma Sister        Past Social history  Social History     Socioeconomic History   • Marital status: /Civil Union     Spouse name: Not on file   • Number of children: Not on file   • Years of education: Not on file   • Highest education level: Not on file   Occupational History   • Occupation: retired    Tobacco Use   • Smoking status: Never Smoker   • Smokeless tobacco: Never Used   Vaping Use   • Vaping Use: Never used   Substance and Sexual Activity   • Alcohol use: Not Currently   • Drug use: Never   • Sexual activity: Yes     Partners: Female   Other Topics Concern   • Not on file   Social History Narrative   • Not on file     Social Determinants of Health     Financial Resource Strain: Low Risk    • Difficulty of Paying Living Expenses: Not hard at all   Food Insecurity: Not on file   Transportation Needs: No Transportation Needs   • Lack of Transportation (Medical): No   • Lack of Transportation (Non-Medical):  No   Physical Activity: Not on file   Stress: Not on file   Social Connections: Not on file   Intimate Partner Violence: Not on file   Housing Stability: Not on file       Current Medications  Current Outpatient Medications   Medication Sig Dispense Refill   • amLODIPine (NORVASC) 5 mg tablet Take 1 tablet (5 mg total) by mouth daily 90 tablet 0   • aspirin (ECOTRIN LOW STRENGTH) 81 mg EC tablet Take 1 tablet (81 mg total) by mouth daily 30 tablet 2   • atorvastatin (LIPITOR) 10 mg tablet Take 1 tablet (10 mg total) by mouth daily 90 tablet 1   • Coenzyme Q10 (CO Q-10 PO) Take 1 caplet by mouth every morning     • Ivermectin 1 % CREA Apply 1 application topically daily To affected area     • metoprolol succinate (TOPROL-XL) 50 mg 24 hr tablet Take 1 tablet (50 mg total) by mouth every morning 90 tablet 1   • Multiple Vitamins-Minerals (MULTIVITAMIN ADULT PO) Take 1 tablet by mouth every morning     • pimecrolimus (ELIDEL) 1 % cream Apply 1 application topically 2 (two) times a day     • flecainide (TAMBOCOR) 100 mg tablet Take 1 tablet (100 mg total) by mouth 2 (two) times a day 60 tablet 10     No current facility-administered medications for this visit  Allergies  No Known Allergies      The following portions of the patient's history were reviewed and updated as appropriate: allergies, current medications, past medical history, past social history, past surgical history and problem list       Vitals  Vitals:    10/11/22 1018   BP: 120/82   BP Location: Right arm   Patient Position: Sitting   Cuff Size: Standard   Pulse: 83   Resp: 18   SpO2: 98%   Weight: 100 kg (221 lb)   Height: 6' 1" (1 854 m)       Physical Exam  Physical Exam  Vitals reviewed  Constitutional:       General: He is not in acute distress  Appearance: Normal appearance  He is normal weight  HENT:      Head: Normocephalic  Pulmonary:      Effort: No respiratory distress  Breath sounds: Normal breath sounds  Skin:     General: Skin is warm and dry  Neurological:      General: No focal deficit present  Mental Status: He is alert and oriented to person, place, and time     Psychiatric:         Mood and Affect: Mood normal          Behavior: Behavior normal        Results  No results found for this or any previous visit (from the past 1 hour(s)) ]  Lab Results   Component Value Date    PSA 1 8 09/09/2022    PSA 1 5 01/19/2021    PSA 2 0 09/10/2019     Lab Results   Component Value Date    CALCIUM 8 9 09/09/2022     09/19/2017    K 4 4 09/09/2022    CO2 30 09/09/2022     09/09/2022    BUN 16 09/09/2022 CREATININE 1 37 (H) 09/09/2022     Lab Results   Component Value Date    WBC 6 22 09/09/2022    HGB 14 6 09/09/2022    HCT 45 5 09/09/2022    MCV 93 09/09/2022     09/09/2022     CHEST      INDICATION:   C64 2: Malignant neoplasm of left kidney, except renal pelvis      COMPARISON:  CXR 9/9/2021 and renal CT 9/21/2022, cardiac CT 8/20/2020      EXAM PERFORMED/VIEWS:  XR CHEST PA & LATERAL  DUAL ENERGY SUBTRACTION        FINDINGS:     Cardiomediastinal silhouette appears unremarkable  Watchman device in the expected location of the left atrial appendage      The lungs are clear  No pneumothorax or pleural effusion      Osseous structures appear within normal limits for patient age      IMPRESSION:     No acute cardiopulmonary disease  CT ABDOMEN AND PELVIS WITH AND WITHOUT IV CONTRAST     INDICATION:   C64 2: Malignant neoplasm of left kidney, except renal pelvis  Status post partial left nephrectomy 10/7/2020  History of appendectomy and hernia repair  Follow-up      COMPARISON:  9/9/2021     TECHNIQUE: Initial CT of the abdomen and pelvis was performed without intravenous contrast   Subsequent dynamic CT evaluation of the abdomen and pelvis was performed after the administration of intravenous contrast in both nephrographic and delayed   phases after the administration of intravenous contrast    Axial, sagittal, and coronal 2D reformatted images were created from the source data and submitted for interpretation       Radiation dose length product (DLP) for this visit:  4271 mGy-cm   This examination, like all CT scans performed in the [facility], was performed utilizing techniques to minimize radiation dose exposure, including the use of iterative   reconstruction and automated exposure control      IV Contrast:  100 mL of iohexol (OMNIPAQUE)  Enteric Contrast:  Enteric contrast was not administered      FINDINGS:     ABDOMEN     RIGHT KIDNEY AND URETER:  No solid renal mass    No detectable urothelial mass  No hydronephrosis or hydroureter  No urinary tract calculi  No perinephric collection      LEFT KIDNEY AND URETER:  Similar left upper pole surgical defect  No solid renal mass  No detectable urothelial mass  No hydronephrosis or hydroureter  No urinary tract calculi  No perinephric collection      URINARY BLADDER:  Mild mass effect from enlarged prostate  Otherwise within normal limits  No calculi         LOWER CHEST:  No clinically significant abnormality identified in the visualized lower chest      LIVER/BILIARY TREE:  Within normal limits      GALLBLADDER:  Within normal limits      SPLEEN:  Within normal limits      PANCREAS:  Within normal limits      ADRENAL GLANDS:  Within normal limits      STOMACH AND BOWEL:  Mild diverticulosis  Normal caliber and wall thickness      APPENDIX:  There are expected postoperative changes of appendectomy      ABDOMINOPELVIC CAVITY:  No ascites  No free intraperitoneal air  No lymphadenopathy      VESSELS:  Mild atherosclerosis  Similar mild celiac artery ectasia  No acute findings      PELVIS     REPRODUCTIVE ORGANS:  Mild prostate enlargement  Seminal vesicles are within normal limits      ABDOMINAL WALL/INGUINAL REGIONS:  Within normal limits      OSSEOUS STRUCTURES:  Mild lumbar levoscoliosis and degenerative change  Few scattered small bone islands      IMPRESSION:     No evidence of recurrent or metastatic malignancy      Stable appearance of the abdomen and pelvis to prior study from approximately one year ago         Orders  Orders Placed This Encounter   Procedures   • CT renal protocol     Standing Status:   Future     Standing Expiration Date:   10/11/2026     Scheduling Instructions:      Nothing to eat 3 hours prior to your test   Clear liquids are also permitted up until the time of the scan  Clear liquids include water, black coffee or tea, apple juice or clear broth   If possible wear clothing without any metal in the abdomen area  Sweat suit, shorts, sports bra or bra without underwire may eliminate the need to change  Please bring your insurance cards, a form of photo ID and a list of your medications with you  Arrive 15 minutes prior to your appointment time in order to register  On the day of your test, please bring any prior CT or MRI studies of this area with you that were not performed at a [facility]  To schedule this appointment, please contact Central Scheduling at [phone]  Order Specific Question:   What is the patient's sedation requirement? Answer:   No Sedation     Order Specific Question:   Contrast information:     Answer:   IV     Order Specific Question:   Did the patient ever have a reaction to x-ray dye? If yes, please verify the type of allergy and order the contrast allergy prep  Answer:   No     Order Specific Question:   Release to patient through Mychart     Answer:   Immediate     Order Specific Question:   Reason for Exam (FREE TEXT)     Answer:   hx RCC- s/p Left nephrectomy   • XR chest pa & lateral     Standing Status:   Future     Standing Expiration Date:   10/11/2026     Scheduling Instructions:      Bring along any outside films relating to this procedure  • Basic metabolic panel     This is a patient instruction: Patient fasting for 8 hours or longer recommended       Standing Status:   Future     Standing Expiration Date:   10/11/2023       JERMAINE Santiago

## 2022-10-11 NOTE — PATIENT INSTRUCTIONS
CT scan in 1 year   Chest Xray in 1 year   Follow up in the office in 1 year  Call the office for concerns or questions

## 2022-10-19 DIAGNOSIS — E78.2 MIXED HYPERLIPIDEMIA: ICD-10-CM

## 2022-10-19 RX ORDER — ATORVASTATIN CALCIUM 10 MG/1
10 TABLET, FILM COATED ORAL DAILY
Qty: 90 TABLET | Refills: 1 | Status: SHIPPED | OUTPATIENT
Start: 2022-10-19

## 2022-10-21 ENCOUNTER — CLINICAL SUPPORT (OUTPATIENT)
Dept: FAMILY MEDICINE CLINIC | Facility: CLINIC | Age: 67
End: 2022-10-21
Payer: MEDICARE

## 2022-10-21 DIAGNOSIS — Z23 NEED FOR IMMUNIZATION AGAINST INFLUENZA: Primary | ICD-10-CM

## 2022-10-21 PROCEDURE — 90662 IIV NO PRSV INCREASED AG IM: CPT

## 2022-10-21 PROCEDURE — G0008 ADMIN INFLUENZA VIRUS VAC: HCPCS

## 2022-11-21 ENCOUNTER — TELEPHONE (OUTPATIENT)
Dept: UROLOGY | Facility: MEDICAL CENTER | Age: 67
End: 2022-11-21

## 2022-11-21 NOTE — TELEPHONE ENCOUNTER
Patient seen by Madhuri Sanchez in [place]    Patient called stating he was seen by Madhuri Sanchez in October and  he is to do a BMP but he is not sure if is to wait a year for it or is he to do it sooner        He can be reached at    [phone]

## 2022-11-21 NOTE — TELEPHONE ENCOUNTER
Called and spoke with patient  Advised that per the last office note  It says returns for follow up, labs, xray and CT in one year  Advised to get BMP prior to CT as it'll be needed for it anyway  He states he understands

## 2022-11-24 DIAGNOSIS — I48.19 PERSISTENT ATRIAL FIBRILLATION (HCC): ICD-10-CM

## 2022-11-25 RX ORDER — METOPROLOL SUCCINATE 50 MG/1
TABLET, EXTENDED RELEASE ORAL
Qty: 90 TABLET | Refills: 1 | Status: SHIPPED | OUTPATIENT
Start: 2022-11-25

## 2022-12-07 ENCOUNTER — OFFICE VISIT (OUTPATIENT)
Dept: SLEEP CENTER | Facility: CLINIC | Age: 67
End: 2022-12-07

## 2022-12-07 VITALS
OXYGEN SATURATION: 97 % | DIASTOLIC BLOOD PRESSURE: 68 MMHG | SYSTOLIC BLOOD PRESSURE: 128 MMHG | BODY MASS INDEX: 29.85 KG/M2 | HEIGHT: 73 IN | WEIGHT: 225.2 LBS | HEART RATE: 59 BPM

## 2022-12-07 DIAGNOSIS — G47.33 OSA (OBSTRUCTIVE SLEEP APNEA): Primary | ICD-10-CM

## 2022-12-07 NOTE — PROGRESS NOTES
Progress Note - Sleep Center   [facility] IBP:[birth year] MRN: [id number]      Reason for Visit:  75 y o male here for annual follow-up    Assessment:  Doing well on current therapy of APAP 5 to 15 cm for previously diagnosed obstructive sleep apnea  Plan:  Continue same    Follow up: One year    History of Present Illness:  History of FELISA on PAP therapy  Fully compliant and deriving benefit        Review of Systems      Genitourinary need to urinate more than twice a night   Cardiology none   Gastrointestinal none   Neurology none   Constitutional claustrophobia   Integumentary none   Psychiatry none   Musculoskeletal none   Pulmonary shortness of breath with activity   ENT ringing in ears   Endocrine none   Hematological none           I have reviewed and updated the review of systems as necessary      Historical Information    Past Medical History:   Past Medical History:   Diagnosis Date   • Arthritis    • Atrial fibrillation ([facility] [address] )    • Cancer ([facility] [address] ) 08/2020    Kidney cancer   • Cancer ([facility] [address] )     melanoma   • Chronic kidney disease     left kidney cancer   • CPAP (continuous positive airway pressure) dependence    • Hyperlipidemia    • Hypertension    • Irregular heart beat     Afib   • Pneumonia    • Skin cancer    • Sleep apnea    • Stroke [facility])    • Supraventricular tachycardia ([facility])    • Wears glasses    • Wears partial dentures     upper partial         Past Surgical History:   Past Surgical History:   Procedure Laterality Date   • APPENDECTOMY     • CARDIOVERSION     • COLONOSCOPY      fiberoptic, also 2009, both negative, resolved 2004   • COLONOSCOPY  08/2019   • EGD     • HERNIA REPAIR      umbilical and bilateral inguinal   • KNEE ARTHROSCOPY Left    • [place] LAP,PARTIAL NEPHRECTOMY Left 10/7/2020    Procedure: ROBOTIC PARTIAL NEPHRECTOMY;  Surgeon: Yakov Mcdowell MD;  Location[facility];  Service: Urology   • [place] PERQ CLSR TCAT L ATR APNDGE W/ENDOCARDIAL IMPLNT N/A 3/25/2021    Procedure: LEFT ATRIAL APPENDAGE OCCLUSION;  Surgeon: Tenzin Barrett MD;  [facility];  Service: Cardiology   • SKIN GRAFT      left ear, skin cancer   • TONSILLECTOMY         Social History:   Social History     Socioeconomic History   • Marital status: /Civil Union     Spouse name: None   • Number of children: None   • Years of education: None   • Highest education level: None   Occupational History   • Occupation: retired    Tobacco Use   • Smoking status: Never   • Smokeless tobacco: Never   Vaping Use   • Vaping Use: Never used   Substance and Sexual Activity   • Alcohol use: Not Currently   • Drug use: Never   • Sexual activity: Yes     Partners: Female   Other Topics Concern   • None   Social History Narrative   • None     Social Determinants of Health     Financial Resource Strain: Low Risk    • Difficulty of Paying Living Expenses: Not hard at all   Food Insecurity: Not on file   Transportation Needs: No Transportation Needs   • Lack of Transportation (Medical): No   • Lack of Transportation (Non-Medical):  No   Physical Activity: Not on file   Stress: Not on file   Social Connections: Not on file   Intimate Partner Violence: Not on file   Housing Stability: Not on file       Family History:   Family History   Problem Relation Age of Onset   • Cancer Father         stomach   • Hypertension Father    • Stroke Father         syndrome   • Cancer Family    • Heart disease Family    • Hypertension Family    • Stroke Family         syndrome   • Throat cancer Mother    • Diabetes Mother    • Asthma Sister        Medications/Allergies:      Current Outpatient Medications:   •  amLODIPine (NORVASC) 5 mg tablet, Take 1 tablet (5 mg total) by mouth daily, Disp: 90 tablet, Rfl: 0  •  aspirin (ECOTRIN LOW STRENGTH) 81 mg EC tablet, Take 1 tablet (81 mg total) by mouth daily, Disp: 30 tablet, Rfl: 2  •  atorvastatin (LIPITOR) 10 mg tablet, Take 1 tablet (10 mg total) by mouth daily, Disp: 90 tablet, Rfl: 1  • Coenzyme Q10 (CO Q-10 PO), Take 1 caplet by mouth every morning, Disp: , Rfl:   •  Ivermectin 1 % CREA, Apply 1 application topically daily To affected area, Disp: , Rfl:   •  metoprolol succinate (TOPROL-XL) 50 mg 24 hr tablet, TAKE ONE TABLET BY MOUTH EVERY MORNING, Disp: 90 tablet, Rfl: 1  •  Multiple Vitamins-Minerals (MULTIVITAMIN ADULT PO), Take 1 tablet by mouth every morning, Disp: , Rfl:   •  pimecrolimus (ELIDEL) 1 % cream, Apply 1 application topically 2 (two) times a day, Disp: , Rfl:   •  flecainide (TAMBOCOR) 100 mg tablet, Take 1 tablet (100 mg total) by mouth 2 (two) times a day, Disp: 60 tablet, Rfl: 10          Objective      Vital Signs:   Vitals:    12/07/22 1302   BP: 128/68   Pulse: 59   SpO2: 97%     [place] Sleepiness Scale:          Physical Exam:    General: Alert, appropriate, cooperative, overweight    Head: NC/AT    Skin: Warm, dry    Neuro: No motor abnormalities, cranial nerves appear intact    Extremity: No clubbing, cyanosis      DME Provider: [employer]        Counseling / Coordination of Care   I have spent 15 minutes with the patient today in which greater than 50% of this time was spent in counseling/coordination of care regarding: equipment and compliance  Board Certified Sleep Specialist    Portions of the record may have been created with voice recognition software  Occasional wrong word or "sound a like" substitutions may have occurred due to the inherent limitations of voice recognition software  Read the chart carefully and recognize, using context, where substitutions have occurred

## 2022-12-12 ENCOUNTER — TELEPHONE (OUTPATIENT)
Dept: SLEEP CENTER | Facility: CLINIC | Age: 67
End: 2022-12-12

## 2022-12-14 LAB

## 2022-12-28 ENCOUNTER — APPOINTMENT (OUTPATIENT)
Dept: LAB | Age: 67
End: 2022-12-28

## 2022-12-28 DIAGNOSIS — E78.2 MIXED HYPERLIPIDEMIA: ICD-10-CM

## 2022-12-28 DIAGNOSIS — N18.2 CKD (CHRONIC KIDNEY DISEASE) STAGE 2, GFR 60-89 ML/MIN: ICD-10-CM

## 2022-12-28 DIAGNOSIS — I10 ESSENTIAL HYPERTENSION: ICD-10-CM

## 2022-12-28 LAB
ALBUMIN SERPL BCP-MCNC: 3.7 G/DL (ref 3.5–5)
ANION GAP SERPL CALCULATED.3IONS-SCNC: 4 MMOL/L (ref 4–13)
BUN SERPL-MCNC: 16 MG/DL (ref 5–25)
CALCIUM SERPL-MCNC: 9.1 MG/DL (ref 8.3–10.1)
CHLORIDE SERPL-SCNC: 106 MMOL/L (ref 96–108)
CO2 SERPL-SCNC: 29 MMOL/L (ref 21–32)
CREAT SERPL-MCNC: 1.25 MG/DL (ref 0.6–1.3)
GFR SERPL CREATININE-BSD FRML MDRD: 58 ML/MIN/1.73SQ M
GLUCOSE P FAST SERPL-MCNC: 94 MG/DL (ref 65–99)
MAGNESIUM SERPL-MCNC: 2.6 MG/DL (ref 1.6–2.6)
PHOSPHATE SERPL-MCNC: 3.1 MG/DL (ref 2.3–4.1)
POTASSIUM SERPL-SCNC: 4.3 MMOL/L (ref 3.5–5.3)
SODIUM SERPL-SCNC: 139 MMOL/L (ref 135–147)

## 2023-01-06 ENCOUNTER — TELEPHONE (OUTPATIENT)
Dept: NEPHROLOGY | Facility: CLINIC | Age: 68
End: 2023-01-06

## 2023-01-06 NOTE — TELEPHONE ENCOUNTER
Appointment Confirmation   Person confirmed appointment with  If not patient, name of the person Patient    Date and time of appointment 1/9/23   11:00 am   Patient acknowledged and will be at appointment? yes    Did you advise the patient that they will need a urine sample if they are a new patient?  N/A    Did you advise the patient to bring their current medications for verification? (including any OTC) Yes    Additional Information

## 2023-01-09 ENCOUNTER — OFFICE VISIT (OUTPATIENT)
Dept: NEPHROLOGY | Facility: CLINIC | Age: 68
End: 2023-01-09

## 2023-01-09 VITALS
BODY MASS INDEX: 30.09 KG/M2 | HEIGHT: 73 IN | SYSTOLIC BLOOD PRESSURE: 126 MMHG | DIASTOLIC BLOOD PRESSURE: 76 MMHG | WEIGHT: 227 LBS

## 2023-01-09 DIAGNOSIS — E78.2 MIXED HYPERLIPIDEMIA: ICD-10-CM

## 2023-01-09 DIAGNOSIS — N18.31 STAGE 3A CHRONIC KIDNEY DISEASE (HCC): Primary | ICD-10-CM

## 2023-01-09 DIAGNOSIS — Z90.5 H/O PARTIAL NEPHRECTOMY: Chronic | ICD-10-CM

## 2023-01-09 DIAGNOSIS — I10 ESSENTIAL HYPERTENSION: ICD-10-CM

## 2023-01-09 NOTE — PROGRESS NOTES
Nephrology Follow up Consultation  Aniket Kenney 75 y o  male MRN: [id number]            BACKGROUND:  Aniket Kenney is a 75 y o male who was referred by Johnathan Gonzalez DO for evaluation of Follow-up and Chronic Kidney Disease    ASSESSMENT / PLAN:   75 y o   male with pmh of multiple co-morbidities including HTN, AFib, hyperlipidemia, sleep apnea, CVA, I cc status post partial left nephrectomy in October 2020 and CKD stage 3 presented to the office for routine follow-up  CKD stage 2/3A:  - Patient has a baseline creatinine of 1-1 3 mg/dL dating as far back as 2017  Most recent labs show a Creatinine of 1 25 mg/dL on 12/28/22  Renal function remains stable at baseline    - likely has underlying CKD secondary to age-related nephron loss plus hypertensive nephrosclerosis plus decreased nephron mass due to prior partial nephrectomy in October 2020 for RCC of the left kidney   -History of prior laparoscopic left partial nephrectomy in October 2022 last seen by urology 10/11/2022 notes reviewed  - Acid base and lytes stable  Continue to monitor, no changes for now  Remained stable  - Clinically the patient appears to be euvolemic  - CT abdomen from 01/21/2021 showing status post resection of left upper pole renal neoplasm  No evidence  -CT renal protocol from 9/21/2022 no evidence of metastatic disease  - Recommend to avoid use of NSAIDs, nephrotoxins  Caution advised with regards to exposure to IV contrast dye    - Discussed with the patient in depth his renal status, including the possible etiologies for CKD  - Advised the patient that when his GFR is close to 20mL/min then will start discussing about RRT(renal replacement therapy) options such as renal transplant, peritoneal dialysis and hemodialysis  - Informed the patient about the various options for Renal Replacement therapy    - Discussed with the patient how we need to work together to delay the progression of CKD with optimal BP control based on their age and co-morbidities and trying to reduce proteinuria by the use of anti-proteinuric agents  Hypertension:  - Patient is on Norvasc 5 mg p o  Q day, Toprol-XL 50 mg p o  Q day    -Blood pressure appears to be stable no medication changes at this time  - Goal BP of < 130/80 based on age and comorbidities  - Instructed to follow low sodium (2gm)diet  Hemoglobin:  - Goal Hb of 10-12 g/dL  - Most recent labs suggestive of 14 6 grams/deciliter  - no role for IV iron at this time, continues to remain stable    CKD-MBD(Mineral Bone Disease): - Based on patients CKD stage following is the goal of therapy  - Maintain calcium phosphorus product of < 55   - Stage 3 CKD - Goal Ca 8 5-10 mg/dL , goal Phos 2 7-4 6 mg/dL  , goal iPTH 30-70 pg/mL  - Patient is currently at goal   - Continue patient on no vitamin-D  - most recent vitamin-D level 38 4 as of 12/28/2021  - check vitamin-D prior to next visit, no recent level, check I PTH vitamin D prior to next visit    Proteinuria:  - proteinuria most likely secondary to hyper filtration after nephrectomy  - most recent protein creatinine ratio of 110 mg as of January 2021  - check protein creatinine ratio    Lipids:  -on Lipitor  - goal LDL less than 70  - management per primary team    Nutrition:  - Encouraged patient to follow a renal diet comprising of moderate potassium, low phosphorus and protein restriction to 0 8gm/kg  - Will check serum albumin with next blood work  Followup:  - Patient is to follow-up in 12 months, with lab work to be performed a few days prior to the next visit  Advised patient to call me in 10 days to review the results if they do not hear back from me, as I may have not received the results  Allison Arnett, 1/9/2023, 11:03 AM             SUBJECTIVE: 75 y o  male presents to the office for routine follow-up  Presents with spouse    Feels well has no complaints or recent hospitalization no issues with edema not taking any NSAIDs thankful for the care information that he is gone today  No upcoming surgeries  Review of Systems   Constitutional: Negative for chills and fever  HENT: Negative for congestion and sore throat  Respiratory: Negative for cough, shortness of breath and wheezing  Cardiovascular: Negative for leg swelling  Gastrointestinal: Negative for constipation, diarrhea, nausea and vomiting  Genitourinary: Negative for difficulty urinating, dysuria and hematuria  Musculoskeletal: Negative for back pain  Skin: Negative for wound  Neurological: Negative for dizziness, light-headedness and headaches  Psychiatric/Behavioral: Negative for agitation and confusion  All other systems reviewed and are negative        PAST MEDICAL HISTORY:  Past Medical History:   Diagnosis Date   • Arthritis    • Atrial fibrillation ([facility] [address] )    • Cancer ([facility] ) 08/2020    Kidney cancer   • Cancer ([facility] )     melanoma   • Chronic kidney disease     left kidney cancer   • CPAP (continuous positive airway pressure) dependence    • Hyperlipidemia    • Hypertension    • Irregular heart beat     Afib   • Pneumonia    • Skin cancer    • Sleep apnea    • Stroke [facility])    • Supraventricular tachycardia (HCC)    • Wears glasses    • Wears partial dentures     upper partial       PROBLEM LIST    Patient Active Problem List   Diagnosis   • History of CVA (cerebrovascular accident)   • Essential hypertension   • Paroxysmal atrial fibrillation (HCC)   • Mixed hyperlipidemia   • Pain of toe of right foot   • Benign prostatic hyperplasia with lower urinary tract symptoms   • Screening for colon cancer   • Acute bacterial conjunctivitis of both eyes   • FELISA (obstructive sleep apnea)   • Renal cell carcinoma of left kidney (HCC)   • Stage 3a chronic kidney disease (HCC)   • SDH (subdural hematoma)   • Presence of Watchman left atrial appendage closure device   • Arachnoid cyst   • Dizziness   • H/O partial nephrectomy   • Seborrheic dermatitis   • Coagulopathy ([facility] [address] )   • Herpes zoster without complication       PAST SURGICAL HISTORY:  Past Surgical History:   Procedure Laterality Date   • APPENDECTOMY     • CARDIOVERSION     • COLONOSCOPY      fiberoptic, also 2009, both negative, resolved 2004   • COLONOSCOPY  08/2019   • EGD     • HERNIA REPAIR      umbilical and bilateral inguinal   • KNEE ARTHROSCOPY Left    • GA LAPAROSCOPY SURG PARTIAL NEPHRECTOMY Left 10/7/2020    Procedure: ROBOTIC PARTIAL NEPHRECTOMY;  Surgeon: Lavonne Harding MD;  Location: [facility];  Service: Urology   • GA PERQ CLSR TCAT L ATR APNDGE W/ENDOCARDIAL IMPLNT N/A 3/25/2021    Procedure: LEFT ATRIAL APPENDAGE OCCLUSION;  Surgeon: Tenzin Barrett MD;  Location:  MAIN OR;  Service: Cardiology   • SKIN GRAFT      left ear, skin cancer   • TONSILLECTOMY         SOCIAL HISTORY :   reports that he has never smoked  He has never used smokeless tobacco  He reports that he does not currently use alcohol  He reports that he does not use drugs  FAMILY HISTORY:  Family History   Problem Relation Age of Onset   • Cancer Father         stomach   • Hypertension Father    • Stroke Father         syndrome   • Cancer Family    • Heart disease Family    • Hypertension Family    • Stroke Family         syndrome   • Throat cancer Mother    • Diabetes Mother    • Asthma Sister        ALLERGIES:  No Known Allergies        PHYSICAL EXAM:  Vitals:    01/09/23 1049   BP: 126/76   BP Location: Left arm   Patient Position: Sitting   Cuff Size: Large   Weight: 103 kg (227 lb)   Height: 6' 1" (1 854 m)     Body mass index is 29 95 kg/m²  Physical Exam  Vitals reviewed  Constitutional:       General: He is not in acute distress  Appearance: Normal appearance  He is normal weight  He is not ill-appearing, toxic-appearing or diaphoretic  HENT:      Head: Normocephalic and atraumatic  Mouth/Throat:      Mouth: Mucous membranes are moist       Pharynx: Oropharynx is clear  No oropharyngeal exudate  Eyes:      General: No scleral icterus  Conjunctiva/sclera: Conjunctivae normal    Cardiovascular:      Rate and Rhythm: Normal rate  Pulses: Normal pulses  Pulmonary:      Effort: Pulmonary effort is normal  No respiratory distress  Breath sounds: Normal breath sounds  No stridor  Abdominal:      General: There is no distension  Palpations: Abdomen is soft  There is no mass  Tenderness: There is no abdominal tenderness  There is no right CVA tenderness or left CVA tenderness  Musculoskeletal:         General: No swelling  Cervical back: Normal range of motion  No rigidity  Skin:     General: Skin is warm  Coloration: Skin is not jaundiced  Neurological:      General: No focal deficit present  Mental Status: He is alert and oriented to person, place, and time  Mental status is at baseline     Psychiatric:         Mood and Affect: Mood normal          Behavior: Behavior normal          LABORATORY DATA:     Results from last 6 Months   Lab Units 12/28/22  0926 09/09/22  0900   WBC Thousand/uL  --  6 22   HEMOGLOBIN g/dL  --  14 6   HEMATOCRIT %  --  45 5   PLATELETS Thousands/uL  --  253   POTASSIUM mmol/L 4 3 4 4   CHLORIDE mmol/L 106 106   CO2 mmol/L 29 30   BUN mg/dL 16 16   CREATININE mg/dL 1 25 1 37*   CALCIUM mg/dL 9 1 8 9   MAGNESIUM mg/dL 2 6  --    PHOSPHORUS mg/dL 3 1  --         rest all reviewed    RADIOLOGY:  No orders to display     Rest all reviewed        MEDICATIONS:    Current Outpatient Medications:   •  amLODIPine (NORVASC) 5 mg tablet, Take 1 tablet (5 mg total) by mouth daily, Disp: 90 tablet, Rfl: 0  •  aspirin (ECOTRIN LOW STRENGTH) 81 mg EC tablet, Take 1 tablet (81 mg total) by mouth daily, Disp: 30 tablet, Rfl: 2  •  atorvastatin (LIPITOR) 10 mg tablet, Take 1 tablet (10 mg total) by mouth daily, Disp: 90 tablet, Rfl: 1  •  Coenzyme Q10 (CO Q-10 PO), Take 1 caplet by mouth every morning, Disp: , Rfl:   •  flecainide (TAMBOCOR) 100 mg tablet, Take 1 tablet (100 mg total) by mouth 2 (two) times a day, Disp: 60 tablet, Rfl: 10  •  Ivermectin 1 % CREA, Apply 1 application topically daily To affected area, Disp: , Rfl:   •  metoprolol succinate (TOPROL-XL) 50 mg 24 hr tablet, TAKE ONE TABLET BY MOUTH EVERY MORNING, Disp: 90 tablet, Rfl: 1  •  Multiple Vitamins-Minerals (MULTIVITAMIN ADULT PO), Take 1 tablet by mouth every morning, Disp: , Rfl:   •  pimecrolimus (ELIDEL) 1 % cream, Apply 1 application topically 2 (two) times a day, Disp: , Rfl:           Portions of the record may have been created with voice recognition software  Occasional wrong word or "sound a like" substitutions may have occurred due to the inherent limitations of voice recognition software  Read the chart carefully and recognize, using context, where substitutions have occurred  If you have any questions, please contact the dictating provider

## 2023-01-09 NOTE — PATIENT INSTRUCTIONS
- Please call me in 10 days after having your blood work done to review the results if you do not hear back from me or my office, as I may have not received the results  - please remember to perform blood work prior to the next visit  - Please call if the blood pressure top number is greater than 150 or less than 110 consistently  - Please call if you are gaining more than 2lbs in 2 days for adjustment of water pills   ~ Please AVOID the following pain medications  LIST OF NSAIDS (NONSTEROIDAL ANTI-INFLAMMATORY DRUGS) AND BARKER-2 INHIBITORS    DIFLUNISAL (DOLOBID)  IBUPROFEN (MOTRIN, ADVIL)  FLURBIPROFEN (ANSAID)  KETOPROFEN (ORUDIS, ORUVAIL)  FENOPROFEN (NALFON)  NABUMETONE (RELAFEN)  PIROXICAM (FELDENE)  NAPROXEN (ALEVE, NAPROSYN, NAPRELAN, ANAPROX)  DICLOFENAC (VOLTAREN, CATAFLAM)  INDOMETHACIN (INDOCIN)  SULINDAC (CLINORIL)  TOLMETIN (TOLETIN)  ETODOLAC (LODINE)  MELOXICAM (MOBIC)  KETOROLAC (TORADOL)  OXAPROZIN (DAYPRO)  CELECOXIB (CELEBREX)    Phosphorus diet  Follow a low phosphorus diet      Avoid these higher phosphorus foods: Choose these lower phosphorus foods:   Milk, pudding or yogurt (from animals and from many soy varieties) Rice milk (unfortified), nondairy creamer (if it doesn't have terms in the ingredients list that contain the letters "phos")   Hard cheeses, ricotta or cottage cheese, fat-free cream cheese Regular and low-fat cream cheese   Ice cream or frozen yogurt Sherbet or frozen fruit pops   Soups made with higher phosphorus ingredients (milk, dried peas, beans, lentils) Soups made with lower phosphorus ingredients (broth- or water-based with other lower phosphorus ingredients)   Whole grains, including whole-grain breads, crackers, cereal, rice and pasta Refined grains, including white bread, crackers, cereals, rice and pasta   Quick breads, biscuits, cornbread, muffins, pancakes or waffles Homemade refined (white) dinner rolls, bagels or English muffins   Dried peas (split, black-eyed), beans (black, garbanzo, lima, kidney, navy, castellanos) or lentils Green peas (canned, frozen), green beans or wax beans   Organ meats, walleye, pollock or sardines Lean beef, pork, lamb, poultry or other fish   Nuts and seeds Popcorn   Peanut butter and other nut butters Jam, jelly or honey   Chocolate, including chocolate drinks Carob (chocolate-flavored) candy, hard candy or gumdrops   Lane and pepper-type sodas, flavored agarwal, bottled teas (if a term in the ingredients list contains the letters "phos") Lemon-lime soda, ginger ale or root beer, plain water   Follow a moderate potassium diet  Things to do to reduce your blood pressure include working with all your physician to do the following:  ~ stop smoking if you smoke  ~ increase cardiovascular exercise like walking and swimming    ~ modify your diet to decrease fat and salt intake  ~ reduce your weight if you are overweight or obese   ~ increase the consumption of fruits, vegetables and whole grains  ~ decrease alcohol consumption if you consume alcohol    ~ try to minimize stress in your life with lifestyle modifications  ~ be compliant with your anti-hypertensive medications  ~ adjust your medications to help improve your vascular stiffness and decrease risks for heart attacks and strokes

## 2023-03-01 DIAGNOSIS — I10 ESSENTIAL HYPERTENSION: ICD-10-CM

## 2023-03-01 RX ORDER — AMLODIPINE BESYLATE 5 MG/1
5 TABLET ORAL DAILY
Qty: 90 TABLET | Refills: 0 | Status: SHIPPED | OUTPATIENT
Start: 2023-03-01 | End: 2023-03-09 | Stop reason: SDUPTHER

## 2023-03-01 NOTE — TELEPHONE ENCOUNTER

## 2023-03-02 ENCOUNTER — RA CDI HCC (OUTPATIENT)
Dept: OTHER | Facility: HOSPITAL | Age: 68
End: 2023-03-02

## 2023-03-02 NOTE — PROGRESS NOTES
[facility] [address]  coding opportunities       Chart reviewed, no opportunity found: CHART REVIEWED, NO OPPORTUNITY FOUND        Patients Insurance     Medicare Insurance: Medicare

## 2023-03-09 ENCOUNTER — OFFICE VISIT (OUTPATIENT)
Dept: FAMILY MEDICINE CLINIC | Facility: CLINIC | Age: 68
End: 2023-03-09

## 2023-03-09 VITALS
OXYGEN SATURATION: 98 % | WEIGHT: 227.4 LBS | HEART RATE: 70 BPM | BODY MASS INDEX: 30.14 KG/M2 | HEIGHT: 73 IN | SYSTOLIC BLOOD PRESSURE: 136 MMHG | TEMPERATURE: 98 F | DIASTOLIC BLOOD PRESSURE: 78 MMHG

## 2023-03-09 DIAGNOSIS — D68.9 COAGULOPATHY (HCC): ICD-10-CM

## 2023-03-09 DIAGNOSIS — C64.2 RENAL CELL CARCINOMA OF LEFT KIDNEY (HCC): ICD-10-CM

## 2023-03-09 DIAGNOSIS — S06.5X9A TRAUMATIC SUBDURAL HEMORRHAGE WITH LOSS OF CONSCIOUSNESS OF UNSPECIFIED DURATION, INITIAL ENCOUNTER (HCC): ICD-10-CM

## 2023-03-09 DIAGNOSIS — N18.31 STAGE 3A CHRONIC KIDNEY DISEASE (HCC): ICD-10-CM

## 2023-03-09 DIAGNOSIS — E78.2 MIXED HYPERLIPIDEMIA: ICD-10-CM

## 2023-03-09 DIAGNOSIS — I10 ESSENTIAL HYPERTENSION: Primary | ICD-10-CM

## 2023-03-09 DIAGNOSIS — I48.0 PAROXYSMAL ATRIAL FIBRILLATION (HCC): ICD-10-CM

## 2023-03-09 DIAGNOSIS — I48.19 PERSISTENT ATRIAL FIBRILLATION (HCC): ICD-10-CM

## 2023-03-09 DIAGNOSIS — Z95.818 PRESENCE OF WATCHMAN LEFT ATRIAL APPENDAGE CLOSURE DEVICE: ICD-10-CM

## 2023-03-09 RX ORDER — METOPROLOL SUCCINATE 50 MG/1
50 TABLET, EXTENDED RELEASE ORAL DAILY
Qty: 90 TABLET | Refills: 1 | Status: SHIPPED | OUTPATIENT
Start: 2023-03-09

## 2023-03-09 RX ORDER — FLECAINIDE ACETATE 100 MG/1
100 TABLET ORAL 2 TIMES DAILY
Qty: 180 TABLET | Refills: 1 | Status: SHIPPED | OUTPATIENT
Start: 2023-03-09 | End: 2023-04-08

## 2023-03-09 RX ORDER — ATORVASTATIN CALCIUM 10 MG/1
10 TABLET, FILM COATED ORAL DAILY
Qty: 90 TABLET | Refills: 1 | Status: SHIPPED | OUTPATIENT
Start: 2023-03-09

## 2023-03-09 RX ORDER — AMLODIPINE BESYLATE 5 MG/1
5 TABLET ORAL DAILY
Qty: 90 TABLET | Refills: 1 | Status: SHIPPED | OUTPATIENT
Start: 2023-03-09

## 2023-03-09 NOTE — PROGRESS NOTES
Assessment/Plan: CMP CBC TSH lipid profile reviewed at this time  Patient will continue with current regimen for chronic conditions listed below  Patient will follow with cardiology hematology oncology I urology etc   Refills given  Patient will follow-up in 6 months or as needed  Diagnoses and all orders for this visit:    Essential hypertension  -     amLODIPine (NORVASC) 5 mg tablet; Take 1 tablet (5 mg total) by mouth daily    Mixed hyperlipidemia  -     atorvastatin (LIPITOR) 10 mg tablet; Take 1 tablet (10 mg total) by mouth daily    Persistent atrial fibrillation (HCC)  -     flecainide (TAMBOCOR) 100 mg tablet; Take 1 tablet (100 mg total) by mouth 2 (two) times a day  -     metoprolol succinate (TOPROL-XL) 50 mg 24 hr tablet; Take 1 tablet (50 mg total) by mouth daily    Presence of Watchman left atrial appendage closure device    Paroxysmal atrial fibrillation (HCC)    Renal cell carcinoma of left kidney (HCC)    Stage 3a chronic kidney disease (HCC)    Traumatic subdural hemorrhage with loss of consciousness of unspecified duration, initial encounter ([facility] )    Coagulopathy ([email or web address] )            Subjective:        Patient ID: Mana Morales is a 75 y o  male  Patient is here to follow-up on hypertension hyperlipidemia with history of A-fib and Watchman procedure  Patient also with history of subdural   Other diagnoses include chronic kidney disease stage III and renal cell carcinoma  No significant headache or visual disturbance  No chest pain  Patient does notice some palpitations with A-fib intermittently  Patient does get shortness of breath and dyspnea on exertion this is been relatively stable  Patient is seeing cardiology in May  No new significant edema  Urination defecation stable    Patient stable regarding renal cell CA        The following portions of the patient's history were reviewed and updated as appropriate: allergies, current medications, past family history, past medical history, past social history, past surgical history and problem list       Review of Systems   Constitutional: Positive for fatigue  HENT: Negative  Eyes: Negative  Respiratory: Positive for shortness of breath  Cardiovascular: Positive for palpitations  Gastrointestinal: Negative  Endocrine: Negative  Genitourinary: Negative  Musculoskeletal: Negative  Skin: Negative  Allergic/Immunologic: Negative  Neurological: Negative  Hematological: Negative  Psychiatric/Behavioral: Negative  Objective:      BMI Counseling: Body mass index is 30 kg/m²  The BMI is above normal  Nutrition recommendations include consuming healthier snacks  Exercise recommendations include exercising 3-5 times per week  Rationale for BMI follow-up plan is due to patient being overweight or obese  Depression Screening and Follow-up Plan: Clincally patient does not have depression  No treatment is required  /78 (BP Location: Right arm, Patient Position: Sitting, Cuff Size: Standard)   Pulse 70   Temp 98 °F (36 7 °C) (Temporal)   Ht 6' 1" (1 854 m)   Wt 103 kg (227 lb 6 4 oz)   SpO2 98%   BMI 30 00 kg/m²          Physical Exam  Vitals and nursing note reviewed  Constitutional:       General: He is not in acute distress  Appearance: Normal appearance  He is not ill-appearing, toxic-appearing or diaphoretic  HENT:      Head: Normocephalic and atraumatic  Right Ear: Tympanic membrane, ear canal and external ear normal  There is no impacted cerumen  Left Ear: Tympanic membrane, ear canal and external ear normal  There is no impacted cerumen  Nose: Nose normal  No congestion or rhinorrhea  Mouth/Throat:      Mouth: Mucous membranes are moist       Pharynx: No oropharyngeal exudate or posterior oropharyngeal erythema  Eyes:      General: No scleral icterus  Right eye: No discharge  Left eye: No discharge        Extraocular Movements: Extraocular movements intact  Conjunctiva/sclera: Conjunctivae normal       Pupils: Pupils are equal, round, and reactive to light  Neck:      Vascular: No carotid bruit  Cardiovascular:      Rate and Rhythm: Normal rate and regular rhythm  Pulses: Normal pulses  Heart sounds: Normal heart sounds  No murmur heard  No friction rub  No gallop  Pulmonary:      Effort: Pulmonary effort is normal  No respiratory distress  Breath sounds: Normal breath sounds  No stridor  No wheezing, rhonchi or rales  Chest:      Chest wall: No tenderness  Musculoskeletal:         General: No swelling, tenderness, deformity or signs of injury  Normal range of motion  Cervical back: Normal range of motion and neck supple  No rigidity  No muscular tenderness  Right lower leg: Edema present  Left lower leg: Edema present  Comments: Trace lower extremity edema   Lymphadenopathy:      Cervical: No cervical adenopathy  Skin:     General: Skin is warm and dry  Capillary Refill: Capillary refill takes less than 2 seconds  Coloration: Skin is not jaundiced  Findings: No bruising, erythema, lesion or rash  Neurological:      Mental Status: He is alert and oriented to person, place, and time  Mental status is at baseline  Cranial Nerves: No cranial nerve deficit  Sensory: No sensory deficit  Motor: No weakness  Coordination: Coordination normal       Gait: Gait normal    Psychiatric:         Mood and Affect: Mood normal          Behavior: Behavior normal          Thought Content:  Thought content normal          Judgment: Judgment normal

## 2023-03-17 ENCOUNTER — TELEPHONE (OUTPATIENT)
Dept: CARDIAC SURGERY | Facility: CLINIC | Age: 68
End: 2023-03-17

## 2023-03-17 NOTE — TELEPHONE ENCOUNTER
Spoke with patient's wife, states patient doing well, continues to take ASA 81 mg daily  Independent with all ADL's   Encouraged to follow up with PCP and cardiologist

## 2023-05-19 ENCOUNTER — OFFICE VISIT (OUTPATIENT)
Dept: CARDIOLOGY CLINIC | Facility: CLINIC | Age: 68
End: 2023-05-19

## 2023-05-19 VITALS
SYSTOLIC BLOOD PRESSURE: 126 MMHG | DIASTOLIC BLOOD PRESSURE: 72 MMHG | HEART RATE: 52 BPM | WEIGHT: 222.6 LBS | HEIGHT: 72 IN | BODY MASS INDEX: 30.15 KG/M2

## 2023-05-19 DIAGNOSIS — Z86.73 HISTORY OF CVA (CEREBROVASCULAR ACCIDENT): ICD-10-CM

## 2023-05-19 DIAGNOSIS — I10 ESSENTIAL HYPERTENSION: ICD-10-CM

## 2023-05-19 DIAGNOSIS — S06.5XAA SDH (SUBDURAL HEMATOMA) (HCC): ICD-10-CM

## 2023-05-19 DIAGNOSIS — C64.2 RENAL CELL CARCINOMA OF LEFT KIDNEY (HCC): ICD-10-CM

## 2023-05-19 DIAGNOSIS — Z95.818 PRESENCE OF WATCHMAN LEFT ATRIAL APPENDAGE CLOSURE DEVICE: ICD-10-CM

## 2023-05-19 DIAGNOSIS — I48.0 PAROXYSMAL ATRIAL FIBRILLATION (HCC): Primary | ICD-10-CM

## 2023-05-19 DIAGNOSIS — R60.9 SWELLING: ICD-10-CM

## 2023-05-19 RX ORDER — FUROSEMIDE 20 MG/1
20 TABLET ORAL DAILY PRN
Qty: 30 TABLET | Refills: 1 | Status: SHIPPED | OUTPATIENT
Start: 2023-05-19

## 2023-05-19 NOTE — PROGRESS NOTES
Cardiology Follow Up    Gagan Hernandez  1955  [id number]  [address] [facility] Graciela Uribe [phone]  [phone]    1  Paroxysmal atrial fibrillation (HCC)  POCT ECG    furosemide (LASIX) 20 mg tablet    AMB extended holter monitor    Echo complete w/ contrast if indicated      2  Essential hypertension        3  SDH (subdural hematoma) (HCC)        4  Renal cell carcinoma of left kidney (HCC)        5  History of CVA (cerebrovascular accident)        6  Presence of Watchman left atrial appendage closure device        7  Swelling  furosemide (LASIX) 20 mg tablet          Discussion/Summary:    Paroxysmal atrial fibrillation  Status post Watchman device after subdural hematoma after a nephrectomy  He is on aspirin at this point and is doing well with it  He is in sinus rhythm in the office, but reports more episodes of atrial fibrillation  In the past, he has seen Dr Sher Wong with electrophysiology and there were plans for ablation but since he has been doing okay with flecainide and metoprolol, he has been maintained on this for now  I would like to check a 2-week Zio patch and an echocardiogram to evaluate for any changes  If he is having a higher burden of A-fib, or if there are any significant changes on the echo, I can refer him back to EP for reconsideration of ablation  For now he tells me he is comfortable with the burden of A-fib that he is having so if everything is otherwise stable, we can continue with the current management and he will let me know if anything changes in the meantime  History of Present Illness:   Pleasant 58-year-old man  History of atrial fibrillation with a prior CVA  Was previously maintaining sinus rhythm with Toprol XL, but in 12/2019, was found to be in afib, more persistent   We did a Holter, which showed rate controlled afib, but he was feeling palpitations  After discussing options, he was referred to EP  Initially started on Flecainide, and then underwent cardioversion in 7/2020  Was having fatigue, so was planned for ablation, but the preoperative CT scan showed a renal mass  Had partial nephrectomy  Unfortunately, then had a subdural hematoma  He was back on medical therapy for afib, then underwent Watchman Device implantation  Since then, he has been doing ok with afib control, so did not have the ablation done  Interval History:    Returns for follow-up  Based on his estimation of symptoms, he is having more episodes of A-fib reports a few per month  These can often occur later in the day but then when he wakes up in the morning are back to normal  He wears an Apple Watch, but does not necessarily track the heart rate trend or have it hooked up to his phone to monitor EKGs, etc     Remains bradycardic and is in sinus rhythm in the office today  No problems with the aspirin  He has a Watchman device  Is on flecainide and metoprolol  Denies any major fatigue        Problem List     Cerebrovascular disease, ill-defined, acute    Essential hypertension    Paroxysmal [facility] fibrillation ([facility] )    Overview Signed 3/15/2018  2:07 PM by Yinka Hayden DO     Transitioned From: Atrial fibrillation         Mixed hyperlipidemia    Pain of toe of right foot        Past Medical History:   Diagnosis Date   • Arthritis    • Atrial fibrillation ([facility] )    • Cancer ([facility] ) 08/2020    Kidney cancer   • Cancer ([facility] )     melanoma   • Chronic kidney disease     left kidney cancer   • CPAP (continuous positive airway pressure) dependence    • Hyperlipidemia    • Hypertension    • Irregular heart beat     Afib   • Pneumonia    • Skin cancer    • Sleep apnea    • Stroke [facility])    • Supraventricular tachycardia (HCC)    • Wears glasses    • Wears partial dentures     upper partial     Social History     Tobacco Use   • Smoking status: Never   • Smokeless tobacco: Never Substance Use Topics   • Alcohol use: Not Currently     Family History   Problem Relation Age of Onset   • Cancer Father         stomach   • Hypertension Father    • Stroke Father         syndrome   • Cancer Family    • Heart disease Family    • Hypertension Family    • Stroke Family         syndrome   • Throat cancer Mother    • Diabetes Mother    • Asthma Sister      Past Surgical History:   Procedure Laterality Date   • APPENDECTOMY     • CARDIOVERSION     • COLONOSCOPY      fiberoptic, also 2009, both negative, resolved 2004   • COLONOSCOPY  08/2019   • EGD     • HERNIA REPAIR      umbilical and bilateral inguinal   • KNEE ARTHROSCOPY Left    • MA LAPAROSCOPY SURG PARTIAL NEPHRECTOMY Left 10/7/2020    Procedure: ROBOTIC PARTIAL NEPHRECTOMY;  Surgeon: Ayana Ron MD;  Location: [facility];  Service: Urology   • MA PERQ CLSR TCAT L ATR APNDGE W/ENDOCARDIAL IMPLNT N/A 3/25/2021    Procedure: LEFT ATRIAL APPENDAGE OCCLUSION;  Surgeon: Basim Navarrete MD;  Location: [facility];  Service: Cardiology   • SKIN GRAFT      left ear, skin cancer   • TONSILLECTOMY         Current Outpatient Medications:   •  amLODIPine (NORVASC) 5 mg tablet, Take 1 tablet (5 mg total) by mouth daily, Disp: 90 tablet, Rfl: 1  •  aspirin (ECOTRIN LOW STRENGTH) 81 mg EC tablet, Take 1 tablet (81 mg total) by mouth daily, Disp: 30 tablet, Rfl: 2  •  atorvastatin (LIPITOR) 10 mg tablet, Take 1 tablet (10 mg total) by mouth daily, Disp: 90 tablet, Rfl: 1  •  Coenzyme Q10 (CO Q-10 PO), Take 1 caplet by mouth every morning, Disp: , Rfl:   •  flecainide (TAMBOCOR) 100 mg tablet, Take 1 tablet (100 mg total) by mouth 2 (two) times a day, Disp: 180 tablet, Rfl: 1  •  furosemide (LASIX) 20 mg tablet, Take 1 tablet (20 mg total) by mouth daily as needed (swelling), Disp: 30 tablet, Rfl: 1  •  metoprolol succinate (TOPROL-XL) 50 mg 24 hr tablet, Take 1 tablet (50 mg total) by mouth daily, Disp: 90 tablet, Rfl: 1  •  Multiple Vitamins-Minerals (MULTIVITAMIN ADULT PO), Take 1 tablet by mouth every morning, Disp: , Rfl:   •  pimecrolimus (ELIDEL) 1 % cream, Apply 1 application topically 2 (two) times a day, Disp: , Rfl:   No Known Allergies    Vitals:    05/19/23 0926   BP: 126/72   BP Location: Right arm   Patient Position: Sitting   Cuff Size: Standard   Pulse: (!) 52   Weight: 101 kg (222 lb 9 6 oz)   Height: 6' (1 829 m)     Vitals:    05/19/23 0926   Weight: 101 kg (222 lb 9 6 oz)      Height: 6' (182 9 cm)   Body mass index is 30 19 kg/m²  Physical Exam:  GEN: Kirby Flores appears well, alert and oriented x 3, pleasant and cooperative   HEENT: pupils equal, round, and reactive to light; extraocular muscles intact  NECK: supple, no carotid bruits   HEART: bradycardic, no murmurs  LUNGS: clear to auscultation bilaterally; no wheezes, rales, or rhonchi   ABDOMEN: normal bowel sounds, soft, no tenderness, no distention  EXTREMITIES: peripheral pulses normal; no clubbing, cyanosis, or edema  NEURO: no focal findings   SKIN: normal without suspicious lesions on exposed skin    ROS:  Fatigue, tired during the day  Except as noted in HPI, is otherwise reviewed in detail and a 12 point review of systems is negative    ROS reviewed and is unchanged    Labs:  Lab Results   Component Value Date     09/19/2017    K 4 3 12/28/2022     12/28/2022    CREATININE 1 25 12/28/2022    BUN 16 12/28/2022    CO2 29 12/28/2022    ALT 28 09/09/2022    AST 13 09/09/2022    INR 1 28 (H) 03/18/2021    GLUF 94 12/28/2022    WBC 6 22 09/09/2022    HGB 14 6 09/09/2022    HCT 45 5 09/09/2022     09/09/2022       Lab Results   Component Value Date    CHOL 136 09/19/2017    CHOL 132 05/31/2017    CHOL 124 (L) 03/30/2016     Lab Results   Component Value Date    LDLCALC 56 09/09/2022    LDLCALC 66 01/23/2020    LDLCALC 69 12/06/2018     Lab Results   Component Value Date    HDL 63 09/09/2022    HDL 49 01/23/2020    HDL 62 12/06/2018     Lab Results   Component Value Date    TRIG 48 09/09/2022    TRIG 53 01/23/2020    TRIG 51 12/06/2018     ALONZO 5/10/21:  LEFT VENTRICLE:  Systolic function was normal  Ejection fraction was estimated to be 55 %  Although no diagnostic regional wall motion abnormality was identified, this possibility cannot be completely excluded on the basis of this study      LEFT ATRIUM:  The atrium was mildly dilated      LEFT ATRIAL APPENDAGE:  A 30 mm left atrial appendage occluder was visualized and was well seated without associated thrombus  There were 2 small residual defects (<3 5 mm in diameter) in the medial and lateral aspects of the device      MITRAL VALVE:  There was mild regurgitation      AORTIC VALVE:  There was trace regurgitation      TRICUSPID VALVE:  There was mild to moderate regurgitation  Echo 10/2019:  LEFT VENTRICLE:  Systolic function was normal  Ejection fraction was estimated to be 55 %  There were no regional wall motion abnormalities  Wall thickness was at the upper limits of normal      MITRAL VALVE:  There was mild to moderate regurgitation      TRICUSPID VALVE:  There was mild regurgitation  Pulmonary artery systolic pressure was within the normal range      PULMONIC VALVE:  There was trace regurgitation      AORTA:  The root exhibited mild dilatation  3 8 cm  There was mild dilatation of the ascending aorta  3 7 cm     Holter 5/27/2020:  IMPRESSION:  Predominantly atrial fibrillation with an average heart rate of 78 bpm    Rare PVCs, consisting of 0 5% of total beats  No sustained ventricular arrhythmias  No significant pauses  Patient's symptoms of fluttering correlated with atrial fibrillation at controlled rates  EKG:  Sinus bradycardia  52 beats minute  First-degree AV block

## 2023-06-05 ENCOUNTER — TELEPHONE (OUTPATIENT)
Dept: CARDIOLOGY CLINIC | Facility: CLINIC | Age: 68
End: 2023-06-05

## 2023-06-05 NOTE — TELEPHONE ENCOUNTER
Ricarda Weaver is not required  Verified that patient has Medicare primary with a supplement secondary

## 2023-06-05 NOTE — TELEPHONE ENCOUNTER
Hi, good morning  This is Blondie Outhouse  It's Renny Dead Ye 1955  I was going about 3 weeks ago see Doctor today and he had ordered an extended Holter monitor for me that was supposed to be sent out  I still haven't received it  I was just calling to follow up to see what the status of that is  You can contact me at [phone]  Thank you  Goodbye      Called pt left a message on vm advised will be looking into this for him,   Contact Patricia by Teams to find out if being worked on,

## 2023-06-08 ENCOUNTER — HOSPITAL ENCOUNTER (OUTPATIENT)
Dept: NON INVASIVE DIAGNOSTICS | Facility: CLINIC | Age: 68
Discharge: HOME | End: 2023-06-08
Payer: MEDICARE

## 2023-06-08 VITALS
HEART RATE: 60 BPM | WEIGHT: 222 LBS | DIASTOLIC BLOOD PRESSURE: 72 MMHG | SYSTOLIC BLOOD PRESSURE: 126 MMHG | HEIGHT: 72 IN | BODY MASS INDEX: 30.07 KG/M2

## 2023-06-08 DIAGNOSIS — I48.0 PAROXYSMAL ATRIAL FIBRILLATION (HCC): ICD-10-CM

## 2023-06-08 LAB
AORTIC ROOT: 3.9 CM
APICAL FOUR CHAMBER EJECTION FRACTION: 56 %
ASCENDING AORTA: 3.6 CM
E WAVE DECELERATION TIME: 274 MS
FRACTIONAL SHORTENING: 26 % (ref 28–44)
INTERVENTRICULAR SEPTUM IN DIASTOLE (PARASTERNAL SHORT AXIS VIEW): 1.1 CM
INTERVENTRICULAR SEPTUM: 1.1 CM (ref 0.6–1.1)
LAAS-AP2: 18.7 CM2
LAAS-AP4: 17.6 CM2
LEFT ATRIUM AREA SYSTOLE SINGLE PLANE A4C: 16.8 CM2
LEFT ATRIUM SIZE: 4 CM
LEFT INTERNAL DIMENSION IN SYSTOLE: 3.4 CM (ref 2.1–4)
LEFT VENTRICLE DIASTOLIC VOLUME (MOD BIPLANE): 136 ML
LEFT VENTRICLE SYSTOLIC VOLUME (MOD BIPLANE): 68 ML
LEFT VENTRICULAR INTERNAL DIMENSION IN DIASTOLE: 4.6 CM (ref 3.5–6)
LEFT VENTRICULAR POSTERIOR WALL IN END DIASTOLE: 1.1 CM
LEFT VENTRICULAR STROKE VOLUME: 51 ML
LV EF: 50 %
LVSV (TEICH): 51 ML
MV E'TISSUE VEL-SEP: 8 CM/S
MV PEAK A VEL: 0.4 M/S
MV PEAK E VEL: 50 CM/S
MV STENOSIS PRESSURE HALF TIME: 80 MS
MV VALVE AREA P 1/2 METHOD: 2.75 CM2
RA PRESSURE ESTIMATED: 10 MMHG
RIGHT ATRIUM AREA SYSTOLE A4C: 19 CM2
RIGHT VENTRICLE ID DIMENSION: 4.3 CM
RV PSP: 39 MMHG
SL CV LEFT ATRIUM LENGTH A2C: 5.2 CM
SL CV LV EF: 55
SL CV PED ECHO LEFT VENTRICLE DIASTOLIC VOLUME (MOD BIPLANE) 2D: 99 ML
SL CV PED ECHO LEFT VENTRICLE SYSTOLIC VOLUME (MOD BIPLANE) 2D: 48 ML
TR MAX PG: 29 MMHG
TR PEAK VELOCITY: 2.7 M/S
TRICUSPID ANNULAR PLANE SYSTOLIC EXCURSION: 2.5 CM
TRICUSPID VALVE PEAK REGURGITATION VELOCITY: 2.7 M/S

## 2023-06-08 PROCEDURE — 93306 TTE W/DOPPLER COMPLETE: CPT

## 2023-06-08 PROCEDURE — 93306 TTE W/DOPPLER COMPLETE: CPT | Performed by: INTERNAL MEDICINE

## 2023-07-03 ENCOUNTER — CLINICAL SUPPORT (OUTPATIENT)
Dept: CARDIOLOGY CLINIC | Facility: CLINIC | Age: 68
End: 2023-07-03
Payer: MEDICARE

## 2023-07-03 DIAGNOSIS — I48.0 PAROXYSMAL ATRIAL FIBRILLATION (HCC): ICD-10-CM

## 2023-07-03 PROCEDURE — 93246 EXT ECG>7D<15D RECORDING: CPT | Performed by: INTERNAL MEDICINE

## 2023-07-31 ENCOUNTER — OFFICE VISIT (OUTPATIENT)
Dept: FAMILY MEDICINE CLINIC | Facility: CLINIC | Age: 68
End: 2023-07-31
Payer: MEDICARE

## 2023-07-31 VITALS
DIASTOLIC BLOOD PRESSURE: 72 MMHG | SYSTOLIC BLOOD PRESSURE: 124 MMHG | TEMPERATURE: 98.7 F | HEIGHT: 72 IN | HEART RATE: 75 BPM | BODY MASS INDEX: 30.48 KG/M2 | OXYGEN SATURATION: 96 % | WEIGHT: 225 LBS

## 2023-07-31 DIAGNOSIS — M79.605 LEFT LEG PAIN: Primary | ICD-10-CM

## 2023-07-31 DIAGNOSIS — M54.16 LUMBAR RADICULOPATHY: ICD-10-CM

## 2023-07-31 PROCEDURE — 99214 OFFICE O/P EST MOD 30 MIN: CPT | Performed by: FAMILY MEDICINE

## 2023-07-31 RX ORDER — PREDNISONE 10 MG/1
TABLET ORAL
Qty: 45 TABLET | Refills: 0 | Status: SHIPPED | OUTPATIENT
Start: 2023-07-31

## 2023-07-31 NOTE — PROGRESS NOTES
Assessment/Plan: Patient go for ultrasound left leg to rule out DVT. Patient will have x-ray of lumbar spine for possible lumbar radiculopathy. Patient use prednisone as directed. Patient will follow-up in 2 weeks. Diagnoses and all orders for this visit:    Left leg pain  -     VAS lower limb venous duplex study, unilateral/limited; Future  -     predniSONE 10 mg tablet; 5 pills daily for 3 days, 4 for 3 days, 3 for 3 days, 2 for 3 days, 1 for 3 days. Lumbar radiculopathy  -     XR spine lumbar minimum 4 views non injury; Future  -     predniSONE 10 mg tablet; 5 pills daily for 3 days, 4 for 3 days, 3 for 3 days, 2 for 3 days, 1 for 3 days. Subjective:        Patient ID: Moira Benedict is a 70 y.o. male. Patient is here with burning sensation in left ankle/lower leg region both medially and laterally over the past month or so. Patient also has some pain popliteal region. Patient also notices that stretching his back does improve his leg discomfort. No significant change urination or defecation. No recent significant travel. Patient able to ambulate and go for walks. No medications used in this regard. The following portions of the patient's history were reviewed and updated as appropriate: allergies, current medications, past family history, past medical history, past social history, past surgical history and problem list.      Review of Systems   Constitutional: Negative. HENT: Negative. Eyes: Negative. Respiratory: Negative. Cardiovascular: Negative. Gastrointestinal: Negative. Endocrine: Negative. Genitourinary: Negative. Musculoskeletal: Positive for arthralgias and gait problem. Skin: Negative. Allergic/Immunologic: Negative. Hematological: Negative. Psychiatric/Behavioral: Negative. Objective:        Depression Screening and Follow-up Plan: Clincally patient does not have depression. No treatment is required. /72 (BP Location: Right arm, Patient Position: Sitting, Cuff Size: Standard)   Pulse 75   Temp 98.7 °F (37.1 °C) (Temporal)   Ht 6' (1.829 m)   Wt 102 kg (225 lb)   SpO2 96%   BMI 30.52 kg/m²          Physical Exam  Vitals and nursing note reviewed. Constitutional:       General: He is not in acute distress. Appearance: Normal appearance. He is not ill-appearing, toxic-appearing or diaphoretic. HENT:      Head: Normocephalic and atraumatic. Right Ear: There is no impacted cerumen. Left Ear: There is no impacted cerumen. Nose: Nose normal. No congestion or rhinorrhea. Mouth/Throat:      Mouth: Mucous membranes are moist.      Pharynx: No oropharyngeal exudate or posterior oropharyngeal erythema. Eyes:      General: No scleral icterus. Right eye: No discharge. Left eye: No discharge. Neck:      Vascular: No carotid bruit. Cardiovascular:      Rate and Rhythm: Normal rate and regular rhythm. Pulses: Normal pulses. Heart sounds: Normal heart sounds. No murmur heard. No friction rub. No gallop. Pulmonary:      Effort: Pulmonary effort is normal. No respiratory distress. Breath sounds: Normal breath sounds. No stridor. No wheezing, rhonchi or rales. Chest:      Chest wall: No tenderness. Abdominal:      Palpations: Abdomen is soft. Musculoskeletal:         General: Tenderness present. No swelling, deformity or signs of injury. Cervical back: Normal range of motion and neck supple. No rigidity. No muscular tenderness. Right lower leg: No edema. Left lower leg: No edema. Comments: Tenderness with palpation popliteal region as well as the left posterior calf. Patient also with negative straight leg raise. Lymphadenopathy:      Cervical: No cervical adenopathy. Skin:     General: Skin is warm and dry. Capillary Refill: Capillary refill takes less than 2 seconds.       Coloration: Skin is not jaundiced. Findings: No bruising, erythema, lesion or rash. Neurological:      Mental Status: He is alert and oriented to person, place, and time. Cranial Nerves: No cranial nerve deficit. Sensory: No sensory deficit. Motor: No weakness. Coordination: Coordination normal.      Gait: Gait normal.   Psychiatric:         Mood and Affect: Mood normal.         Behavior: Behavior normal.         Thought Content:  Thought content normal.         Judgment: Judgment normal.

## 2023-08-01 ENCOUNTER — APPOINTMENT (OUTPATIENT)
Dept: RADIOLOGY | Age: 68
End: 2023-08-01
Payer: MEDICARE

## 2023-08-01 DIAGNOSIS — M54.16 LUMBAR RADICULOPATHY: ICD-10-CM

## 2023-08-01 PROCEDURE — 72110 X-RAY EXAM L-2 SPINE 4/>VWS: CPT

## 2023-08-04 ENCOUNTER — HOSPITAL ENCOUNTER (OUTPATIENT)
Dept: NON INVASIVE DIAGNOSTICS | Facility: HOSPITAL | Age: 68
Discharge: HOME | End: 2023-08-04
Payer: MEDICARE

## 2023-08-04 DIAGNOSIS — M79.605 LEFT LEG PAIN: ICD-10-CM

## 2023-08-04 PROCEDURE — 93971 EXTREMITY STUDY: CPT

## 2023-08-05 PROCEDURE — 93971 EXTREMITY STUDY: CPT | Performed by: SURGERY

## 2023-08-21 ENCOUNTER — OFFICE VISIT (OUTPATIENT)
Dept: FAMILY MEDICINE CLINIC | Facility: CLINIC | Age: 68
End: 2023-08-21
Payer: MEDICARE

## 2023-08-21 VITALS
OXYGEN SATURATION: 100 % | SYSTOLIC BLOOD PRESSURE: 124 MMHG | BODY MASS INDEX: 29.91 KG/M2 | HEIGHT: 72 IN | HEART RATE: 76 BPM | WEIGHT: 220.8 LBS | TEMPERATURE: 97.9 F | DIASTOLIC BLOOD PRESSURE: 70 MMHG

## 2023-08-21 DIAGNOSIS — Z12.11 COLON CANCER SCREENING: ICD-10-CM

## 2023-08-21 DIAGNOSIS — M79.605 LEFT LEG PAIN: Primary | ICD-10-CM

## 2023-08-21 PROCEDURE — 99213 OFFICE O/P EST LOW 20 MIN: CPT | Performed by: FAMILY MEDICINE

## 2023-08-21 NOTE — PROGRESS NOTES
Assessment/Plan: Doppler study reviewed at this time. X-ray lumbar spine reviewed. Patient will see chiropractor as directed. Patient may consider MRI of the back as well as for physical therapy and other work-up if needed       Diagnoses and all orders for this visit:    Colon cancer screening  -     Ambulatory Referral to Colorectal Surgery; Future    Left leg pain  -     Ambulatory Referral to Chiropractic; Future            Subjective:        Patient ID: Steffen March is a 70 y.o. male. Patient is here to follow-up on x-ray and Doppler studies of the left leg. Patient still with some discomfort in his left leg. Patient discomfort roughly 50% better. Some days patient does well without discomfort and other days it hurts. Patient's pain is burning in sensation. No direct back pain associate with this. No change in urination or defecation. The following portions of the patient's history were reviewed and updated as appropriate: allergies, current medications, past family history, past medical history, past social history, past surgical history and problem list.      Review of Systems   Constitutional: Negative. HENT: Negative. Eyes: Negative. Respiratory: Negative. Cardiovascular: Negative. Gastrointestinal: Negative. Endocrine: Negative. Genitourinary: Negative. Musculoskeletal: Positive for arthralgias. Skin: Negative. Allergic/Immunologic: Negative. Neurological: Negative. Hematological: Negative. Psychiatric/Behavioral: Negative. Objective:        Depression Screening and Follow-up Plan: Patient was screened for depression during today's encounter.  They screened negative with a PHQ-2 score of 0.            /70 (BP Location: Right arm, Patient Position: Sitting, Cuff Size: Large)   Pulse 76   Temp 97.9 °F (36.6 °C) (Tympanic)   Ht 6' (1.829 m)   Wt 100 kg (220 lb 12.8 oz)   SpO2 100%   BMI 29.95 kg/m²          Physical Exam  Vitals and nursing note reviewed. Constitutional:       Appearance: Normal appearance. Cardiovascular:      Rate and Rhythm: Normal rate and regular rhythm. Pulses: Normal pulses. Heart sounds: Normal heart sounds. Pulmonary:      Effort: Pulmonary effort is normal.      Breath sounds: Normal breath sounds. Musculoskeletal:         General: Tenderness present. Comments: Pain with palpation calf muscle as well as popliteal region   Skin:     Capillary Refill: Capillary refill takes less than 2 seconds. Neurological:      Mental Status: He is alert. Psychiatric:         Mood and Affect: Mood normal.         Behavior: Behavior normal.         Thought Content:  Thought content normal.

## 2023-09-26 ENCOUNTER — OFFICE VISIT (OUTPATIENT)
Dept: FAMILY MEDICINE CLINIC | Facility: CLINIC | Age: 68
End: 2023-09-26
Payer: MEDICARE

## 2023-09-26 VITALS
DIASTOLIC BLOOD PRESSURE: 78 MMHG | HEIGHT: 72 IN | OXYGEN SATURATION: 97 % | BODY MASS INDEX: 30.07 KG/M2 | WEIGHT: 222 LBS | TEMPERATURE: 97.8 F | HEART RATE: 60 BPM | SYSTOLIC BLOOD PRESSURE: 132 MMHG

## 2023-09-26 DIAGNOSIS — Z12.5 SCREENING FOR PROSTATE CANCER: ICD-10-CM

## 2023-09-26 DIAGNOSIS — Z00.00 MEDICARE ANNUAL WELLNESS VISIT, SUBSEQUENT: Primary | ICD-10-CM

## 2023-09-26 DIAGNOSIS — J01.00 ACUTE MAXILLARY SINUSITIS, RECURRENCE NOT SPECIFIED: ICD-10-CM

## 2023-09-26 DIAGNOSIS — Z12.11 SCREENING FOR COLON CANCER: ICD-10-CM

## 2023-09-26 DIAGNOSIS — I10 ESSENTIAL HYPERTENSION: ICD-10-CM

## 2023-09-26 PROCEDURE — G0439 PPPS, SUBSEQ VISIT: HCPCS | Performed by: FAMILY MEDICINE

## 2023-09-26 RX ORDER — AMOXICILLIN 500 MG/1
1000 CAPSULE ORAL EVERY 12 HOURS SCHEDULED
Qty: 28 CAPSULE | Refills: 0 | Status: SHIPPED | OUTPATIENT
Start: 2023-09-26 | End: 2023-10-03

## 2023-09-26 NOTE — PROGRESS NOTES
Answers for HPI/ROS submitted by the patient on 9/26/2023  How would you rate your overall health?: good  Compared to last year, how is your physical health?: same  In general, how satisfied are you with your life?: satisfied  Compared to last year, how is your eyesight?: same  Compared to last year, how is your hearing?: slightly worse  Compared to last year, how is your emotional/mental health?: same  How often is anger a problem for you?: never, rarely  How often do you feel unusually tired/fatigued?: sometimes  In the past 7 days, how much pain have you experienced?: none  In the past 6 months, have you lost or gained 10 pounds without trying?: No  One or more falls in the last year: No  Do you have trouble with the stairs inside or outside your home?: No  Does your home have working smoke alarms?: Yes  Does your home have a carbon monoxide monitor?: Yes  Which safety hazards (if any) have you experienced in your home? Please select all that apply.: none  How would you describe your current diet?  Please select all that apply.: Regular  In addition to prescription medications, are you taking any over-the-counter supplements?: No  Can you manage your medications?: Yes  Are you currently taking any opioid medications?: No  Can you walk and transfer into and out of your bed and chair?: Yes  Can you dress and groom yourself?: Yes  Can you bathe or shower yourself?: Yes  Can you feed yourself?: Yes  Can you do your laundry/ housekeeping?: Yes  Can you manage your money, pay your bills, and track your expenses?: Yes  Can you make your own meals?: Yes  Can you do your own shopping?: Yes  Within the last 12 months, have you had any hospitalizations or Emergency Department visits?: No  Do you have a living will?: Yes  Do you have a Durable POA (Power of ) for healthcare decisions?: Yes  Do you have an Advanced Directive for end of life decisions?: No  How often have you used an illegal drug (including marijuana) or a prescription medication for non-medical reasons in the past year?: never  What is the typical number of drinks you consume in a day?: 1  What is the typical number of drinks you consume in a week?: 2  How often did you have a drink containing alcohol in the past year?: monthly or less  How many drinks did you have on a typical day  when you were drinking in the past year?: 0  How often did you have 6 or more drinks on one occasion in the past year?: never

## 2023-09-26 NOTE — PROGRESS NOTES
Assessment and Plan:     Problem List Items Addressed This Visit        Cardiovascular and Mediastinum    Essential hypertension    Relevant Orders    CBC and differential    Comprehensive metabolic panel    Lipid panel    TSH, 3rd generation with Free T4 reflex       Other    Screening for colon cancer    Relevant Orders    Ambulatory Referral to Gastroenterology   Other Visit Diagnoses     Medicare annual wellness visit, subsequent    -  Primary    Acute maxillary sinusitis, recurrence not specified        Relevant Medications    amoxicillin (AMOXIL) 500 mg capsule    Screening for prostate cancer        Relevant Orders    PSA, Total Screen           Preventive health issues were discussed with patient, and age appropriate screening tests were ordered as noted in patient's After Visit Summary. Personalized health advice and appropriate referrals for health education or preventive services given if needed, as noted in patient's After Visit Summary.      History of Present Illness:     Patient presents for a Medicare Wellness Visit    HPI   Patient Care Team:  Robbie Vidales DO as PCP - General  DO Del Burkett MD Ricka Bailiff, MD Lindajean Bodo, MD (Nephrology)     Review of Systems:     Review of Systems     Problem List:     Patient Active Problem List   Diagnosis   • History of CVA (cerebrovascular accident)   • Essential hypertension   • Paroxysmal atrial fibrillation (HCC)   • Mixed hyperlipidemia   • Pain of toe of right foot   • Benign prostatic hyperplasia with lower urinary tract symptoms   • Screening for colon cancer   • Acute bacterial conjunctivitis of both eyes   • FELISA (obstructive sleep apnea)   • Renal cell carcinoma of left kidney ([address])   • Stage 3a chronic kidney disease ([address])   • SDH (subdural hematoma) ([address])   • Presence of Watchman left atrial appendage closure device   • Arachnoid cyst   • Dizziness   • H/O partial nephrectomy   • Seborrheic dermatitis   • Coagulopathy [facility])   • Herpes zoster without complication   • Traumatic subdural hemorrhage with loss of consciousness of unspecified duration, initial encounter [facility])   • Left leg pain      Past Medical and Surgical History:     Past Medical History:   Diagnosis Date   • Arthritis    • Atrial fibrillation ([address])    • Cancer ([address]) 08/2020    Kidney cancer   • Cancer ([address])     melanoma   • Chronic kidney disease     left kidney cancer   • Clotting disorder ([address]) 1/10/2021   • CPAP (continuous positive airway pressure) dependence    • Headache(784.0)     Not current and not often   • Hyperlipidemia    • Hypertension    • Irregular heart beat     Afib   • Pneumonia    • Skin cancer    • Sleep apnea    • Stroke [facility])    • Supraventricular tachycardia (HCC)    • Wears glasses    • Wears partial dentures     upper partial     Past Surgical History:   Procedure Laterality Date   • APPENDECTOMY     • CARDIOVERSION     • COLONOSCOPY      fiberoptic, also 2009, both negative, resolved 2004   • COLONOSCOPY  08/2019   • EGD     • HERNIA REPAIR      umbilical and bilateral inguinal   • KNEE ARTHROSCOPY Left    • MT LAPAROSCOPY SURG PARTIAL NEPHRECTOMY Left 10/7/2020    Procedure: ROBOTIC PARTIAL NEPHRECTOMY;  Surgeon: Donis Cleary MD;  Location: [facility];  Service: Urology   • MT PERQ CLSR TCAT L ATR APNDGE W/ENDOCARDIAL IMPLNT N/A 3/25/2021    Procedure: LEFT ATRIAL APPENDAGE OCCLUSION;  Surgeon: John Prado MD;  Location:  MAIN OR;  Service: Cardiology   • SKIN GRAFT      left ear, skin cancer   • TONSILLECTOMY        Family History:     Family History   Problem Relation Age of Onset   • Cancer Father         stomach   • Hypertension Father    • Stroke Father         syndrome   • Cancer Family    • Heart disease Family    • Hypertension Family    • Stroke Family         syndrome   • Throat cancer Mother    • Diabetes Mother    • Hypertension Mother    • Cancer Mother    • Asthma Sister       Social History:     Social History Socioeconomic History   • Marital status: /Civil Union     Spouse name: None   • Number of children: None   • Years of education: None   • Highest education level: None   Occupational History   • Occupation: retired    Tobacco Use   • Smoking status: Never   • Smokeless tobacco: Never   Vaping Use   • Vaping Use: Never used   Substance and Sexual Activity   • Alcohol use: Not Currently     Comment: Social only   • Drug use: No   • Sexual activity: Yes     Partners: Female   Other Topics Concern   • None   Social History Narrative   • None     Social Determinants of Health     Financial Resource Strain: Low Risk  (9/26/2023)    Overall Financial Resource Strain (CARDIA)    • Difficulty of Paying Living Expenses: Not very hard   Food Insecurity: Not on file   Transportation Needs: No Transportation Needs (9/26/2023)    PRAPARE - Transportation    • Lack of Transportation (Medical): No    • Lack of Transportation (Non-Medical):  No   Physical Activity: Not on file   Stress: Not on file   Social Connections: Not on file   Intimate Partner Violence: Not on file   Housing Stability: Not on file      Medications and Allergies:     Current Outpatient Medications   Medication Sig Dispense Refill   • amLODIPine (NORVASC) 5 mg tablet Take 1 tablet (5 mg total) by mouth daily 90 tablet 1   • amoxicillin (AMOXIL) 500 mg capsule Take 2 capsules (1,000 mg total) by mouth every 12 (twelve) hours for 7 days 28 capsule 0   • aspirin (ECOTRIN LOW STRENGTH) 81 mg EC tablet Take 1 tablet (81 mg total) by mouth daily 30 tablet 2   • atorvastatin (LIPITOR) 10 mg tablet Take 1 tablet (10 mg total) by mouth daily 90 tablet 1   • Coenzyme Q10 (CO Q-10 PO) Take 1 caplet by mouth every morning     • flecainide (TAMBOCOR) 100 mg tablet Take 1 tablet (100 mg total) by mouth 2 (two) times a day 180 tablet 1   • furosemide (LASIX) 20 mg tablet Take 1 tablet (20 mg total) by mouth daily as needed (swelling) 30 tablet 1   • metoprolol succinate (TOPROL-XL) 50 mg 24 hr tablet Take 1 tablet (50 mg total) by mouth daily 90 tablet 1   • Multiple Vitamins-Minerals (MULTIVITAMIN ADULT PO) Take 1 tablet by mouth every morning     • pimecrolimus (ELIDEL) 1 % cream Apply 1 application topically 2 (two) times a day       No current facility-administered medications for this visit. No Known Allergies   Immunizations:     Immunization History   Administered Date(s) Administered   • COVID-19 MODERNA VACC 0.5 ML IM 01/28/2021, 03/03/2021, 11/03/2021, 07/21/2022   • COVID-19 Moderna Vac BIVALENT 12 Yr+ IM 0.5 ML 12/29/2022   • INFLUENZA 02/27/2014   • Influenza Quadrivalent Preservative Free 3 years and older IM 10/20/2016, 10/05/2017   • Influenza, high dose seasonal 0.7 mL 10/23/2019, 10/14/2020, 11/11/2021, 10/21/2022   • Influenza, injectable, quadrivalent, pediatric 09/10/2018   • Influenza, seasonal, injectable 1955   • Pneumococcal Conjugate 13-Valent 04/17/2020   • Pneumococcal Polysaccharide PPV23 05/04/2021   • Tdap 1955      Health Maintenance:         Topic Date Due   • Colorectal Cancer Screening  10/22/2022   • Hepatitis C Screening  Completed         Topic Date Due   • COVID-19 Vaccine (6 - Lonne Cleaves series) 04/29/2023   • Influenza Vaccine (1) 09/01/2023      Medicare Screening Tests and Risk Assessments:     Emma Desir is here for his Subsequent Wellness visit. Health Risk Assessment:   Patient rates overall health as good. Patient feels that their physical health rating is same. Patient is satisfied with their life. Eyesight was rated as same. Hearing was rated as slightly worse. Patient feels that their emotional and mental health rating is same. Patients states they are never, rarely angry. Patient states they are sometimes unusually tired/fatigued. Pain experienced in the last 7 days has been none. Patient states that he has experienced no weight loss or gain in last 6 months.      Depression Screening:   PHQ-2 Score: 0      Fall Risk Screening: In the past year, patient has experienced: no history of falling in past year      Home Safety:  Patient does not have trouble with stairs inside or outside of their home. Patient has working smoke alarms and has working carbon monoxide detector. Home safety hazards include: none. Nutrition:   Current diet is Regular. Medications:   Patient is not currently taking any over-the-counter supplements. Patient is able to manage medications. Activities of Daily Living (ADLs)/Instrumental Activities of Daily Living (IADLs):   Walk and transfer into and out of bed and chair?: Yes  Dress and groom yourself?: Yes    Bathe or shower yourself?: Yes    Feed yourself? Yes  Do your laundry/housekeeping?: Yes  Manage your money, pay your bills and track your expenses?: Yes  Make your own meals?: Yes    Do your own shopping?: Yes    Previous Hospitalizations:   Any hospitalizations or ED visits within the last 12 months?: No      Advance Care Planning:   Living will: Yes    Durable POA for healthcare:  Yes    Advanced directive: No      Cognitive Screening:   Provider or family/friend/caregiver concerned regarding cognition?: No    PREVENTIVE SCREENINGS      Cardiovascular Screening:    General: Screening Not Indicated, History Lipid Disorder and Risks and Benefits Discussed    Due for: Lipid Panel      Diabetes Screening:     General: Risks and Benefits Discussed    Due for: Blood Glucose      Colorectal Cancer Screening:     General: Screening Current and Risks and Benefits Discussed      Prostate Cancer Screening:    General: Risks and Benefits Discussed    Due for: PSA      Osteoporosis Screening:    General: Risks and Benefits Discussed and Screening Not Indicated      Abdominal Aortic Aneurysm (AAA) Screening:    Risk factors include: age between 70-75 yo        General: Risks and Benefits Discussed and Screening Not Indicated      Lung Cancer Screening:     General: Screening Not Indicated and Risks and Benefits Discussed      Hepatitis C Screening:    General: Screening Current and Risks and Benefits Discussed    Screening, Brief Intervention, and Referral to Treatment (SBIRT)    Screening  Typical number of drinks in a day: 1  Typical number of drinks in a week: 2  Interpretation: Low risk drinking behavior. AUDIT-C Screenin) How often did you have a drink containing alcohol in the past year? monthly or less  2) How many drinks did you have on a typical day when you were drinking in the past year? 0  3) How often did you have 6 or more drinks on one occasion in the past year? never    AUDIT-C Score: 1  Interpretation: Score 0-3 (male): Negative screen for alcohol misuse    Single Item Drug Screening:  How often have you used an illegal drug (including marijuana) or a prescription medication for non-medical reasons in the past year? never    Single Item Drug Screen Score: 0  Interpretation: Negative screen for possible drug use disorder    Other Counseling Topics:   Regular weightbearing exercise. No results found.      Physical Exam:     /78 (BP Location: Right arm, Patient Position: Sitting, Cuff Size: Standard)   Pulse 60   Temp 97.8 °F (36.6 °C) (Temporal)   Ht 6' (1.829 m)   Wt 101 kg (222 lb)   SpO2 97%   BMI 30.11 kg/m²     Physical Exam     Fabian Epps DO

## 2023-09-26 NOTE — PATIENT INSTRUCTIONS
Medicare Preventive Visit Patient Instructions  Thank you for completing your Welcome to Medicare Visit or Medicare Annual Wellness Visit today. Your next wellness visit will be due in one year (9/26/2024). The screening/preventive services that you may require over the next 5-10 years are detailed below. Some tests may not apply to you based off risk factors and/or age. Screening tests ordered at today's visit but not completed yet may show as past due. Also, please note that scanned in results may not display below. Preventive Screenings:  Service Recommendations Previous Testing/Comments   Colorectal Cancer Screening  · Colonoscopy    · Fecal Occult Blood Test (FOBT)/Fecal Immunochemical Test (FIT)  · Fecal DNA/Cologuard Test  · Flexible Sigmoidoscopy Age: 43-73 years old   Colonoscopy: every 10 years (May be performed more frequently if at higher risk)  OR  FOBT/FIT: every 1 year  OR  Cologuard: every 3 years  OR  Sigmoidoscopy: every 5 years  Screening may be recommended earlier than age 39 if at higher risk for colorectal cancer. Also, an individualized decision between you and your healthcare provider will decide whether screening between the ages of 77-80 would be appropriate.  Colonoscopy: 10/22/2019  FOBT/FIT: Not on file  Cologuard: Not on file  Sigmoidoscopy: Not on file    Screening Current     Prostate Cancer Screening Individualized decision between patient and health care provider in men between ages of 53-66   Medicare will cover every 12 months beginning on the day after your 50th birthday PSA: 1.8 ng/mL           Hepatitis C Screening Once for adults born between 1945 and 1965  More frequently in patients at high risk for Hepatitis C Hep C Antibody: 01/19/2021    Screening Current   Diabetes Screening 1-2 times per year if you're at risk for diabetes or have pre-diabetes Fasting glucose: 94 mg/dL (12/28/2022)  A1C: No results in last 5 years (No results in last 5 years)  Screening Current Cholesterol Screening Once every 5 years if you don't have a lipid disorder. May order more often based on risk factors. Lipid panel: 09/09/2022  Screening Not Indicated  History Lipid Disorder      Other Preventive Screenings Covered by Medicare:  1. Abdominal Aortic Aneurysm (AAA) Screening: covered once if your at risk. You're considered to be at risk if you have a family history of AAA or a male between the age of 70-76 who smoking at least 100 cigarettes in your lifetime. 2. Lung Cancer Screening: covers low dose CT scan once per year if you meet all of the following conditions: (1) Age 48-67; (2) No signs or symptoms of lung cancer; (3) Current smoker or have quit smoking within the last 15 years; (4) You have a tobacco smoking history of at least 20 pack years (packs per day x number of years you smoked); (5) You get a written order from a healthcare provider. 3. Glaucoma Screening: covered annually if you're considered high risk: (1) You have diabetes OR (2) Family history of glaucoma OR (3)  aged 48 and older OR (3)  American aged 72 and older  3. Osteoporosis Screening: covered every 2 years if you meet one of the following conditions: (1) Have a vertebral abnormality; (2) On glucocorticoid therapy for more than 3 months; (3) Have primary hyperparathyroidism; (4) On osteoporosis medications and need to assess response to drug therapy. 5. HIV Screening: covered annually if you're between the age of 14-79. Also covered annually if you are younger than 13 and older than 72 with risk factors for HIV infection. For pregnant patients, it is covered up to 3 times per pregnancy.     Immunizations:  Immunization Recommendations   Influenza Vaccine Annual influenza vaccination during flu season is recommended for all persons aged >= 6 months who do not have contraindications   Pneumococcal Vaccine   * Pneumococcal conjugate vaccine = PCV13 (Prevnar 13), PCV15 (Vaxneuvance), PCV20 (Prevnar 20)  * Pneumococcal polysaccharide vaccine = PPSV23 (Pneumovax) Adults 2364 years old: 1-3 doses may be recommended based on certain risk factors  Adults 72 years old: 1-2 doses may be recommended based off what pneumonia vaccine you previously received   Hepatitis B Vaccine 3 dose series if at intermediate or high risk (ex: diabetes, end stage renal disease, liver disease)   Tetanus (Td) Vaccine - COST NOT COVERED BY MEDICARE PART B Following completion of primary series, a booster dose should be given every 10 years to maintain immunity against tetanus. Td may also be given as tetanus wound prophylaxis. Tdap Vaccine - COST NOT COVERED BY MEDICARE PART B Recommended at least once for all adults. For pregnant patients, recommended with each pregnancy. Shingles Vaccine (Shingrix) - COST NOT COVERED BY MEDICARE PART B  2 shot series recommended in those aged 48 and above     Health Maintenance Due:      Topic Date Due   • Colorectal Cancer Screening  10/22/2022   • Hepatitis C Screening  Completed     Immunizations Due:      Topic Date Due   • COVID-19 Vaccine (6 - Moderna series) 04/29/2023   • Influenza Vaccine (1) 09/01/2023     Advance Directives   What are advance directives? Advance directives are legal documents that state your wishes and plans for medical care. These plans are made ahead of time in case you lose your ability to make decisions for yourself. Advance directives can apply to any medical decision, such as the treatments you want, and if you want to donate organs. What are the types of advance directives? There are many types of advance directives, and each state has rules about how to use them. You may choose a combination of any of the following:  · Living will: This is a written record of the treatment you want. You can also choose which treatments you do not want, which to limit, and which to stop at a certain time. This includes surgery, medicine, IV fluid, and tube feedings. · Durable power of  for [facility]): This is a written record that states who you want to make healthcare choices for you when you are unable to make them for yourself. This person, called a proxy, is usually a family member or a friend. You may choose more than 1 proxy. · Do not resuscitate (DNR) order:  A DNR order is used in case your heart stops beating or you stop breathing. It is a request not to have certain forms of treatment, such as CPR. A DNR order may be included in other types of advance directives. · Medical directive: This covers the care that you want if you are in a coma, near death, or unable to make decisions for yourself. You can list the treatments you want for each condition. Treatment may include pain medicine, surgery, blood transfusions, dialysis, IV or tube feedings, and a ventilator (breathing machine). · Values history: This document has questions about your views, beliefs, and how you feel and think about life. This information can help others choose the care that you would choose. Why are advance directives important? An advance directive helps you control your care. Although spoken wishes may be used, it is better to have your wishes written down. Spoken wishes can be misunderstood, or not followed. Treatments may be given even if you do not want them. An advance directive may make it easier for your family to make difficult choices about your care. Weight Management   Why it is important to manage your weight:  Being overweight increases your risk of health conditions such as heart disease, high blood pressure, type 2 diabetes, and certain types of cancer. It can also increase your risk for osteoarthritis, sleep apnea, and other respiratory problems. Aim for a slow, steady weight loss. Even a small amount of weight loss can lower your risk of health problems.   How to lose weight safely:  A safe and healthy way to lose weight is to eat fewer calories and get regular exercise. You can lose up about 1 pound a week by decreasing the number of calories you eat by 500 calories each day. Healthy meal plan for weight management:  A healthy meal plan includes a variety of foods, contains fewer calories, and helps you stay healthy. A healthy meal plan includes the following:  · Eat whole-grain foods more often. A healthy meal plan should contain fiber. Fiber is the part of grains, fruits, and vegetables that is not broken down by your body. Whole-grain foods are healthy and provide extra fiber in your diet. Some examples of whole-grain foods are whole-wheat breads and pastas, oatmeal, brown rice, and bulgur. · Eat a variety of vegetables every day. Include dark, leafy greens such as spinach, kale, cary greens, and mustard greens. Eat yellow and orange vegetables such as carrots, sweet potatoes, and winter squash. · Eat a variety of fruits every day. Choose fresh or canned fruit (canned in its own juice or light syrup) instead of juice. Fruit juice has very little or no fiber. · Eat low-fat dairy foods. Drink fat-free (skim) milk or 1% milk. Eat fat-free yogurt and low-fat cottage cheese. Try low-fat cheeses such as mozzarella and other reduced-fat cheeses. · Choose meat and other protein foods that are low in fat. Choose beans or other legumes such as split peas or lentils. Choose fish, skinless poultry (chicken or turkey), or lean cuts of red meat (beef or pork). Before you cook meat or poultry, cut off any visible fat. · Use less fat and oil. Try baking foods instead of frying them. Add less fat, such as margarine, sour cream, regular salad dressing and mayonnaise to foods. Eat fewer high-fat foods. Some examples of high-fat foods include french fries, doughnuts, ice cream, and cakes. · Eat fewer sweets. Limit foods and drinks that are high in sugar. This includes candy, cookies, regular soda, and sweetened drinks.   Exercise:  Exercise at least 30 minutes per day on most days of the week. Some examples of exercise include walking, biking, dancing, and swimming. You can also fit in more physical activity by taking the stairs instead of the elevator or parking farther away from stores. Ask your healthcare provider about the best exercise plan for you. © Copyright [address] 2018 Information is for End User's use only and may not be sold, redistributed or otherwise used for commercial purposes.  All illustrations and images included in CareNotes® are the copyrighted property of [employer].D[employer]M., Inc. or  [address]

## 2023-10-06 DIAGNOSIS — I48.19 PERSISTENT ATRIAL FIBRILLATION (HCC): ICD-10-CM

## 2023-10-06 RX ORDER — FLECAINIDE ACETATE 100 MG/1
100 TABLET ORAL 2 TIMES DAILY
Qty: 180 TABLET | Refills: 1 | Status: SHIPPED | OUTPATIENT
Start: 2023-10-06

## 2023-10-19 ENCOUNTER — APPOINTMENT (OUTPATIENT)
Dept: LAB | Age: 68
End: 2023-10-19
Payer: MEDICARE

## 2023-10-19 ENCOUNTER — APPOINTMENT (OUTPATIENT)
Dept: RADIOLOGY | Age: 68
End: 2023-10-19
Payer: MEDICARE

## 2023-10-19 DIAGNOSIS — I10 ESSENTIAL HYPERTENSION: ICD-10-CM

## 2023-10-19 DIAGNOSIS — C64.2 RENAL CELL CARCINOMA OF LEFT KIDNEY (HCC): ICD-10-CM

## 2023-10-19 DIAGNOSIS — Z12.5 SCREENING FOR PROSTATE CANCER: ICD-10-CM

## 2023-10-19 LAB
ALBUMIN SERPL BCP-MCNC: 3.9 G/DL (ref 3.5–5)
ALP SERPL-CCNC: 65 U/L (ref 34–104)
ALT SERPL W P-5'-P-CCNC: 14 U/L (ref 7–52)
ANION GAP SERPL CALCULATED.3IONS-SCNC: 4 MMOL/L
AST SERPL W P-5'-P-CCNC: 18 U/L (ref 13–39)
BASOPHILS # BLD AUTO: 0.02 THOUSANDS/ÂΜL (ref 0–0.1)
BASOPHILS NFR BLD AUTO: 0 % (ref 0–1)
BILIRUB SERPL-MCNC: 0.93 MG/DL (ref 0.2–1)
BUN SERPL-MCNC: 14 MG/DL (ref 5–25)
CALCIUM SERPL-MCNC: 9 MG/DL (ref 8.4–10.2)
CHLORIDE SERPL-SCNC: 103 MMOL/L (ref 96–108)
CHOLEST SERPL-MCNC: 118 MG/DL
CO2 SERPL-SCNC: 32 MMOL/L (ref 21–32)
CREAT SERPL-MCNC: 1.24 MG/DL (ref 0.6–1.3)
EOSINOPHIL # BLD AUTO: 0.12 THOUSAND/ÂΜL (ref 0–0.61)
EOSINOPHIL NFR BLD AUTO: 2 % (ref 0–6)
ERYTHROCYTE [DISTWIDTH] IN BLOOD BY AUTOMATED COUNT: 13.6 % (ref 11.6–15.1)
GFR SERPL CREATININE-BSD FRML MDRD: 58 ML/MIN/1.73SQ M
GLUCOSE P FAST SERPL-MCNC: 80 MG/DL (ref 65–99)
HCT VFR BLD AUTO: 44.9 % (ref 36.5–49.3)
HDLC SERPL-MCNC: 53 MG/DL
HGB BLD-MCNC: 14.1 G/DL (ref 12–17)
IMM GRANULOCYTES # BLD AUTO: 0.02 THOUSAND/UL (ref 0–0.2)
IMM GRANULOCYTES NFR BLD AUTO: 0 % (ref 0–2)
LDLC SERPL CALC-MCNC: 54 MG/DL (ref 0–100)
LYMPHOCYTES # BLD AUTO: 1.37 THOUSANDS/ÂΜL (ref 0.6–4.47)
LYMPHOCYTES NFR BLD AUTO: 18 % (ref 14–44)
MCH RBC QN AUTO: 29 PG (ref 26.8–34.3)
MCHC RBC AUTO-ENTMCNC: 31.4 G/DL (ref 31.4–37.4)
MCV RBC AUTO: 92 FL (ref 82–98)
MONOCYTES # BLD AUTO: 0.59 THOUSAND/ÂΜL (ref 0.17–1.22)
MONOCYTES NFR BLD AUTO: 8 % (ref 4–12)
NEUTROPHILS # BLD AUTO: 5.5 THOUSANDS/ÂΜL (ref 1.85–7.62)
NEUTS SEG NFR BLD AUTO: 72 % (ref 43–75)
NONHDLC SERPL-MCNC: 65 MG/DL
NRBC BLD AUTO-RTO: 0 /100 WBCS
PLATELET # BLD AUTO: 272 THOUSANDS/UL (ref 149–390)
PMV BLD AUTO: 10 FL (ref 8.9–12.7)
POTASSIUM SERPL-SCNC: 3.9 MMOL/L (ref 3.5–5.3)
PROT SERPL-MCNC: 6.4 G/DL (ref 6.4–8.4)
PSA SERPL-MCNC: 1.63 NG/ML (ref 0–4)
RBC # BLD AUTO: 4.87 MILLION/UL (ref 3.88–5.62)
SODIUM SERPL-SCNC: 139 MMOL/L (ref 135–147)
TRIGL SERPL-MCNC: 53 MG/DL
TSH SERPL DL<=0.05 MIU/L-ACNC: 2.75 UIU/ML (ref 0.45–4.5)
WBC # BLD AUTO: 7.62 THOUSAND/UL (ref 4.31–10.16)

## 2023-10-19 PROCEDURE — 85025 COMPLETE CBC W/AUTO DIFF WBC: CPT

## 2023-10-19 PROCEDURE — 80061 LIPID PANEL: CPT

## 2023-10-19 PROCEDURE — 84443 ASSAY THYROID STIM HORMONE: CPT

## 2023-10-19 PROCEDURE — 80053 COMPREHEN METABOLIC PANEL: CPT

## 2023-10-19 PROCEDURE — G0103 PSA SCREENING: HCPCS

## 2023-10-19 PROCEDURE — 36415 COLL VENOUS BLD VENIPUNCTURE: CPT

## 2023-10-19 PROCEDURE — 71046 X-RAY EXAM CHEST 2 VIEWS: CPT

## 2023-10-20 ENCOUNTER — TELEPHONE (OUTPATIENT)
Age: 68
End: 2023-10-20

## 2023-10-20 ENCOUNTER — PREP FOR PROCEDURE (OUTPATIENT)
Age: 68
End: 2023-10-20

## 2023-10-20 DIAGNOSIS — Z86.010 HISTORY OF COLON POLYPS: Primary | ICD-10-CM

## 2023-10-20 NOTE — TELEPHONE ENCOUNTER
Scheduled date of colonoscopy (as of today): 12/19/2023  Physician performing colonoscopy:   Location of colonoscopy: [facility]  Bowel prep reviewed with patient: Taryn Guevara  Instructions reviewed with patient by: Taryn Guevara  Clearances: N/A    Miralax Dulcolax prep sent via [employer]

## 2023-10-25 ENCOUNTER — HOSPITAL ENCOUNTER (OUTPATIENT)
Dept: RADIOLOGY | Age: 68
Discharge: HOME | End: 2023-10-25
Payer: MEDICARE

## 2023-10-25 DIAGNOSIS — C64.2 RENAL CELL CARCINOMA OF LEFT KIDNEY (HCC): ICD-10-CM

## 2023-10-25 PROCEDURE — 74178 CT ABD&PLV WO CNTR FLWD CNTR: CPT

## 2023-10-25 PROCEDURE — G1004 CDSM NDSC: HCPCS

## 2023-10-25 RX ADMIN — IOHEXOL 100 ML: 350 INJECTION, SOLUTION INTRAVENOUS at 09:27

## 2023-11-07 ENCOUNTER — TELEPHONE (OUTPATIENT)
Dept: UROLOGY | Facility: AMBULATORY SURGERY CENTER | Age: 68
End: 2023-11-07

## 2023-11-09 ENCOUNTER — CLINICAL SUPPORT (OUTPATIENT)
Dept: FAMILY MEDICINE CLINIC | Facility: CLINIC | Age: 68
End: 2023-11-09
Payer: MEDICARE

## 2023-11-09 DIAGNOSIS — E78.2 MIXED HYPERLIPIDEMIA: ICD-10-CM

## 2023-11-09 DIAGNOSIS — Z23 ENCOUNTER FOR IMMUNIZATION: Primary | ICD-10-CM

## 2023-11-09 PROCEDURE — G0008 ADMIN INFLUENZA VIRUS VAC: HCPCS

## 2023-11-09 PROCEDURE — 90662 IIV NO PRSV INCREASED AG IM: CPT

## 2023-11-09 RX ORDER — ATORVASTATIN CALCIUM 10 MG/1
10 TABLET, FILM COATED ORAL DAILY
Qty: 90 TABLET | Refills: 1 | Status: SHIPPED | OUTPATIENT
Start: 2023-11-09

## 2023-11-14 ENCOUNTER — OFFICE VISIT (OUTPATIENT)
Dept: UROLOGY | Facility: AMBULATORY SURGERY CENTER | Age: 68
End: 2023-11-14
Payer: MEDICARE

## 2023-11-14 VITALS
BODY MASS INDEX: 30.48 KG/M2 | DIASTOLIC BLOOD PRESSURE: 80 MMHG | WEIGHT: 225 LBS | HEIGHT: 72 IN | OXYGEN SATURATION: 96 % | SYSTOLIC BLOOD PRESSURE: 120 MMHG | HEART RATE: 57 BPM

## 2023-11-14 DIAGNOSIS — C64.2 RENAL CELL CARCINOMA OF LEFT KIDNEY (HCC): Primary | ICD-10-CM

## 2023-11-14 PROCEDURE — 99214 OFFICE O/P EST MOD 30 MIN: CPT

## 2023-11-14 NOTE — PROGRESS NOTES
Office Visit- Urology  [facility] 1955 MRN: [id number]      Assessment/Discussion/Plan    70 y.o. male managed by     1. Renal cell carcinoma  -S/p left robotic assisted laparoscopic partial nephrectomy in October 2020  -Pathology demonstrating [id number] staging. Histology: Papillary. no sarcomatoid or rhabdoid features. Negative margins with no lymphovascular invasion.   -Patient had CT renal protocol in October 2023 with no evidence of recurrence or metastatic disease. Chest x-ray obtained in October 2023 with no pulmonary nodules  -Per NCCN guidelines we will continue with annual imaging until 5 years out from surgical intervention.  -Obtain a CT of the abdomen with and without contrast and chest x-ray in October 2024 with a follow-up afterwards    2. Nocturia   -patient's only bothersome urinary complaint  -Patient was diagnosed with sleep apnea but notes that was mild 2 or 3 years ago. He does not currently use a CPAP. Advised him to consider returning for another sleep study to ensure of there has not been a progression of his degree of sleep apnea  -Patient has minimal lower extremity swelling throughout the day. Advised that he could utilize compression stockings to mitigate daytime swelling as potential factor for nocturia  -Previously trialed Flomax but was unable to tolerate  -Discussed option of Cialis 5 mg for BPH. Patient does not want to consider pharmacotherapy at this time but did discuss about administration and side effects so that if he changes mind he will call the office at which time one of our providers will get cardiac clearance before initiation of this    3.   Prostate cancer screening  -Patient has routine prostate cancer screening with PSA and SOPHIA through his PCP.  -Last PSA measured at 1.63 on 10/19/2023      Chief Complaint:   Meryl Sacks is a 70 y.o. male presenting to the office for a follow up visit regarding renal cell carcinoma        Subjective    Patient is a 48-year-old male with a history of left renal cell carcinoma s/p left robotic assisted laparoscopic partial nephrectomy in October 2020 who presents for follow-up. He has had routine abdominal imaging and chest imaging since then with no evidence of recurrence or metastasis. His last chest x-ray was in October 2023. Last abdominal imaging which was CT renal protocol in October 2023 as well. Patient states that he has nocturia ranging from a couple times a night to multiple times a night that can affect his quality of life. He previously trialed Flomax but stated that it affected his heart rhythm so he came off of it. He does follow with cardiology for A-fib, history of CVA, presence of Watchman device. He does state that he was diagnosed with sleep apnea about 2 to 3 years ago but it was mild and he does not use a CPAP currently. He also endorses minimal lower extremity swelling. No bothersome urinary symptoms during the day.   He denies any gross hematuria, dysuria, flank pain        AUA SYMPTOM SCORE      Flowsheet Row Most Recent Value   AUA SYMPTOM SCORE    How often have you had a sensation of not emptying your bladder completely after you finished urinating? 0 (P)     How often have you had to urinate again less than two hours after you finished urinating? 3 (P)     How often have you found you stopped and started again several times when you urinate? 1 (P)     How often have you found it difficult to postpone urination? 2 (P)     How often have you had a weak urinary stream? 2 (P)     How often have you had to push or strain to begin urination? 0 (P)     How many times did you most typically get up to urinate from the time you went to bed at night until the time you got up in the morning? 5 (P)     Quality of Life: If you were to spend the rest of your life with your urinary condition just the way it is now, how would you feel about that? 4 (P)     AUA SYMPTOM SCORE 13 (P)             ROS:   Review of Systems   Constitutional: Negative. Negative for chills, fatigue and fever. HENT: Negative. Respiratory:  Negative for shortness of breath. Cardiovascular:  Negative for chest pain. Gastrointestinal: Negative. Negative for abdominal pain. Endocrine: Negative. Musculoskeletal: Negative. Skin: Negative. Neurological: Negative. Negative for dizziness and light-headedness. Hematological: Negative. Psychiatric/Behavioral: Negative.            Past Medical History  Past Medical History:   Diagnosis Date    Arthritis     Atrial fibrillation ([address])     Cancer ([address]) 08/2020    Kidney cancer    Cancer ([address])     melanoma    Chronic kidney disease     left kidney cancer    Clotting disorder ([address]) 1/10/2021    CPAP (continuous positive airway pressure) dependence     Headache(784.0)     Not current and not often    Hyperlipidemia     Hypertension     Irregular heart beat     Afib    Pneumonia     Skin cancer     Sleep apnea     Stroke [facility])     Supraventricular tachycardia     Wears glasses     Wears partial dentures     upper partial       Past Surgical History  Past Surgical History:   Procedure Laterality Date    APPENDECTOMY      CARDIOVERSION      COLONOSCOPY      fiberoptic, also 2009, both negative, resolved 2004    COLONOSCOPY  08/2019    EGD      HERNIA REPAIR      umbilical and bilateral inguinal    KNEE ARTHROSCOPY Left     OH LAPAROSCOPY SURG PARTIAL NEPHRECTOMY Left 10/7/2020    Procedure: ROBOTIC PARTIAL NEPHRECTOMY;  Surgeon: Nazia Turner MD;  Location: [facility];  Service: Urology    OH PERQ CLSR TCAT L ATR APNDGE W/ENDOCARDIAL IMPLNT N/A 3/25/2021    Procedure: LEFT ATRIAL APPENDAGE OCCLUSION;  Surgeon: Rebecca Olguin MD;  Location:  MAIN OR;  Service: Cardiology    SKIN GRAFT      left ear, skin cancer    TONSILLECTOMY         Past Family History  Family History   Problem Relation Age of Onset    Cancer Father         stomach    Hypertension Father     Stroke Father syndrome    Cancer Family     Heart disease Family     Hypertension Family     Stroke Family         syndrome    Throat cancer Mother     Diabetes Mother     Hypertension Mother     Cancer Mother     Asthma Sister        Past Social history  Social History     Socioeconomic History    Marital status: /Civil Union     Spouse name: Not on file    Number of children: Not on file    Years of education: Not on file    Highest education level: Not on file   Occupational History    Occupation: retired    Tobacco Use    Smoking status: Never    Smokeless tobacco: Never   Vaping Use    Vaping Use: Never used   Substance and Sexual Activity    Alcohol use: Not Currently     Comment: Social only    Drug use: No    Sexual activity: Yes     Partners: Female   Other Topics Concern    Not on file   Social History Narrative    Not on file     Social Determinants of Health     Financial Resource Strain: Low Risk  (9/26/2023)    Overall Financial Resource Strain (CARDIA)     Difficulty of Paying Living Expenses: Not very hard   Food Insecurity: Not on file   Transportation Needs: No Transportation Needs (9/26/2023)    PRAPARE - Transportation     Lack of Transportation (Medical): No     Lack of Transportation (Non-Medical):  No   Physical Activity: Not on file   Stress: Not on file   Social Connections: Not on file   Intimate Partner Violence: Not on file   Housing Stability: Not on file       Current Medications  Current Outpatient Medications   Medication Sig Dispense Refill    amLODIPine (NORVASC) 5 mg tablet Take 1 tablet (5 mg total) by mouth daily 90 tablet 1    aspirin (ECOTRIN LOW STRENGTH) 81 mg EC tablet Take 1 tablet (81 mg total) by mouth daily 30 tablet 2    atorvastatin (LIPITOR) 10 mg tablet Take 1 tablet (10 mg total) by mouth daily 90 tablet 1    Coenzyme Q10 (CO Q-10 PO) Take 1 caplet by mouth every morning      flecainide (TAMBOCOR) 100 mg tablet TAKE ONE TABLET BY MOUTH TWICE A  tablet 1 furosemide (LASIX) 20 mg tablet Take 1 tablet (20 mg total) by mouth daily as needed (swelling) 30 tablet 1    metoprolol succinate (TOPROL-XL) 50 mg 24 hr tablet Take 1 tablet (50 mg total) by mouth daily 90 tablet 1    Multiple Vitamins-Minerals (MULTIVITAMIN ADULT PO) Take 1 tablet by mouth every morning      pimecrolimus (ELIDEL) 1 % cream Apply 1 application topically 2 (two) times a day       No current facility-administered medications for this visit. Allergies  No Known Allergies    OBJECTIVE    Vitals   There were no vitals filed for this visit. PVR:    Physical Exam  Constitutional:       General: He is not in acute distress. Appearance: Normal appearance. He is normal weight. He is not ill-appearing or toxic-appearing. HENT:      Head: Normocephalic and atraumatic. Eyes:      Conjunctiva/sclera: Conjunctivae normal.   Cardiovascular:      Rate and Rhythm: Normal rate. Pulmonary:      Effort: Pulmonary effort is normal. No respiratory distress. Abdominal:      Tenderness: There is no right CVA tenderness or left CVA tenderness. Skin:     General: Skin is warm and dry. Neurological:      General: No focal deficit present. Mental Status: He is alert and oriented to person, place, and time. Cranial Nerves: No cranial nerve deficit. Psychiatric:         Mood and Affect: Mood normal.         Behavior: Behavior normal.         Thought Content:  Thought content normal.          Labs:     Lab Results   Component Value Date    PSA 1.63 10/19/2023    PSA 1.8 09/09/2022    PSA 1.5 01/19/2021    PSA 2.0 09/10/2019     Lab Results   Component Value Date    CREATININE 1.24 10/19/2023      No results found for: "HGBA1C"  Lab Results   Component Value Date    CALCIUM 9.0 10/19/2023     09/19/2017    K 3.9 10/19/2023    CO2 32 10/19/2023     10/19/2023    BUN 14 10/19/2023    CREATININE 1.24 10/19/2023       I have personally reviewed all pertinent lab results and reviewed with patient    Imaging   Narrative & Impression   CT ABDOMEN AND PELVIS WITH AND WITHOUT IV CONTRAST     INDICATION:   C64.2: Malignant neoplasm of left kidney, except renal pelvis. COMPARISON: 9/21/2022     TECHNIQUE: Initial CT of the abdomen and pelvis was performed without intravenous contrast.  Subsequent dynamic CT evaluation of the abdomen and pelvis was performed after the administration of intravenous contrast in both nephrographic and delayed   phases. Multiplanar 2D reformatted images were created from the source data. This examination, like all CT scans performed in the [facility], was performed utilizing techniques to minimize radiation dose exposure, including the use of iterative reconstruction and automated exposure control. Radiation dose length   product (DLP) for this visit:  1541 mGy-cm     IV Contrast:  100 mL of iohexol (OMNIPAQUE)  Enteric Contrast:  Enteric contrast was not administered. FINDINGS:     ABDOMEN     RIGHT KIDNEY AND URETER:  No solid renal mass. No detectable urothelial mass. No hydronephrosis or hydroureter. No urinary tract calculi. No perinephric collection. LEFT KIDNEY AND URETER:  Stable appearance of the upper pole of the left kidney is noted status post partial nephrectomy. Subcentimeter left renal hypodensity is too small to characterize. .  No detectable urothelial mass. No hydronephrosis or hydroureter. No urinary tract calculi. No perinephric collection. URINARY BLADDER:  No bladder wall mass. No calculi. LOWER CHEST:  No clinically significant abnormality identified in the visualized lower chest.     LIVER/BILIARY TREE:  Unremarkable. GALLBLADDER:  No calcified gallstones. No pericholecystic inflammatory change. SPLEEN:  Unremarkable. PANCREAS:  Unremarkable. ADRENAL GLANDS:  Unremarkable. STOMACH AND BOWEL: There is colonic diverticulosis without evidence of acute diverticulitis.      APPENDIX:  No findings to suggest appendicitis. ABDOMINOPELVIC CAVITY:  No ascites. No free intraperitoneal air. No lymphadenopathy. VESSELS:  Unremarkable for patient's age. PELVIS     REPRODUCTIVE ORGANS:  Unremarkable for patient's age. ABDOMINAL WALL/INGUINAL REGIONS:  Unremarkable. OSSEOUS STRUCTURES:  No acute fracture or destructive osseous lesion. IMPRESSION:     Stable exam status post partial left nephrectomy without evidence of recurrence or metastatic disease. Workstation performed: [id number]        CHEST     INDICATION:   C64.2: Malignant neoplasm of left kidney, except renal pelvis. COMPARISON: CXR 9/21/2022 and 9/9/2021, cardiac CT 8/20/2020. EXAM PERFORMED/VIEWS:  XR CHEST PA & LATERAL. DUAL ENERGY SUBTRACTION. FINDINGS:     Cardiomediastinal silhouette normal. Watchman device in the expected location of the left atrial appendage. Lungs clear. No effusion or pneumothorax. Benign biapical pleural-parenchymal scar. Upper abdomen normal.  Bones normal for age. IMPRESSION:     No acute cardiopulmonary disease. Workstation performed: [id number]     Latisha Longo PA-C  Date: 11/14/2023 Time: 10:22 AM  [id number] [facility]    This note was written using [employer] dictation software. Please excuse any resulting minor grammatical errors.

## 2023-12-08 DIAGNOSIS — I48.19 PERSISTENT ATRIAL FIBRILLATION (HCC): ICD-10-CM

## 2023-12-08 DIAGNOSIS — I10 ESSENTIAL HYPERTENSION: ICD-10-CM

## 2023-12-08 RX ORDER — METOPROLOL SUCCINATE 50 MG/1
50 TABLET, EXTENDED RELEASE ORAL DAILY
Qty: 90 TABLET | Refills: 1 | Status: SHIPPED | OUTPATIENT
Start: 2023-12-08

## 2023-12-08 RX ORDER — AMLODIPINE BESYLATE 5 MG/1
5 TABLET ORAL DAILY
Qty: 90 TABLET | Refills: 1 | Status: SHIPPED | OUTPATIENT
Start: 2023-12-08

## 2023-12-19 ENCOUNTER — ANESTHESIA EVENT (OUTPATIENT)
Dept: GASTROENTEROLOGY | Facility: HOSPITAL | Age: 68
End: 2023-12-19

## 2023-12-19 ENCOUNTER — HOSPITAL ENCOUNTER (OUTPATIENT)
Dept: GASTROENTEROLOGY | Facility: HOSPITAL | Age: 68
Setting detail: OUTPATIENT SURGERY
Discharge: HOME | End: 2023-12-19
Attending: INTERNAL MEDICINE
Payer: MEDICARE

## 2023-12-19 ENCOUNTER — ANESTHESIA (OUTPATIENT)
Dept: GASTROENTEROLOGY | Facility: HOSPITAL | Age: 68
End: 2023-12-19

## 2023-12-19 VITALS
RESPIRATION RATE: 16 BRPM | HEART RATE: 57 BPM | DIASTOLIC BLOOD PRESSURE: 71 MMHG | TEMPERATURE: 96 F | OXYGEN SATURATION: 96 % | SYSTOLIC BLOOD PRESSURE: 147 MMHG

## 2023-12-19 DIAGNOSIS — Z86.010 HISTORY OF COLON POLYPS: ICD-10-CM

## 2023-12-19 PROCEDURE — 45380 COLONOSCOPY AND BIOPSY: CPT | Performed by: INTERNAL MEDICINE

## 2023-12-19 PROCEDURE — 88305 TISSUE EXAM BY PATHOLOGIST: CPT | Performed by: PATHOLOGY

## 2023-12-19 RX ORDER — SODIUM CHLORIDE 9 MG/ML
INJECTION, SOLUTION INTRAVENOUS CONTINUOUS PRN
Status: DISCONTINUED | OUTPATIENT
Start: 2023-12-19 | End: 2023-12-19

## 2023-12-19 RX ORDER — PROPOFOL 10 MG/ML
INJECTION, EMULSION INTRAVENOUS CONTINUOUS PRN
Status: DISCONTINUED | OUTPATIENT
Start: 2023-12-19 | End: 2023-12-19

## 2023-12-19 RX ORDER — PROPOFOL 10 MG/ML
INJECTION, EMULSION INTRAVENOUS AS NEEDED
Status: DISCONTINUED | OUTPATIENT
Start: 2023-12-19 | End: 2023-12-19

## 2023-12-19 RX ORDER — LIDOCAINE HYDROCHLORIDE 10 MG/ML
INJECTION, SOLUTION EPIDURAL; INFILTRATION; INTRACAUDAL; PERINEURAL AS NEEDED
Status: DISCONTINUED | OUTPATIENT
Start: 2023-12-19 | End: 2023-12-19

## 2023-12-19 RX ADMIN — LIDOCAINE HYDROCHLORIDE 100 MG: 10 INJECTION, SOLUTION EPIDURAL; INFILTRATION; INTRACAUDAL; PERINEURAL at 13:16

## 2023-12-19 RX ADMIN — SODIUM CHLORIDE: 9 INJECTION, SOLUTION INTRAVENOUS at 13:13

## 2023-12-19 RX ADMIN — PROPOFOL 100 MG: 10 INJECTION, EMULSION INTRAVENOUS at 13:16

## 2023-12-19 RX ADMIN — PROPOFOL 120 MCG/KG/MIN: 10 INJECTION, EMULSION INTRAVENOUS at 13:16

## 2023-12-19 NOTE — ANESTHESIA PREPROCEDURE EVALUATION
Procedure:  COLONOSCOPY    Relevant Problems   CARDIO   (+) Essential hypertension   (+) Mixed hyperlipidemia   (+) Paroxysmal atrial fibrillation (HCC)      /RENAL   (+) Benign prostatic hyperplasia with lower urinary tract symptoms   (+) Renal cell carcinoma of left kidney (HCC)   (+) Stage 3a chronic kidney disease ([facility])      HEMATOLOGY   (+) Coagulopathy ([facility])      NEURO/PSYCH   (+) SDH (subdural hematoma) ([facility])   (+) Traumatic subdural hemorrhage with loss of consciousness of unspecified duration, initial encounter ([facility])      PULMONARY   (+) FELISA (obstructive sleep apnea)    CVA with left sided weakness that has fully resolved. Minimal FELISA per patient.    Physical Exam    Airway    Mallampati score: I  TM Distance: >3 FB  Neck ROM: full     Dental    upper dentures,     Cardiovascular  Cardiovascular exam normal    Pulmonary  Pulmonary exam normal     Other Findings        Anesthesia Plan  ASA Score- 3     Anesthesia Type- IV sedation with anesthesia with ASA Monitors.         Additional Monitors:     Airway Plan:            Plan Factors-Exercise tolerance (METS): >4 METS.    Chart reviewed. EKG reviewed.  Existing labs reviewed. Patient summary reviewed.    Patient is not a current smoker.  Patient did not smoke on day of surgery.    Obstructive sleep apnea risk education given perioperatively.        Induction- intravenous.    Postoperative Plan- Plan for postoperative opioid use.     Informed Consent- Anesthetic plan and risks discussed with patient.  I personally reviewed this patient with the CRNA. Discussed and agreed on the Anesthesia Plan with the CRNA..

## 2023-12-19 NOTE — ANESTHESIA POSTPROCEDURE EVALUATION
Post-Op Assessment Note    CV Status:  Stable  Pain Score: 0    Pain management: adequate       Mental Status:  Alert and awake   Hydration Status:  Euvolemic   PONV Controlled:  Controlled   Airway Patency:  Patent     Post Op Vitals Reviewed: Yes      Staff: CRNA               /58 (12/19/23 1335)    Temp (!) 96 °F (35.6 °C) (12/19/23 1335)    Pulse 59 (12/19/23 1335)   Resp 18 (12/19/23 1335)    SpO2 99 % (12/19/23 1335)

## 2023-12-19 NOTE — ANESTHESIA PROCEDURE NOTES
Anesthesia Notable Event  No anethesia notable event occurred.    Date/Time: 12/19/2023 2:44 PM    Patient location during procedure: PACU  Performed by: Toñito Verduzco MD  Authorized by: Toñito Verduzco MD

## 2023-12-19 NOTE — H&P
History and Physical - [facility]  Ervin Lawson 68 y.o. male MRN: [id number]                  HPI: Ervin Lawson is a 68 y.o. year old male who presents for colonoscopy for history of colon polyps.      REVIEW OF SYSTEMS: Per the HPI, and otherwise unremarkable.    Historical Information   Past Medical History:   Diagnosis Date    Arthritis     Atrial fibrillation (HCC)     Cancer (HCC) 08/2020    Kidney cancer    Cancer (HCC)     melanoma    Chronic kidney disease     left kidney cancer    Clotting disorder (HCC) 1/10/2021    CPAP (continuous positive airway pressure) dependence     Headache(784.0)     Not current and not often    Hyperlipidemia     Hypertension     Irregular heart beat     Afib    Pneumonia     Skin cancer     Sleep apnea     Stroke (HCC)     Supraventricular tachycardia     Wears glasses     Wears partial dentures     upper partial     Past Surgical History:   Procedure Laterality Date    APPENDECTOMY      CARDIOVERSION      COLONOSCOPY      fiberoptic, also 2009, both negative, resolved 2004    COLONOSCOPY  08/2019    EGD      HERNIA REPAIR      umbilical and bilateral inguinal    KNEE ARTHROSCOPY Left     SD LAPAROSCOPY SURG PARTIAL NEPHRECTOMY Left 10/7/2020    Procedure: ROBOTIC PARTIAL NEPHRECTOMY;  Surgeon: Vickey Jane MD;  Location[facility];  Service: Urology    SD PERQ CLSR TCAT L ATR APNDGE W/ENDOCARDIAL IMPLNT N/A 3/25/2021    Procedure: LEFT ATRIAL APPENDAGE OCCLUSION;  Surgeon: Farrukh Hyatt MD;  [facility];  Service: Cardiology    SKIN GRAFT      left ear, skin cancer    TONSILLECTOMY       Social History   Social History     Substance and Sexual Activity   Alcohol Use Not Currently    Comment: Social only     Social History     Substance and Sexual Activity   Drug Use No     Social History     Tobacco Use   Smoking Status Never   Smokeless Tobacco Never     Family History   Problem Relation Age of Onset    Cancer Father         stomach     Hypertension Father     Stroke Father         syndrome    Cancer Family     Heart disease Family     Hypertension Family     Stroke Family         syndrome    Throat cancer Mother     Diabetes Mother     Hypertension Mother     Cancer Mother     Asthma Sister        Meds/Allergies       Current Outpatient Medications:     amLODIPine (NORVASC) 5 mg tablet    aspirin (ECOTRIN LOW STRENGTH) 81 mg EC tablet    atorvastatin (LIPITOR) 10 mg tablet    Coenzyme Q10 (CO Q-10 PO)    flecainide (TAMBOCOR) 100 mg tablet    metoprolol succinate (TOPROL-XL) 50 mg 24 hr tablet    Multiple Vitamins-Minerals (MULTIVITAMIN ADULT PO)    furosemide (LASIX) 20 mg tablet    pimecrolimus (ELIDEL) 1 % cream    No Known Allergies    Objective     There were no vitals taken for this visit.      PHYSICAL EXAM    Gen: NAD  Head: NCAT  CV: RRR  CHEST: Clear  ABD: soft, NT/ND  EXT: no edema      ASSESSMENT/PLAN:  This is a 68 y.o. year old male here for colonoscopy evaluation, and he is stable and optimized for his procedure.

## 2023-12-26 PROCEDURE — 88305 TISSUE EXAM BY PATHOLOGIST: CPT | Performed by: PATHOLOGY

## 2024-01-02 ENCOUNTER — TELEPHONE (OUTPATIENT)
Dept: NEPHROLOGY | Facility: CLINIC | Age: 69
End: 2024-01-02

## 2024-02-21 PROBLEM — Z12.11 SCREENING FOR COLON CANCER: Status: RESOLVED | Noted: 2018-10-08 | Resolved: 2024-02-21

## 2024-02-21 PROBLEM — H10.33 ACUTE BACTERIAL CONJUNCTIVITIS OF BOTH EYES: Status: RESOLVED | Noted: 2019-12-03 | Resolved: 2024-02-21

## 2024-03-14 ENCOUNTER — APPOINTMENT (OUTPATIENT)
Dept: LAB | Age: 69
End: 2024-03-14
Payer: MEDICARE

## 2024-03-14 DIAGNOSIS — I10 ESSENTIAL HYPERTENSION: ICD-10-CM

## 2024-03-14 DIAGNOSIS — Z90.5 H/O PARTIAL NEPHRECTOMY: Chronic | ICD-10-CM

## 2024-03-14 DIAGNOSIS — E78.2 MIXED HYPERLIPIDEMIA: ICD-10-CM

## 2024-03-14 DIAGNOSIS — N18.31 STAGE 3A CHRONIC KIDNEY DISEASE (HCC): ICD-10-CM

## 2024-03-14 LAB
25(OH)D3 SERPL-MCNC: 44.8 NG/ML (ref 30–100)
ALBUMIN SERPL BCP-MCNC: 4 G/DL (ref 3.5–5)
ANION GAP SERPL CALCULATED.3IONS-SCNC: 10 MMOL/L (ref 4–13)
BUN SERPL-MCNC: 14 MG/DL (ref 5–25)
CALCIUM SERPL-MCNC: 8.9 MG/DL (ref 8.4–10.2)
CHLORIDE SERPL-SCNC: 101 MMOL/L (ref 96–108)
CO2 SERPL-SCNC: 28 MMOL/L (ref 21–32)
CREAT SERPL-MCNC: 1.1 MG/DL (ref 0.6–1.3)
GFR SERPL CREATININE-BSD FRML MDRD: 68 ML/MIN/1.73SQ M
GLUCOSE P FAST SERPL-MCNC: 80 MG/DL (ref 65–99)
PHOSPHATE SERPL-MCNC: 3.4 MG/DL (ref 2.3–4.1)
POTASSIUM SERPL-SCNC: 4.3 MMOL/L (ref 3.5–5.3)
PTH-INTACT SERPL-MCNC: 53.3 PG/ML (ref 12–88)
SODIUM SERPL-SCNC: 139 MMOL/L (ref 135–147)

## 2024-03-14 PROCEDURE — 83970 ASSAY OF PARATHORMONE: CPT

## 2024-03-14 PROCEDURE — 80069 RENAL FUNCTION PANEL: CPT

## 2024-03-14 PROCEDURE — 36415 COLL VENOUS BLD VENIPUNCTURE: CPT

## 2024-03-14 PROCEDURE — 82306 VITAMIN D 25 HYDROXY: CPT

## 2024-03-20 ENCOUNTER — OFFICE VISIT (OUTPATIENT)
Dept: NEPHROLOGY | Facility: CLINIC | Age: 69
End: 2024-03-20
Payer: MEDICARE

## 2024-03-20 ENCOUNTER — RA CDI HCC (OUTPATIENT)
Dept: OTHER | Facility: HOSPITAL | Age: 69
End: 2024-03-20

## 2024-03-20 VITALS
DIASTOLIC BLOOD PRESSURE: 60 MMHG | BODY MASS INDEX: 30.96 KG/M2 | SYSTOLIC BLOOD PRESSURE: 130 MMHG | WEIGHT: 228.6 LBS | HEART RATE: 56 BPM | HEIGHT: 72 IN

## 2024-03-20 DIAGNOSIS — I48.0 PAROXYSMAL ATRIAL FIBRILLATION (HCC): ICD-10-CM

## 2024-03-20 DIAGNOSIS — N18.2 CKD (CHRONIC KIDNEY DISEASE) STAGE 2, GFR 60-89 ML/MIN: Primary | ICD-10-CM

## 2024-03-20 DIAGNOSIS — I10 ESSENTIAL HYPERTENSION: ICD-10-CM

## 2024-03-20 DIAGNOSIS — C64.2 RENAL CELL CARCINOMA OF LEFT KIDNEY (HCC): ICD-10-CM

## 2024-03-20 DIAGNOSIS — E78.2 MIXED HYPERLIPIDEMIA: ICD-10-CM

## 2024-03-20 DIAGNOSIS — N18.31 STAGE 3A CHRONIC KIDNEY DISEASE (HCC): ICD-10-CM

## 2024-03-20 PROCEDURE — 99214 OFFICE O/P EST MOD 30 MIN: CPT | Performed by: INTERNAL MEDICINE

## 2024-03-20 NOTE — PROGRESS NOTES
Nephrology Follow up Consultation  Ervin Lawson 68 y.o. male MRN: [id number]            BACKGROUND:  Ervin Lawson is a 68 y.o.male who was referred by Reggie Lockhart DO for evaluation of Chronic Kidney Disease and Follow-up  .      ASSESSMENT / PLAN:   68 y.o.  male with pmh of multiple co-morbidities including HTN, AFib, hyperlipidemia, sleep apnea, CVA, I cc status post partial left nephrectomy in October 2020 and CKD stage 3 presented to the office for routine follow-up.     CKD stage 2/3A:  - Patient has a baseline creatinine of 1-1.3 mg/dL dating as far back as 2017. Most recent labs show a Creatinine of 1.10 mg/dL on 3/14/24. Renal function remains stable at baseline.   - likely has underlying CKD secondary to age-related nephron loss plus hypertensive nephrosclerosis plus decreased nephron mass due to prior partial nephrectomy in October 2020 for RCC of the left kidney.  -History of prior laparoscopic left partial nephrectomy in October 2022 last seen by urology 10/11/2022 notes reviewed.  - Acid base and lytes stable.  Continue to monitor, no changes for now.  Remained stable  - Clinically the patient appears to be euvolemic  - CT abdomen from 01/21/2021 showing status post resection of left upper pole renal neoplasm.  No evidence  -CT renal protocol from 9/21/2022 no evidence of metastatic disease  - Recommend to avoid use of NSAIDs, nephrotoxins. Caution advised with regards to exposure to IV contrast dye.   - Discussed with the patient in depth his renal status, including the possible etiologies for CKD.   - Advised the patient that when his GFR is close to 20mL/min then will start discussing about RRT(renal replacement therapy) options such as renal transplant, peritoneal dialysis and hemodialysis.   - Informed the patient about the various options for Renal Replacement therapy.  - Discussed with the patient how we need to work together to delay the progression of CKD with optimal BP control based on  their age and co-morbidities and trying to reduce proteinuria by the use of anti-proteinuric agents.     Hypertension/afib:  BP Readings from Last 3 Encounters:   03/20/24 130/60   12/19/23 147/71   11/14/23 120/80     - Patient is on Norvasc 5 mg p.o. Q.day, Toprol-XL 50 mg p.o. Q.day, lasix 20mg po prn.   - taking lasix once a week started by the cardiologist  -Blood pressure appears to be stable no medication changes at this time  - Goal BP of < 130/80 based on age and comorbidities  - Instructed to follow low sodium (2gm)diet.    Hemoglobin:  - Goal Hb of 10-12 g/dL  - Most recent labs suggestive of 14.6 grams/deciliter.   - no role for IV iron at this time, continues to remain stable    CKD-MBD(Mineral Bone Disease):  - Based on patients CKD stage following is the goal of therapy.  - Maintain calcium phosphorus product of < 55.  - Stage 3 CKD - Goal Ca 8.5-10 mg/dL , goal Phos 2.7-4.6 mg/dL  , goal iPTH 30-70 pg/mL  - Patient is currently at goal.  - Continue patient on no vitamin-D except for the multivitamin  - most recent vitamin-D level 44.8 and ipth 53.3 as of 3/14/24  - check vitamin-D prior to next visit,    Proteinuria:  - proteinuria most likely secondary to hyper filtration after nephrectomy  - most recent protein creatinine ratio of 110 mg as of January 2021  - check protein creatinine ratio    Lipids:  -on Lipitor  - goal LDL less than 70  - management per primary team    Nutrition/obesity:  - Encouraged patient to follow a renal diet comprising of moderate potassium, low phosphorus and protein restriction to 0.8gm/kg.  Advised of dietary habits and weight loss  - Will check serum albumin with next blood work.     Followup:  - Patient is to follow-up in 12 months, with lab work to be performed a few days prior to the next visit. Advised patient to call me in 10 days to review the results if they do not hear back from me, as I may have not received the results.    SAMANTHA Kong, 3/20/2024,  3:25 PM             SUBJECTIVE: 68 y.o. male presents to the office for routine follow-up.  Feels well has no complaints no recent hospitalizations since last visit was started on flecainide and Lasix per cardiology for A-fib takes Lasix once a week.  Has not taken it thus far this week overall feels well happy renal parameters are stable thankful to care.  No NSAID use.    Review of Systems   Constitutional:  Negative for chills and fever.   HENT:  Negative for congestion and sore throat.    Respiratory:  Negative for cough and wheezing.    Cardiovascular:  Negative for leg swelling.   Gastrointestinal:  Negative for diarrhea and vomiting.   Genitourinary:  Negative for difficulty urinating, dysuria and hematuria.   Musculoskeletal:  Negative for back pain.   Skin:  Negative for rash.   Neurological:  Negative for dizziness and headaches.   Psychiatric/Behavioral:  Negative for agitation and confusion.    All other systems reviewed and are negative.      PAST MEDICAL HISTORY:  Past Medical History:   Diagnosis Date   • Arthritis    • Atrial fibrillation (HCC)    • Cancer (HCC) 08/2020    Kidney cancer   • Cancer (HCC)     melanoma   • Chronic kidney disease     left kidney cancer   • Clotting disorder (HCC) 1/10/2021   • CPAP (continuous positive airway pressure) dependence    • Headache(784.0)     Not current and not often   • Hyperlipidemia    • Hypertension    • Irregular heart beat     Afib   • Pneumonia    • Skin cancer    • Sleep apnea    • Stroke (HCC)    • Supraventricular tachycardia    • Wears glasses    • Wears partial dentures     upper partial       PROBLEM LIST    Patient Active Problem List   Diagnosis   • History of CVA (cerebrovascular accident)   • Essential hypertension   • Paroxysmal atrial fibrillation (HCC)   • Mixed hyperlipidemia   • Pain of toe of right foot   • Benign prostatic hyperplasia with lower urinary tract symptoms   • FELISA (obstructive sleep apnea)   • Renal cell carcinoma of left  kidney (HCC)   • Stage 3a chronic kidney disease (HCC)   • SDH (subdural hematoma) ([facility])   • Presence of Watchman left atrial appendage closure device   • Arachnoid cyst   • Dizziness   • H/O partial nephrectomy   • Seborrheic dermatitis   • Coagulopathy ([facility])   • Herpes zoster without complication   • Traumatic subdural hemorrhage with loss of consciousness of unspecified duration, initial encounter ([facility])   • Left leg pain   • CKD (chronic kidney disease) stage 2, GFR 60-89 ml/min       PAST SURGICAL HISTORY:  Past Surgical History:   Procedure Laterality Date   • APPENDECTOMY     • CARDIOVERSION     • COLONOSCOPY      fiberoptic, also 2009, both negative, resolved 2004   • COLONOSCOPY  08/2019   • EGD     • HERNIA REPAIR      umbilical and bilateral inguinal   • KNEE ARTHROSCOPY Left    • NJ LAPAROSCOPY SURG PARTIAL NEPHRECTOMY Left 10/7/2020    Procedure: ROBOTIC PARTIAL NEPHRECTOMY;  Surgeon: Vickey Jane MD;  Location: [facility];  Service: Urology   • NJ PERQ CLSR TCAT L ATR APNDGE W/ENDOCARDIAL IMPLNT N/A 3/25/2021    Procedure: LEFT ATRIAL APPENDAGE OCCLUSION;  Surgeon: Farrukh Hyatt MD;  Location:  MAIN OR;  Service: Cardiology   • SKIN GRAFT      left ear, skin cancer   • TONSILLECTOMY         SOCIAL HISTORY :   reports that he has never smoked. He has never used smokeless tobacco. He reports that he does not currently use alcohol. He reports that he does not use drugs.    FAMILY HISTORY:  Family History   Problem Relation Age of Onset   • Cancer Father         stomach   • Hypertension Father    • Stroke Father         syndrome   • Cancer Family    • Heart disease Family    • Hypertension Family    • Stroke Family         syndrome   • Throat cancer Mother    • Diabetes Mother    • Hypertension Mother    • Cancer Mother    • Asthma Sister        ALLERGIES:  No Known Allergies        PHYSICAL EXAM:  Vitals:    03/20/24 1458 03/20/24 1518   BP: 138/62 130/60   BP Location: Left arm    Patient Position:  Sitting    Cuff Size: Standard    Pulse: 56    Weight: 104 kg (228 lb 9.6 oz)    Height: 6' (1.829 m)        Body mass index is 31 kg/m².    Physical Exam  Vitals reviewed.   Constitutional:       General: He is not in acute distress.     Appearance: Normal appearance. He is obese. He is not ill-appearing, toxic-appearing or diaphoretic.   HENT:      Head: Normocephalic and atraumatic.      Mouth/Throat:      Mouth: Mucous membranes are moist.      Pharynx: No oropharyngeal exudate.   Eyes:      General: No scleral icterus.  Cardiovascular:      Rate and Rhythm: Normal rate.   Pulmonary:      Effort: No respiratory distress.      Breath sounds: Normal breath sounds. No stridor. No wheezing.   Abdominal:      General: There is no distension.      Palpations: Abdomen is soft. There is no mass.      Tenderness: There is no right CVA tenderness or left CVA tenderness.   Musculoskeletal:         General: Swelling present.      Cervical back: Normal range of motion. No rigidity.      Comments: Trace lower extremity edema bilaterally   Skin:     Coloration: Skin is not jaundiced.   Neurological:      General: No focal deficit present.      Mental Status: He is alert and oriented to person, place, and time.   Psychiatric:         Mood and Affect: Mood normal.         Behavior: Behavior normal.         LABORATORY DATA:     Results from last 6 Months   Lab Units 03/14/24  0816 10/19/23  0900   WBC Thousand/uL  --  7.62   HEMOGLOBIN g/dL  --  14.1   HEMATOCRIT %  --  44.9   PLATELETS Thousands/uL  --  272   POTASSIUM mmol/L 4.3 3.9   CHLORIDE mmol/L 101 103   CO2 mmol/L 28 32   BUN mg/dL 14 14   CREATININE mg/dL 1.10 1.24   CALCIUM mg/dL 8.9 9.0   PHOSPHORUS mg/dL 3.4  --           rest all reviewed    RADIOLOGY:  No orders to display     Rest all reviewed        MEDICATIONS:    Current Outpatient Medications:   •  amLODIPine (NORVASC) 5 mg tablet, Take 1 tablet (5 mg total) by mouth daily, Disp: 90 tablet, Rfl: 1  •  aspirin  "(ECOTRIN LOW STRENGTH) 81 mg EC tablet, Take 1 tablet (81 mg total) by mouth daily, Disp: 30 tablet, Rfl: 2  •  atorvastatin (LIPITOR) 10 mg tablet, Take 1 tablet (10 mg total) by mouth daily, Disp: 90 tablet, Rfl: 1  •  Coenzyme Q10 (CO Q-10 PO), Take 1 caplet by mouth every morning, Disp: , Rfl:   •  flecainide (TAMBOCOR) 100 mg tablet, TAKE ONE TABLET BY MOUTH TWICE A DAY, Disp: 180 tablet, Rfl: 1  •  furosemide (LASIX) 20 mg tablet, Take 1 tablet (20 mg total) by mouth daily as needed (swelling) (Patient taking differently: Take 20 mg by mouth if needed (swelling)), Disp: 30 tablet, Rfl: 1  •  metoprolol succinate (TOPROL-XL) 50 mg 24 hr tablet, Take 1 tablet (50 mg total) by mouth daily, Disp: 90 tablet, Rfl: 1  •  Multiple Vitamins-Minerals (MULTIVITAMIN ADULT PO), Take 1 tablet by mouth every morning, Disp: , Rfl:   •  pimecrolimus (ELIDEL) 1 % cream, Apply 1 application topically 2 (two) times a day (Patient not taking: Reported on 3/20/2024), Disp: , Rfl:           Portions of the record may have been created with voice recognition software. Occasional wrong word or \"sound a like\" substitutions may have occurred due to the inherent limitations of voice recognition software. Read the chart carefully and recognize, using context, where substitutions have occurred.If you have any questions, please contact the dictating provider.      "

## 2024-03-20 NOTE — PATIENT INSTRUCTIONS
"  - Please call me in 10 days after having your blood work done to review the results if you do not hear back from me or my office, as I may have not received the results.  - please remember to perform blood work prior to the next visit.  - Please call if the blood pressure top number is greater than 140 or less than 110 consistently.  - Please call if you are gaining more than 2lbs in 2 days for adjustment of water pills.  ~ Please AVOID the following pain medications.  LIST OF NSAIDS (NONSTEROIDAL ANTI-INFLAMMATORY DRUGS) AND BARKER-2 INHIBITORS    DIFLUNISAL (DOLOBID)  IBUPROFEN (MOTRIN, ADVIL)  FLURBIPROFEN (ANSAID)  KETOPROFEN (ORUDIS, ORUVAIL)  FENOPROFEN (NALFON)  NABUMETONE (RELAFEN)  PIROXICAM (FELDENE)  NAPROXEN (ALEVE, NAPROSYN, NAPRELAN, ANAPROX)  DICLOFENAC (VOLTAREN, CATAFLAM)  INDOMETHACIN (INDOCIN)  SULINDAC (CLINORIL)  TOLMETIN (TOLETIN)  ETODOLAC (LODINE)  MELOXICAM (MOBIC)  KETOROLAC (TORADOL)  OXAPROZIN (DAYPRO)  CELECOXIB (CELEBREX)    Phosphorus diet  Follow a low phosphorus diet.    Avoid these higher phosphorus foods: Choose these lower phosphorus foods:   Milk, pudding or yogurt (from animals and from many soy varieties) Rice milk (unfortified), nondairy creamer (if it doesn't have terms in the ingredients list that contain the letters \"phos\")   Hard cheeses, ricotta or cottage cheese, fat-free cream cheese Regular and low-fat cream cheese   Ice cream or frozen yogurt Sherbet or frozen fruit pops   Soups made with higher phosphorus ingredients (milk, dried peas, beans, lentils) Soups made with lower phosphorus ingredients (broth- or water-based with other lower phosphorus ingredients)   Whole grains, including whole-grain breads, crackers, cereal, rice and pasta Refined grains, including white bread, crackers, cereals, rice and pasta   Quick breads, biscuits, cornbread, muffins, pancakes or waffles Homemade refined (white) dinner rolls, bagels or English muffins   Dried peas (split, " "black-eyed), beans (black, garbanzo, lima, kidney, navy, castellanos) or lentils Green peas (canned, frozen), green beans or wax beans   Organ meats, walleye, pollock or sardines Lean beef, pork, lamb, poultry or other fish   Nuts and seeds Popcorn   Peanut butter and other nut butters Jam, jelly or honey   Chocolate, including chocolate drinks Carob (chocolate-flavored) candy, hard candy or gumdrops   Lane and pepper-type sodas, flavored agarwal, bottled teas (if a term in the ingredients list contains the letters \"phos\") Lemon-lime soda, ginger ale or root beer, plain water   Follow a moderate potassium diet.          Things to do to reduce your blood pressure include working with all your physician to do the following:  ~ stop smoking if you smoke.  ~ increase cardiovascular exercise like walking and swimming.   ~ modify your diet to decrease fat and salt intake.  ~ reduce your weight if you are overweight or obese.  ~ increase the consumption of fruits, vegetables and whole grains.  ~ decrease alcohol consumption if you consume alcohol.   ~ try to minimize stress in your life with lifestyle modifications.   ~ be compliant with your anti-hypertensive medications.   ~ adjust your medications to help improve your vascular stiffness and decrease risks for heart attacks and strokes.   Exercise to Lose Weight     Exercise and a healthy diet may help you lose weight. Your doctor may suggest specific exercises.     EXERCISE IDEAS AND TIPS     Choose low-cost things you enjoy doing, such as walking, bicycling, or exercising to workout videos.   Take stairs instead of the elevator.   Walk during your lunch break.   Park your car further away from work or school.   Go to a gym or an exercise class.   Start with 5 to 10 minutes of exercise each day. Build up to 30 minutes of exercise 4 to 6 days a week.   Wear shoes with good support and comfortable clothes.   Stretch before and after working out.   Work out until you breathe " harder and your heart beats faster.   Drink extra water when you exercise.   Do not do so much that you hurt yourself, feel dizzy, or get very short of breath.     Exercises that burn about 150 calories:   Running 1 ½ miles in 15 minutes.   Playing volleyball for 45 to 60 minutes.   Washing and waxing a car for 45 to 60 minutes.   Playing touch football for 45 minutes.   Walking 1 ¾ miles in 35 minutes.   Pushing a stroller 1 ½ miles in 30 minutes.   Playing basketball for 30 minutes.   Raking leaves for 30 minutes.   Bicycling 5 miles in 30 minutes.   Walking 2 miles in 30 minutes.   Dancing for 30 minutes.   Shoveling snow for 15 minutes.   Swimming laps for 20 minutes.   Walking up stairs for 15 minutes.   Bicycling 4 miles in 15 minutes.   Gardening for 30 to 45 minutes.   Jumping rope for 15 minutes.   Washing windows or floors for 45 to 60 minutes.     Exercise to Lose Weight     Exercise and a healthy diet may help you lose weight. Your doctor may suggest specific exercises.     EXERCISE IDEAS AND TIPS     Choose low-cost things you enjoy doing, such as walking, bicycling, or exercising to workout videos.   Take stairs instead of the elevator.   Walk during your lunch break.   Park your car further away from work or school.   Go to a gym or an exercise class.   Start with 5 to 10 minutes of exercise each day. Build up to 30 minutes of exercise 4 to 6 days a week.   Wear shoes with good support and comfortable clothes.   Stretch before and after working out.   Work out until you breathe harder and your heart beats faster.   Drink extra water when you exercise.   Do not do so much that you hurt yourself, feel dizzy, or get very short of breath.     Exercises that burn about 150 calories:   Running 1 ½ miles in 15 minutes.   Playing volleyball for 45 to 60 minutes.   Washing and waxing a car for 45 to 60 minutes.   Playing touch football for 45 minutes.   Walking 1 ¾ miles in 35 minutes.   Pushing a stroller 1 ½  miles in 30 minutes.   Playing basketball for 30 minutes.   Raking leaves for 30 minutes.   Bicycling 5 miles in 30 minutes.   Walking 2 miles in 30 minutes.   Dancing for 30 minutes.   Shoveling snow for 15 minutes.   Swimming laps for 20 minutes.   Walking up stairs for 15 minutes.   Bicycling 4 miles in 15 minutes.   Gardening for 30 to 45 minutes.   Jumping rope for 15 minutes.   Washing windows or floors for 45 to 60 minutes.

## 2024-03-27 ENCOUNTER — OFFICE VISIT (OUTPATIENT)
Dept: FAMILY MEDICINE CLINIC | Facility: CLINIC | Age: 69
End: 2024-03-27
Payer: MEDICARE

## 2024-03-27 VITALS
BODY MASS INDEX: 31.15 KG/M2 | DIASTOLIC BLOOD PRESSURE: 72 MMHG | OXYGEN SATURATION: 99 % | SYSTOLIC BLOOD PRESSURE: 122 MMHG | HEART RATE: 65 BPM | WEIGHT: 230 LBS | HEIGHT: 72 IN | TEMPERATURE: 98.1 F

## 2024-03-27 DIAGNOSIS — N18.2 CKD (CHRONIC KIDNEY DISEASE) STAGE 2, GFR 60-89 ML/MIN: ICD-10-CM

## 2024-03-27 DIAGNOSIS — Z12.5 PROSTATE CANCER SCREENING: ICD-10-CM

## 2024-03-27 DIAGNOSIS — S06.5XAA SDH (SUBDURAL HEMATOMA) (HCC): ICD-10-CM

## 2024-03-27 DIAGNOSIS — C64.2 RENAL CELL CARCINOMA OF LEFT KIDNEY (HCC): ICD-10-CM

## 2024-03-27 DIAGNOSIS — S06.5X9A TRAUMATIC SUBDURAL HEMORRHAGE WITH LOSS OF CONSCIOUSNESS OF UNSPECIFIED DURATION, INITIAL ENCOUNTER (HCC): ICD-10-CM

## 2024-03-27 DIAGNOSIS — D68.9 COAGULOPATHY (HCC): ICD-10-CM

## 2024-03-27 DIAGNOSIS — G47.33 OSA (OBSTRUCTIVE SLEEP APNEA): ICD-10-CM

## 2024-03-27 DIAGNOSIS — I48.19 PERSISTENT ATRIAL FIBRILLATION (HCC): Primary | ICD-10-CM

## 2024-03-27 DIAGNOSIS — E78.2 MIXED HYPERLIPIDEMIA: ICD-10-CM

## 2024-03-27 DIAGNOSIS — I10 ESSENTIAL HYPERTENSION: ICD-10-CM

## 2024-03-27 DIAGNOSIS — I48.0 PAROXYSMAL ATRIAL FIBRILLATION (HCC): ICD-10-CM

## 2024-03-27 DIAGNOSIS — R60.9 SWELLING: ICD-10-CM

## 2024-03-27 PROCEDURE — G2211 COMPLEX E/M VISIT ADD ON: HCPCS | Performed by: FAMILY MEDICINE

## 2024-03-27 PROCEDURE — 99214 OFFICE O/P EST MOD 30 MIN: CPT | Performed by: FAMILY MEDICINE

## 2024-03-27 RX ORDER — ATORVASTATIN CALCIUM 10 MG/1
10 TABLET, FILM COATED ORAL DAILY
Qty: 90 TABLET | Refills: 1 | Status: SHIPPED | OUTPATIENT
Start: 2024-03-27

## 2024-03-27 RX ORDER — FLECAINIDE ACETATE 100 MG/1
100 TABLET ORAL 2 TIMES DAILY
Qty: 180 TABLET | Refills: 1 | Status: SHIPPED | OUTPATIENT
Start: 2024-03-27

## 2024-03-27 RX ORDER — METOPROLOL SUCCINATE 50 MG/1
50 TABLET, EXTENDED RELEASE ORAL DAILY
Qty: 90 TABLET | Refills: 1 | Status: SHIPPED | OUTPATIENT
Start: 2024-03-27

## 2024-03-27 RX ORDER — FUROSEMIDE 20 MG/1
20 TABLET ORAL DAILY PRN
Qty: 30 TABLET | Refills: 1 | Status: SHIPPED | OUTPATIENT
Start: 2024-03-27

## 2024-03-27 RX ORDER — AMLODIPINE BESYLATE 5 MG/1
5 TABLET ORAL DAILY
Qty: 90 TABLET | Refills: 1 | Status: SHIPPED | OUTPATIENT
Start: 2024-03-27

## 2024-03-27 NOTE — PROGRESS NOTES
Assessment/Plan: Colonoscopy done last fall.  CMP CBC TSH lipid profile PSA reviewed at this time.  Patient just saw nephrology.  Patient will follow with cardiology appropriately.  Patient will follow-up with other specialist per routine.  Patient will labs prior to next visit.  Vaccines reviewed.  Patient go for Shingrix vaccine.  Patient will consider tetanus shot in the future.       Diagnoses and all orders for this visit:    Persistent atrial fibrillation (HCC)  -     flecainide (TAMBOCOR) 100 mg tablet; Take 1 tablet (100 mg total) by mouth 2 (two) times a day  -     metoprolol succinate (TOPROL-XL) 50 mg 24 hr tablet; Take 1 tablet (50 mg total) by mouth daily  -     Comprehensive metabolic panel; Future  -     CBC and differential; Future  -     Lipid panel; Future  -     TSH, 3rd generation with Free T4 reflex; Future  -     PSA, Total Screen; Future    Essential hypertension  -     amLODIPine (NORVASC) 5 mg tablet; Take 1 tablet (5 mg total) by mouth daily  -     Comprehensive metabolic panel; Future  -     CBC and differential; Future  -     Lipid panel; Future  -     TSH, 3rd generation with Free T4 reflex; Future  -     PSA, Total Screen; Future    Mixed hyperlipidemia  -     atorvastatin (LIPITOR) 10 mg tablet; Take 1 tablet (10 mg total) by mouth daily  -     Comprehensive metabolic panel; Future  -     CBC and differential; Future  -     Lipid panel; Future  -     TSH, 3rd generation with Free T4 reflex; Future  -     PSA, Total Screen; Future    Paroxysmal atrial fibrillation (HCC)  -     furosemide (LASIX) 20 mg tablet; Take 1 tablet (20 mg total) by mouth daily as needed (swelling)    Swelling  -     furosemide (LASIX) 20 mg tablet; Take 1 tablet (20 mg total) by mouth daily as needed (swelling)    FELISA (obstructive sleep apnea)    CKD (chronic kidney disease) stage 2, GFR 60-89 ml/min  -     Comprehensive metabolic panel; Future  -     CBC and differential; Future  -     Lipid panel; Future  -      TSH, 3rd generation with Free T4 reflex; Future  -     PSA, Total Screen; Future    Renal cell carcinoma of left kidney (HCC)  -     Comprehensive metabolic panel; Future  -     CBC and differential; Future  -     Lipid panel; Future  -     TSH, 3rd generation with Free T4 reflex; Future  -     PSA, Total Screen; Future    Prostate cancer screening  -     PSA, Total Screen; Future    Traumatic subdural hemorrhage with loss of consciousness of unspecified duration, initial encounter (HCC)    Coagulopathy (HCC)    SDH (subdural hematoma) (HCC)            Subjective:        Patient ID: Ervin Lawson is a 68 y.o. male.      Patient is here to follow-up on hypertension hyperlipidemia with history of paroxysmal A-fib.  Patient does get lower extremity edema intermittently.  Patient does use Lasix roughly 1-2 times per week on average.  No significant change urination or defecation.  Patient does note bouts of A-fib intermittently.  No significant chest pain or shortness of breath.          The following portions of the patient's history were reviewed and updated as appropriate: allergies, current medications, past family history, past medical history, past social history, past surgical history and problem list.      Review of Systems   Constitutional: Negative.    HENT: Negative.     Eyes: Negative.    Respiratory: Negative.     Cardiovascular:  Positive for leg swelling.   Gastrointestinal: Negative.    Endocrine: Negative.    Genitourinary: Negative.    Musculoskeletal: Negative.    Skin: Negative.    Allergic/Immunologic: Negative.    Neurological: Negative.    Hematological: Negative.    Psychiatric/Behavioral: Negative.             Objective:        Depression Screening and Follow-up Plan: Clincally patient does not have depression. No treatment is required.             /72 (BP Location: Right arm, Patient Position: Sitting, Cuff Size: Large)   Pulse 65   Temp 98.1 °F (36.7 °C) (Temporal)   Ht 6' (1.829  m)   Wt 104 kg (230 lb)   SpO2 99%   BMI 31.19 kg/m²          Physical Exam  Vitals and nursing note reviewed.   Constitutional:       General: He is not in acute distress.     Appearance: Normal appearance. He is not ill-appearing, toxic-appearing or diaphoretic.   HENT:      Head: Normocephalic and atraumatic.      Right Ear: Tympanic membrane, ear canal and external ear normal. There is no impacted cerumen.      Left Ear: Tympanic membrane, ear canal and external ear normal. There is no impacted cerumen.      Nose: Nose normal. No congestion or rhinorrhea.      Mouth/Throat:      Mouth: Mucous membranes are moist.      Pharynx: No oropharyngeal exudate or posterior oropharyngeal erythema.   Eyes:      General: No scleral icterus.        Right eye: No discharge.         Left eye: No discharge.   Neck:      Vascular: No carotid bruit.   Cardiovascular:      Rate and Rhythm: Normal rate and regular rhythm.      Pulses: Normal pulses.      Heart sounds: Normal heart sounds. No murmur heard.     No friction rub. No gallop.   Pulmonary:      Effort: Pulmonary effort is normal. No respiratory distress.      Breath sounds: Normal breath sounds. No stridor. No wheezing, rhonchi or rales.   Chest:      Chest wall: No tenderness.   Musculoskeletal:         General: No swelling, tenderness, deformity or signs of injury. Normal range of motion.      Cervical back: Normal range of motion and neck supple. No rigidity. No muscular tenderness.      Right lower leg: Edema present.      Left lower leg: Edema present.   Lymphadenopathy:      Cervical: No cervical adenopathy.   Skin:     General: Skin is warm and dry.      Capillary Refill: Capillary refill takes less than 2 seconds.      Coloration: Skin is not jaundiced.      Findings: No bruising, erythema, lesion or rash.   Neurological:      Mental Status: He is alert and oriented to person, place, and time. Mental status is at baseline.      Cranial Nerves: No cranial nerve  deficit.      Sensory: No sensory deficit.      Motor: No weakness.      Coordination: Coordination normal.      Gait: Gait normal.   Psychiatric:         Mood and Affect: Mood normal.         Behavior: Behavior normal.         Thought Content: Thought content normal.         Judgment: Judgment normal.

## 2024-04-26 ENCOUNTER — OFFICE VISIT (OUTPATIENT)
Dept: SLEEP CENTER | Facility: CLINIC | Age: 69
End: 2024-04-26
Payer: MEDICARE

## 2024-04-26 VITALS
HEIGHT: 72 IN | WEIGHT: 228 LBS | BODY MASS INDEX: 30.88 KG/M2 | DIASTOLIC BLOOD PRESSURE: 70 MMHG | SYSTOLIC BLOOD PRESSURE: 120 MMHG | OXYGEN SATURATION: 97 % | HEART RATE: 55 BPM

## 2024-04-26 DIAGNOSIS — G47.33 OSA (OBSTRUCTIVE SLEEP APNEA): Primary | ICD-10-CM

## 2024-04-26 DIAGNOSIS — Z86.73 HISTORY OF CVA (CEREBROVASCULAR ACCIDENT): ICD-10-CM

## 2024-04-26 DIAGNOSIS — I48.0 PAROXYSMAL ATRIAL FIBRILLATION (HCC): ICD-10-CM

## 2024-04-26 DIAGNOSIS — I10 ESSENTIAL HYPERTENSION: ICD-10-CM

## 2024-04-26 PROCEDURE — G2211 COMPLEX E/M VISIT ADD ON: HCPCS | Performed by: STUDENT IN AN ORGANIZED HEALTH CARE EDUCATION/TRAINING PROGRAM

## 2024-04-26 PROCEDURE — 99213 OFFICE O/P EST LOW 20 MIN: CPT | Performed by: STUDENT IN AN ORGANIZED HEALTH CARE EDUCATION/TRAINING PROGRAM

## 2024-04-26 NOTE — PATIENT INSTRUCTIONS
Continue PAP Therapy  Continue AutoPAP at settings of 5-15 cmH2O  Remember to clean your mask and equipment regularly, as directed.  I am ordering a formal mask fitting appointment; this is an appointment with the DME to ensure that you have the optimal mask and fit for your face structure    You should be eligible for new supplies approximately every 3-6 months, depending on your insurance coverage. Contact your Durable Medical Equipment (DME) company for new supplies as needed.  Follow up in 5-6 months.      Care and Maintenance  Headgear should be washed as needed. Daily inspection and weekly washings are recommended. Do not disassemble the straps. Machine wash in warm water, making sure to attach Velcro hooks and tabs before washing. Line dry or machine dry on a low setting.  Masks should be washed every day. Daily inspection is recommended. Leave the mask and tubing attached. Gently wash the mask with a soft cloth using warm water and mild detergent, concentrating on the mask cushion flaps. DO NOT use alcohol or bleach. Rinse thoroughly and air dry.  Tubing and headgear should be washed weekly. Daily inspection is recommended. Wash in warm water and mild detergent and rinse thoroughly. Hook the tubing to the machine and blow until dry.  Humidifier should be washed daily and filled with DISTILLED water before use. Wash with warm water and mild detergent. Disinfect weekly by soaking with a solution of 1 part white vinegar and 3 parts water for 30 minutes. Rinse thoroughly and air dry.  Disposable filters should be replaced once a month. Wash reusable foam filters with warm water and mild detergent at least once a month. Rinse thoroughly and dry with paper towels.  Avoid  that contain fragrance or conditioners, as these will leave a residue.  NEVER iron any soft goods.      CMS Requirements    Your insurance requires a face-to-face follow up visit within a 31-90 day period after starting CPAP.  Your  insurance requires compliance with CPAP, which is at least 4 hours per night for 70% of the time. This must be done over a 30 day period and must occur within the initial 31-90 day period after starting CPAP.  Your insurance also requires at least yearly follow ups to continue to pay for CPAP supplies.       PAP Supply Guidelines    Below are the guidelines for reordering your supplies. You will be responsible for your deductible, co payments, and out of pocket expenses.    Item Frequency   Nasal Mask (no headgear) 1 every 3 months   Nasal Mask Cushion 1 every 2 weeks   Full Face Mask (no headgear) 1 every 3 months   Full Face Mask Cushion 1 every month   Nasal Pillows 1 every 2 weeks   Headgear 1 every 6 months   hin Strap 1 every 6 months   leslie 1 every 3 months   Filters: Reusable 1 every 6 months   Filters: Disposable 1 every 2 weeks   Humidifier Chamber(disposable) 1 every 6 months         Good Sleep Hygiene    Wake up at the same time every day, even on the weekends.  Use your bed for sleep and intimacy only.  If you have been in bed awake for 30 minutes, get up and leave the bedroom. Choose a dull activity not involving a blue screen (TV, computer, handheld devices). Go back to bed when you feel sleepy.  Avoid caffeine, nicotine and alcohol before you go to bed.  Avoid large meals before you go to bed.  Avoid using screens (computers, tablets, smartphones, etc.) for at least 1 hour before bedtime  Exercise regularly, but do not exercise right before you go to bed.  Avoid daytime naps. If you do take a nap, sleep for 20-40 minutes, and not after dinner.

## 2024-04-26 NOTE — PROGRESS NOTES
[facility]  Sleep Medicine Follow up/ Established Patient Visit      Assessment/Plan:  Jayson Lawson is a 69 year old male with PMH of FELISA, essential hypertension, prior CVA, and paroxysmal atrial fibrillation s/p Watchman's device who presents to the office for follow-up on FELISA.    1. FELISA (obstructive sleep apnea)  PAP DME Resupply/Reorder    Mask fitting only      2. Essential hypertension  PAP DME Resupply/Reorder    Mask fitting only      3. History of CVA (cerebrovascular accident)  PAP DME Resupply/Reorder    Mask fitting only      4. Paroxysmal atrial fibrillation (HCC)  PAP DME Resupply/Reorder    Mask fitting only          Extensive discussion was held with patient regarding prevention of major cardiovascular events such as stroke and myocardial infarction from uncontrolled FELISA.   He is in agreement with trying alternative mask. Masking fitting ordered during today's visit.  Patient originally obtained his CPAP machine in 2020. Resupply of DME ordered.  He will Return for Follow up in 5-6 months..      ________________________________________________________________________________________________    Per Last Visit Note (Date: 12/7/2022):  Reason for Visit:  67 y.o.male here for annual follow-up     Assessment:  Doing well on current therapy of APAP 5 to 15 cm for previously diagnosed obstructive sleep apnea.     Plan:  Continue same     Follow up:  One year      Sleep Studies:  -HSAT 6/5/2020: TRT: 398.2 minutes, ARIAN: 9.2, O2 debra: 85%       ________________________________________________________________________________________________      Interval History: Ervin Wheeling is a 69 y.o. male with a PMHx of FELISA, hypertension, prior CVA, and AF s/p Watchman Device (internal bleeding on AC) who presents in follow up for FELISA. The patient endorses still experiencing mild snoring but not much appreciated by spouse. He denies any excessive daytime sleepiness even after discontinuing use  of his CPAP roughly eight months ago. He never noticed much of a difference even while using the CPAP machine.    SDB:  -Current experience with PAP Therapy: Not currently using PAP therapy  -Mask type: Full face mask  -Difficulties with mask: Uncomfortable, redness of the face after use  -Device: Dreamstation; received ~2020  -Difficulties with device: Machine was loud and noisy  -Compliance:            Warren Sleepiness Scale:  What are your chances of dozing?   0= no chance  1= slight chance  2= moderate chance  3= high chance    Sitting and readin  Watching TV: 2  Sitting, inactive in a public place (e.g. a theatre or a meeting):1  As a passenger in a car for an hour without a break: 2  Lying down to rest in the afternoon when circumstances permit: 3   Sitting and talking to someone: 0  Sitting quietly after a lunch without alcohol: 0  In a car, while stopped for a few minutes in the traffic: 0       TOTAL  10/24  Greater or equal to 10 is positive for excessive daytime sleepiness        SLEEP HYGIENE QUESTIONS:  Bedtime: 11 PM  Time it takes to fall sleep: 30 minutes  Wake up Time: 6-6:30 AM  Number of times patient wakes up per night: 3  Reason (s) why patient wakes up during the night: Bathroom  Estimated total sleep time ( in a 24 hour period of time): 6 to 7 hours  Naps: None    Changes to PMH, PSH, SH: None    SLEEP RELATED ROS  Review of Systems   Constitutional:  Negative for chills and fever.   HENT:  Negative for ear pain and sore throat.    Eyes:  Negative for pain and visual disturbance.   Respiratory:  Negative for cough, chest tightness and shortness of breath.    Cardiovascular:  Negative for chest pain and palpitations.   Gastrointestinal:  Negative for abdominal pain and vomiting.   Genitourinary:  Negative for dysuria and hematuria.   Musculoskeletal:  Negative for arthralgias and back pain.   Skin:  Negative for color change and rash.   Neurological:  Negative for seizures and syncope.    Psychiatric/Behavioral:  Negative for agitation and confusion.    All other systems reviewed and are negative.    No Known Allergies    CURRENT MEDICATIONS:  Current Outpatient Medications   Medication Instructions    amLODIPine (NORVASC) 5 mg, Oral, Daily    aspirin (ECOTRIN LOW STRENGTH) 81 mg, Oral, Daily    atorvastatin (LIPITOR) 10 mg, Oral, Daily    Coenzyme Q10 (CO Q-10 PO) 1 caplet, Oral, Every morning    flecainide (TAMBOCOR) 100 mg, Oral, 2 times daily    furosemide (LASIX) 20 mg, Oral, Daily PRN    metoprolol succinate (TOPROL-XL) 50 mg, Oral, Daily    Multiple Vitamins-Minerals (MULTIVITAMIN ADULT PO) 1 tablet, Oral, Every morning           PHYSICAL EXAMINATION:  Vital Signs: /70 (BP Location: Left arm, Patient Position: Sitting, Cuff Size: Adult)   Pulse 55   Ht 6' (1.829 m)   Wt 103 kg (228 lb)   SpO2 97%   BMI 30.92 kg/m²     Constitutional: NAD, well appearing   Mental Status: AAOx3  Skin: Warm, dry, no rashes noted   Eyes: PERRL, normal conjunctiva  ENT: Nasal congestion absent, nasal valve incompetence absent.  Posterior Airspace:   Nicole Tongue Position: 2  Retrognathia: absent  Overbite: absent  High Arched Palate: absent  Tongue Scalloping/Ridging: absent  Uvula: normal  Chest: RRR, +S1/S2, CTA B/L, no W/R/R  Abdomen: Soft, NT/ND  Extremities: No digital clubbing or pedal edema  Neuro: Strength 5/5 throughout, sensation grossly intact      I have spent a total time of 30 minutes on 04/26/24 in caring for this patient including Diagnostic results, Prognosis, Risks and benefits of tx options, Instructions for management, Patient and family education, Importance of tx compliance, Risk factor reductions, Impressions, Counseling / Coordination of care, Documenting in the medical record, Reviewing / ordering tests, medicine, procedures  , Obtaining or reviewing history  , and Communicating with other healthcare professionals .        Electronically signed by:    DO Lola Dumont  [facility]  04/26/24

## 2024-04-29 ENCOUNTER — TELEPHONE (OUTPATIENT)
Dept: SLEEP CENTER | Facility: CLINIC | Age: 69
End: 2024-04-29

## 2024-04-30 LAB

## 2024-05-17 ENCOUNTER — OFFICE VISIT (OUTPATIENT)
Dept: CARDIOLOGY CLINIC | Facility: CLINIC | Age: 69
End: 2024-05-17
Payer: MEDICARE

## 2024-05-17 VITALS
DIASTOLIC BLOOD PRESSURE: 74 MMHG | HEIGHT: 72 IN | HEART RATE: 53 BPM | BODY MASS INDEX: 30.88 KG/M2 | WEIGHT: 228 LBS | SYSTOLIC BLOOD PRESSURE: 122 MMHG

## 2024-05-17 DIAGNOSIS — I48.0 PAROXYSMAL ATRIAL FIBRILLATION (HCC): Primary | ICD-10-CM

## 2024-05-17 DIAGNOSIS — E78.2 MIXED HYPERLIPIDEMIA: ICD-10-CM

## 2024-05-17 DIAGNOSIS — N18.31 STAGE 3A CHRONIC KIDNEY DISEASE (HCC): ICD-10-CM

## 2024-05-17 DIAGNOSIS — I10 ESSENTIAL HYPERTENSION: ICD-10-CM

## 2024-05-17 DIAGNOSIS — S06.5XAA SDH (SUBDURAL HEMATOMA) (HCC): ICD-10-CM

## 2024-05-17 DIAGNOSIS — Z86.73 HISTORY OF CVA (CEREBROVASCULAR ACCIDENT): ICD-10-CM

## 2024-05-17 PROCEDURE — 93000 ELECTROCARDIOGRAM COMPLETE: CPT | Performed by: INTERNAL MEDICINE

## 2024-05-17 PROCEDURE — 99214 OFFICE O/P EST MOD 30 MIN: CPT | Performed by: INTERNAL MEDICINE

## 2024-05-17 NOTE — PROGRESS NOTES
Cardiology Follow Up    Ervin Lawson  1955  [id number]  [facility]  [address]  [address]  [place] [id number] [phone]    1. Paroxysmal atrial fibrillation (HCC)  POCT ECG    Ambulatory referral to Cardiac Electrophysiology      2. Essential hypertension        3. SDH (subdural hematoma) (HCC)        4. History of CVA (cerebrovascular accident)        5. Mixed hyperlipidemia        6. Stage 3a chronic kidney disease (HCC)            Discussion/Summary:    Paroxysmal atrial fibrillation. Sounds like he is having much more frequent episodes now. He is amenable to follow-up again with Dr. Hyatt and I think he would be good to consider for ablation. This was initially planned years ago, but then he had complications and ultimately never ended up having the procedure done.    Has a Watchman device in place. He remains on aspirin.    Would recommend continuing his current medications. He has baseline sinus bradycardia. He is not had long pauses. He does feel fatigued throughout the day, but this is variable and may be related to when he is in atrial fibrillation. I see no clear indication for pacemaker, but indicated that he should anticipate the possibility at the time of the ablation or otherwise. He will see Dr. Hyatt in follow-up.          History of Present Illness:   Pleasant 69-year-old man.  History of atrial fibrillation with a prior CVA.  Was previously maintaining sinus rhythm with Toprol XL, but in 12/2019, was found to be in afib, more persistent. We did a Holter, which showed rate controlled afib, but he was feeling palpitations. After discussing options, he was referred to EP. Initially started on Flecainide, and then underwent cardioversion in 7/2020.  Was having fatigue, so was planned for ablation, but the preoperative CT scan showed a renal mass.  Had partial nephrectomy.  Unfortunately, then had a  subdural hematoma.  He was back on medical therapy for afib, then underwent Watchman Device implantation.    Since then, he has been doing ok with afib control, so did not have the ablation done.    Interval History:    I saw him last year, and he was describing more episodes of atrial fibrillation. I did a Zio patch, and in fact this was the case, but the episodes were pretty short. He opted to continue with medical management.    He returns for his regular follow-up. He is describing now much more frequent episodes of A-fib. They are coming every week instead of every month. Sometimes they can last up to a day. They are bothersome to him and he is symptomatic. On the Zio patch, his rates in atrial fibrillation were not elevated. The maximum rate was actually just in the 90s.    He maintains sinus bradycardia for the most part with the flecainide.    He is on aspirin with a Watchman device in place given his prior history of cerebral bleed. No problems with that.    He seems more amenable to following up with the electrophysiologist to discuss moving forward with the ablation.    Problem List       Cerebrovascular disease, ill-defined, acute    Essential hypertension    Paroxysmal atrial fibrillation (HCC)    Overview Signed 3/15/2018  2:07 PM by Reggie Lockhart DO     Transitioned From: Atrial fibrillation         Mixed hyperlipidemia    Pain of toe of right foot          Past Medical History:   Diagnosis Date    Arthritis     Atrial fibrillation (HCC)     Cancer (HCC) 08/2020    Kidney cancer    Cancer (HCC)     melanoma    Chronic kidney disease     left kidney cancer    Clotting disorder (HCC) 1/10/2021    CPAP (continuous positive airway pressure) dependence     Headache(784.0)     Not current and not often    Hyperlipidemia     Hypertension     Irregular heart beat     Afib    Pneumonia     Skin cancer     Sleep apnea     Stroke (HCC)     Supraventricular tachycardia     Wears glasses     Wears partial dentures      upper partial     Social History     Tobacco Use    Smoking status: Never    Smokeless tobacco: Never   Substance Use Topics    Alcohol use: Not Currently     Comment: Social only     Family History   Problem Relation Age of Onset    Cancer Father         stomach    Hypertension Father     Stroke Father         syndrome    Cancer Family     Heart disease Family     Hypertension Family     Stroke Family         syndrome    Throat cancer Mother     Diabetes Mother     Hypertension Mother     Cancer Mother     Asthma Sister      Past Surgical History:   Procedure Laterality Date    APPENDECTOMY      CARDIOVERSION      COLONOSCOPY      fiberoptic, also 2009, both negative, resolved 2004    COLONOSCOPY  08/2019    EGD      HERNIA REPAIR      umbilical and bilateral inguinal    KNEE ARTHROSCOPY Left     MN LAPAROSCOPY SURG PARTIAL NEPHRECTOMY Left 10/7/2020    Procedure: ROBOTIC PARTIAL NEPHRECTOMY;  Surgeon: Vickey Jane MD;  Location: [facility];  Service: Urology    MN PERQ CLSR TCAT L ATR APNDGE W/ENDOCARDIAL IMPLNT N/A 3/25/2021    Procedure: LEFT ATRIAL APPENDAGE OCCLUSION;  Surgeon: Farrukh Hyatt MD;  Location: [facility];  Service: Cardiology    SKIN GRAFT      left ear, skin cancer    TONSILLECTOMY         Current Outpatient Medications:     amLODIPine (NORVASC) 5 mg tablet, Take 1 tablet (5 mg total) by mouth daily, Disp: 90 tablet, Rfl: 1    aspirin (ECOTRIN LOW STRENGTH) 81 mg EC tablet, Take 1 tablet (81 mg total) by mouth daily, Disp: 30 tablet, Rfl: 2    atorvastatin (LIPITOR) 10 mg tablet, Take 1 tablet (10 mg total) by mouth daily, Disp: 90 tablet, Rfl: 1    Coenzyme Q10 (CO Q-10 PO), Take 1 caplet by mouth every morning, Disp: , Rfl:     flecainide (TAMBOCOR) 100 mg tablet, Take 1 tablet (100 mg total) by mouth 2 (two) times a day, Disp: 180 tablet, Rfl: 1    furosemide (LASIX) 20 mg tablet, Take 1 tablet (20 mg total) by mouth daily as needed (swelling), Disp: 30 tablet, Rfl: 1    metoprolol  succinate (TOPROL-XL) 50 mg 24 hr tablet, Take 1 tablet (50 mg total) by mouth daily, Disp: 90 tablet, Rfl: 1    Multiple Vitamins-Minerals (MULTIVITAMIN ADULT PO), Take 1 tablet by mouth every morning, Disp: , Rfl:   No Known Allergies    Vitals:    05/17/24 0924   BP: 122/74   Pulse: (!) 53   Weight: 103 kg (228 lb)   Height: 6' (1.829 m)     Vitals:    05/17/24 0924   Weight: 103 kg (228 lb)      Height: 6' (182.9 cm)   Body mass index is 30.92 kg/m².    Physical Exam:  GEN: Ervin Lawson appears well, alert and oriented x 3, pleasant and cooperative   HEENT: pupils equal, round, and reactive to light; extraocular muscles intact  NECK: supple, no carotid bruits   HEART: regular rhythm, normal S1 and S2, no murmurs, clicks, gallops or rubs   LUNGS: clear to auscultation bilaterally; no wheezes, rales, or rhonchi   ABDOMEN: normal bowel sounds, soft, no tenderness, no distention  EXTREMITIES: peripheral pulses normal; no clubbing, cyanosis, or edema  NEURO: no focal findings   SKIN: normal without suspicious lesions on exposed skin    ROS:  Fatigue, tired during the day.  Except as noted in HPI, is otherwise reviewed in detail and a 12 point review of systems is negative.  ROS reviewed and is unchanged    Labs:  Lab Results   Component Value Date     09/19/2017    K 4.3 03/14/2024     03/14/2024    CREATININE 1.10 03/14/2024    BUN 14 03/14/2024    CO2 28 03/14/2024    ALT 14 10/19/2023    AST 18 10/19/2023    INR 1.28 (H) 03/18/2021    GLUF 80 03/14/2024    WBC 7.62 10/19/2023    HGB 14.1 10/19/2023    HCT 44.9 10/19/2023     10/19/2023       Lab Results   Component Value Date    CHOL 136 09/19/2017    CHOL 132 05/31/2017    CHOL 124 (L) 03/30/2016     Lab Results   Component Value Date    LDLCALC 54 10/19/2023    LDLCALC 56 09/09/2022    LDLCALC 66 01/23/2020     Lab Results   Component Value Date    HDL 53 10/19/2023    HDL 63 09/09/2022    HDL 49 01/23/2020     Lab Results   Component Value  Date    TRIG 53 10/19/2023    TRIG 48 09/09/2022    TRIG 53 01/23/2020     Echo 6/8/23:  Left Ventricle: Left ventricular cavity size is normal. Wall thickness is normal. The left ventricular ejection fraction is 55%. Systolic function is normal. Wall motion is normal. Diastolic function is normal.    Right Ventricle: Right ventricular cavity size is normal. Systolic function is normal.    Left Atrium: The atrium is mildly dilated.    Mitral Valve: There is mild regurgitation.    Tricuspid Valve: There is trace regurgitation.    Aorta: The aortic root is mildly dilated. The ascending aorta is mildly dilated.    Prior TTE study available for comparison. Prior study date: 10/8/2019. No significant changes noted compared to the prior study.    ALONZO 5/10/21:  LEFT VENTRICLE:  Systolic function was normal. Ejection fraction was estimated to be 55 %.  Although no diagnostic regional wall motion abnormality was identified, this possibility cannot be completely excluded on the basis of this study.     LEFT ATRIUM:  The atrium was mildly dilated.     LEFT ATRIAL APPENDAGE:  A 30 mm left atrial appendage occluder was visualized and was well seated without associated thrombus. There were 2 small residual defects (<3.5 mm in diameter) in the medial and lateral aspects of the device.     MITRAL VALVE:  There was mild regurgitation.     AORTIC VALVE:  There was trace regurgitation.     TRICUSPID VALVE:  There was mild to moderate regurgitation.    Echo 10/2019:  LEFT VENTRICLE:  Systolic function was normal. Ejection fraction was estimated to be 55 %.  There were no regional wall motion abnormalities.  Wall thickness was at the upper limits of normal.     MITRAL VALVE:  There was mild to moderate regurgitation.     TRICUSPID VALVE:  There was mild regurgitation.  Pulmonary artery systolic pressure was within the normal range.     PULMONIC VALVE:  There was trace regurgitation.     AORTA:  The root exhibited mild dilatation. 3.8  cm  There was mild dilatation of the ascending aorta. 3.7 cm     Holter 5/27/2020:  IMPRESSION:  Predominantly atrial fibrillation with an average heart rate of 78 bpm.   Rare PVCs, consisting of 0.5% of total beats. No sustained ventricular arrhythmias.  No significant pauses.    Patient's symptoms of fluttering correlated with atrial fibrillation at controlled rates.     EKG:  Sinus bradycardia, 53 BPM. First degree AV block.

## 2024-05-17 NOTE — Clinical Note
You've seen Jayson before, never had the ablation. He is having more episodes now and is symptomatic. I think it's time to go ahead with ablation. I asked him to follow up with you. Thanks -dd.

## 2024-07-23 ENCOUNTER — PREP FOR PROCEDURE (OUTPATIENT)
Dept: CARDIOLOGY CLINIC | Facility: CLINIC | Age: 69
End: 2024-07-23

## 2024-07-23 ENCOUNTER — TELEPHONE (OUTPATIENT)
Dept: CARDIOLOGY CLINIC | Facility: CLINIC | Age: 69
End: 2024-07-23

## 2024-07-23 ENCOUNTER — CONSULT (OUTPATIENT)
Dept: CARDIOLOGY CLINIC | Facility: CLINIC | Age: 69
End: 2024-07-23
Payer: MEDICARE

## 2024-07-23 VITALS
HEIGHT: 72 IN | WEIGHT: 228.5 LBS | HEART RATE: 57 BPM | DIASTOLIC BLOOD PRESSURE: 74 MMHG | BODY MASS INDEX: 30.95 KG/M2 | SYSTOLIC BLOOD PRESSURE: 128 MMHG

## 2024-07-23 DIAGNOSIS — I48.0 PAROXYSMAL ATRIAL FIBRILLATION (HCC): Primary | ICD-10-CM

## 2024-07-23 DIAGNOSIS — E78.2 MIXED HYPERLIPIDEMIA: ICD-10-CM

## 2024-07-23 DIAGNOSIS — Z90.5 H/O PARTIAL NEPHRECTOMY: Chronic | ICD-10-CM

## 2024-07-23 DIAGNOSIS — I10 ESSENTIAL HYPERTENSION: ICD-10-CM

## 2024-07-23 DIAGNOSIS — Z95.818 PRESENCE OF WATCHMAN LEFT ATRIAL APPENDAGE CLOSURE DEVICE: ICD-10-CM

## 2024-07-23 PROCEDURE — 99215 OFFICE O/P EST HI 40 MIN: CPT | Performed by: INTERNAL MEDICINE

## 2024-07-23 PROCEDURE — 93000 ELECTROCARDIOGRAM COMPLETE: CPT | Performed by: INTERNAL MEDICINE

## 2024-07-23 RX ORDER — DABIGATRAN ETEXILATE 75 MG/1
75 CAPSULE ORAL EVERY 12 HOURS SCHEDULED
Qty: 60 CAPSULE | Refills: 4 | Status: SHIPPED | OUTPATIENT
Start: 2024-07-23

## 2024-07-23 NOTE — TELEPHONE ENCOUNTER
Patient scheduled for ALONZO/A fib at [facility] on   8/9/24    with Dr Hyatt    Patient aware of general instructions, labs test required.     Meds holds:   PRADAXA: No holds   LASIX: Hold this medication the morning of the procedure.   FLECAINIDE: Hold  this medication 48HRS  prior to the procedure.

## 2024-07-23 NOTE — LETTER
CARDIAC ABLATION INSTRUCTIONS     Evrin Lawson   : 1955  MRN: [id number]  [address] [place] [id number]    Procedure:  Cardiac ablation     Procedure Date: 2024    Location: [facility]  Address: [address] [place] [address], [place] [address]    Please call to Short Stay Center [phone] if not get call by 5.00pm.      Labs to be done  BEFORE 2024   CMP /  CBC (FASTING 8 HOURS)    BLOOD THINNER INSTRUCTIONS (Coumadin / Warfarin / Pradaxa / Eliquis / Xarelto):   PRADAXA: Continue taking as prescribed, per Dr SIMON you DONT need to stop this medication.     Medication Hold:   LASIX: DO NOT take this medication the morning of the procedure.   FLECAINIDE: DO NOT take this medication 48HRS  prior to the procedure.       Arrival Time: The Hospital will contact you the day prior to your procedure, usually between 4PM - 6PM to instruct you on the time and place to report. If you do not hear from a [facility]  by 6PM the evening prior to your procedure, please contact the campus you are scheduled at.     You may have NOTHING to eat or drink from midnight the night prior to your procedure including candy & gum.  You may have a SIP of water with your morning medications.    DO NOT stop taking Plavix or Aspirin unless advised otherwise.    Arrange for a responsible adult to drive you to and from the hospital.    Please notify us if you got a  NEW MEDICATION prescribed prior to your procedure or have been admitted to the hospital within 30 days.     You should continue to take your morning medications with a sip of water UNLESS ADVISED OTHERWISE.       DO NOT stop taking Plavix or Aspirin unless advised otherwise.     If you are currently taking Fish Oil, Krill oil and/or Vitamin E please DO NOT TAKE FOR A WEEK PRIOR TO PROCEDURE.     If you are diabetic, DO NOT take any of your diabetic pills the morning of your procedure. Oral diabetic medications may include  Glucophage, Prandin, Glyburide, Micronase, Avandia, Glucovance, Precose, Glynase, and Starlix.     Bring a list of daily medications, vitamins, minerals, herbals and nutritional supplements you take. Please include dosages and the times you take them each day.     If you are packing an overnight bag, pack minimal clothing, you will be given hospital sleepwear.   DO NOT bring money, valuables or jewelry. The wedding band is ok.     If you use CPAP machine, bring it to the hospital.      Bring your Photo ID and Insurance cards with you.     Please shower before your procedure and do not use powders or lotions.     If you develop a cold, sore throat, fever or any other illness prior to your procedure date, notify your surgeon immediately      FAILURE TO FOLLOW ANY OF THESE INSTRUCTIONS COULD RESULT IN THE CANCELLATION OF YOUR PROCEDURE      Please call [phone] if you do not hear from the  by 6:00PM the night before your procedure.    All patients enter through ENTRANCE B. [employer] Parking is available free of charge or park on Parking Deck B.        Thank you,   Chelita Alicea  Surgery Coordinator  [facility]   [address] [place] [id number]  Ph: [phone]

## 2024-07-23 NOTE — PROGRESS NOTES
Electrophysiology    Clinic Visit Note  Ervin Lawson 69 y.o. male   MRN: [id number]    Assessment and Plan      Problem List Items Addressed This Visit       Essential hypertension    Paroxysmal atrial fibrillation (HCC) - Primary    Relevant Orders    POCT ECG    Mixed hyperlipidemia    Presence of Watchman left atrial appendage closure device    H/O partial nephrectomy (Chronic)     1.  Paroxysmal atrial fibrillation--[id number] score 4.  S/p Watchman device.  Patient is currently off anticoagulation.  EKG in the office revealed a sinus bradycardia with first-degree AV block.  Zio patch monitor in 2023 revealed AF burden less than 1% with longest episode 30 minutes.  Patient complained symptomatic palpitations and increased frequency of atrial fibrillation to once a week. Patient is on metoprolol succinate and flecainide.   Discussed the risks and benefits of atrial fibrillation ablation.  Patient is willing to undergo PFA.  Patient is on aspirin 81 mg p.o. daily.  Will resume Pradaxa 75 mg p.o. twice daily for approximately 3 months.  Will discontinue flecainide after atrial fibrillation ablation.  Patient denies smoking, occasionally drinks alcohol.  Not very consistent with CPAP.    2.  Hypertension--blood pressure well controlled.    3.  Hyperlipidemia--continue statin.    4.  History of subdural hematoma--patient tolerated Pradaxa in the past for Watchman device.  Will resume Pradaxa for atrial fibrillation ablation.      Schedule a follow-up appointment for atrial fibrillation ablation.     Chief Complaint: Consult for atrial fibrillation ablation  Subjective     History of Present Illness:  Patient is a 69-year-old male with a past medical history of persistent atrial fibrillation s/p Watchman device, FELISA, history of subdural hematoma, hypertension and hyperlipidemia presented to the office for evaluation of atrial fibrillation ablation.    Patient was found to have paroxysmal atrial fibrillation in  2019.  Patient was referred to EP and initiated on flecainide and underwent cardioversion in 2022.  Patient was scheduled for atrial fibrillation ablation, but perioperative CT scan showed a renal mass.  Patient also developed subdural hematoma spontaneously on Eliquis in the past.  Patient had Watchman device implanted in .  Patient was cleared for resume anticoagulation per neurosurgery, however patient lost follow-up with EP.  Patient now complaining of more frequent episodes of atrial fibrillation with symptomatic palpitations.  Zio patch monitor  revealed A-fib burden less than 1% with the longest episode of 30 minutes.  Patient is here for evaluation for atrial fibrillation ablation.    Previous Cardiology Workup:  TREADMILL STRESS  Results for orders placed during the hospital encounter of 20    Stress test only, exercise    Narrative  [address] [address] [phone]    Stress Electrocardiography during Exercise    Name: JULIANNE LINDER  MR #: [id number]  Account #: [id number]  Study date: 2020  : 1955  Age: 65 years  Gender: Male  Height: 74 in  Weight: 220 lb  BSA: 2.27 mï¾²    Allergies: NO KNOWN ALLERGIES    Diagnosis: R06.00 - Dyspnea, unspecified    RN:  Estrellita Schroeder RN  Primary Physician:  Seferino Lockhart DO  Referring Physician:  Ernesto Palomino MD  Interpreting Physician:  Tacos Vu MD  Group:  [facility]  Cardiology Fellow:  Dilip Reyes MD  Report Prepared By::  Estrellita Schroeder RN  Technician:  CHARLA Alvarez    CLINICAL QUESTION: Detection of coronary artery disease.    HISTORY: The patient is a 65 year old  male. Chest pain status: no chest pain. Other symptoms: dyspnea of recent onset. Coronary artery disease risk factors: dyslipidemia, hypertension, and family history of premature coronary  artery disease. Cardiovascular history: arrhythmia and  stroke. Medications: a beta blocker, a calcium channel blocker, an ACE inhibitor/ARB, a diuretic, a lipid lowering agent, and an antiarrhythmic agent. Previous test results: abnormal  ECG.    PHYSICAL EXAM: Baseline physical exam screening: normal and no wheezes audible.    REST ECG: Sinus bradycardia.    PROCEDURE: Treadmill exercise testing was performed, using the Uzair protocol. Stress electrocardiographic evaluation was performed.    UZAIR PROTOCOL:  HR bpm SBP mmHg DBP mmHg Symptoms  Baseline 51 114 62 none  Stage 1 65 80 50 severe dyspnea  Stage 2 67 -- -- severe dyspnea  Immediate 53 86 60 subsiding  Recovery 1 52 118 60 subsiding  Recovery 2 53 142 74 none  Recovery 3 52 126 74 none  No medications or fluids given.    STRESS SUMMARY: Duration of exercise was 4 min and 0 sec. The patient exercised to protocol stage 2. Maximal work rate was 7 METs. Maximal heart rate during stress was 67 bpm ( 44 % of maximal predicted heart rate). The heart rate response  to stress was blunted. A hypotensive response to stress was noted. Resting O2 sat 99%, peak stress O2 sat 98%. The rate-pressure product for the peak heart rate and blood pressure was 9514. There was no chest pain during stress. The stress  test was terminated due to severe dyspnea. There were no stress arrhythmias or conduction abnormalities. No acute ischemic changes. The stress ECG was non-diagnostic due to failure to reach target heart rate.    SUMMARY:  -  Stress results: Duration of exercise was 4 min and 0 sec. Target heart rate was not achieved. A hypotensive response to stress was noted. There was no chest pain during stress.  -  ECG conclusions: No acute ischemic changes. The stress ECG was non-diagnostic due to failure to reach target heart rate.  -  Summary: Results explained to patient and Dr. Hyatt notified.    IMPRESSIONS: Abnormal study. Patient developed severe dyspnea with exercise associated with hypotension and an inappropriate rise in  heart rate. Given no significant ischemic changes on EKG, this raised concern for chronotropic  incompetence. Diagnostic sensitivity was limited by submaximal stress.    Prepared and signed by    Tacos Vu MD  Signed 2020 12:38:17     ----------------------------------------------------------------------------------------------  NUCLEAR STRESS TEST: No results found for this or any previous visit.    No results found for this or any previous visit.      --------------------------------------------------------------------------------  CATH:  No results found for this or any previous visit.    --------------------------------------------------------------------------------  ECHO:   Results for orders placed during the hospital encounter of 10/08/19    Echo complete with contrast if indicated    Narrative  [address] [address] [phone]    Transthoracic Echocardiogram  2D, M-mode, Doppler, and Color Doppler    Study date:  08-Oct-2019    Patient: JULIANNE LINDER  MR number: [id number]  Account number: [id number]  : 1955  Age: 64 years  Gender: Male  Status: Outpatient  Location: [address] [place] [address] [facility] [place]  Height: 76 in  Weight: 213.6 lb  BP: 124/ 70 mmHg    Indications: A-Fib    Diagnoses: I48.0 - Atrial fibrillation    Sonographer:  KIAH Felipe  Primary Physician:  Seferino Lockhart DO  Referring Physician:  Ernesto Palomino MD  Group:  [facility]  Interpreting Physician:  Tacos Vu MD    SUMMARY    LEFT VENTRICLE:  Systolic function was normal. Ejection fraction was estimated to be 55 %.  There were no regional wall motion abnormalities.  Wall thickness was at the upper limits of normal.    MITRAL VALVE:  There was mild to moderate regurgitation.    TRICUSPID VALVE:  There was mild regurgitation.  Pulmonary artery systolic pressure was within the normal range.    PULMONIC VALVE:  There was  trace regurgitation.    AORTA:  The root exhibited mild dilatation. 3.8 cm  There was mild dilatation of the ascending aorta. 3.7 cm    HISTORY: PRIOR HISTORY: HTN, HLD, cerebrovascualr disease , stroke, SVT    PROCEDURE: The study was performed in the [address] [place] [address] [facility]. This was a routine study. The transthoracic approach was used. The study included complete 2D imaging, M-mode, complete spectral Doppler, and color Doppler. The  heart rate was 58 bpm, at the start of the study. Images were obtained from the parasternal, apical, subcostal, and suprasternal notch acoustic windows. Echocardiographic views were limited due to decreased penetration. This was a  technically difficult study.    LEFT VENTRICLE: Size was normal. Systolic function was normal. Ejection fraction was estimated to be 55 %. There were no regional wall motion abnormalities. Wall thickness was at the upper limits of normal. No evidence of apical thrombus.  DOPPLER: Left ventricular diastolic function parameters were normal.    RIGHT VENTRICLE: The size was normal. Systolic function was normal. Wall thickness was normal.    LEFT ATRIUM: Size was normal.    RIGHT ATRIUM: Size was normal.    MITRAL VALVE: Valve structure was normal. There was normal leaflet separation. DOPPLER: The transmitral velocity was within the normal range. There was no evidence for stenosis. There was mild to moderate regurgitation.    AORTIC VALVE: The valve was trileaflet. Leaflets exhibited normal thickness and normal cuspal separation. DOPPLER: Transaortic velocity was within the normal range. There was no evidence for stenosis. There was no significant  regurgitation.    TRICUSPID VALVE: The valve structure was normal. There was normal leaflet separation. DOPPLER: The transtricuspid velocity was within the normal range. There was no evidence for stenosis. There was mild regurgitation. Pulmonary artery  systolic pressure was within the normal  range.    PULMONIC VALVE: Leaflets exhibited normal thickness, no calcification, and normal cuspal separation. DOPPLER: The transpulmonic velocity was within the normal range. There was trace regurgitation.    PERICARDIUM: There was no pericardial effusion. The pericardium was normal in appearance.    AORTA: The root exhibited mild dilatation. 3.8 cm There was mild dilatation of the ascending aorta. 3.7 cm    SYSTEMIC VEINS: IVC: The inferior vena cava was normal in size.    SYSTEM MEASUREMENT TABLES    2D  %FS: 23.4 %  Ao Diam: 3.8 cm  EDV(Teich): 110.72 ml  EF(Cube): 55.06 %  EF(Teich): 46.71 %  ESV(Cube): 51.61 ml  ESV(Teich): 59 ml  IVSd: 1.07 cm  LA Area: 16.85 cm2  LA Diam: 3.57 cm  LVEDV MOD A4C: 110.01 ml  LVEF MOD A4C: 56.61 %  LVESV MOD A4C: 47.73 ml  LVIDd: 4.86 cm  LVIDs: 3.72 cm  LVLd A4C: 8.72 cm  LVLs A4C: 7.28 cm  LVPWd: 0.83 cm  RA Area: 23.79 cm2  RV Diam.: 3.81 cm  SI(Cube): 27.73 ml/m2  SI(Teich): 22.68 ml/m2  SV MOD A4C: 62.28 ml  SV(Cube): 63.23 ml  SV(Teich): 51.72 ml    CW  TR Vmax: 2 m/s  TR maxPG: 15.99 mmHg    MM  TAPSE: 2.11 cm    PW  E': 0.11 m/s  E/E': 5.87  MV A [place]: 0.46 m/s  MV Dec [place]: 2.22 m/s2  MV DecT: 286.92 ms  MV E [place]: 0.64 m/s  MV E/A Ratio: 1.37    In[employer]oci[employer] Commission Accredited Echocardiography Laboratory    Prepared and electronically signed by    Tacos Vu MD  Signed 08-Oct-2019 11:25:58    No results found for this or any previous visit.    --------------------------------------------------------------------------------  HOLTER  Results for orders placed during the hospital encounter of 20    Holter monitor - 24 hour    Narrative  PT NAME: Ervin Lawson  : 1955  AGE: 65 y.o.  GENDER: male  MRN: [id number]   PROCEDURE: Holter monitor - 24 hour    PCP: Reggie Lockhart DO    Outpatient Cardiologist: Ernesto Palomino MD    INDICATIONS: Atrial fibrillation burden and bradycardia assessment    DESCRIPTION OF FINDINGS:  The patient was monitored for a total  of 23 hours and 59 minutes.  The patient was in sinus rhythm throughout the study.  The average heart rate was 53 beats per minute.  The heart rate ranged from a low of 46 beats per minute at 11:38 PM to a maximum of 79 beats per minute at 11:29 AM.    Ventricular ectopic activity consisted of 11 beats. These were primarily isolated premature ventricular contractions with a single ventricular couplet. There was no sustained or nonsustained ventricular tachycardia.    Supraventricular ectopic activity consisted of 53 beats (0.1 % of total beats). These were primarily isolated premature atrial contractions with brief episode of trigeminy and rare atrial pairs. There was no supraventricular tachycardia identified.  There was no evidence of atrial fibrillation or atrial flutter.    There were no significant pauses. The longest R-R interval was 1.6 seconds.  There was no evidence of advanced degree heart block.    The accompanying patient diary notes no symptoms.    Impression  1. Patient was in sinus rhythm throughout the study with an average HR of 53 bpm, following a diurnal pattern.  2. No evidence of atrial fibrillation throughout the monitoring period  3. Minimal atrial or ventricular ectopy  4. There was 19 hr 40 min of bradycardia with a slowest heart rate of 46 bpm in sinus bradycardia  5. No evidence of high degree heart block.  6. Diary noted activities of daily living but no symptoms.  7. Patient documented 3 episodes of exercise with peak heart rate ranging from 70 to 79 bpm.      Fellow: SESAR Ch MD  Attending: Joni Avery MD      Teaching Physician Attestation  Personally reviewed the holter tracings, reviewed fellow's documentation of findings and made changes as appropriate.    Joni Avery MD    --------------------------------------------------------------------------------  CAROTIDS  No results found for this or any previous visit.        ---------------------------------------------------------------------------------  Review of Systems   Constitutional:  Negative for chills and fever.   HENT:  Negative for congestion, rhinorrhea, sneezing and sore throat.    Eyes:  Negative for pain and discharge.   Respiratory:  Negative for cough, chest tightness, shortness of breath and wheezing.    Cardiovascular:  Positive for palpitations and leg swelling. Negative for chest pain.   Gastrointestinal:  Negative for abdominal pain, nausea and vomiting.   Endocrine: Negative for polydipsia, polyphagia and polyuria.   Genitourinary:  Negative for flank pain, frequency and urgency.   Musculoskeletal:  Negative for arthralgias, back pain and joint swelling.   Skin:  Negative for color change and pallor.   Neurological:  Negative for dizziness, weakness, light-headedness and headaches.   Psychiatric/Behavioral:  Negative for agitation and confusion.          Current Outpatient Medications:     amLODIPine (NORVASC) 5 mg tablet, Take 1 tablet (5 mg total) by mouth daily, Disp: 90 tablet, Rfl: 1    aspirin (ECOTRIN LOW STRENGTH) 81 mg EC tablet, Take 1 tablet (81 mg total) by mouth daily, Disp: 30 tablet, Rfl: 2    atorvastatin (LIPITOR) 10 mg tablet, Take 1 tablet (10 mg total) by mouth daily, Disp: 90 tablet, Rfl: 1    Coenzyme Q10 (CO Q-10 PO), Take 1 caplet by mouth every morning, Disp: , Rfl:     flecainide (TAMBOCOR) 100 mg tablet, Take 1 tablet (100 mg total) by mouth 2 (two) times a day, Disp: 180 tablet, Rfl: 1    furosemide (LASIX) 20 mg tablet, Take 1 tablet (20 mg total) by mouth daily as needed (swelling), Disp: 30 tablet, Rfl: 1    metoprolol succinate (TOPROL-XL) 50 mg 24 hr tablet, Take 1 tablet (50 mg total) by mouth daily, Disp: 90 tablet, Rfl: 1    Multiple Vitamins-Minerals (MULTIVITAMIN ADULT PO), Take 1 tablet by mouth every morning, Disp: , Rfl:   Past Medical History:   Diagnosis Date    Arthritis     Atrial fibrillation (HCC)     Cancer  (HCC) 08/2020    Kidney cancer    Cancer (HCC)     melanoma    Chronic kidney disease     left kidney cancer    Clotting disorder (HCC) 1/10/2021    CPAP (continuous positive airway pressure) dependence     Headache(784.0)     Not current and not often    Hyperlipidemia     Hypertension     Irregular heart beat     Afib    Pneumonia     Skin cancer     Sleep apnea     Stroke (HCC)     Supraventricular tachycardia     Wears glasses     Wears partial dentures     upper partial     Past Surgical History:   Procedure Laterality Date    APPENDECTOMY      CARDIOVERSION      COLONOSCOPY      fiberoptic, also 2009, both negative, resolved 2004    COLONOSCOPY  08/2019    EGD      HERNIA REPAIR      umbilical and bilateral inguinal    KNEE ARTHROSCOPY Left     DE LAPAROSCOPY SURG PARTIAL NEPHRECTOMY Left 10/7/2020    Procedure: ROBOTIC PARTIAL NEPHRECTOMY;  Surgeon: Vickey Jane MD;  Location: [facility];  Service: Urology    DE PERQ CLSR TCAT L ATR APNDGE W/ENDOCARDIAL IMPLNT N/A 3/25/2021    Procedure: LEFT ATRIAL APPENDAGE OCCLUSION;  Surgeon: Farrukh Hyatt MD;  Location:  MAIN OR;  Service: Cardiology    SKIN GRAFT      left ear, skin cancer    TONSILLECTOMY       Social History     Socioeconomic History    Marital status: /Civil Union     Spouse name: Not on file    Number of children: Not on file    Years of education: Not on file    Highest education level: Not on file   Occupational History    Occupation: retired    Tobacco Use    Smoking status: Never    Smokeless tobacco: Never   Vaping Use    Vaping status: Never Used   Substance and Sexual Activity    Alcohol use: Not Currently     Comment: Social only    Drug use: No    Sexual activity: Yes     Partners: Female   Other Topics Concern    Not on file   Social History Narrative    Not on file     Social Determinants of Health     Financial Resource Strain: Low Risk  (9/26/2023)    Overall Financial Resource Strain (CARDIA)     Difficulty of  Paying Living Expenses: Not very hard   Food Insecurity: Not on file   Transportation Needs: No Transportation Needs (9/26/2023)    PRAPARE - Transportation     Lack of Transportation (Medical): No     Lack of Transportation (Non-Medical): No   Physical Activity: Not on file   Stress: Not on file   Social Connections: Not on file   Intimate Partner Violence: Not on file   Housing Stability: Not on file     Family History   Problem Relation Age of Onset    Cancer Father         stomach    Hypertension Father     Stroke Father         syndrome    Cancer Family     Heart disease Family     Hypertension Family     Stroke Family         syndrome    Throat cancer Mother     Diabetes Mother     Hypertension Mother     Cancer Mother     Asthma Sister      No Known Allergies    Objective     Vitals:    07/23/24 0845   Height: 6' (1.829 m)       Physical exam:     Physical Exam  Constitutional:       Appearance: He is obese.   HENT:      Head: Normocephalic.   Eyes:      Pupils: Pupils are equal, round, and reactive to light.   Cardiovascular:      Rate and Rhythm: Normal rate and regular rhythm.      Heart sounds: No murmur heard.  Pulmonary:      Effort: Pulmonary effort is normal. No respiratory distress.      Breath sounds: No wheezing or rales.   Abdominal:      Palpations: Abdomen is soft.      Tenderness: There is no abdominal tenderness.   Musculoskeletal:         General: No swelling. Normal range of motion.      Cervical back: Normal range of motion.      Right lower leg: No edema.      Left lower leg: No edema.   Skin:     General: Skin is warm.   Neurological:      Mental Status: He is alert and oriented to person, place, and time.   Psychiatric:         Mood and Affect: Mood normal.           ==  PLEASE NOTE:  This encounter was completed utilizing the [employer] Direct Speech Voice Recognition Software. Grammatical errors, random word insertions, pronoun errors and incomplete sentences are occasional  consequences of the system due to software limitations, ambient noise and hardware issues.These may be missed by proof reading prior to affixing electronic signature. Any questions or concerns about the content, text or information contained within the body of this dictation should be directly addressed to the physician for clarification. Please do not hesitate to call me directly if you have any any questions or concerns.

## 2024-07-29 ENCOUNTER — APPOINTMENT (OUTPATIENT)
Dept: LAB | Age: 69
End: 2024-07-29
Payer: MEDICARE

## 2024-07-29 DIAGNOSIS — I48.0 PAROXYSMAL ATRIAL FIBRILLATION (HCC): ICD-10-CM

## 2024-07-29 LAB
ALBUMIN SERPL BCG-MCNC: 3.8 G/DL (ref 3.5–5)
ALP SERPL-CCNC: 71 U/L (ref 34–104)
ALT SERPL W P-5'-P-CCNC: 18 U/L (ref 7–52)
ANION GAP SERPL CALCULATED.3IONS-SCNC: 8 MMOL/L (ref 4–13)
AST SERPL W P-5'-P-CCNC: 22 U/L (ref 13–39)
BASOPHILS # BLD AUTO: 0.02 THOUSANDS/ÂΜL (ref 0–0.1)
BASOPHILS NFR BLD AUTO: 0 % (ref 0–1)
BILIRUB SERPL-MCNC: 1.04 MG/DL (ref 0.2–1)
BUN SERPL-MCNC: 12 MG/DL (ref 5–25)
CALCIUM SERPL-MCNC: 8.7 MG/DL (ref 8.4–10.2)
CHLORIDE SERPL-SCNC: 101 MMOL/L (ref 96–108)
CO2 SERPL-SCNC: 29 MMOL/L (ref 21–32)
CREAT SERPL-MCNC: 1.23 MG/DL (ref 0.6–1.3)
EOSINOPHIL # BLD AUTO: 0.21 THOUSAND/ÂΜL (ref 0–0.61)
EOSINOPHIL NFR BLD AUTO: 3 % (ref 0–6)
ERYTHROCYTE [DISTWIDTH] IN BLOOD BY AUTOMATED COUNT: 13.1 % (ref 11.6–15.1)
GFR SERPL CREATININE-BSD FRML MDRD: 59 ML/MIN/1.73SQ M
GLUCOSE P FAST SERPL-MCNC: 75 MG/DL (ref 65–99)
HCT VFR BLD AUTO: 44.8 % (ref 36.5–49.3)
HGB BLD-MCNC: 14.6 G/DL (ref 12–17)
IMM GRANULOCYTES # BLD AUTO: 0.01 THOUSAND/UL (ref 0–0.2)
IMM GRANULOCYTES NFR BLD AUTO: 0 % (ref 0–2)
LYMPHOCYTES # BLD AUTO: 1.31 THOUSANDS/ÂΜL (ref 0.6–4.47)
LYMPHOCYTES NFR BLD AUTO: 19 % (ref 14–44)
MCH RBC QN AUTO: 29.4 PG (ref 26.8–34.3)
MCHC RBC AUTO-ENTMCNC: 32.6 G/DL (ref 31.4–37.4)
MCV RBC AUTO: 90 FL (ref 82–98)
MONOCYTES # BLD AUTO: 0.62 THOUSAND/ÂΜL (ref 0.17–1.22)
MONOCYTES NFR BLD AUTO: 9 % (ref 4–12)
NEUTROPHILS # BLD AUTO: 4.59 THOUSANDS/ÂΜL (ref 1.85–7.62)
NEUTS SEG NFR BLD AUTO: 69 % (ref 43–75)
NRBC BLD AUTO-RTO: 0 /100 WBCS
PLATELET # BLD AUTO: 258 THOUSANDS/UL (ref 149–390)
PMV BLD AUTO: 10.3 FL (ref 8.9–12.7)
POTASSIUM SERPL-SCNC: 3.6 MMOL/L (ref 3.5–5.3)
PROT SERPL-MCNC: 6.6 G/DL (ref 6.4–8.4)
RBC # BLD AUTO: 4.96 MILLION/UL (ref 3.88–5.62)
SODIUM SERPL-SCNC: 138 MMOL/L (ref 135–147)
WBC # BLD AUTO: 6.76 THOUSAND/UL (ref 4.31–10.16)

## 2024-07-29 PROCEDURE — 80053 COMPREHEN METABOLIC PANEL: CPT

## 2024-07-29 PROCEDURE — 85025 COMPLETE CBC W/AUTO DIFF WBC: CPT

## 2024-07-29 PROCEDURE — 36415 COLL VENOUS BLD VENIPUNCTURE: CPT

## 2024-08-08 NOTE — DISCHARGE INSTR - AVS FIRST PAGE
MEDICATION INSTRUCTIONS/CHANGES:  Please hold your fish oil pills for 2 weeks in the post procedure setting.  Continue taking Pradaxa 75mg by mouth every twelve (12) hours  Decrease flecainide to 50 mg twice daily for 1 month, then stop  Continue Toprol XL 50 mg daily     RESTRICTIONS:   No heavy lifting (more than 5-10 pounds) or strenuous activity for one week.    No soaking in a bath tub/hot tub/swimming pool for one week or until groin heals. You may shower - please let soap and water run over the groins, no scrubbing, and pat the area dry. Please place band-aid on groins daily for up to five days, but you may remove sooner if no issues are noted.     If you notice ongoing bleeding, swelling, or firm lumps in groin near ablation incision, please contact Dr. Hyatt' office - [phone]. If you have any significant issues after hours or on the weekends, please call the on call cardiology number at [phone].

## 2024-08-09 ENCOUNTER — HOSPITAL ENCOUNTER (OUTPATIENT)
Dept: NON INVASIVE DIAGNOSTICS | Facility: HOSPITAL | Age: 69
Discharge: HOME | End: 2024-08-09
Attending: INTERNAL MEDICINE
Payer: MEDICARE

## 2024-08-09 ENCOUNTER — ANESTHESIA (OUTPATIENT)
Dept: NON INVASIVE DIAGNOSTICS | Facility: HOSPITAL | Age: 69
End: 2024-08-09
Payer: MEDICARE

## 2024-08-09 ENCOUNTER — ANESTHESIA EVENT (OUTPATIENT)
Dept: NON INVASIVE DIAGNOSTICS | Facility: HOSPITAL | Age: 69
End: 2024-08-09
Payer: MEDICARE

## 2024-08-09 ENCOUNTER — HOSPITAL ENCOUNTER (OUTPATIENT)
Facility: HOSPITAL | Age: 69
Setting detail: OUTPATIENT SURGERY
Discharge: HOME | End: 2024-08-10
Attending: INTERNAL MEDICINE | Admitting: INTERNAL MEDICINE
Payer: MEDICARE

## 2024-08-09 VITALS
HEIGHT: 73 IN | DIASTOLIC BLOOD PRESSURE: 80 MMHG | WEIGHT: 220 LBS | SYSTOLIC BLOOD PRESSURE: 130 MMHG | HEART RATE: 66 BPM | BODY MASS INDEX: 29.16 KG/M2

## 2024-08-09 DIAGNOSIS — I48.0 PAROXYSMAL ATRIAL FIBRILLATION (HCC): ICD-10-CM

## 2024-08-09 DIAGNOSIS — I48.19 PERSISTENT ATRIAL FIBRILLATION (HCC): ICD-10-CM

## 2024-08-09 DIAGNOSIS — Z98.890 S/P ABLATION OF ATRIAL FIBRILLATION: Primary | ICD-10-CM

## 2024-08-09 DIAGNOSIS — Z86.79 S/P ABLATION OF ATRIAL FIBRILLATION: Primary | ICD-10-CM

## 2024-08-09 LAB
ANION GAP SERPL CALCULATED.3IONS-SCNC: 4 MMOL/L (ref 4–13)
ATRIAL RATE: 288 BPM
ATRIAL RATE: 64 BPM
BASOPHILS # BLD AUTO: 0.02 THOUSANDS/ÂΜL (ref 0–0.1)
BASOPHILS NFR BLD AUTO: 0 % (ref 0–1)
BUN SERPL-MCNC: 14 MG/DL (ref 5–25)
CALCIUM SERPL-MCNC: 8.9 MG/DL (ref 8.4–10.2)
CHLORIDE SERPL-SCNC: 106 MMOL/L (ref 96–108)
CO2 SERPL-SCNC: 30 MMOL/L (ref 21–32)
CREAT SERPL-MCNC: 1.19 MG/DL (ref 0.6–1.3)
EOSINOPHIL # BLD AUTO: 0.08 THOUSAND/ÂΜL (ref 0–0.61)
EOSINOPHIL NFR BLD AUTO: 1 % (ref 0–6)
ERYTHROCYTE [DISTWIDTH] IN BLOOD BY AUTOMATED COUNT: 13.1 % (ref 11.6–15.1)
GFR SERPL CREATININE-BSD FRML MDRD: 61 ML/MIN/1.73SQ M
GLUCOSE P FAST SERPL-MCNC: 86 MG/DL (ref 65–99)
GLUCOSE SERPL-MCNC: 86 MG/DL (ref 65–140)
HCT VFR BLD AUTO: 47.6 % (ref 36.5–49.3)
HGB BLD-MCNC: 15.6 G/DL (ref 12–17)
IMM GRANULOCYTES # BLD AUTO: 0.02 THOUSAND/UL (ref 0–0.2)
IMM GRANULOCYTES NFR BLD AUTO: 0 % (ref 0–2)
INR PPP: 1.34 (ref 0.85–1.19)
KCT BLD-ACNC: 301 SEC (ref 89–137)
KCT BLD-ACNC: 341 SEC (ref 89–137)
KCT BLD-ACNC: 429 SEC (ref 89–137)
LYMPHOCYTES # BLD AUTO: 1.24 THOUSANDS/ÂΜL (ref 0.6–4.47)
LYMPHOCYTES NFR BLD AUTO: 20 % (ref 14–44)
MCH RBC QN AUTO: 29.3 PG (ref 26.8–34.3)
MCHC RBC AUTO-ENTMCNC: 32.8 G/DL (ref 31.4–37.4)
MCV RBC AUTO: 89 FL (ref 82–98)
MONOCYTES # BLD AUTO: 0.42 THOUSAND/ÂΜL (ref 0.17–1.22)
MONOCYTES NFR BLD AUTO: 7 % (ref 4–12)
NEUTROPHILS # BLD AUTO: 4.48 THOUSANDS/ÂΜL (ref 1.85–7.62)
NEUTS SEG NFR BLD AUTO: 72 % (ref 43–75)
NRBC BLD AUTO-RTO: 0 /100 WBCS
P AXIS: 87 DEGREES
PLATELET # BLD AUTO: 258 THOUSANDS/UL (ref 149–390)
PMV BLD AUTO: 9.4 FL (ref 8.9–12.7)
POTASSIUM SERPL-SCNC: 3.9 MMOL/L (ref 3.5–5.3)
PR INTERVAL: 214 MS
PROTHROMBIN TIME: 16.8 SECONDS (ref 12.3–15)
QRS AXIS: 50 DEGREES
QRS AXIS: 71 DEGREES
QRSD INTERVAL: 80 MS
QRSD INTERVAL: 90 MS
QT INTERVAL: 408 MS
QT INTERVAL: 442 MS
QTC INTERVAL: 455 MS
QTC INTERVAL: 479 MS
RBC # BLD AUTO: 5.33 MILLION/UL (ref 3.88–5.62)
SODIUM SERPL-SCNC: 140 MMOL/L (ref 135–147)
SPECIMEN SOURCE: ABNORMAL
T WAVE AXIS: 23 DEGREES
T WAVE AXIS: 69 DEGREES
VENTRICULAR RATE: 64 BPM
VENTRICULAR RATE: 83 BPM
WBC # BLD AUTO: 6.26 THOUSAND/UL (ref 4.31–10.16)

## 2024-08-09 PROCEDURE — C1894 INTRO/SHEATH, NON-LASER: HCPCS | Performed by: INTERNAL MEDICINE

## 2024-08-09 PROCEDURE — 93010 ELECTROCARDIOGRAM REPORT: CPT | Performed by: INTERNAL MEDICINE

## 2024-08-09 PROCEDURE — 93312 ECHO TRANSESOPHAGEAL: CPT | Performed by: INTERNAL MEDICINE

## 2024-08-09 PROCEDURE — 93325 DOPPLER ECHO COLOR FLOW MAPG: CPT | Performed by: INTERNAL MEDICINE

## 2024-08-09 PROCEDURE — C1766 INTRO/SHEATH,STRBLE,NON-PEEL: HCPCS | Performed by: INTERNAL MEDICINE

## 2024-08-09 PROCEDURE — C1731 CATH, EP, 20 OR MORE ELEC: HCPCS | Performed by: INTERNAL MEDICINE

## 2024-08-09 PROCEDURE — 93005 ELECTROCARDIOGRAM TRACING: CPT

## 2024-08-09 PROCEDURE — C1760 CLOSURE DEV, VASC: HCPCS | Performed by: INTERNAL MEDICINE

## 2024-08-09 PROCEDURE — 93656 COMPRE EP EVAL ABLTJ ATR FIB: CPT | Performed by: INTERNAL MEDICINE

## 2024-08-09 PROCEDURE — 85347 COAGULATION TIME ACTIVATED: CPT

## 2024-08-09 PROCEDURE — C1730 CATH, EP, 19 OR FEW ELECT: HCPCS | Performed by: INTERNAL MEDICINE

## 2024-08-09 PROCEDURE — 92960 CARDIOVERSION ELECTRIC EXT: CPT | Performed by: INTERNAL MEDICINE

## 2024-08-09 PROCEDURE — 93321 DOPPLER ECHO F-UP/LMTD STD: CPT | Performed by: INTERNAL MEDICINE

## 2024-08-09 PROCEDURE — C1769 GUIDE WIRE: HCPCS | Performed by: INTERNAL MEDICINE

## 2024-08-09 PROCEDURE — C1733 CATH, EP, OTHR THAN COOL-TIP: HCPCS | Performed by: INTERNAL MEDICINE

## 2024-08-09 PROCEDURE — 85025 COMPLETE CBC W/AUTO DIFF WBC: CPT | Performed by: PHYSICIAN ASSISTANT

## 2024-08-09 PROCEDURE — 76937 US GUIDE VASCULAR ACCESS: CPT | Performed by: INTERNAL MEDICINE

## 2024-08-09 PROCEDURE — 80048 BASIC METABOLIC PNL TOTAL CA: CPT | Performed by: PHYSICIAN ASSISTANT

## 2024-08-09 PROCEDURE — C1732 CATH, EP, DIAG/ABL, 3D/VECT: HCPCS | Performed by: INTERNAL MEDICINE

## 2024-08-09 PROCEDURE — 85610 PROTHROMBIN TIME: CPT | Performed by: PHYSICIAN ASSISTANT

## 2024-08-09 PROCEDURE — 93312 ECHO TRANSESOPHAGEAL: CPT

## 2024-08-09 PROCEDURE — NC001 PR NO CHARGE

## 2024-08-09 PROCEDURE — C1759 CATH, INTRA ECHOCARDIOGRAPHY: HCPCS | Performed by: INTERNAL MEDICINE

## 2024-08-09 DEVICE — DVC VASC CLSR VASCADE MVP 6-12FR: Type: IMPLANTABLE DEVICE | Site: GROIN | Status: FUNCTIONAL

## 2024-08-09 RX ORDER — AMLODIPINE BESYLATE 5 MG/1
5 TABLET ORAL DAILY
Status: DISCONTINUED | OUTPATIENT
Start: 2024-08-10 | End: 2024-08-10 | Stop reason: HOSPADM

## 2024-08-09 RX ORDER — ONDANSETRON 2 MG/ML
INJECTION INTRAMUSCULAR; INTRAVENOUS AS NEEDED
Status: DISCONTINUED | OUTPATIENT
Start: 2024-08-09 | End: 2024-08-09

## 2024-08-09 RX ORDER — SODIUM CHLORIDE 9 MG/ML
20 INJECTION, SOLUTION INTRAVENOUS ONCE
Status: COMPLETED | OUTPATIENT
Start: 2024-08-09 | End: 2024-08-09

## 2024-08-09 RX ORDER — HEPARIN SODIUM 10000 [USP'U]/100ML
INJECTION, SOLUTION INTRAVENOUS
Status: DISCONTINUED | OUTPATIENT
Start: 2024-08-09 | End: 2024-08-09 | Stop reason: HOSPADM

## 2024-08-09 RX ORDER — FLECAINIDE ACETATE 100 MG/1
100 TABLET ORAL 2 TIMES DAILY
Status: DISCONTINUED | OUTPATIENT
Start: 2024-08-09 | End: 2024-08-10 | Stop reason: HOSPADM

## 2024-08-09 RX ORDER — PHENYLEPHRINE HCL IN 0.9% NACL 1 MG/10 ML
SYRINGE (ML) INTRAVENOUS AS NEEDED
Status: DISCONTINUED | OUTPATIENT
Start: 2024-08-09 | End: 2024-08-09

## 2024-08-09 RX ORDER — DABIGATRAN ETEXILATE 75 MG/1
75 CAPSULE ORAL EVERY 12 HOURS SCHEDULED
Status: DISCONTINUED | OUTPATIENT
Start: 2024-08-09 | End: 2024-08-10 | Stop reason: HOSPADM

## 2024-08-09 RX ORDER — FENTANYL CITRATE 50 UG/ML
INJECTION, SOLUTION INTRAMUSCULAR; INTRAVENOUS AS NEEDED
Status: DISCONTINUED | OUTPATIENT
Start: 2024-08-09 | End: 2024-08-09

## 2024-08-09 RX ORDER — ONDANSETRON 2 MG/ML
4 INJECTION INTRAMUSCULAR; INTRAVENOUS EVERY 8 HOURS PRN
Status: DISCONTINUED | OUTPATIENT
Start: 2024-08-09 | End: 2024-08-10 | Stop reason: HOSPADM

## 2024-08-09 RX ORDER — GLYCOPYRROLATE 0.2 MG/ML
INJECTION INTRAMUSCULAR; INTRAVENOUS AS NEEDED
Status: DISCONTINUED | OUTPATIENT
Start: 2024-08-09 | End: 2024-08-09

## 2024-08-09 RX ORDER — CEFAZOLIN SODIUM 1 G/3ML
INJECTION, POWDER, FOR SOLUTION INTRAMUSCULAR; INTRAVENOUS AS NEEDED
Status: DISCONTINUED | OUTPATIENT
Start: 2024-08-09 | End: 2024-08-09

## 2024-08-09 RX ORDER — SODIUM CHLORIDE, SODIUM LACTATE, POTASSIUM CHLORIDE, CALCIUM CHLORIDE 600; 310; 30; 20 MG/100ML; MG/100ML; MG/100ML; MG/100ML
INJECTION, SOLUTION INTRAVENOUS CONTINUOUS PRN
Status: DISCONTINUED | OUTPATIENT
Start: 2024-08-09 | End: 2024-08-09

## 2024-08-09 RX ORDER — ATORVASTATIN CALCIUM 10 MG/1
10 TABLET, FILM COATED ORAL DAILY
Status: DISCONTINUED | OUTPATIENT
Start: 2024-08-09 | End: 2024-08-10 | Stop reason: HOSPADM

## 2024-08-09 RX ORDER — METOPROLOL SUCCINATE 50 MG/1
25 TABLET, EXTENDED RELEASE ORAL DAILY
Start: 2024-08-09 | End: 2024-08-10

## 2024-08-09 RX ORDER — HYDROMORPHONE HCL/PF 1 MG/ML
0.5 SYRINGE (ML) INJECTION
Status: DISCONTINUED | OUTPATIENT
Start: 2024-08-09 | End: 2024-08-09 | Stop reason: HOSPADM

## 2024-08-09 RX ORDER — PROPOFOL 10 MG/ML
INJECTION, EMULSION INTRAVENOUS AS NEEDED
Status: DISCONTINUED | OUTPATIENT
Start: 2024-08-09 | End: 2024-08-09

## 2024-08-09 RX ORDER — FENTANYL CITRATE/PF 50 MCG/ML
25 SYRINGE (ML) INJECTION
Status: DISCONTINUED | OUTPATIENT
Start: 2024-08-09 | End: 2024-08-09 | Stop reason: HOSPADM

## 2024-08-09 RX ORDER — ROCURONIUM BROMIDE 10 MG/ML
INJECTION, SOLUTION INTRAVENOUS AS NEEDED
Status: DISCONTINUED | OUTPATIENT
Start: 2024-08-09 | End: 2024-08-09

## 2024-08-09 RX ORDER — HEPARIN SODIUM 1000 [USP'U]/ML
INJECTION, SOLUTION INTRAVENOUS; SUBCUTANEOUS CODE/TRAUMA/SEDATION MEDICATION
Status: DISCONTINUED | OUTPATIENT
Start: 2024-08-09 | End: 2024-08-09 | Stop reason: HOSPADM

## 2024-08-09 RX ORDER — FUROSEMIDE 20 MG
20 TABLET ORAL DAILY PRN
Status: DISCONTINUED | OUTPATIENT
Start: 2024-08-09 | End: 2024-08-10 | Stop reason: HOSPADM

## 2024-08-09 RX ORDER — METOPROLOL SUCCINATE 25 MG/1
25 TABLET, EXTENDED RELEASE ORAL DAILY
Status: DISCONTINUED | OUTPATIENT
Start: 2024-08-10 | End: 2024-08-10 | Stop reason: HOSPADM

## 2024-08-09 RX ORDER — LIDOCAINE HYDROCHLORIDE 10 MG/ML
INJECTION, SOLUTION EPIDURAL; INFILTRATION; INTRACAUDAL; PERINEURAL AS NEEDED
Status: DISCONTINUED | OUTPATIENT
Start: 2024-08-09 | End: 2024-08-09

## 2024-08-09 RX ORDER — ACETAMINOPHEN 325 MG/1
650 TABLET ORAL EVERY 4 HOURS PRN
Status: DISCONTINUED | OUTPATIENT
Start: 2024-08-09 | End: 2024-08-10 | Stop reason: HOSPADM

## 2024-08-09 RX ORDER — ONDANSETRON 2 MG/ML
4 INJECTION INTRAMUSCULAR; INTRAVENOUS ONCE AS NEEDED
Status: DISCONTINUED | OUTPATIENT
Start: 2024-08-09 | End: 2024-08-09 | Stop reason: HOSPADM

## 2024-08-09 RX ORDER — PROTAMINE SULFATE 10 MG/ML
INJECTION, SOLUTION INTRAVENOUS AS NEEDED
Status: DISCONTINUED | OUTPATIENT
Start: 2024-08-09 | End: 2024-08-09

## 2024-08-09 RX ADMIN — FLECAINIDE ACETATE 100 MG: 100 TABLET ORAL at 19:55

## 2024-08-09 RX ADMIN — LIDOCAINE HYDROCHLORIDE 50 MG: 10 INJECTION, SOLUTION EPIDURAL; INFILTRATION; INTRACAUDAL at 13:07

## 2024-08-09 RX ADMIN — SODIUM CHLORIDE, SODIUM LACTATE, POTASSIUM CHLORIDE, AND CALCIUM CHLORIDE: .6; .31; .03; .02 INJECTION, SOLUTION INTRAVENOUS at 12:57

## 2024-08-09 RX ADMIN — HYDROMORPHONE HYDROCHLORIDE 0.5 MG: 1 INJECTION, SOLUTION INTRAMUSCULAR; INTRAVENOUS; SUBCUTANEOUS at 16:41

## 2024-08-09 RX ADMIN — PHENYLEPHRINE HYDROCHLORIDE 50 MCG/MIN: 10 INJECTION INTRAVENOUS at 13:52

## 2024-08-09 RX ADMIN — FENTANYL CITRATE 25 MCG: 50 INJECTION INTRAMUSCULAR; INTRAVENOUS at 15:52

## 2024-08-09 RX ADMIN — PROPOFOL 150 MG: 10 INJECTION, EMULSION INTRAVENOUS at 13:09

## 2024-08-09 RX ADMIN — FENTANYL CITRATE 50 MCG: 50 INJECTION INTRAMUSCULAR; INTRAVENOUS at 14:21

## 2024-08-09 RX ADMIN — DABIGATRAN ETEXILATE MESYLATE 75 MG: 75 CAPSULE ORAL at 17:33

## 2024-08-09 RX ADMIN — ATORVASTATIN CALCIUM 10 MG: 10 TABLET, FILM COATED ORAL at 17:33

## 2024-08-09 RX ADMIN — PROTAMINE SULFATE 40 MG: 10 INJECTION, SOLUTION INTRAVENOUS at 14:44

## 2024-08-09 RX ADMIN — ONDANSETRON 4 MG: 2 INJECTION INTRAMUSCULAR; INTRAVENOUS at 14:44

## 2024-08-09 RX ADMIN — ROCURONIUM 10 MG: 50 INJECTION, SOLUTION INTRAVENOUS at 13:53

## 2024-08-09 RX ADMIN — ACETAMINOPHEN 650 MG: 325 TABLET, FILM COATED ORAL at 17:45

## 2024-08-09 RX ADMIN — ROCURONIUM 70 MG: 50 INJECTION, SOLUTION INTRAVENOUS at 13:09

## 2024-08-09 RX ADMIN — GLYCOPYRROLATE 0.2 MG: 0.2 INJECTION, SOLUTION INTRAMUSCULAR; INTRAVENOUS at 13:45

## 2024-08-09 RX ADMIN — FENTANYL CITRATE 25 MCG: 50 INJECTION INTRAMUSCULAR; INTRAVENOUS at 16:28

## 2024-08-09 RX ADMIN — SODIUM CHLORIDE 20 ML/HR: 0.9 INJECTION, SOLUTION INTRAVENOUS at 10:09

## 2024-08-09 RX ADMIN — Medication 100 MCG: at 13:52

## 2024-08-09 RX ADMIN — CEFAZOLIN 2000 MG: 1 INJECTION, POWDER, FOR SOLUTION INTRAMUSCULAR; INTRAVENOUS at 13:20

## 2024-08-09 RX ADMIN — SUGAMMADEX 200 MG: 100 INJECTION, SOLUTION INTRAVENOUS at 14:49

## 2024-08-09 NOTE — PERIOPERATIVE NURSING NOTE
Pt. instructed to lay flat, not to sit up, or bend legs. He verbalized understanding of instructions.

## 2024-08-09 NOTE — H&P
[facility]  H&P: Electrophysiology  Date of Service: 8/9/2024  Hospital Day: 0  Name: Ervin Lawson I  MRN: [id number]  Unit/Bed#: [facility] CATH LAB ROOM      Assessment & Plan   Paroxysmal Atrial Fibrillation  Dx 12/2019  Est. w/ EP 07/2022 and was started on Flecainide and underwent DDCV  Increased symptomatic afib noted by patient and had repeat EP eval 07/2024 w/ ablation recommended   [id number] w/ hx of SDH s/p watchman placement 03/2021  He has been tolerating pradaxa pre-op without noted adverse bleeding and affirms no missed doses   Pre-procedure therapy with:  Rate control - Metoprolol succinate 50mg daily  Rhythm control - flecainide 100mg BID   Sinus bradycardia w/ 1st degree AV block  Documented on EKG w/ ventricular rate 56bpm during 07/23/2024 outpatient EP evaluation  EKG today shows rate controlled afib   HTN  HLD  Hx of SDH   Developed spontaneously on Eliquis in the past  Has tolerated pradaxa in the past for watchman device       Discussion/Plan:    Patient presents for elective afib ablation     He has been tolerating pradaxa pre-op without noted adverse bleeding and affirms no missed doses     He affirms he has remained NPO and has not eaten since around 2100 last night    Plan for pre-operative ALONZO for watchman assessment and thrombus r/o    Ablation procedure, risks vs benefits, and post-op restrictions were discussed in detail with the patient again today. He remains agreeable to undergo said procedure.    Plan for discharge later today provider procedure is without complication, although explained to patient that he may require overnight stay tonight depending on how his procedure goes.     As for medications plan to continue pradaxa for at least two months post-procedure, will continue flecainide 100mg BID, and will decrease metoprolol succinate to 25mg daily.     History of Present Illness   HPI: Ervin Lawson is a 69 y.o. year old male with a PMH of  afib s/p watchman, FELISA, hx of SDH, HTN, and HLD      EKG:     Atrial fibrillation w/ ventricular rate 83bpm       Historical Information   Past Medical History:  No date: Arthritis  No date: Atrial fibrillation (HCC)  08/2020: Cancer (HCC)      Comment:  Kidney cancer  No date: Cancer (HCC)      Comment:  melanoma  No date: Chronic kidney disease      Comment:  left kidney cancer  1/10/2021: Clotting disorder (HCC)  No date: CPAP (continuous positive airway pressure) dependence  No date: Headache(784.0)      Comment:  Not current and not often  No date: Hyperlipidemia  No date: Hypertension  No date: Irregular heart beat      Comment:  Afib  No date: Pneumonia  No date: Skin cancer  No date: Sleep apnea  No date: Stroke (HCC)  No date: Supraventricular tachycardia  No date: Wears glasses  No date: Wears partial dentures      Comment:  upper partial Past Surgical History:  No date: APPENDECTOMY  No date: CARDIOVERSION  No date: COLONOSCOPY      Comment:  fiberoptic, also 2009, both negative, resolved 2004 08/2019: COLONOSCOPY  No date: EGD  No date: HERNIA REPAIR      Comment:  umbilical and bilateral inguinal  No date: KNEE ARTHROSCOPY; Left  10/7/2020: RI LAPAROSCOPY SURG PARTIAL NEPHRECTOMY; Left      Comment:  Procedure: ROBOTIC PARTIAL NEPHRECTOMY;  Surgeon: Vickey Jane MD;  Location: [facility];  Service: Urology  3/25/2021: RI PERQ CLSR TCAT L ATR APNDGE W/ENDOCARDIAL IMPLNT; N/A      Comment:  Procedure: LEFT ATRIAL APPENDAGE OCCLUSION;  Surgeon:                Farrukh Hyatt MD;  Location: [facility];  Service:                Cardiology  No date: SKIN GRAFT      Comment:  left ear, skin cancer  No date: TONSILLECTOMY   Current Outpatient Medications   Medication Instructions    amLODIPine (NORVASC) 5 mg, Oral, Daily    aspirin (ECOTRIN LOW STRENGTH) 81 mg, Oral, Daily    atorvastatin (LIPITOR) 10 mg, Oral, Daily    Coenzyme Q10 (CO Q-10 PO) 1 caplet, Oral, Every morning    dabigatran  etexilate (PRADAXA) 75 mg, Oral, Every 12 hours scheduled    flecainide (TAMBOCOR) 100 mg, Oral, 2 times daily    furosemide (LASIX) 20 mg, Oral, Daily PRN    metoprolol succinate (TOPROL-XL) 50 mg, Oral, Daily    Multiple Vitamins-Minerals (MULTIVITAMIN ADULT PO) 1 tablet, Oral, Every morning    No Known Allergies   Social History     Tobacco Use    Smoking status: Never    Smokeless tobacco: Never   Vaping Use    Vaping status: Never Used   Substance Use Topics    Alcohol use: Yes     Comment: Social only    Drug use: No    Family History   Problem Relation Age of Onset    Cancer Father         stomach    Hypertension Father     Stroke Father         syndrome    Cancer Family     Heart disease Family     Hypertension Family     Stroke Family         syndrome    Throat cancer Mother     Diabetes Mother     Hypertension Mother     Cancer Mother     Asthma Sister             Objective                            Vitals I/O    Most Recent Min/Max in 24hrs   Temp   No data recorded   Pulse   No data recorded   Resp   No data recorded   BP   No data recorded   O2 Sat   No data recorded    No intake or output data in the 24 hours ending 08/09/24 1059                     Physical Exam   Physical Exam  Constitutional:       General: He is not in acute distress.     Appearance: Normal appearance.   HENT:      Head: Normocephalic and atraumatic.   Cardiovascular:      Rate and Rhythm: Normal rate and regular rhythm.      Pulses: Normal pulses.      Heart sounds: Normal heart sounds.   Pulmonary:      Effort: Pulmonary effort is normal.      Breath sounds: Normal breath sounds.   Abdominal:      General: Abdomen is flat. Bowel sounds are normal.      Palpations: Abdomen is soft.   Musculoskeletal:      Right lower leg: No edema.      Left lower leg: No edema.   Skin:     General: Skin is warm and dry.      Capillary Refill: Capillary refill takes less than 2 seconds.   Neurological:      Mental Status: He is alert and oriented  to person, place, and time.            Labs:    CBC  Recent Labs     08/09/24  1008   WBC 6.26   HGB 15.6   HCT 47.6        BMP  Recent Labs     08/09/24  1008   SODIUM 140   K 3.9      CO2 30   AGAP 4   BUN 14   CREATININE 1.19   CALCIUM 8.9       Coags  Recent Labs     08/09/24  1008   INR 1.34*        Additional Electrolytes  No recent results       Blood Gas  No recent results  No recent results LFTs  No recent results    Infectious  No recent results  Glucose  Recent Labs     08/09/24  1008   GLUC 86           SIGNATURE: Zachariah Singleton PA-C

## 2024-08-09 NOTE — ANESTHESIA PREPROCEDURE EVALUATION
"\"69-year-old man.  History of atrial fibrillation with a prior CVA.  Was previously maintaining sinus rhythm with Toprol XL, but in 12/2019, was found to be in afib, more persistent. We did a Holter, which showed rate controlled afib, but he was feeling palpitations. After discussing options, he was referred to EP. Initially started on Flecainide, and then underwent cardioversion in 7/2020.  Was having fatigue, so was planned for ablation, but the preoperative CT scan showed a renal mass.  Had partial nephrectomy.  Unfortunately, then had a subdural hematoma.  He was back on medical therapy for afib, then underwent Watchman Device implantation.     Since then, he has been doing ok with afib control, so did not have the ablation done.     Interval History:     I saw him last year, and he was describing more episodes of atrial fibrillation. I did a Zio patch, and in fact this was the case, but the episodes were pretty short. He opted to continue with medical management.     He returns for his regular follow-up. He is describing now much more frequent episodes of A-fib. They are coming every week instead of every month. Sometimes they can last up to a day. They are bothersome to him and he is symptomatic. On the Zio patch, his rates in atrial fibrillation were not elevated. The maximum rate was actually just in the 90s.     He maintains sinus bradycardia for the most part with the flecainide.\"    Procedure:  Cardiac eps/afib ablation PFA (Chest)    Relevant Problems   CARDIO   (+) Essential hypertension   (+) Mixed hyperlipidemia   (+) Paroxysmal atrial fibrillation (HCC)      /RENAL   (+) Benign prostatic hyperplasia with lower urinary tract symptoms   (+) CKD (chronic kidney disease) stage 2, GFR 60-89 ml/min   (+) Renal cell carcinoma of left kidney (HCC)   (+) Stage 3a chronic kidney disease (HCC)      HEMATOLOGY   (+) Coagulopathy (HCC)      NEURO/PSYCH   (+) SDH (subdural hematoma) (HCC)   (+) Traumatic " subdural hemorrhage with loss of consciousness of unspecified duration, initial encounter ([facility])      PULMONARY   (+) FELISA (obstructive sleep apnea)     Left Ventricle: Left ventricular cavity size is normal. Wall thickness is normal. The left ventricular ejection fraction is 55%. Systolic function is normal. Wall motion is normal. Diastolic function is normal.    Right Ventricle: Right ventricular cavity size is normal. Systolic function is normal.    Left Atrium: The atrium is mildly dilated.    Mitral Valve: There is mild regurgitation.    Tricuspid Valve: There is trace regurgitation.    Aorta: The aortic root is mildly dilated. The ascending aorta is mildly dilated.    Prior TTE study available for comparison. Prior study date: 10/8/2019. No significant changes noted compared to the prior study.     Physical Exam    Airway    Mallampati score: II  TM Distance: >3 FB  Neck ROM: full     Dental   No notable dental hx     Cardiovascular  Rhythm: irregular, No weak pulses    Pulmonary   No stridor    Other Findings        Anesthesia Plan  ASA Score- 3     Anesthesia Type- general with ASA Monitors.         Additional Monitors:     Airway Plan: ETT.           Plan Factors-    Chart reviewed. EKG reviewed.  Existing labs reviewed. Patient summary reviewed.                  Induction- intravenous.    Postoperative Plan- . Planned trial extubation    Perioperative Resuscitation Plan - Level 1 - Full Code.       Informed Consent- Anesthetic plan and risks discussed with patient.  I personally reviewed this patient with the CRNA. Discussed and agreed on the Anesthesia Plan with the CRNA..

## 2024-08-09 NOTE — ANESTHESIA POSTPROCEDURE EVALUATION
Post-Op Assessment Note    CV Status:  Stable  Pain Score: 0    Pain management: adequate       Mental Status:  Alert and awake   Hydration Status:  Euvolemic   PONV Controlled:  Controlled   Airway Patency:  Patent  Two or more mitigation strategies used for obstructive sleep apnea   Post Op Vitals Reviewed: Yes    No anethesia notable event occurred.    Staff: CRNA               BP   132/65   Temp   97.9 F    Pulse  63   Resp   14   SpO2   95% 6 L FM

## 2024-08-09 NOTE — DISCHARGE SUMMARY
Discharge Summary - Ervin Lawson 69 y.o. male MRN: [id number]    Unit/Bed#: PPHP-322-01 Encounter: [id number]      Admission Date: 8/9/2024   Discharge Date: 8/10/2024    Discharge Diagnosis: Atrial fibrillation    Procedures Performed: Afib ablation ([facility]I)  Orders Placed This Encounter   Procedures    Cardiac ep lab eps/ablations     Consultants: None    HPI: Ervin Lawson is a 69 y.o. year old male with PMH of afib s/p watchman, FELISA, hx of SDH, HTN, and HLD.   He was seen by Dr. Hyatt as an outpatient, and Ablation was recommended. He presented this hospital admission to undergo this procedure.     Hospital Course: Ervin Lawson presented at his baseline state of health. After the procedure was explained in detail and consent was obtained, he underwent the above procedures without complications. Please see operative notes by Dr. Hyatt for full details. He tolerated the procedure well. He was then monitored overnight for further observation.    There were no acute issues or events overnight. The following morning he denied all cardiac complaints, including chest pain/pressure, dyspnea, palpitations, dizziness, lightheadedness, or syncope. His vital signs were reviewed and labs are stable. Telemetry showed sinus rhythm HR 60-70s. His groins were soft without significant hematoma or recurrent bleeding.    He was given routine post ablation discharge instructions and restrictions, and these were explained in detail. He was given a follow up appointment with Dr. Hyatt, and he was instructed to follow up with his primary cardiologist as previously instructed.    He is stable for discharge at this time with all questions answered. He was discussed in detail with Dr. Hyatt who is in agreement with this discharge summary.     Medication Instructions:  Continue Pradaxa twice daily for at least 2 months  Decrease flecainide to 50 mg bid x 1 month, then stop  Continue Toprol XL 50 mg daily, can likely decrease  "to 25 mg daily in follow up    Physical exam:  /71 (BP Location: Right arm)   Pulse 65   Temp 98.1 °F (36.7 °C) (Oral)   Resp 18   Ht 6' 1\" (1.854 m)   Wt 99.8 kg (220 lb)   SpO2 95%   BMI 29.03 kg/m²   GEN: NAD, alert and oriented x 3, well appearing  SKIN: dry without significant lesions or rashes; groin access sites without active bleeding, drainage, or excessive swelling/ecchymosis  HEENT: NCAT  NECK: No JVD appreciated  CARDIOVASCULAR: RRR, normal S1, S2 without murmurs, rubs, or gallops appreciated  LUNGS: Clear to auscultation bilaterally without wheezes, rhonchi, or rales  ABDOMEN: Soft, nontender, nondistended. Groin sites soft bilaterally, no bleeding, bruits or hematoma.   EXTREMITIES/VASCULAR: perfused without clubbing, cyanosis, or LE edema b/l  PSYCH: Normal mood and affect  NEURO: CN ll-Xll grossly intact    Discharge Medications:  See after visit summary for reconciled discharge medications provided to patient and family.      Medications Prior to Admission:     aspirin (ECOTRIN LOW STRENGTH) 81 mg EC tablet    dabigatran etexilate (PRADAXA) 75 mg capsule    [DISCONTINUED] metoprolol succinate (TOPROL-XL) 50 mg 24 hr tablet    amLODIPine (NORVASC) 5 mg tablet    atorvastatin (LIPITOR) 10 mg tablet    Coenzyme Q10 (CO Q-10 PO)    furosemide (LASIX) 20 mg tablet    Multiple Vitamins-Minerals (MULTIVITAMIN ADULT PO)    [DISCONTINUED] flecainide (TAMBOCOR) 100 mg tablet      Pertininet Labs/diagnostics:  CBC with diff:   Results from last 7 days   Lab Units 08/10/24  0444 08/09/24  1008   WBC Thousand/uL 12.92* 6.26   HEMOGLOBIN g/dL 13.3 15.6   HEMATOCRIT % 41.3 47.6   MCV fL 91 89   PLATELETS Thousands/uL 223 258   RBC Million/uL 4.52 5.33   MCH pg 29.4 29.3   MCHC g/dL 32.2 32.8   RDW % 13.8 13.1   MPV fL 10.0 9.4   NRBC AUTO /100 WBCs 0 0       BMP:  Results from last 7 days   Lab Units 08/10/24  0444 08/09/24  1008   POTASSIUM mmol/L 4.1 3.9   CHLORIDE mmol/L 105 106   CO2 mmol/L 27 30 "   BUN mg/dL 21 14   CREATININE mg/dL 1.29 1.19   CALCIUM mg/dL 7.9* 8.9       Magnesium:       Coags:   Results from last 7 days   Lab Units 08/09/24  1008   INR  1.34*         Complications: none    Condition at Discharge: good     Discharge instructions/Information to patient and family:   See after visit summary for information provided to patient and family.      Provisions for Follow-Up Care:  See after visit summary for information related to follow-up care and any pertinent home health orders.      Disposition: Home    Planned Readmission: No    Discharge Statement   I spent 45 minutes minutes discharging the patient. This time was spent on the day of discharge. I had direct contact with the patient on the day of discharge. Additional documentation is required if more than 30 minutes were spent on discharge. Evaluating the incision, discussing discharge instructions and restrictions, arranging follow up appointments, discussing medications

## 2024-08-10 VITALS
WEIGHT: 220 LBS | SYSTOLIC BLOOD PRESSURE: 138 MMHG | BODY MASS INDEX: 29.16 KG/M2 | HEIGHT: 73 IN | OXYGEN SATURATION: 95 % | DIASTOLIC BLOOD PRESSURE: 71 MMHG | TEMPERATURE: 98.1 F | HEART RATE: 65 BPM | RESPIRATION RATE: 18 BRPM

## 2024-08-10 LAB
ANION GAP SERPL CALCULATED.3IONS-SCNC: 6 MMOL/L (ref 4–13)
BASOPHILS # BLD AUTO: 0.02 THOUSANDS/ÂΜL (ref 0–0.1)
BASOPHILS NFR BLD AUTO: 0 % (ref 0–1)
BUN SERPL-MCNC: 21 MG/DL (ref 5–25)
CALCIUM SERPL-MCNC: 7.9 MG/DL (ref 8.4–10.2)
CHLORIDE SERPL-SCNC: 105 MMOL/L (ref 96–108)
CO2 SERPL-SCNC: 27 MMOL/L (ref 21–32)
CREAT SERPL-MCNC: 1.29 MG/DL (ref 0.6–1.3)
EOSINOPHIL # BLD AUTO: 0.02 THOUSAND/ÂΜL (ref 0–0.61)
EOSINOPHIL NFR BLD AUTO: 0 % (ref 0–6)
ERYTHROCYTE [DISTWIDTH] IN BLOOD BY AUTOMATED COUNT: 13.8 % (ref 11.6–15.1)
GFR SERPL CREATININE-BSD FRML MDRD: 55 ML/MIN/1.73SQ M
GLUCOSE P FAST SERPL-MCNC: 100 MG/DL (ref 65–99)
GLUCOSE SERPL-MCNC: 100 MG/DL (ref 65–140)
HCT VFR BLD AUTO: 41.3 % (ref 36.5–49.3)
HGB BLD-MCNC: 13.3 G/DL (ref 12–17)
IMM GRANULOCYTES # BLD AUTO: 0.07 THOUSAND/UL (ref 0–0.2)
IMM GRANULOCYTES NFR BLD AUTO: 1 % (ref 0–2)
LYMPHOCYTES # BLD AUTO: 1.06 THOUSANDS/ÂΜL (ref 0.6–4.47)
LYMPHOCYTES NFR BLD AUTO: 8 % (ref 14–44)
MCH RBC QN AUTO: 29.4 PG (ref 26.8–34.3)
MCHC RBC AUTO-ENTMCNC: 32.2 G/DL (ref 31.4–37.4)
MCV RBC AUTO: 91 FL (ref 82–98)
MONOCYTES # BLD AUTO: 0.75 THOUSAND/ÂΜL (ref 0.17–1.22)
MONOCYTES NFR BLD AUTO: 6 % (ref 4–12)
NEUTROPHILS # BLD AUTO: 11 THOUSANDS/ÂΜL (ref 1.85–7.62)
NEUTS SEG NFR BLD AUTO: 85 % (ref 43–75)
NRBC BLD AUTO-RTO: 0 /100 WBCS
PLATELET # BLD AUTO: 223 THOUSANDS/UL (ref 149–390)
PMV BLD AUTO: 10 FL (ref 8.9–12.7)
POTASSIUM SERPL-SCNC: 4.1 MMOL/L (ref 3.5–5.3)
RBC # BLD AUTO: 4.52 MILLION/UL (ref 3.88–5.62)
SODIUM SERPL-SCNC: 138 MMOL/L (ref 135–147)
WBC # BLD AUTO: 12.92 THOUSAND/UL (ref 4.31–10.16)

## 2024-08-10 PROCEDURE — 80048 BASIC METABOLIC PNL TOTAL CA: CPT

## 2024-08-10 PROCEDURE — 85025 COMPLETE CBC W/AUTO DIFF WBC: CPT

## 2024-08-10 PROCEDURE — NC001 PR NO CHARGE: Performed by: PHYSICIAN ASSISTANT

## 2024-08-10 RX ORDER — METOPROLOL SUCCINATE 50 MG/1
50 TABLET, EXTENDED RELEASE ORAL DAILY
Start: 2024-08-10

## 2024-08-10 RX ORDER — FLECAINIDE ACETATE 50 MG/1
50 TABLET ORAL 2 TIMES DAILY
Qty: 60 TABLET | Refills: 0 | Status: SHIPPED | OUTPATIENT
Start: 2024-08-10

## 2024-08-10 RX ADMIN — METOPROLOL SUCCINATE 25 MG: 25 TABLET, EXTENDED RELEASE ORAL at 08:08

## 2024-08-10 RX ADMIN — ASPIRIN 81 MG: 81 TABLET, COATED ORAL at 08:08

## 2024-08-10 RX ADMIN — AMLODIPINE BESYLATE 5 MG: 5 TABLET ORAL at 08:08

## 2024-08-10 RX ADMIN — FLECAINIDE ACETATE 100 MG: 100 TABLET ORAL at 08:08

## 2024-08-10 RX ADMIN — DABIGATRAN ETEXILATE MESYLATE 75 MG: 75 CAPSULE ORAL at 08:08

## 2024-08-10 NOTE — PLAN OF CARE
Problem: PAIN - ADULT  Goal: Verbalizes/displays adequate comfort level or baseline comfort level  Description: Interventions:  - Encourage patient to monitor pain and request assistance  - Assess pain using appropriate pain scale  - Administer analgesics based on type and severity of pain and evaluate response  - Implement non-pharmacological measures as appropriate and evaluate response  - Consider cultural and social influences on pain and pain management  - Notify physician/advanced practitioner if interventions unsuccessful or patient reports new pain  Outcome: Progressing     Problem: INFECTION - ADULT  Goal: Absence or prevention of progression during hospitalization  Description: INTERVENTIONS:  - Assess and monitor for signs and symptoms of infection  - Monitor lab/diagnostic results  - Monitor all insertion sites, i.e. indwelling lines, tubes, and drains  - Monitor endotracheal if appropriate and nasal secretions for changes in amount and color  - [place] appropriate cooling/warming therapies per order  - Administer medications as ordered  - Instruct and encourage patient and family to use good hand hygiene technique  - Identify and instruct in appropriate isolation precautions for identified infection/condition  Outcome: Progressing  Goal: Absence of fever/infection during neutropenic period  Description: INTERVENTIONS:  - Monitor WBC    Outcome: Progressing     Problem: SAFETY ADULT  Goal: Patient will remain free of falls  Description: INTERVENTIONS:  - Educate patient/family on patient safety including physical limitations  - Instruct patient to call for assistance with activity   - Consult OT/PT to assist with strengthening/mobility   - Keep Call bell within reach  - Keep bed low and locked with side rails adjusted as appropriate  - Keep care items and personal belongings within reach  - Initiate and maintain comfort rounds  - Make Fall Risk Sign visible to staff  - Offer Toileting every 2 Hours,  in advance of need  - Apply yellow socks and bracelet for high fall risk patients  - Consider moving patient to room near nurses station  Outcome: Progressing  Goal: Maintain or return to baseline ADL function  Description: INTERVENTIONS:  -  Assess patient's ability to carry out ADLs; assess patient's baseline for ADL function and identify physical deficits which impact ability to perform ADLs (bathing, care of mouth/teeth, toileting, grooming, dressing, etc.)  - Assess/evaluate cause of self-care deficits   - Assess range of motion  - Assess patient's mobility; develop plan if impaired  - Assess patient's need for assistive devices and provide as appropriate  - Encourage maximum independence but intervene and supervise when necessary  - Involve family in performance of ADLs  - Assess for home care needs following discharge   - Consider OT consult to assist with ADL evaluation and planning for discharge  - Provide patient education as appropriate  Outcome: Progressing     Problem: DISCHARGE PLANNING  Goal: Discharge to home or other facility with appropriate resources  Description: INTERVENTIONS:  - Identify barriers to discharge w/patient and caregiver  - Arrange for needed discharge resources and transportation as appropriate  - Identify discharge learning needs (meds, wound care, etc.)  - Arrange for interpretive services to assist at discharge as needed  - Refer to Case Management Department for coordinating discharge planning if the patient needs post-hospital services based on physician/advanced practitioner order or complex needs related to functional status, cognitive ability, or social support system  Outcome: Progressing     Problem: Knowledge Deficit  Goal: Patient/family/caregiver demonstrates understanding of disease process, treatment plan, medications, and discharge instructions  Description: Complete learning assessment and assess knowledge base.  Interventions:  - Provide teaching at level of  understanding  - Provide teaching via preferred learning methods  Outcome: Progressing     Problem: NEUROSENSORY - ADULT  Goal: Achieves stable or improved neurological status  Description: INTERVENTIONS  - Monitor and report changes in neurological status  - Monitor vital signs such as temperature, blood pressure, glucose, and any other labs ordered   - Initiate measures to prevent increased intracranial pressure  - Monitor for seizure activity and implement precautions if appropriate      Outcome: Progressing  Goal: Remains free of injury related to seizures activity  Description: INTERVENTIONS  - Maintain airway, patient safety  and administer oxygen as ordered  - Monitor patient for seizure activity, document and report duration and description of seizure to physician/advanced practitioner  - If seizure occurs,  ensure patient safety during seizure  - Reorient patient post seizure  - Seizure pads on all 4 side rails  - Instruct patient/family to notify RN of any seizure activity including if an aura is experienced  - Instruct patient/family to call for assistance with activity based on nursing assessment  - Administer anti-seizure medications if ordered    Outcome: Progressing  Goal: Achieves maximal functionality and self care  Description: INTERVENTIONS  - Monitor swallowing and airway patency with patient fatigue and changes in neurological status  - Encourage and assist patient to increase activity and self care.   - Encourage visually impaired, hearing impaired and aphasic patients to use assistive/communication devices  Outcome: Progressing     Problem: CARDIOVASCULAR - ADULT  Goal: Maintains optimal cardiac output and hemodynamic stability  Description: INTERVENTIONS:  - Monitor I/O, vital signs and rhythm  - Monitor for S/S and trends of decreased cardiac output  - Administer and titrate ordered vasoactive medications to optimize hemodynamic stability  - Assess quality of pulses, skin color and  temperature  - Assess for signs of decreased coronary artery perfusion  - Instruct patient to report change in severity of symptoms  Outcome: Progressing  Goal: Absence of cardiac dysrhythmias or at baseline rhythm  Description: INTERVENTIONS:  - Continuous cardiac monitoring, vital signs, obtain 12 lead EKG if ordered  - Administer antiarrhythmic and heart rate control medications as ordered  - Monitor electrolytes and administer replacement therapy as ordered  Outcome: Progressing     Problem: RESPIRATORY - ADULT  Goal: Achieves optimal ventilation and oxygenation  Description: INTERVENTIONS:  - Assess for changes in respiratory status  - Assess for changes in mentation and behavior  - Position to facilitate oxygenation and minimize respiratory effort  - Oxygen administered by appropriate delivery if ordered  - Initiate smoking cessation education as indicated  - Encourage broncho-pulmonary hygiene including cough, deep breathe, Incentive Spirometry  - Assess the need for suctioning and aspirate as needed  - Assess and instruct to report SOB or any respiratory difficulty  - Respiratory Therapy support as indicated  Outcome: Progressing     Problem: GASTROINTESTINAL - ADULT  Goal: Minimal or absence of nausea and/or vomiting  Description: INTERVENTIONS:  - Administer IV fluids if ordered to ensure adequate hydration  - Maintain NPO status until nausea and vomiting are resolved  - Nasogastric tube if ordered  - Administer ordered antiemetic medications as needed  - Provide nonpharmacologic comfort measures as appropriate  - Advance diet as tolerated, if ordered  - Consider nutrition services referral to assist patient with adequate nutrition and appropriate food choices  Outcome: Progressing  Goal: Maintains or returns to baseline bowel function  Description: INTERVENTIONS:  - Assess bowel function  - Encourage oral fluids to ensure adequate hydration  - Administer IV fluids if ordered to ensure adequate  hydration  - Administer ordered medications as needed  - Encourage mobilization and activity  - Consider nutritional services referral to assist patient with adequate nutrition and appropriate food choices  Outcome: Progressing  Goal: Maintains adequate nutritional intake  Description: INTERVENTIONS:  - Monitor percentage of each meal consumed  - Identify factors contributing to decreased intake, treat as appropriate  - Assist with meals as needed  - Monitor I&O, weight, and lab values if indicated  - Obtain nutrition services referral as needed  Outcome: Progressing  Goal: Establish and maintain optimal ostomy function  Description: INTERVENTIONS:  - Assess bowel function  - Encourage oral fluids to ensure adequate hydration  - Administer IV fluids if ordered to ensure adequate hydration   - Administer ordered medications as needed  - Encourage mobilization and activity  - Nutrition services referral to assist patient with appropriate food choices  - Assess stoma site  - Consider wound care consult   Outcome: Progressing  Goal: Oral mucous membranes remain intact  Description: INTERVENTIONS  - Assess oral mucosa and hygiene practices  - Implement preventative oral hygiene regimen  - Implement oral medicated treatments as ordered  - Initiate Nutrition services referral as needed  Outcome: Progressing     Problem: GENITOURINARY - ADULT  Goal: Maintains or returns to baseline urinary function  Description: INTERVENTIONS:  - Assess urinary function  - Encourage oral fluids to ensure adequate hydration if ordered  - Administer IV fluids as ordered to ensure adequate hydration  - Administer ordered medications as needed  - Offer frequent toileting  - Follow urinary retention protocol if ordered  Outcome: Progressing  Goal: Absence of urinary retention  Description: INTERVENTIONS:  - Assess patient’s ability to void and empty bladder  - Monitor I/O  - Bladder scan as needed  - Discuss with physician/AP medications to  alleviate retention as needed  - Discuss catheterization for long term situations as appropriate  Outcome: Progressing  Goal: Urinary catheter remains patent  Description: INTERVENTIONS:  - Assess patency of urinary catheter  - If patient has a chronic mariscal, consider changing catheter if non-functioning  - Follow guidelines for intermittent irrigation of non-functioning urinary catheter  Outcome: Progressing     Problem: METABOLIC, FLUID AND ELECTROLYTES - ADULT  Goal: Electrolytes maintained within normal limits  Description: INTERVENTIONS:  - Monitor labs and assess patient for signs and symptoms of electrolyte imbalances  - Administer electrolyte replacement as ordered  - Monitor response to electrolyte replacements, including repeat lab results as appropriate  - Instruct patient on fluid and nutrition as appropriate  Outcome: Progressing  Goal: Fluid balance maintained  Description: INTERVENTIONS:  - Monitor labs   - Monitor I/O and WT  - Instruct patient on fluid and nutrition as appropriate  - Assess for signs & symptoms of volume excess or deficit  Outcome: Progressing  Goal: Glucose maintained within target range  Description: INTERVENTIONS:  - Monitor Blood Glucose as ordered  - Assess for signs and symptoms of hyperglycemia and hypoglycemia  - Administer ordered medications to maintain glucose within target range  - Assess nutritional intake and initiate nutrition service referral as needed  Outcome: Progressing          Problem: HEMATOLOGIC - ADULT  Goal: Maintains hematologic stability  Description: INTERVENTIONS  - Assess for signs and symptoms of bleeding or hemorrhage  - Monitor labs  - Administer supportive blood products/factors as ordered and appropriate  Outcome: Progressing     Problem: MUSCULOSKELETAL - ADULT  Goal: Maintain or return mobility to safest level of function  Description: INTERVENTIONS:  - Assess patient's ability to carry out ADLs; assess patient's baseline for ADL function and  identify physical deficits which impact ability to perform ADLs (bathing, care of mouth/teeth, toileting, grooming, dressing, etc.)  - Assess/evaluate cause of self-care deficits   - Assess range of motion  - Assess patient's mobility  - Assess patient's need for assistive devices and provide as appropriate  - Encourage maximum independence but intervene and supervise when necessary  - Involve family in performance of ADLs  - Assess for home care needs following discharge   - Consider OT consult to assist with ADL evaluation and planning for discharge  - Provide patient education as appropriate  Outcome: Progressing  Goal: Maintain proper alignment of affected body part  Description: INTERVENTIONS:  - Support, maintain and protect limb and body alignment  - Provide patient/ family with appropriate education  Outcome: Progressing

## 2024-08-19 NOTE — TELEPHONE ENCOUNTER
Infectious Diseases Progress Note    Today's Date: 2024   Admit Date: 2024  : 1964    Impression:   Neutropenic fever, trending slowly down, no further fevers   Non-productive cough  AML  ANC 0.  CT: Airspace opacities demonstrated within the lungs with some scattered nodular densities. Differential could include infection versus malignancy.   RPP negative, blood cultures negative. Aspergillus and 1,3-BDG pending. CMV PCR pending.    Plan:     Follow pending studies - strep pneumo, legionella, CMV, beta-D glucan.   BCX 8/15 from PICC- NGTD.   Continue Cefepime, doxycycline, fluconazole and voriconazole.   Pt's clinically improving with no further fevers.    ID will follow along.     Anti-infectives:   Cefepime  -  Vanc  -  Fluc ppx till , then changed to Vori  Acyclovir ppx  Doxycycline  -     Subjective:   Interval History:   WBC of 0.3 today. Tmax of 99.3 last 24hr. Platelet of 16. Blood cultures NGTD from .   Aspergillus galacto 0.14. pt reports feeling better everyday. Able to cough up phlegm that's yellow color.     No past medical history on file.    Social History     Tobacco Use    Smoking status: Never    Smokeless tobacco: Never   Substance Use Topics    Alcohol use: Not Currently      Past Surgical History:   Procedure Laterality Date    CT BONE MARROW BIOPSY  3/20/2024    CT BONE MARROW BIOPSY 3/20/2024    IR BIOPSY PERC SUPERF BONE  3/25/2024    IR BIOPSY PERC SUPERF BONE 3/25/2024 [facility]     No family history on file.      Current medications were reviewed     No Known Allergies     Objective:     Visit Vitals  BP (!) 115/59   Pulse 96   Temp 98.6 °F (37 °C) (Oral)   Resp 18   Ht 1.651 m (5' 5\")   Wt 62.3 kg (137 lb 5 oz)   SpO2 94%   BMI 22.85 kg/m²     Temp (24hrs), Av.8 °F (37.1 °C), Min:98.4 °F (36.9 °C), Max:99.3 °F (37.4 °C)       Intake/Output Summary (Last 24 hours) at 2024 0950  Last data filed at 2024 0328  Gross per 24  Spoke with patient re: testing  His symptoms have improved  He was in sinus rhythm on his monitor  Echo without significant abnormality  Will maintain current medications for now  I do suspect his palpitations were related to afib  If he has recurrence, can start antiarrhythmic at that time

## 2024-10-07 NOTE — PROGRESS NOTES
Electrophysiology Follow Up  [facility]  [facility] [address], [place] [address]    Name: Ervin Lawson  : 1955  MRN: [id number]    ASSESSMENT/PLAN:  Paroxysmal Atrial Fibrillation s/p PFA ablation (PVI, PW) 24 by Dr. Hyatt   Dx 2019  Est. w/ EP 2022 and was started on Flecainide and underwent DDCV  Increased symptomatic afib noted by patient and had repeat EP eval 2024 w/ ablation recommended   [id number] 4 w/ hx of SDH s/p watchman placement 2021  He has been tolerating pradaxa pre-op without noted adverse bleeding and affirms no missed doses   Pre-procedure therapy with:  Rate control - Metoprolol succinate 50mg daily  Rhythm control - flecainide 100mg BID   Sinus bradycardia w/ 1st degree AV block  Documented on EKG w/ ventricular rate 56bpm during 2024 outpatient EP evaluation  EKG today shows rate controlled afib   HTN  BP in office today 166/68  HLD  Hx of SDH   Developed spontaneously on Eliquis in the past  Has tolerated pradaxa in the past for watchman device       Discussion/Plan:    Patient recently underwent PFA afib ablation on 24 by Dr. Hyatt    Since this procedure his flecainide was decreased to 50mg BID then later stopped    He otherwise has been taking his pradaxa + metoprolol succinate 50mg BID    He is s/p watchman placement is is now >2 months since his ablation procedure, he is to stop taking his pradaxa     He reports that since the procedure he has been feeling well overall and denies repeat afib symptom. He does note some palpitations, slight fatigue, and exertional SOB. He otherwise affirms their groin access sites have healed well but still has some lingering tenderness of his left thigh.    EKG in office today showing sinus rhythm w/ frequent PVC and ventricular rate 76bpm    We discussed that he is currently in the blanking period post-ablation and some palpitations can be normal. Will  check a 1 week zio for further workup given the frequent PVC's noted on his EKG today    To continue metoprolol succinate 50mg daily    He will be seen again in around 3 months at which time we will discuss his zio results and see how he is doing out of the blanking period. At this appointment we will see if any med adjustments (increased metoprolol, restart flecainide?) may be pursued.     We discussed long-term monitoring options such as a loop recorder today. He is to think more a bout this option at home, and otherwise currently monitors himself with an [employer] smart watch.     Patient has been instructed to follow up in our EP office in 3 months  or as needed. He will call our office with any questions or concerns in the meantime.    Rhythm History:   Atrial fibrillation:      Atrial flutter:      SVT:      VT/VF/PVC:     Device history:   Pacemaker:     Defibrillator:     BIV PPM:     BIV ICD:     ILR:    Interim History/HPI:   Interim history: Ervin Lawson is a 69 y.o. male with a PMH of PAF s/p ablation, HTN, HLD, sinus bradycardia, and hx of SDH.    He presents today for routine outpatient f/u given his recent afib ablation in August. He reports that since then he has been feeling well overall and denies repeat afib symptom. He has noted some palpitations, slight fatigue, and exertional SOB post-procedure however. He otherwise affirms their groin access sites have healed well but still has some lingering tenderness of his left thigh.. He affirms his groin access sites have healed well.     EKG: sinus rhythm w/ frequent PVC and ventricular rate 76bpm    Review of Systems   Constitutional:  Negative for activity change, appetite change, chills, fatigue and fever.   HENT:  Negative for nosebleeds.    Respiratory:  Negative for chest tightness and shortness of breath.    Cardiovascular:  Negative for chest pain, palpitations and leg swelling.   Neurological:  Negative for dizziness, syncope, weakness and  light-headedness.         OBJECTIVE:   Vitals:   There were no vitals taken for this visit.  There is no height or weight on file to calculate BMI.        Physical Exam:   Physical Exam  Constitutional:       General: He is not in acute distress.     Appearance: Normal appearance. He is not toxic-appearing.   HENT:      Head: Normocephalic and atraumatic.   Eyes:      General:         Right eye: No discharge.         Left eye: No discharge.   Cardiovascular:      Rate and Rhythm: Normal rate and regular rhythm.      Pulses: Normal pulses.   Pulmonary:      Effort: Pulmonary effort is normal.      Breath sounds: Normal breath sounds.   Musculoskeletal:      Right lower leg: No edema.      Left lower leg: No edema.   Skin:     General: Skin is warm and dry.      Capillary Refill: Capillary refill takes less than 2 seconds.   Neurological:      Mental Status: He is alert.            Medications:      Current Outpatient Medications:     amLODIPine (NORVASC) 5 mg tablet, Take 1 tablet (5 mg total) by mouth daily, Disp: 90 tablet, Rfl: 1    aspirin (ECOTRIN LOW STRENGTH) 81 mg EC tablet, Take 1 tablet (81 mg total) by mouth daily, Disp: 30 tablet, Rfl: 2    atorvastatin (LIPITOR) 10 mg tablet, Take 1 tablet (10 mg total) by mouth daily, Disp: 90 tablet, Rfl: 1    Coenzyme Q10 (CO Q-10 PO), Take 1 caplet by mouth every morning, Disp: , Rfl:     dabigatran etexilate (PRADAXA) 75 mg capsule, Take 1 capsule (75 mg total) by mouth every 12 (twelve) hours, Disp: 60 capsule, Rfl: 4    flecainide (TAMBOCOR) 50 mg tablet, Take 1 tablet (50 mg total) by mouth 2 (two) times a day, Disp: 60 tablet, Rfl: 0    furosemide (LASIX) 20 mg tablet, Take 1 tablet (20 mg total) by mouth daily as needed (swelling), Disp: 30 tablet, Rfl: 1    metoprolol succinate (TOPROL-XL) 50 mg 24 hr tablet, Take 1 tablet (50 mg total) by mouth daily, Disp: , Rfl:     Multiple Vitamins-Minerals (MULTIVITAMIN ADULT PO), Take 1 tablet by mouth every morning,  Disp: , Rfl:        Historical Information   Past Medical History:   Diagnosis Date    Arthritis     Atrial fibrillation (HCC)     Cancer (HCC) 08/2020    Kidney cancer    Cancer (HCC)     melanoma    Chronic kidney disease     left kidney cancer    Clotting disorder (HCC) 1/10/2021    CPAP (continuous positive airway pressure) dependence     Headache(784.0)     Not current and not often    Hyperlipidemia     Hypertension     Irregular heart beat     Afib    Pneumonia     Skin cancer     Sleep apnea     Stroke (HCC)     Supraventricular tachycardia (HCC)     Wears glasses     Wears partial dentures     upper partial       Past Surgical History:   Procedure Laterality Date    APPENDECTOMY      CARDIAC ELECTROPHYSIOLOGY PROCEDURE N/A 8/9/2024    Procedure: Cardiac eps/afib ablation PFA;  Surgeon: aFrrukh Hyatt MD;  Location: [facility];  Service: Cardiology    CARDIOVERSION      COLONOSCOPY      fiberoptic, also 2009, both negative, resolved 2004    COLONOSCOPY  08/2019    EGD      HERNIA REPAIR      umbilical and bilateral inguinal    KNEE ARTHROSCOPY Left     MI LAPAROSCOPY SURG PARTIAL NEPHRECTOMY Left 10/7/2020    Procedure: ROBOTIC PARTIAL NEPHRECTOMY;  Surgeon: Vickey Jane MD;  Location: [facility];  Service: Urology    MI PERQ CLSR TCAT L ATR APNDGE W/ENDOCARDIAL IMPLNT N/A 3/25/2021    Procedure: LEFT ATRIAL APPENDAGE OCCLUSION;  Surgeon: Farrukh Hyatt MD;  Location: [facility];  Service: Cardiology    SKIN GRAFT      left ear, skin cancer    TONSILLECTOMY         Social History     Substance and Sexual Activity   Alcohol Use Yes    Comment: Social only     Social History     Substance and Sexual Activity   Drug Use No     Social History     Tobacco Use   Smoking Status Never   Smokeless Tobacco Never       Family History   Problem Relation Age of Onset    Cancer Father         stomach    Hypertension Father     Stroke Father         syndrome    Cancer Family     Heart disease Family      Hypertension Family     Stroke Family         syndrome    Throat cancer Mother     Diabetes Mother     Hypertension Mother     Cancer Mother     Asthma Sister          Labs & Results:  Below is the patient's most recent value for Albumin, ALT, AST, BUN, Calcium, Chloride, Cholesterol, CO2, Creatinine, GFR, Glucose, HDL, Hematocrit, Hemoglobin, Hemoglobin A1C, LDL, Magnesium, Phosphorus, Platelets, Potassium, PSA, Sodium, Triglycerides, and WBC.   Lab Results   Component Value Date    ALT 18 07/29/2024    AST 22 07/29/2024    BUN 21 08/10/2024    CALCIUM 7.9 (L) 08/10/2024     08/10/2024    CHOL 136 09/19/2017    CO2 27 08/10/2024    CREATININE 1.29 08/10/2024    HDL 53 10/19/2023    HCT 41.3 08/10/2024    HGB 13.3 08/10/2024    MG 2.6 12/28/2022    PHOS 3.4 03/14/2024     08/10/2024    K 4.1 08/10/2024    PSA 1.63 10/19/2023     09/19/2017    TRIG 53 10/19/2023    WBC 12.92 (H) 08/10/2024     Note: for a comprehensive list of the patient's lab results, access the Results Review activity.

## 2024-10-08 ENCOUNTER — APPOINTMENT (OUTPATIENT)
Dept: LAB | Age: 69
End: 2024-10-08
Payer: MEDICARE

## 2024-10-08 DIAGNOSIS — N18.2 CKD (CHRONIC KIDNEY DISEASE) STAGE 2, GFR 60-89 ML/MIN: ICD-10-CM

## 2024-10-08 DIAGNOSIS — I10 ESSENTIAL HYPERTENSION: ICD-10-CM

## 2024-10-08 DIAGNOSIS — I48.19 PERSISTENT ATRIAL FIBRILLATION (HCC): ICD-10-CM

## 2024-10-08 DIAGNOSIS — C64.2 RENAL CELL CARCINOMA OF LEFT KIDNEY (HCC): ICD-10-CM

## 2024-10-08 DIAGNOSIS — Z12.5 PROSTATE CANCER SCREENING: ICD-10-CM

## 2024-10-08 DIAGNOSIS — E78.2 MIXED HYPERLIPIDEMIA: ICD-10-CM

## 2024-10-08 LAB
ALBUMIN SERPL BCG-MCNC: 4 G/DL (ref 3.5–5)
ALP SERPL-CCNC: 67 U/L (ref 34–104)
ALT SERPL W P-5'-P-CCNC: 22 U/L (ref 7–52)
ANION GAP SERPL CALCULATED.3IONS-SCNC: 7 MMOL/L (ref 4–13)
AST SERPL W P-5'-P-CCNC: 23 U/L (ref 13–39)
BASOPHILS # BLD AUTO: 0.02 THOUSANDS/ΜL (ref 0–0.1)
BASOPHILS NFR BLD AUTO: 0 % (ref 0–1)
BILIRUB SERPL-MCNC: 0.79 MG/DL (ref 0.2–1)
BUN SERPL-MCNC: 14 MG/DL (ref 5–25)
CALCIUM SERPL-MCNC: 8.7 MG/DL (ref 8.4–10.2)
CHLORIDE SERPL-SCNC: 102 MMOL/L (ref 96–108)
CHOLEST SERPL-MCNC: 121 MG/DL
CO2 SERPL-SCNC: 30 MMOL/L (ref 21–32)
CREAT SERPL-MCNC: 1.17 MG/DL (ref 0.6–1.3)
EOSINOPHIL # BLD AUTO: 0.12 THOUSAND/ΜL (ref 0–0.61)
EOSINOPHIL NFR BLD AUTO: 2 % (ref 0–6)
ERYTHROCYTE [DISTWIDTH] IN BLOOD BY AUTOMATED COUNT: 13.2 % (ref 11.6–15.1)
GFR SERPL CREATININE-BSD FRML MDRD: 62 ML/MIN/1.73SQ M
GLUCOSE P FAST SERPL-MCNC: 90 MG/DL (ref 65–99)
HCT VFR BLD AUTO: 44 % (ref 36.5–49.3)
HDLC SERPL-MCNC: 52 MG/DL
HGB BLD-MCNC: 14.3 G/DL (ref 12–17)
IMM GRANULOCYTES # BLD AUTO: 0.02 THOUSAND/UL (ref 0–0.2)
IMM GRANULOCYTES NFR BLD AUTO: 0 % (ref 0–2)
LDLC SERPL CALC-MCNC: 58 MG/DL (ref 0–100)
LYMPHOCYTES # BLD AUTO: 1.19 THOUSANDS/ΜL (ref 0.6–4.47)
LYMPHOCYTES NFR BLD AUTO: 21 % (ref 14–44)
MCH RBC QN AUTO: 29.9 PG (ref 26.8–34.3)
MCHC RBC AUTO-ENTMCNC: 32.5 G/DL (ref 31.4–37.4)
MCV RBC AUTO: 92 FL (ref 82–98)
MONOCYTES # BLD AUTO: 0.49 THOUSAND/ΜL (ref 0.17–1.22)
MONOCYTES NFR BLD AUTO: 9 % (ref 4–12)
NEUTROPHILS # BLD AUTO: 3.85 THOUSANDS/ΜL (ref 1.85–7.62)
NEUTS SEG NFR BLD AUTO: 68 % (ref 43–75)
NONHDLC SERPL-MCNC: 69 MG/DL
NRBC BLD AUTO-RTO: 0 /100 WBCS
PLATELET # BLD AUTO: 241 THOUSANDS/UL (ref 149–390)
PMV BLD AUTO: 10.5 FL (ref 8.9–12.7)
POTASSIUM SERPL-SCNC: 4.4 MMOL/L (ref 3.5–5.3)
PROT SERPL-MCNC: 6.5 G/DL (ref 6.4–8.4)
PSA SERPL-MCNC: 1.96 NG/ML (ref 0–4)
RBC # BLD AUTO: 4.78 MILLION/UL (ref 3.88–5.62)
SODIUM SERPL-SCNC: 139 MMOL/L (ref 135–147)
TRIGL SERPL-MCNC: 53 MG/DL
TSH SERPL DL<=0.05 MIU/L-ACNC: 3 UIU/ML (ref 0.45–4.5)
WBC # BLD AUTO: 5.69 THOUSAND/UL (ref 4.31–10.16)

## 2024-10-08 PROCEDURE — 85025 COMPLETE CBC W/AUTO DIFF WBC: CPT

## 2024-10-08 PROCEDURE — 80053 COMPREHEN METABOLIC PANEL: CPT

## 2024-10-08 PROCEDURE — 80061 LIPID PANEL: CPT

## 2024-10-08 PROCEDURE — G0103 PSA SCREENING: HCPCS

## 2024-10-08 PROCEDURE — 84443 ASSAY THYROID STIM HORMONE: CPT

## 2024-10-08 PROCEDURE — 36415 COLL VENOUS BLD VENIPUNCTURE: CPT

## 2024-10-09 ENCOUNTER — OFFICE VISIT (OUTPATIENT)
Dept: CARDIOLOGY CLINIC | Facility: CLINIC | Age: 69
End: 2024-10-09
Payer: MEDICARE

## 2024-10-09 VITALS
DIASTOLIC BLOOD PRESSURE: 68 MMHG | BODY MASS INDEX: 29.91 KG/M2 | SYSTOLIC BLOOD PRESSURE: 116 MMHG | HEART RATE: 76 BPM | WEIGHT: 225.7 LBS | HEIGHT: 73 IN

## 2024-10-09 DIAGNOSIS — N18.31 STAGE 3A CHRONIC KIDNEY DISEASE (HCC): ICD-10-CM

## 2024-10-09 DIAGNOSIS — I48.0 PAROXYSMAL ATRIAL FIBRILLATION (HCC): Primary | ICD-10-CM

## 2024-10-09 DIAGNOSIS — G47.33 OSA (OBSTRUCTIVE SLEEP APNEA): ICD-10-CM

## 2024-10-09 DIAGNOSIS — I10 ESSENTIAL HYPERTENSION: ICD-10-CM

## 2024-10-09 PROCEDURE — 93000 ELECTROCARDIOGRAM COMPLETE: CPT

## 2024-10-09 PROCEDURE — 93242 EXT ECG>48HR<7D RECORDING: CPT

## 2024-10-09 PROCEDURE — 99214 OFFICE O/P EST MOD 30 MIN: CPT

## 2024-10-22 ENCOUNTER — OFFICE VISIT (OUTPATIENT)
Age: 69
End: 2024-10-22
Payer: MEDICARE

## 2024-10-22 VITALS
OXYGEN SATURATION: 96 % | SYSTOLIC BLOOD PRESSURE: 118 MMHG | HEART RATE: 60 BPM | TEMPERATURE: 98.2 F | HEIGHT: 73 IN | WEIGHT: 228.2 LBS | DIASTOLIC BLOOD PRESSURE: 62 MMHG | BODY MASS INDEX: 30.24 KG/M2

## 2024-10-22 DIAGNOSIS — Z23 ENCOUNTER FOR IMMUNIZATION: ICD-10-CM

## 2024-10-22 DIAGNOSIS — I48.0 PAROXYSMAL ATRIAL FIBRILLATION (HCC): Primary | ICD-10-CM

## 2024-10-22 DIAGNOSIS — Z00.00 MEDICARE ANNUAL WELLNESS VISIT, SUBSEQUENT: ICD-10-CM

## 2024-10-22 DIAGNOSIS — I48.19 PERSISTENT ATRIAL FIBRILLATION (HCC): ICD-10-CM

## 2024-10-22 DIAGNOSIS — R60.9 SWELLING: ICD-10-CM

## 2024-10-22 DIAGNOSIS — I10 ESSENTIAL HYPERTENSION: ICD-10-CM

## 2024-10-22 DIAGNOSIS — E78.2 MIXED HYPERLIPIDEMIA: ICD-10-CM

## 2024-10-22 PROCEDURE — 90662 IIV NO PRSV INCREASED AG IM: CPT

## 2024-10-22 PROCEDURE — G0439 PPPS, SUBSEQ VISIT: HCPCS

## 2024-10-22 PROCEDURE — 99214 OFFICE O/P EST MOD 30 MIN: CPT | Performed by: FAMILY MEDICINE

## 2024-10-22 PROCEDURE — G0008 ADMIN INFLUENZA VIRUS VAC: HCPCS

## 2024-10-22 RX ORDER — FUROSEMIDE 20 MG/1
20 TABLET ORAL DAILY PRN
Qty: 30 TABLET | Refills: 1 | Status: SHIPPED | OUTPATIENT
Start: 2024-10-22

## 2024-10-22 RX ORDER — METOPROLOL SUCCINATE 50 MG/1
50 TABLET, EXTENDED RELEASE ORAL DAILY
Qty: 90 TABLET | Refills: 1 | Status: SHIPPED | OUTPATIENT
Start: 2024-10-22

## 2024-10-22 RX ORDER — AMLODIPINE BESYLATE 5 MG/1
5 TABLET ORAL DAILY
Qty: 90 TABLET | Refills: 1 | Status: SHIPPED | OUTPATIENT
Start: 2024-10-22

## 2024-10-22 RX ORDER — ATORVASTATIN CALCIUM 10 MG/1
10 TABLET, FILM COATED ORAL DAILY
Qty: 90 TABLET | Refills: 1 | Status: SHIPPED | OUTPATIENT
Start: 2024-10-22

## 2024-10-22 NOTE — ASSESSMENT & PLAN NOTE
Orders:    furosemide (LASIX) 20 mg tablet; Take 1 tablet (20 mg total) by mouth daily as needed (swelling)

## 2024-10-22 NOTE — PROGRESS NOTES
Ambulatory Visit  Name: Ervin Lawson      : 1955      MRN: [id number]  Encounter Provider: Reggie Lockhart DO  Encounter Date: 10/22/2024   Encounter department: [facility] PRIMARY CARE    Assessment & Plan  Essential hypertension    Orders:    amLODIPine (NORVASC) 5 mg tablet; Take 1 tablet (5 mg total) by mouth daily    Mixed hyperlipidemia    Orders:    atorvastatin (LIPITOR) 10 mg tablet; Take 1 tablet (10 mg total) by mouth daily    Paroxysmal atrial fibrillation (HCC)    Orders:    furosemide (LASIX) 20 mg tablet; Take 1 tablet (20 mg total) by mouth daily as needed (swelling)    Swelling    Orders:    furosemide (LASIX) 20 mg tablet; Take 1 tablet (20 mg total) by mouth daily as needed (swelling)    Persistent atrial fibrillation (HCC)    Orders:    metoprolol succinate (TOPROL-XL) 50 mg 24 hr tablet; Take 1 tablet (50 mg total) by mouth daily    Medicare annual wellness visit, subsequent         Encounter for immunization    Orders:    influenza vaccine, high-dose, PF 0.5 mL (Fluzone High Dose)      Depression Screening and Follow-up Plan: Patient was screened for depression during today's encounter. They screened negative with a PHQ-2 score of 0.      Preventive health issues were discussed with patient, and age appropriate screening tests were ordered as noted in patient's After Visit Summary. Personalized health advice and appropriate referrals for health education or preventive services given if needed, as noted in patient's After Visit Summary.    History of Present Illness     Patient is reported wellness exam as well as to follow-up on hypertension and A-fib.  Patient feeling well overall.  Patient is following with cardiology appropriately.       Patient Care Team:  Seferino Lockhart DO as PCP - General  DO Edil Llanes MD Devang Dave, MD Sana Rab Akbar, MD (Nephrology)    Review of Systems   Constitutional: Negative.    HENT: Negative.     Eyes: Negative.     Respiratory: Negative.     Cardiovascular: Negative.    Gastrointestinal: Negative.    Endocrine: Negative.    Genitourinary: Negative.    Musculoskeletal: Negative.    Skin: Negative.    Allergic/Immunologic: Negative.    Neurological: Negative.    Hematological: Negative.    Psychiatric/Behavioral: Negative.       Medical History Reviewed by provider this encounter:  Tobacco  Allergies  Meds  Problems  Med Hx  Surg Hx  Fam Hx       Annual Wellness Visit Questionnaire   Ervin is here for his Subsequent Wellness visit.     Health Risk Assessment:   Patient rates overall health as very good. Patient feels that their physical health rating is slightly better. Patient is satisfied with their life. Eyesight was rated as same. Hearing was rated as slightly worse. Patient feels that their emotional and mental health rating is same. Patients states they are never, rarely angry. Patient states they are sometimes unusually tired/fatigued. Pain experienced in the last 7 days has been none. Patient states that he has experienced no weight loss or gain in last 6 months.     Depression Screening:   PHQ-2 Score: 0      Fall Risk Screening:   In the past year, patient has experienced: no history of falling in past year      Home Safety:  Patient does not have trouble with stairs inside or outside of their home. Patient has working smoke alarms and has working carbon monoxide detector. Home safety hazards include: none.     Nutrition:   Current diet is Regular.     Medications:   Patient is currently taking over-the-counter supplements. OTC medications include: see medication list. Patient is able to manage medications.     Activities of Daily Living (ADLs)/Instrumental Activities of Daily Living (IADLs):   Walk and transfer into and out of bed and chair?: Yes  Dress and groom yourself?: Yes    Bathe or shower yourself?: Yes    Feed yourself? Yes  Do your laundry/housekeeping?: Yes  Manage your money, pay your bills and  track your expenses?: Yes  Make your own meals?: Yes    Do your own shopping?: Yes    Previous Hospitalizations:   Any hospitalizations or ED visits within the last 12 months?: Yes    How many hospitalizations have you had in the last year?: 1-2    Advance Care Planning:   Living will: Yes    Durable POA for healthcare: Yes    Advanced directive: Yes      Cognitive Screening:   Provider or family/friend/caregiver concerned regarding cognition?: No    PREVENTIVE SCREENINGS      Cardiovascular Screening:    General: Screening Not Indicated, History Lipid Disorder, Screening Current and Risks and Benefits Discussed      Diabetes Screening:     General: Screening Current and Risks and Benefits Discussed      Colorectal Cancer Screening:     General: Screening Current      Prostate Cancer Screening:    General: Screening Current and Risks and Benefits Discussed      Osteoporosis Screening:    General: Risks and Benefits Discussed and Patient Declines      Abdominal Aortic Aneurysm (AAA) Screening:    Risk factors include: age between 65-74 yo        General: Risks and Benefits Discussed and Screening Not Indicated      Lung Cancer Screening:     General: Screening Not Indicated and Risks and Benefits Discussed      Hepatitis C Screening:    General: Screening Current and Risks and Benefits Discussed    Screening, Brief Intervention, and Referral to Treatment (SBIRT)    Screening  Typical number of drinks in a day: 0  Typical number of drinks in a week: 0  Interpretation: Low risk drinking behavior.    AUDIT-C Screenin) How often did you have a drink containing alcohol in the past year? monthly or less  2) How many drinks did you have on a typical day when you were drinking in the past year? 0  3) How often did you have 6 or more drinks on one occasion in the past year? never    AUDIT-C Score: 1  Interpretation: Score 0-3 (male): Negative screen for alcohol misuse    Single Item Drug Screening:  How often have you  "used an illegal drug (including marijuana) or a prescription medication for non-medical reasons in the past year? never    Single Item Drug Screen Score: 0  Interpretation: Negative screen for possible drug use disorder    Other Counseling Topics:   Regular weightbearing exercise.     Social Determinants of Health     Financial Resource Strain: Low Risk  (9/26/2023)    Overall Financial Resource Strain (CARDIA)     Difficulty of Paying Living Expenses: Not very hard   Food Insecurity: No Food Insecurity (10/22/2024)    Hunger Vital Sign     Worried About Running Out of Food in the Last Year: Never true     Ran Out of Food in the Last Year: Never true   Transportation Needs: No Transportation Needs (10/22/2024)    PRAPARE - Transportation     Lack of Transportation (Medical): No     Lack of Transportation (Non-Medical): No   Housing Stability: Low Risk  (10/22/2024)    Housing Stability Vital Sign     Unable to Pay for Housing in the Last Year: No     Number of Times Moved in the Last Year: 0     Homeless in the Last Year: No   Utilities: Not At Risk (10/22/2024)    [facility] Utilities     Threatened with loss of utilities: No     No results found.    Objective     /62 (BP Location: Right arm, Patient Position: Sitting, Cuff Size: Standard)   Pulse 60   Temp 98.2 °F (36.8 °C) (Temporal)   Ht 6' 1\" (1.854 m)   Wt 104 kg (228 lb 3.2 oz)   SpO2 96%   BMI 30.11 kg/m²     Physical Exam  Vitals and nursing note reviewed.   Constitutional:       General: He is not in acute distress.     Appearance: Normal appearance. He is well-developed. He is not ill-appearing, toxic-appearing or diaphoretic.   HENT:      Head: Normocephalic and atraumatic.      Right Ear: Tympanic membrane, ear canal and external ear normal.      Left Ear: Tympanic membrane, ear canal and external ear normal.      Nose: Nose normal.      Mouth/Throat:      Mouth: Mucous membranes are moist.      Pharynx: No oropharyngeal exudate.   Eyes:      " General:         Right eye: No discharge.         Left eye: No discharge.      Conjunctiva/sclera: Conjunctivae normal.      Pupils: Pupils are equal, round, and reactive to light.   Neck:      Thyroid: No thyromegaly.      Vascular: No carotid bruit.      Trachea: No tracheal deviation.   Cardiovascular:      Rate and Rhythm: Normal rate and regular rhythm.      Pulses: Normal pulses.      Heart sounds: Normal heart sounds. No murmur heard.     No gallop.   Pulmonary:      Effort: Pulmonary effort is normal. No respiratory distress.      Breath sounds: Normal breath sounds. No stridor. No wheezing or rales.   Chest:      Chest wall: No tenderness.   Abdominal:      General: Bowel sounds are normal. There is no distension.      Palpations: Abdomen is soft.      Tenderness: There is no abdominal tenderness. There is no guarding or rebound.   Musculoskeletal:         General: No tenderness or deformity. Normal range of motion.      Cervical back: Normal range of motion and neck supple.   Lymphadenopathy:      Cervical: No cervical adenopathy.   Skin:     General: Skin is warm and dry.      Capillary Refill: Capillary refill takes less than 2 seconds.      Coloration: Skin is not pale.      Findings: No erythema or rash.   Neurological:      Mental Status: He is alert and oriented to person, place, and time. Mental status is at baseline.      Cranial Nerves: No cranial nerve deficit.      Motor: No weakness or abnormal muscle tone.      Coordination: Coordination normal.      Gait: Gait normal.      Deep Tendon Reflexes: Reflexes normal.   Psychiatric:         Mood and Affect: Mood normal.         Behavior: Behavior normal.         Thought Content: Thought content normal.         Judgment: Judgment normal.

## 2024-10-22 NOTE — PATIENT INSTRUCTIONS
Medicare Preventive Visit Patient Instructions  Thank you for completing your Welcome to Medicare Visit or Medicare Annual Wellness Visit today. Your next wellness visit will be due in one year (10/23/2025).  The screening/preventive services that you may require over the next 5-10 years are detailed below. Some tests may not apply to you based off risk factors and/or age. Screening tests ordered at today's visit but not completed yet may show as past due. Also, please note that scanned in results may not display below.  Preventive Screenings:  Service Recommendations Previous Testing/Comments   Colorectal Cancer Screening  Colonoscopy    Fecal Occult Blood Test (FOBT)/Fecal Immunochemical Test (FIT)  Fecal DNA/Cologuard Test  Flexible Sigmoidoscopy Age: 45-75 years old   Colonoscopy: every 10 years (May be performed more frequently if at higher risk)  OR  FOBT/FIT: every 1 year  OR  Cologuard: every 3 years  OR  Sigmoidoscopy: every 5 years  Screening may be recommended earlier than age 45 if at higher risk for colorectal cancer. Also, an individualized decision between you and your healthcare provider will decide whether screening between the ages of 76-85 would be appropriate. Colonoscopy: 12/19/2023  FOBT/FIT: Not on file  Cologuard: Not on file  Sigmoidoscopy: Not on file    Screening Current     Prostate Cancer Screening Individualized decision between patient and health care provider in men between ages of 55-69   Medicare will cover every 12 months beginning on the day after your 50th birthday PSA: 1.958 ng/mL     Screening Current     Hepatitis C Screening Once for adults born between 1945 and 1965  More frequently in patients at high risk for Hepatitis C Hep C Antibody: 01/19/2021    Screening Current   Diabetes Screening 1-2 times per year if you're at risk for diabetes or have pre-diabetes Fasting glucose: 90 mg/dL (10/8/2024)  A1C: No results in last 5 years (No results in last 5 years)  Screening  Current   Cholesterol Screening Once every 5 years if you don't have a lipid disorder. May order more often based on risk factors. Lipid panel: 10/08/2024  Screening Not Indicated  History Lipid Disorder      Other Preventive Screenings Covered by Medicare:  Abdominal Aortic Aneurysm (AAA) Screening: covered once if your at risk. You're considered to be at risk if you have a family history of AAA or a male between the age of 65-75 who smoking at least 100 cigarettes in your lifetime.  Lung Cancer Screening: covers low dose CT scan once per year if you meet all of the following conditions: (1) Age 55-77; (2) No signs or symptoms of lung cancer; (3) Current smoker or have quit smoking within the last 15 years; (4) You have a tobacco smoking history of at least 20 pack years (packs per day x number of years you smoked); (5) You get a written order from a healthcare provider.  Glaucoma Screening: covered annually if you're considered high risk: (1) You have diabetes OR (2) Family history of glaucoma OR (3)  aged 50 and older OR (4)  American aged 65 and older  Osteoporosis Screening: covered every 2 years if you meet one of the following conditions: (1) Have a vertebral abnormality; (2) On glucocorticoid therapy for more than 3 months; (3) Have primary hyperparathyroidism; (4) On osteoporosis medications and need to assess response to drug therapy.  HIV Screening: covered annually if you're between the age of 15-65. Also covered annually if you are younger than 15 and older than 65 with risk factors for HIV infection. For pregnant patients, it is covered up to 3 times per pregnancy.    Immunizations:  Immunization Recommendations   Influenza Vaccine Annual influenza vaccination during flu season is recommended for all persons aged >= 6 months who do not have contraindications   Pneumococcal Vaccine   * Pneumococcal conjugate vaccine = PCV13 (Prevnar 13), PCV15 (Vaxneuvance), PCV20 (Prevnar 20)  *  Pneumococcal polysaccharide vaccine = PPSV23 (Pneumovax) Adults 19-63 yo with certain risk factors or if 65+ yo  If never received any pneumonia vaccine: recommend Prevnar 20 (PCV20)  Give PCV20 if previously received 1 dose of PCV13 or PPSV23   Hepatitis B Vaccine 3 dose series if at intermediate or high risk (ex: diabetes, end stage renal disease, liver disease)   Respiratory syncytial virus (RSV) Vaccine - COVERED BY MEDICARE PART D  * RSVPreF3 (Arexvy) CDC recommends that adults 60 years of age and older may receive a single dose of RSV vaccine using shared clinical decision-making (SCDM)   Tetanus (Td) Vaccine - COST NOT COVERED BY MEDICARE PART B Following completion of primary series, a booster dose should be given every 10 years to maintain immunity against tetanus. Td may also be given as tetanus wound prophylaxis.   Tdap Vaccine - COST NOT COVERED BY MEDICARE PART B Recommended at least once for all adults. For pregnant patients, recommended with each pregnancy.   Shingles Vaccine (Shingrix) - COST NOT COVERED BY MEDICARE PART B  2 shot series recommended in those 19 years and older who have or will have weakened immune systems or those 50 years and older     Health Maintenance Due:      Topic Date Due   • Colorectal Cancer Screening  12/18/2026   • Hepatitis C Screening  Completed     Immunizations Due:      Topic Date Due   • Influenza Vaccine (1) 09/01/2024     Advance Directives   What are advance directives?  Advance directives are legal documents that state your wishes and plans for medical care. These plans are made ahead of time in case you lose your ability to make decisions for yourself. Advance directives can apply to any medical decision, such as the treatments you want, and if you want to donate organs.   What are the types of advance directives?  There are many types of advance directives, and each state has rules about how to use them. You may choose a combination of any of the  following:  Living will:  This is a written record of the treatment you want. You can also choose which treatments you do not want, which to limit, and which to stop at a certain time. This includes surgery, medicine, IV fluid, and tube feedings.   Durable power of  for healthcare (DPAHC):  This is a written record that states who you want to make healthcare choices for you when you are unable to make them for yourself. This person, called a proxy, is usually a family member or a friend. You may choose more than 1 proxy.  Do not resuscitate (DNR) order:  A DNR order is used in case your heart stops beating or you stop breathing. It is a request not to have certain forms of treatment, such as CPR. A DNR order may be included in other types of advance directives.  Medical directive:  This covers the care that you want if you are in a coma, near death, or unable to make decisions for yourself. You can list the treatments you want for each condition. Treatment may include pain medicine, surgery, blood transfusions, dialysis, IV or tube feedings, and a ventilator (breathing machine).  Values history:  This document has questions about your views, beliefs, and how you feel and think about life. This information can help others choose the care that you would choose.  Why are advance directives important?  An advance directive helps you control your care. Although spoken wishes may be used, it is better to have your wishes written down. Spoken wishes can be misunderstood, or not followed. Treatments may be given even if you do not want them. An advance directive may make it easier for your family to make difficult choices about your care.   Weight Management   Why it is important to manage your weight:  Being overweight increases your risk of health conditions such as heart disease, high blood pressure, type 2 diabetes, and certain types of cancer. It can also increase your risk for osteoarthritis, sleep apnea, and  other respiratory problems. Aim for a slow, steady weight loss. Even a small amount of weight loss can lower your risk of health problems.  How to lose weight safely:  A safe and healthy way to lose weight is to eat fewer calories and get regular exercise. You can lose up about 1 pound a week by decreasing the number of calories you eat by 500 calories each day.   Healthy meal plan for weight management:  A healthy meal plan includes a variety of foods, contains fewer calories, and helps you stay healthy. A healthy meal plan includes the following:  Eat whole-grain foods more often.  A healthy meal plan should contain fiber. Fiber is the part of grains, fruits, and vegetables that is not broken down by your body. Whole-grain foods are healthy and provide extra fiber in your diet. Some examples of whole-grain foods are whole-wheat breads and pastas, oatmeal, brown rice, and bulgur.  Eat a variety of vegetables every day.  Include dark, leafy greens such as spinach, kale, cary greens, and mustard greens. Eat yellow and orange vegetables such as carrots, sweet potatoes, and winter squash.   Eat a variety of fruits every day.  Choose fresh or canned fruit (canned in its own juice or light syrup) instead of juice. Fruit juice has very little or no fiber.  Eat low-fat dairy foods.  Drink fat-free (skim) milk or 1% milk. Eat fat-free yogurt and low-fat cottage cheese. Try low-fat cheeses such as mozzarella and other reduced-fat cheeses.  Choose meat and other protein foods that are low in fat.  Choose beans or other legumes such as split peas or lentils. Choose fish, skinless poultry (chicken or turkey), or lean cuts of red meat (beef or pork). Before you cook meat or poultry, cut off any visible fat.   Use less fat and oil.  Try baking foods instead of frying them. Add less fat, such as margarine, sour cream, regular salad dressing and mayonnaise to foods. Eat fewer high-fat foods. Some examples of high-fat foods  include french fries, doughnuts, ice cream, and cakes.  Eat fewer sweets.  Limit foods and drinks that are high in sugar. This includes candy, cookies, regular soda, and sweetened drinks.  Exercise:  Exercise at least 30 minutes per day on most days of the week. Some examples of exercise include walking, biking, dancing, and swimming. You can also fit in more physical activity by taking the stairs instead of the elevator or parking farther away from stores. Ask your healthcare provider about the best exercise plan for you.      © Copyright [employer] 2018 Information is for End User's use only and may not be sold, redistributed or otherwise used for commercial purposes. All illustrations and images included in CareNotes® are the copyrighted property of [employer] or [employer]

## 2024-10-23 ENCOUNTER — HOSPITAL ENCOUNTER (OUTPATIENT)
Dept: RADIOLOGY | Age: 69
Discharge: HOME | End: 2024-10-23
Payer: MEDICARE

## 2024-10-23 ENCOUNTER — APPOINTMENT (OUTPATIENT)
Dept: RADIOLOGY | Age: 69
End: 2024-10-23
Payer: MEDICARE

## 2024-10-23 DIAGNOSIS — C64.2 RENAL CELL CARCINOMA OF LEFT KIDNEY (HCC): ICD-10-CM

## 2024-10-23 PROCEDURE — 74170 CT ABD WO CNTRST FLWD CNTRST: CPT

## 2024-10-23 PROCEDURE — G1004 CDSM NDSC: HCPCS

## 2024-10-23 PROCEDURE — 74018 RADEX ABDOMEN 1 VIEW: CPT

## 2024-10-23 RX ADMIN — IOHEXOL 100 ML: 350 INJECTION, SOLUTION INTRAVENOUS at 09:33

## 2024-10-24 ENCOUNTER — CLINICAL SUPPORT (OUTPATIENT)
Dept: CARDIOLOGY CLINIC | Facility: CLINIC | Age: 69
End: 2024-10-24
Payer: MEDICARE

## 2024-10-24 DIAGNOSIS — I48.0 PAROXYSMAL ATRIAL FIBRILLATION (HCC): Primary | ICD-10-CM

## 2024-10-24 DIAGNOSIS — I48.0 PAROXYSMAL ATRIAL FIBRILLATION (HCC): ICD-10-CM

## 2024-10-24 PROCEDURE — 93244 EXT ECG>48HR<7D REV&INTERPJ: CPT

## 2024-10-24 RX ORDER — FLECAINIDE ACETATE 50 MG/1
50 TABLET ORAL 2 TIMES DAILY
Qty: 180 TABLET | Refills: 3 | Status: SHIPPED | OUTPATIENT
Start: 2024-10-24

## 2024-11-05 ENCOUNTER — CLINICAL SUPPORT (OUTPATIENT)
Age: 69
End: 2024-11-05
Payer: MEDICARE

## 2024-11-05 DIAGNOSIS — Z23 NEED FOR COVID-19 VACCINE: Primary | ICD-10-CM

## 2024-11-05 PROCEDURE — 91320 SARSCV2 VAC 30MCG TRS-SUC IM: CPT

## 2024-11-05 PROCEDURE — 90480 ADMN SARSCOV2 VAC 1/ONLY CMP: CPT

## 2024-11-20 DIAGNOSIS — E78.2 MIXED HYPERLIPIDEMIA: ICD-10-CM

## 2024-11-21 ENCOUNTER — OFFICE VISIT (OUTPATIENT)
Dept: UROLOGY | Facility: AMBULATORY SURGERY CENTER | Age: 69
End: 2024-11-21
Payer: MEDICARE

## 2024-11-21 VITALS
HEIGHT: 73 IN | BODY MASS INDEX: 29.69 KG/M2 | DIASTOLIC BLOOD PRESSURE: 90 MMHG | OXYGEN SATURATION: 97 % | SYSTOLIC BLOOD PRESSURE: 130 MMHG | WEIGHT: 224 LBS | HEART RATE: 64 BPM

## 2024-11-21 DIAGNOSIS — C64.2 RENAL CELL CARCINOMA OF LEFT KIDNEY (HCC): Primary | ICD-10-CM

## 2024-11-21 LAB
POST-VOID RESIDUAL VOLUME, ML POC: 19 ML
SL AMB  POCT GLUCOSE, UA: NORMAL
SL AMB LEUKOCYTE ESTERASE,UA: NORMAL
SL AMB POCT BILIRUBIN,UA: NORMAL
SL AMB POCT BLOOD,UA: NORMAL
SL AMB POCT CLARITY,UA: CLEAR
SL AMB POCT COLOR,UA: NORMAL
SL AMB POCT KETONES,UA: NORMAL
SL AMB POCT NITRITE,UA: NORMAL
SL AMB POCT PH,UA: 5
SL AMB POCT SPECIFIC GRAVITY,UA: 1.01
SL AMB POCT URINE PROTEIN: 30
SL AMB POCT UROBILINOGEN: 1

## 2024-11-21 PROCEDURE — 81002 URINALYSIS NONAUTO W/O SCOPE: CPT

## 2024-11-21 PROCEDURE — 99213 OFFICE O/P EST LOW 20 MIN: CPT

## 2024-11-21 PROCEDURE — 51798 US URINE CAPACITY MEASURE: CPT

## 2024-11-21 RX ORDER — ATORVASTATIN CALCIUM 10 MG/1
10 TABLET, FILM COATED ORAL DAILY
Qty: 90 TABLET | Refills: 1 | Status: SHIPPED | OUTPATIENT
Start: 2024-11-21

## 2024-11-21 NOTE — PROGRESS NOTES
Office Visit- Urology  Ervin Lawson 1955 MRN: [id number]      Assessment/Discussion/Plan    69 y.o. male managed by     1.  Renal cell carcinoma  -S/p left robotic assisted laparoscopic partial nephrectomy in October 2020  -Pathology demonstrating [id number] staging.  Histology: Papillary.  no sarcomatoid or rhabdoid features.  Negative margins with no lymphovascular invasion.   -Patient had CT renal protocol in October 2023 with no evidence of recurrence or metastatic disease.  Chest x-ray obtained in October 2023 with no pulmonary nodules  -Per NCCN guidelines we will continue with annual imaging until 5 years out from surgical intervention.  -CT of the abdomen did not reveal any evidence of recurrence on 10/23/2024.  Obtain x-ray of the chest now.  Patient will be due for CT of the abdomen and x-ray of the chest in a year with follow-up at that point in time.  If there is no evidence of recurrence that point patient will be 5 years from surgical intervention and we can consider discontinuing postoperative surveillance imaging     2.  Nocturia   -patient's only bothersome urinary complaint  -Patient was diagnosed with sleep apnea but notes that was mild 2 or 3 years ago.  He does not currently use a CPAP.  Advised him to consider returning for another sleep study to ensure of there has not been a progression of his degree of sleep apnea  -Patient has minimal lower extremity swelling throughout the day.  Advised that he could utilize compression stockings to mitigate daytime swelling as potential factor for nocturia  -Previously trialed Flomax but was unable to tolerate  -Patient defers consideration of pharmacotherapy at this point in time     3.  Prostate cancer screening  -Patient has routine prostate cancer screening with PSA and SOPHIA through his PCP.  -Last PSA measured at 1.9 in October 2024         Chief Complaint:   Ervin is a 69 y.o. male presenting to the office for a follow up visit regarding renal  cell carcinoma        Subjective    Hx 11/14/2023  Patient is a 68-year-old male with a history of left renal cell carcinoma s/p left robotic assisted laparoscopic partial nephrectomy in October 2020 who presents for follow-up.  He has had routine abdominal imaging and chest imaging since then with no evidence of recurrence or metastasis.  His last chest x-ray was in October 2023.  Last abdominal imaging which was CT renal protocol in October 2023 as well.  Patient states that he has nocturia ranging from a couple times a night to multiple times a night that can affect his quality of life.  He previously trialed Flomax but stated that it affected his heart rhythm so he came off of it.  He does follow with cardiology for A-fib, history of CVA, presence of Watchman device.  He does state that he was diagnosed with sleep apnea about 2 to 3 years ago but it was mild and he does not use a CPAP currently.  He also endorses minimal lower extremity swelling.  No bothersome urinary symptoms during the day.  He denies any gross hematuria, dysuria, flank pain     Hx 11/21/2024  Patient presents for his annual follow-up.  He obtained an updated CT of the abdomen without contrast that did not demonstrate evidence of recurrence of kidney cancer.  He reports that he is overall doing well his symptom of nocturia is stable no dysuria or gross hematuria.  Last PSA was 1.9 in October 2024    AUA SYMPTOM SCORE      Flowsheet Row Most Recent Value   AUA SYMPTOM SCORE    How often have you had a sensation of not emptying your bladder completely after you finished urinating? 1 (P)     How often have you had to urinate again less than two hours after you finished urinating? 1 (P)     How often have you found you stopped and started again several times when you urinate? 0 (P)     How often have you found it difficult to postpone urination? 2 (P)     How often have you had a weak urinary stream? 1 (P)     How often have you had to push or  strain to begin urination? 0 (P)     How many times did you most typically get up to urinate from the time you went to bed at night until the time you got up in the morning? 5 (P)     Quality of Life: If you were to spend the rest of your life with your urinary condition just the way it is now, how would you feel about that? 2 (P)     AUA SYMPTOM SCORE 10 (P)              ROS:   Review of Systems   Constitutional: Negative.  Negative for chills, fatigue and fever.   HENT: Negative.     Respiratory:  Negative for shortness of breath.    Cardiovascular:  Negative for chest pain.   Gastrointestinal: Negative.  Negative for abdominal pain.   Endocrine: Negative.    Musculoskeletal: Negative.    Skin: Negative.    Neurological: Negative.  Negative for dizziness and light-headedness.   Hematological: Negative.    Psychiatric/Behavioral: Negative.           Past Medical History  Past Medical History:   Diagnosis Date    Arthritis     Atrial fibrillation (HCC)     Cancer (HCC) 08/2020    Kidney cancer    Cancer (HCC)     melanoma    Chronic kidney disease     left kidney cancer    Clotting disorder (HCC) 1/10/2021    CPAP (continuous positive airway pressure) dependence     Headache(784.0)     Not current and not often    Hyperlipidemia     Hypertension     Irregular heart beat     Afib    Pneumonia     Skin cancer     Sleep apnea     Stroke (HCC)     Supraventricular tachycardia (HCC)     Wears glasses     Wears partial dentures     upper partial       Past Surgical History  Past Surgical History:   Procedure Laterality Date    APPENDECTOMY      CARDIAC ELECTROPHYSIOLOGY PROCEDURE N/A 8/9/2024    Procedure: Cardiac eps/afib ablation PFA;  Surgeon: Farrukh Hyatt MD;  [facility];  Service: Cardiology    CARDIOVERSION      COLONOSCOPY      fiberoptic, also 2009, both negative, resolved 2004    COLONOSCOPY  08/2019    EGD      HERNIA REPAIR      umbilical and bilateral inguinal    KNEE ARTHROSCOPY Left      SD LAPAROSCOPY SURG PARTIAL NEPHRECTOMY Left 10/7/2020    Procedure: ROBOTIC PARTIAL NEPHRECTOMY;  Surgeon: Vickey Jane MD;  Location: [facility];  Service: Urology    SD PERQ CLSR TCAT L ATR APNDGE W/ENDOCARDIAL IMPLNT N/A 3/25/2021    Procedure: LEFT ATRIAL APPENDAGE OCCLUSION;  Surgeon: Farrukh Hyatt MD;  Location:  MAIN OR;  Service: Cardiology    SKIN GRAFT      left ear, skin cancer    TONSILLECTOMY         Past Family History  Family History   Problem Relation Age of Onset    Cancer Father         stomach    Hypertension Father     Stroke Father         syndrome    Cancer Family     Heart disease Family     Hypertension Family     Stroke Family         syndrome    Throat cancer Mother     Diabetes Mother     Hypertension Mother     Cancer Mother     Asthma Sister        Past Social history  Social History     Socioeconomic History    Marital status: /Civil Union     Spouse name: Not on file    Number of children: Not on file    Years of education: Not on file    Highest education level: Not on file   Occupational History    Occupation: retired    Tobacco Use    Smoking status: Never    Smokeless tobacco: Never   Vaping Use    Vaping status: Never Used   Substance and Sexual Activity    Alcohol use: Not Currently     Comment: Social only    Drug use: No    Sexual activity: Yes     Partners: Female   Other Topics Concern    Not on file   Social History Narrative    Not on file     Social Drivers of Health     Financial Resource Strain: Low Risk  (9/26/2023)    Overall Financial Resource Strain (CARDIA)     Difficulty of Paying Living Expenses: Not very hard   Food Insecurity: No Food Insecurity (10/22/2024)    Hunger Vital Sign     Worried About Running Out of Food in the Last Year: Never true     Ran Out of Food in the Last Year: Never true   Transportation Needs: No Transportation Needs (10/22/2024)    PRAPARE - Transportation     Lack of Transportation (Medical): No     Lack of  "Transportation (Non-Medical): No   Physical Activity: Not on file   Stress: Not on file   Social Connections: Not on file   Intimate Partner Violence: Not on file   Housing Stability: Low Risk  (10/22/2024)    Housing Stability Vital Sign     Unable to Pay for Housing in the Last Year: No     Number of Times Moved in the Last Year: 0     Homeless in the Last Year: No       Current Medications  Current Outpatient Medications   Medication Sig Dispense Refill    amLODIPine (NORVASC) 5 mg tablet Take 1 tablet (5 mg total) by mouth daily 90 tablet 1    aspirin (ECOTRIN LOW STRENGTH) 81 mg EC tablet Take 1 tablet (81 mg total) by mouth daily 30 tablet 2    atorvastatin (LIPITOR) 10 mg tablet TAKE ONE TABLET BY MOUTH EVERY DAY 90 tablet 1    Coenzyme Q10 (CO Q-10 PO) Take 1 caplet by mouth every morning      flecainide (TAMBOCOR) 50 mg tablet Take 1 tablet (50 mg total) by mouth 2 (two) times a day 180 tablet 3    metoprolol succinate (TOPROL-XL) 50 mg 24 hr tablet Take 1 tablet (50 mg total) by mouth daily 90 tablet 1    Multiple Vitamins-Minerals (MULTIVITAMIN ADULT PO) Take 1 tablet by mouth every morning      furosemide (LASIX) 20 mg tablet Take 1 tablet (20 mg total) by mouth daily as needed (swelling) (Patient not taking: Reported on 11/21/2024) 30 tablet 1     No current facility-administered medications for this visit.       Allergies  No Known Allergies    OBJECTIVE    Vitals   Vitals:    11/21/24 0932   BP: 130/90   BP Location: Left arm   Patient Position: Sitting   Cuff Size: Adult   Pulse: 64   SpO2: 97%   Weight: 102 kg (224 lb)   Height: 6' 1\" (1.854 m)       PVR:    Physical Exam  Constitutional:       General: He is not in acute distress.     Appearance: Normal appearance. He is normal weight. He is not ill-appearing or toxic-appearing.   HENT:      Head: Normocephalic and atraumatic.   Eyes:      Conjunctiva/sclera: Conjunctivae normal.   Cardiovascular:      Rate and Rhythm: Normal rate.   Pulmonary:     " " Effort: Pulmonary effort is normal. No respiratory distress.   Skin:     General: Skin is warm and dry.   Neurological:      General: No focal deficit present.      Mental Status: He is alert and oriented to person, place, and time.      Cranial Nerves: No cranial nerve deficit.   Psychiatric:         Mood and Affect: Mood normal.         Behavior: Behavior normal.         Thought Content: Thought content normal.          Labs:     Lab Results   Component Value Date    PSA 1.958 10/08/2024    PSA 1.63 10/19/2023    PSA 1.8 09/09/2022    PSA 1.5 01/19/2021     Lab Results   Component Value Date    CREATININE 1.17 10/08/2024      No results found for: \"HGBA1C\"  Lab Results   Component Value Date    CALCIUM 8.7 10/08/2024     09/19/2017    K 4.4 10/08/2024    CO2 30 10/08/2024     10/08/2024    BUN 14 10/08/2024    CREATININE 1.17 10/08/2024       I have personally reviewed all pertinent lab results and reviewed with patient    Imaging       Toñito Alas PA-C  Date: 11/21/2024 Time: 9:44 AM  [facility]    This note was written using fluency dictation software. Please excuse any resulting minor grammatical errors.      "

## 2024-12-09 ENCOUNTER — RESULTS FOLLOW-UP (OUTPATIENT)
Dept: UROLOGY | Facility: CLINIC | Age: 69
End: 2024-12-09

## 2024-12-09 ENCOUNTER — APPOINTMENT (OUTPATIENT)
Dept: RADIOLOGY | Age: 69
End: 2024-12-09
Payer: MEDICARE

## 2024-12-09 DIAGNOSIS — C64.2 RENAL CELL CARCINOMA OF LEFT KIDNEY (HCC): ICD-10-CM

## 2024-12-09 DIAGNOSIS — C64.2 RENAL CELL CARCINOMA OF LEFT KIDNEY (HCC): Primary | ICD-10-CM

## 2024-12-09 PROCEDURE — 71046 X-RAY EXAM CHEST 2 VIEWS: CPT

## 2025-01-10 PROBLEM — R00.1 SINUS BRADYCARDIA: Status: ACTIVE | Noted: 2025-01-10

## 2025-01-10 PROBLEM — I49.3 PVC (PREMATURE VENTRICULAR CONTRACTION): Status: ACTIVE | Noted: 2025-01-10

## 2025-01-10 NOTE — ASSESSMENT & PLAN NOTE
Dx 12/2019  Est. w/ EP 07/2022 and was started on Flecainide and underwent DDCV  Increased symptomatic afib noted by patient and had repeat EP eval 07/2024 w/ ablation recommended   [id number] 4 w/ hx of SDH s/p watchman placement 03/2021  He tolerated pradaxa pre-op without noted adverse bleeding and has since been maintained off AC  Current medication regimen:  Rate control - Metoprolol succinate 50mg daily  Rhythm control - flecainide 50mg BID

## 2025-01-10 NOTE — PROGRESS NOTES
Electrophysiology Follow Up  [facility]  [facility] [address], [place] [address]    Name: Ervin Lawson  : 1955  MRN: [id number]    Assessment & Plan  Paroxysmal atrial fibrillation (HCC)  Dx 2019  Est. w/ EP 2022 and was started on Flecainide and underwent DDCV  Increased symptomatic afib noted by patient and had repeat EP eval 2024 w/ ablation recommended   [id number] 4 w/ hx of SDH s/p watchman placement 2021  He tolerated pradaxa pre-op without noted adverse bleeding and has since been maintained off AC  Current medication regimen:  Rate control - Metoprolol succinate 50mg daily  Rhythm control - flecainide 50mg BID   PVC (premature ventricular contraction)  18% PVC burden noted on latest 10/2024 Zio monitor  Sinus bradycardia  Prior history of such noted  Heart rate in office today 60 bpm  Essential hypertension  BP in office today 128/78  FELISA (obstructive sleep apnea)  Recommend CPAP compliance  SDH (subdural hematoma) (HCC)  Developed spontaneously on Eliquis in the past  Has tolerated pradaxa in the past and is now s/p watchman  Stage 3a chronic kidney disease (HCC)  Lab Results   Component Value Date    EGFR 62 10/08/2024    EGFR 55 08/10/2024    EGFR 61 2024    CREATININE 1.17 10/08/2024    CREATININE 1.29 08/10/2024    CREATININE 1.19 2024          Discussion/Plan:    Patient has a history of A-fib for which he is s/p ablation 24 by Dr. Hyatt and has since been maintained on flecainide and metoprolol as above    He is s/p watchman placement and otherwise is currently maintained on ASA 81 mg daily    He routinely monitors himself via Apple Watch    During his prior 10/9/2024 outpatient EP follow-up appointment frequent PVCs were noted on his EKG for which a 1 week ZIO monitor was ordered for further evaluation    This monitor showed an 18% PVC burden with around 15% A-fib burden    EKG in office today showing  sinus rhythm without PVC's with first-degree AV block ([id number])  w/ ventricular rate 60 bpm    Since his last outpatient EP appointment he reports he has been feeling improved and currently denies acute cardiac complaint    No acute medication changes made today    We discussed the possibility of obtaining repeat stress test given his ongoing flecainide therapy.  He would be agreeable to undergo this procedure, however asked that we proceed with caution given during prior 2020 stress testing he developed hypotension, inappropriate tachycardia, and severe dyspnea requiring him to abandon the study early.    I will discuss need for further stress testing with his attending prior to placing any orders at this time.    Addendum: Spoke with Dr. Hyatt who feels exercise stress testing would beneficial in his current situation. Order placed at this time.    Patient has been instructed to follow up in our EP office in 1 year or as needed. He will call our office with any questions or concerns in the meantime.    Rhythm History:   Atrial fibrillation:      Atrial flutter:      SVT:      VT/VF/PVC:     Device history:   Pacemaker:     Defibrillator:     BIV PPM:     BIV ICD:     ILR:    Interim History/HPI:   Interim history: Ervin Lawson is a 69 y.o. male with a PMH of PAF, PVCs, sinus bradycardia, HTN, FELISA, and SDH s/p watchman placement.    He presents today for routine outpatient follow-up given his history of PVCs and PAF.  Since his last outpatient EP appointment he reports he has been feeling improved and currently denies acute cardiac complaint.     EKG: Sinus rhythm with first-degree AV block ([id number])  w/ ventricular rate 60 bpm    Review of Systems   Constitutional:  Negative for activity change, appetite change, chills, fatigue and fever.   HENT:  Negative for nosebleeds.    Respiratory:  Negative for chest tightness and shortness of breath.    Cardiovascular:  Negative for chest pain, palpitations and leg  swelling.   Neurological:  Negative for dizziness, syncope, weakness and light-headedness.         OBJECTIVE:   Vitals:   There were no vitals taken for this visit.  There is no height or weight on file to calculate BMI.        Physical Exam:   Physical Exam  Constitutional:       General: He is not in acute distress.     Appearance: Normal appearance. He is not toxic-appearing.   HENT:      Head: Normocephalic and atraumatic.   Eyes:      General:         Right eye: No discharge.         Left eye: No discharge.   Cardiovascular:      Rate and Rhythm: Normal rate and regular rhythm.      Pulses: Normal pulses.   Pulmonary:      Effort: Pulmonary effort is normal.      Breath sounds: Normal breath sounds.   Musculoskeletal:      Right lower leg: No edema.      Left lower leg: No edema.   Skin:     General: Skin is warm and dry.      Capillary Refill: Capillary refill takes less than 2 seconds.   Neurological:      Mental Status: He is alert.            Medications:      Current Outpatient Medications:     amLODIPine (NORVASC) 5 mg tablet, Take 1 tablet (5 mg total) by mouth daily, Disp: 90 tablet, Rfl: 1    aspirin (ECOTRIN LOW STRENGTH) 81 mg EC tablet, Take 1 tablet (81 mg total) by mouth daily, Disp: 30 tablet, Rfl: 2    atorvastatin (LIPITOR) 10 mg tablet, TAKE ONE TABLET BY MOUTH EVERY DAY, Disp: 90 tablet, Rfl: 1    Coenzyme Q10 (CO Q-10 PO), Take 1 caplet by mouth every morning, Disp: , Rfl:     flecainide (TAMBOCOR) 50 mg tablet, Take 1 tablet (50 mg total) by mouth 2 (two) times a day, Disp: 180 tablet, Rfl: 3    furosemide (LASIX) 20 mg tablet, Take 1 tablet (20 mg total) by mouth daily as needed (swelling) (Patient not taking: Reported on 11/21/2024), Disp: 30 tablet, Rfl: 1    metoprolol succinate (TOPROL-XL) 50 mg 24 hr tablet, Take 1 tablet (50 mg total) by mouth daily, Disp: 90 tablet, Rfl: 1    Multiple Vitamins-Minerals (MULTIVITAMIN ADULT PO), Take 1 tablet by mouth every morning, Disp: , Rfl:         Historical Information   Past Medical History:   Diagnosis Date    Arthritis     Atrial fibrillation (HCC)     Cancer (HCC) 08/2020    Kidney cancer    Cancer (HCC)     melanoma    Chronic kidney disease     left kidney cancer    Clotting disorder (HCC) 1/10/2021    CPAP (continuous positive airway pressure) dependence     Headache(784.0)     Not current and not often    Hyperlipidemia     Hypertension     Irregular heart beat     Afib    Pneumonia     Skin cancer     Sleep apnea     Stroke (HCC)     Supraventricular tachycardia (HCC)     Wears glasses     Wears partial dentures     upper partial       Past Surgical History:   Procedure Laterality Date    APPENDECTOMY      CARDIAC ELECTROPHYSIOLOGY PROCEDURE N/A 8/9/2024    Procedure: Cardiac eps/afib ablation PFA;  Surgeon: Farrukh Hyatt MD;  [facility];  Service: Cardiology    CARDIOVERSION      COLONOSCOPY      fiberoptic, also 2009, both negative, resolved 2004    COLONOSCOPY  08/2019    EGD      HERNIA REPAIR      umbilical and bilateral inguinal    KNEE ARTHROSCOPY Left     TX LAPAROSCOPY SURG PARTIAL NEPHRECTOMY Left 10/7/2020    Procedure: ROBOTIC PARTIAL NEPHRECTOMY;  Surgeon: Vickey Jane MD;  [facility];  Service: Urology    TX PERQ CLSR TCAT L ATR APNDGE W/ENDOCARDIAL IMPLNT N/A 3/25/2021    Procedure: LEFT ATRIAL APPENDAGE OCCLUSION;  Surgeon: Farrukh Hyatt MD;  [facility];  Service: Cardiology    SKIN GRAFT      left ear, skin cancer    TONSILLECTOMY         Social History     Substance and Sexual Activity   Alcohol Use Not Currently    Comment: Social only     Social History     Substance and Sexual Activity   Drug Use No     Social History     Tobacco Use   Smoking Status Never   Smokeless Tobacco Never       Family History   Problem Relation Age of Onset    Cancer Father         stomach    Hypertension Father     Stroke Father         syndrome    Cancer Family     Heart disease Family      Hypertension Family     Stroke Family         syndrome    Throat cancer Mother     Diabetes Mother     Hypertension Mother     Cancer Mother     Asthma Sister          Labs & Results:  Below is the patient's most recent value for Albumin, ALT, AST, BUN, Calcium, Chloride, Cholesterol, CO2, Creatinine, GFR, Glucose, HDL, Hematocrit, Hemoglobin, Hemoglobin A1C, LDL, Magnesium, Phosphorus, Platelets, Potassium, PSA, Sodium, Triglycerides, and WBC.   Lab Results   Component Value Date    ALT 22 10/08/2024    AST 23 10/08/2024    BUN 14 10/08/2024    CALCIUM 8.7 10/08/2024     10/08/2024    CHOL 136 09/19/2017    CO2 30 10/08/2024    CREATININE 1.17 10/08/2024    HDL 52 10/08/2024    HCT 44.0 10/08/2024    HGB 14.3 10/08/2024    MG 2.6 12/28/2022    PHOS 3.4 03/14/2024     10/08/2024    K 4.4 10/08/2024    PSA 1.958 10/08/2024     09/19/2017    TRIG 53 10/08/2024    WBC 5.69 10/08/2024     Note: for a comprehensive list of the patient's lab results, access the Results Review activity.

## 2025-01-10 NOTE — ASSESSMENT & PLAN NOTE
18% PVC burden noted on latest 10/2024 Zio monitor   Chief complaint: Enteritis, atrial fibrillation    No acute events overnight.  Patient abdominal pain has resolved.  Passing flatus.    Afebrile, vital signs stable  Clear to auscultation bilaterally  Rate controlled atrial fibrillation  Abdomen is soft nontender nondistended    47-year-old male with resolved gastroenteritis.  His pain is improved and we will advance his diet.  He was noted to have atrial fibrillation and cardiology is recommending possible cardioversion tomorrow.  -Continue cardiology  -Advance diet as tolerated  -N.p.o. after midnight if patient wishes to undergo cardioversion

## 2025-01-10 NOTE — ASSESSMENT & PLAN NOTE
Developed spontaneously on Eliquis in the past  Has tolerated pradaxa in the past and is now s/p watchman

## 2025-01-14 ENCOUNTER — OFFICE VISIT (OUTPATIENT)
Dept: CARDIOLOGY CLINIC | Facility: CLINIC | Age: 70
End: 2025-01-14
Payer: MEDICARE

## 2025-01-14 VITALS
DIASTOLIC BLOOD PRESSURE: 78 MMHG | SYSTOLIC BLOOD PRESSURE: 128 MMHG | BODY MASS INDEX: 29.22 KG/M2 | HEART RATE: 60 BPM | WEIGHT: 221.5 LBS

## 2025-01-14 DIAGNOSIS — G47.33 OSA (OBSTRUCTIVE SLEEP APNEA): ICD-10-CM

## 2025-01-14 DIAGNOSIS — N18.31 STAGE 3A CHRONIC KIDNEY DISEASE (HCC): ICD-10-CM

## 2025-01-14 DIAGNOSIS — I49.3 PVC (PREMATURE VENTRICULAR CONTRACTION): ICD-10-CM

## 2025-01-14 DIAGNOSIS — R00.1 SINUS BRADYCARDIA: ICD-10-CM

## 2025-01-14 DIAGNOSIS — I48.0 PAROXYSMAL ATRIAL FIBRILLATION (HCC): Primary | ICD-10-CM

## 2025-01-14 DIAGNOSIS — I10 ESSENTIAL HYPERTENSION: ICD-10-CM

## 2025-01-14 DIAGNOSIS — S06.5XAA SDH (SUBDURAL HEMATOMA) (HCC): ICD-10-CM

## 2025-01-14 PROCEDURE — 99214 OFFICE O/P EST MOD 30 MIN: CPT

## 2025-01-14 PROCEDURE — 93000 ELECTROCARDIOGRAM COMPLETE: CPT

## 2025-01-14 NOTE — ASSESSMENT & PLAN NOTE
Lab Results   Component Value Date    EGFR 62 10/08/2024    EGFR 55 08/10/2024    EGFR 61 08/09/2024    CREATININE 1.17 10/08/2024    CREATININE 1.29 08/10/2024    CREATININE 1.19 08/09/2024

## 2025-01-20 ENCOUNTER — TELEPHONE (OUTPATIENT)
Age: 70
End: 2025-01-20

## 2025-01-20 NOTE — TELEPHONE ENCOUNTER
Received call from pt asking if he needed to hold his Metoprolol prior to his stress test that is scheduled for tomorrow 1/21.  Pt would like to know if he should hold his flecainide as well. Please review and advise, thank you.

## 2025-01-21 ENCOUNTER — HOSPITAL ENCOUNTER (OUTPATIENT)
Dept: NON INVASIVE DIAGNOSTICS | Facility: HOSPITAL | Age: 70
Discharge: HOME | End: 2025-01-21
Payer: MEDICARE

## 2025-01-21 VITALS
HEART RATE: 60 BPM | SYSTOLIC BLOOD PRESSURE: 128 MMHG | DIASTOLIC BLOOD PRESSURE: 78 MMHG | BODY MASS INDEX: 29.69 KG/M2 | OXYGEN SATURATION: 98 % | WEIGHT: 224 LBS | HEIGHT: 73 IN

## 2025-01-21 DIAGNOSIS — I48.0 PAROXYSMAL ATRIAL FIBRILLATION (HCC): ICD-10-CM

## 2025-01-21 LAB
CHEST PAIN STATEMENT: NORMAL
MAX DIASTOLIC BP: 68 MMHG
MAX PREDICTED HEART RATE: 150 BPM
PROTOCOL NAME: NORMAL
STRESS POST EXERCISE DUR MIN: 6 MIN
STRESS POST EXERCISE DUR SEC: 0 SEC
STRESS POST PEAK HR: 96 BPM
STRESS POST PEAK SYSTOLIC BP: 154 MMHG
TARGET HR FORMULA: NORMAL
TEST INDICATION: NORMAL

## 2025-01-21 PROCEDURE — 93018 CV STRESS TEST I&R ONLY: CPT | Performed by: INTERNAL MEDICINE

## 2025-01-21 PROCEDURE — 93017 CV STRESS TEST TRACING ONLY: CPT

## 2025-01-21 PROCEDURE — 93016 CV STRESS TEST SUPVJ ONLY: CPT | Performed by: INTERNAL MEDICINE

## 2025-01-22 LAB
MAX HR PERCENT: 64 %
MAX HR: 96 BPM
RATE PRESSURE PRODUCT: NORMAL
SL CV STRESS RECOVERY BP: NORMAL MMHG
SL CV STRESS RECOVERY HR: 60 BPM
SL CV STRESS RECOVERY O2 SAT: 99 %
SL CV STRESS STAGE REACHED: 2
STRESS ANGINA INDEX: 0
STRESS BASELINE BP: NORMAL MMHG
STRESS BASELINE HR: 60 BPM
STRESS O2 SAT REST: 98 %
STRESS PEAK HR: 95 BPM
STRESS POST ESTIMATED WORKLOAD: 7 METS
STRESS POST EXERCISE DUR MIN: 6 MIN
STRESS POST EXERCISE DUR SEC: 0 SEC
STRESS POST O2 SAT PEAK: 96 %
STRESS POST PEAK BP: 140 MMHG

## 2025-01-23 ENCOUNTER — RESULTS FOLLOW-UP (OUTPATIENT)
Dept: NON INVASIVE DIAGNOSTICS | Facility: CLINIC | Age: 70
End: 2025-01-23

## 2025-01-23 DIAGNOSIS — I48.19 PERSISTENT ATRIAL FIBRILLATION (HCC): ICD-10-CM

## 2025-01-23 RX ORDER — METOPROLOL SUCCINATE 50 MG/1
25 TABLET, EXTENDED RELEASE ORAL EVERY EVENING
Start: 2025-01-23

## 2025-01-23 NOTE — TELEPHONE ENCOUNTER
----- Message from Zachariah Singleton PA-C sent at 1/23/2025  7:55 AM EST -----  Hey guys,    Would someone be able to call this patient and let him know that we'd like to decrease his metoprolol dosing to 25mg only once daily in the evening.    His stress test showed his heart rate didn't get to our desired range when exercising. This may help give him a little more energy.    Thanks!  ----- Message -----  From: Farrukh Hyatt MD  Sent: 1/22/2025   5:13 PM EST  To: Zachariah Singleton PA-C    Great lets lower metoprolol to 25mg at night only and see if feels better on that.   Otherwise looks good, albeit maybe heading to pacemaker eventually.  ----- Message -----  From: Zachariah Singleton PA-C  Sent: 1/22/2025   9:25 AM EST  To: Farrukh Hyatt MD    Got this patient's stress testing back.    He did not have the severe dyspnea and hypotensive episode as he had during prior stress test.    However his heart rate showed a blunted response during the study with only a max rate of 96 bpm, deeming the study nondiagnostic    Patient was feeling well during our visit  ----- Message -----  From: Kervin Lowery DO  Sent: 1/22/2025   4:48 AM EST  To: Zachariah Singleton PA-C

## 2025-01-23 NOTE — PROGRESS NOTES
Reviewed patient's recent stress test results w/ Dr. Hyatt.    Plan to reduce metoprolol dosing to 25mg once daily in the evening given blunted cardiac rate response during exercise.

## 2025-01-29 ENCOUNTER — OFFICE VISIT (OUTPATIENT)
Dept: SLEEP CENTER | Facility: CLINIC | Age: 70
End: 2025-01-29
Payer: MEDICARE

## 2025-01-29 VITALS
WEIGHT: 226.8 LBS | SYSTOLIC BLOOD PRESSURE: 122 MMHG | DIASTOLIC BLOOD PRESSURE: 78 MMHG | HEIGHT: 73 IN | BODY MASS INDEX: 30.06 KG/M2

## 2025-01-29 DIAGNOSIS — G47.33 OSA (OBSTRUCTIVE SLEEP APNEA): Primary | ICD-10-CM

## 2025-01-29 DIAGNOSIS — I48.0 PAROXYSMAL ATRIAL FIBRILLATION (HCC): ICD-10-CM

## 2025-01-29 DIAGNOSIS — Z86.73 HISTORY OF CVA (CEREBROVASCULAR ACCIDENT): ICD-10-CM

## 2025-01-29 PROCEDURE — 99214 OFFICE O/P EST MOD 30 MIN: CPT | Performed by: STUDENT IN AN ORGANIZED HEALTH CARE EDUCATION/TRAINING PROGRAM

## 2025-01-29 PROCEDURE — G2211 COMPLEX E/M VISIT ADD ON: HCPCS | Performed by: STUDENT IN AN ORGANIZED HEALTH CARE EDUCATION/TRAINING PROGRAM

## 2025-01-29 NOTE — ASSESSMENT & PLAN NOTE
Reviewed the importance of treating SDB on stroke risk reduction  Reviewed other lifestyle factors (blood pressure/cholesterol/sugar control, mediterranean diet, etc.) that function as primary stroke prevention  Continue appropriate medications per patient's neurologist and/or PCP for secondary stroke prevention

## 2025-01-29 NOTE — PROGRESS NOTES
"Name: Ervin Lawson      : 1955      MRN: [id number]  Encounter Provider: Joel Donnelly DO  Encounter Date: 2025   Encounter department: [facility] SLEEP MEDICINE [place]  :  Assessment & Plan  FELISA (obstructive sleep apnea)  Jayson is a pleasant 69-year-old gentleman with a PMHx of HTN, prior stroke, atrial fibrillation status post placement of Watchman device (internal bleeding on anticoagulation) who presents in follow up for FELISA (ARIAN 9.2, O2 debra 85%).  He is accompanied by his wife today.  He is not currently utilizing his PAP device, stating that he feels he sleeps better without it; curiously, his wife tells me that he was snoring even with the device.    Discussed with patient the pathophysiology of OSAS and medical conditions associated with OSAS such as DM, HTN, CAD, Depression, Stroke, Headache at length today.  Specifically, we discussed that while treatment is not always pursued in the case of \"mild\" FELISA, I will typically encourage it in the setting of individuals with appropriate comorbidities (including HTN, prior stroke, and atrial fibrillation)  Treatment options including surgery, Dental appliances, positional therapy, and CPAP were discussed as well.  We will pursue two potential routes: I have placed both an order for a PAP titration study to help refine his pressure settings with CPAP therapy as well as an order for a mandibular advancing device.  He will reach out to a dentist to inquire about the MAD while pursuing the PAP titration study and subsequent resumption of PAP therapy with the refined pressure settings.  Advised patient to avoid activities that could harm self or others when tired/sleepy, including driving.  Discussed importance of good sleep hygiene.      Orders:    CPAP Study; Future    Mandible Advancing Appliance    History of CVA (cerebrovascular accident)    Reviewed the importance of treating SDB on stroke risk reduction  Reviewed other lifestyle factors " "(blood pressure/cholesterol/sugar control, mediterranean diet, etc.) that function as primary stroke prevention  Continue appropriate medications per patient's neurologist and/or PCP for secondary stroke prevention         Paroxysmal atrial fibrillation (HCC)    Reviewed the importance of treating SDB on cardiovascular risk reduction (including but not limited to risk of heart attack, CHF, and both initial occurrence of A-fib and recurrence of A-fib following ablation or similar intervention)  Defer primary/medical management of patient's atrial fibrillation per cardiology and/or patient's PCP.           Follow-up:  He will Return in about 6 months (around 7/29/2025).      ________________________________________________________________________________________________    Per Last Visit Note (Date: 4/26/2024):  Jayson is a very pleasant 69-year-old gentleman with PMH of HTN, prior stroke, atrial fibrillation status post placement of Watchman device (internal bleeding on anticoagulation) who presents in follow-up for FELISA (ARIAN 9.2, O2 debra 85%).  He is not currently utilizing his device, having ceased using it due to mask discomfort and overall subjective lack of results.  I do suspect that some component of his lack of results may be that his AHI's have been consistently supratherapeutic, likely due to mask leak.     We spent quite some time today reviewing the pathophysiology of obstructive sleep apnea syndrome and the risks associated with untreated FELISA, most applicable in his case the risks of recurrent stroke and atrial fibrillation, in addition to heart disease, heart attacks, etc.  Reviewed that while his HSAT revealed \"mild\" sleep apnea, there are several factors that make me concerned that this may be worse than what was captured, including HSAT's known propensity to underestimate FELISA and that it was scored on 4%/CMS criteria.  Recommended that he continue AutoPap 5-15 cm H2O   I have ordered a formal mask " "fitting to help him hopefully find a mask that is more comfortable, as he is really only tried 1 since therapy initiation.  Prescription for new supplies ordered today  Reviewed CMS/insurance requirements and resupply guidelines  Information provided on the above as well as general maintenance steps  He is to follow-up in 5 to 6 months.      Sleep Studies:  -HSAT 6/5/2020: TRT: 398.2 minutes, ARIAN: 9.2, O2 debra: 85%      ________________________________________________________________________________________________      Interval History: Ervin Lawson is a 69 y.o. male with a PMHx of HTN, prior stroke, atrial fibrillation status post placement of Watchman device (internal bleeding on anticoagulation) who presents in follow up for FELISA (ARIAN 9.2, O2 debra 85%).    He did have an ablative procedure for his atrial fibrillation in August, and has not returned since.     SDB:  -Current experience with PAP Therapy: He has not been using it since ~September. He tells me that he thinks he sleeps better without it; wife states that he was snoring even with the device.         SLEEP HYGIENE QUESTIONS:  Bedtime: 2300/2330   Time it takes to fall sleep: 16-30 minutes  Wake up Time: 0630/0700   Number of times patient wakes up per night: 3-5  Reason (s) why patient wakes up during the night: Restroom   Naps: Sometimes, in the middle of the afternoon, \"if I'm not doing anything\"  Estimated total sleep time ( in a 24 hour period of time): 7-7.5 hours       Changes to PMH, PSH, SH: See above       Sitting and reading: (Patient-Rptd) (P) Slight chance of dozing  Watching TV: (Patient-Rptd) (P) Moderate chance of dozing  Sitting, inactive in a public place (e.g. a theatre or a meeting): (Patient-Rptd) (P) Slight chance of dozing  As a passenger in a car for an hour without a break: (Patient-Rptd) (P) Would never doze  Lying down to rest in the afternoon when circumstances permit: (Patient-Rptd) (P) High chance of dozing  Sitting and " "talking to someone: (Patient-Rptd) (P) Would never doze  Sitting quietly after a lunch without alcohol: (Patient-Rptd) (P) Slight chance of dozing  In a car, while stopped for a few minutes in traffic: (Patient-Rptd) (P) Would never doze  Total score: (Patient-Rptd) (P) 8     Review of Systems  Pertinent positives/negatives included in HPI and also as noted:     Current Outpatient Medications on File Prior to Visit   Medication Sig Dispense Refill    amLODIPine (NORVASC) 5 mg tablet Take 1 tablet (5 mg total) by mouth daily 90 tablet 1    aspirin (ECOTRIN LOW STRENGTH) 81 mg EC tablet Take 1 tablet (81 mg total) by mouth daily 30 tablet 2    atorvastatin (LIPITOR) 10 mg tablet TAKE ONE TABLET BY MOUTH EVERY DAY 90 tablet 1    Coenzyme Q10 (CO Q-10 PO) Take 1 caplet by mouth every morning      flecainide (TAMBOCOR) 50 mg tablet Take 1 tablet (50 mg total) by mouth 2 (two) times a day 180 tablet 3    furosemide (LASIX) 20 mg tablet Take 1 tablet (20 mg total) by mouth daily as needed (swelling) 30 tablet 1    metoprolol succinate (TOPROL-XL) 50 mg 24 hr tablet Take 0.5 tablets (25 mg total) by mouth every evening      Multiple Vitamins-Minerals (MULTIVITAMIN ADULT PO) Take 1 tablet by mouth every morning       No current facility-administered medications on file prior to visit.      Objective   /78   Ht 6' 1\" (1.854 m)   Wt 103 kg (226 lb 12.8 oz)   BMI 29.92 kg/m²        Physical Exam  PHYSICAL EXAMINATION:  Vital Signs: /78   Ht 6' 1\" (1.854 m)   Wt 103 kg (226 lb 12.8 oz)   BMI 29.92 kg/m²     Constitutional: NAD, well appearing   Mental Status: AAOx3  Skin: Warm, dry, no rashes noted   Eyes: PERRL, normal conjunctiva  Posterior Airspace:   Nicole Tongue Position: 4  Retrognathia: absent  Overbite: absent  High Arched Palate: present  Tongue Scalloping/Ridging: absent  Uvula: normal  Chest: No evidence of respiratory distress, no accessory muscle use; no evidence of peripheral cyanosis  Abdomen: " "Soft, NT/ND  Extremities: No digital clubbing or pedal edema  Neuro: Strength 5/5 throughout, sensation grossly intact    Data  Lab Results   Component Value Date    HGB 14.3 10/08/2024    HCT 44.0 10/08/2024    MCV 92 10/08/2024      Lab Results   Component Value Date    CALCIUM 8.7 10/08/2024     09/19/2017    K 4.4 10/08/2024    CO2 30 10/08/2024     10/08/2024    BUN 14 10/08/2024    CREATININE 1.17 10/08/2024     No results found for: \"IRON\", \"TIBC\", \"FERRITIN\"  Lab Results   Component Value Date    AST 23 10/08/2024    ALT 22 10/08/2024       Administrative Statements   I have spent a total time of 32-36 minutes in caring for this patient on the day of the visit/encounter including Diagnostic results, Prognosis, Risks and benefits of tx options, Instructions for management, Patient and family education, Importance of tx compliance, Risk factor reductions, Documenting in the medical record, Reviewing / ordering tests, medicine, procedures  , and Obtaining or reviewing history  .     Electronically signed by:    Joel Donnelly DO  Board-Certified Neurology and Sleep Medicine  [facility]  01/29/25  "

## 2025-01-29 NOTE — ASSESSMENT & PLAN NOTE
"Jayson is a pleasant 69-year-old gentleman with a PMHx of HTN, prior stroke, atrial fibrillation status post placement of Watchman device (internal bleeding on anticoagulation) who presents in follow up for FELISA (ARIAN 9.2, O2 debra 85%).  He is accompanied by his wife today.  He is not currently utilizing his PAP device, stating that he feels he sleeps better without it; curiously, his wife tells me that he was snoring even with the device.    Discussed with patient the pathophysiology of OSAS and medical conditions associated with OSAS such as DM, HTN, CAD, Depression, Stroke, Headache at length today.  Specifically, we discussed that while treatment is not always pursued in the case of \"mild\" FELISA, I will typically encourage it in the setting of individuals with appropriate comorbidities (including HTN, prior stroke, and atrial fibrillation)  Treatment options including surgery, Dental appliances, positional therapy, and CPAP were discussed as well.  We will pursue two potential routes: I have placed both an order for a PAP titration study to help refine his pressure settings with CPAP therapy as well as an order for a mandibular advancing device.  He will reach out to a dentist to inquire about the MAD while pursuing the PAP titration study and subsequent resumption of PAP therapy with the refined pressure settings.  Advised patient to avoid activities that could harm self or others when tired/sleepy, including driving.  Discussed importance of good sleep hygiene.      Orders:    CPAP Study; Future    Mandible Advancing Appliance    "

## 2025-01-29 NOTE — ASSESSMENT & PLAN NOTE
Reviewed the importance of treating SDB on cardiovascular risk reduction (including but not limited to risk of heart attack, CHF, and both initial occurrence of A-fib and recurrence of A-fib following ablation or similar intervention)  Defer primary/medical management of patient's atrial fibrillation per cardiology and/or patient's PCP.

## 2025-01-29 NOTE — PATIENT INSTRUCTIONS
Sleep apnea is a serious sleep disorder that occurs when a person's breathing is interrupted during sleep. People with untreated sleep apnea stop breathing repeatedly during their sleep, sometimes hundreds of times during the night.  If left untreated, obstructive sleep apnea can result in a number of health problems including hypertension, stroke, arrhythmias, cardiomyopathy (enlargement of the muscle tissue of the heart), heart failure, diabetes, obesity, and heart attacks.  Treatment options for obstructive sleep apnea may include positive airway pressure (PAP) therapy, oral appliance, hypoglossal nerve stimulator, nose/throat surgery, weight loss, side sleeping, or avoidance of medications or substances that can relax the airway muscles (alcohol, benzodiazepines, and opioids).    I have ordered a study known as a PAP Titration  This is an in-lab sleep study utilizing CPAP, where we slowly adjust the pressure in order to determine the optimal settings to treat your sleep apnea.   This test should be scheduled at your earliest convenience.  I have also palced an order for a Mandibular Advancement Device; see below. This could be a reasonable alternative to CPAP for FELISA at your severity. You will need to see a sleep-trained dentist for this; I will provide a list below.       Avoid driving if drowsy. We recommend that if you are dozing off while driving, that you do not drive until your sleepiness is appropriately treated.  We encourage a healthy lifestyle with adequate sleep (7-9 hours per night), diet and exercise.  Recommend good sleep hygiene, as outlined below    Myself and/or my team will reach out to you via MyChart and/or phone call with the results and next steps.      Oral Appliance/Mandibular Advancement Device:  These are devices that fit essentially like a double retainer, working in conjunction to pull your lower jaw forward and open your airway while sleeping. These can be quite effective in treating  particularly mild FELISA. To get started with this, we would place a consult/referral to a dentist who specializes in these devices.    Examples of a few possible types of dental appliances are listed below    Travis Jaimes Somnomed Flex   [email or web address]    Prosomnus  [email or web address]      Here are some dental providers patients have used for mandibular advancement devices in the past with the approximate costs incurred:  Dr. Jenni Rosas - [facility] [phone]  Dr. Duong Pond - [facility]   Dr. Duong Morris - [facility] - [phone]  Dr. Kat Mock [facility] - [phone]   Dr. Michele Corral - [facility] [phone]    Unfortunately, I can't attest to quality or services provided, but I gathered the above information from patients who were satisfied with their devices.        Good Sleep Hygiene  Wake up at the same time every day, even on the weekends.  Use your bed for sleep and intimacy only.  If you have been in bed awake for 30 minutes, get up and leave the bedroom. Choose a dull activity not involving a blue screen (TV, computer, handheld devices). Go back to bed when you feel sleepy.  Avoid caffeine, nicotine and alcohol before you go to bed.  Avoid large meals before you go to bed.  Avoid using screens (computers, tablets, smartphones, etc.) for at least 1 hour before bedtime  Exercise regularly, but do not exercise right before you go to bed.  Avoid daytime naps. If you do take a nap, sleep for 20-40 minutes, and not after 2pm.

## 2025-02-06 ENCOUNTER — HOSPITAL ENCOUNTER (OUTPATIENT)
Dept: SLEEP CENTER | Facility: CLINIC | Age: 70
Discharge: HOME | End: 2025-02-06
Payer: MEDICARE

## 2025-02-06 DIAGNOSIS — G47.33 OSA (OBSTRUCTIVE SLEEP APNEA): ICD-10-CM

## 2025-02-06 PROCEDURE — 95811 POLYSOM 6/>YRS CPAP 4/> PARM: CPT

## 2025-02-07 NOTE — PROGRESS NOTES
Sleep Study Documentation    Pre-Sleep Study       Sleep testing procedure explained to patient:YES    Patient napped prior to study:NO    Caffeine:Dayshift worker after 12PM.  Caffeine use:NO    Alcohol:Dayshift workers after 5PM: Alcohol use:NO    Typical day for patient:YES       Study Documentation    Sleep Study Indications: The patient is here for a CPAP titration.     Sleep Study: Treatment   Optimal PAP pressure: 11cm  Leak:Small  Snore:Eliminated  REM Obtained:yes  Supplemental O2: no    Minimum SaO2 N/A  Baseline SaO2 N/A  PAP mask tried (list all) ResMed AirTouch F20 size large  PAP mask choice (final) ResMed AirTouch F20 size large  PAP mask type:full face  PAP pressure at which snoring was eliminated 9cm   Mode of Therapy:CPAP  ETCO2:No  CPAP changed to BiPAP:No    Mode of Therapy:CPAP    EKG abnormalities: yes:  EPOCH example and comments: PVCs throughout the study.     EEG abnormalities: no    Were abnormal behaviors in sleep observed:NO    Is Total Sleep Study Recording Time < 2 hours: N/A    Is Total Sleep Study Recording Time > 2 hours but study is incomplete: N/A    Is Total Sleep Study Recording Time 6 hours or more but sleep was not obtained: NO    Patient classification: retired       Post-Sleep Study    Medication used at bedtime or during sleep study:YES other prescription medications    Patient reports time it took to fall asleep:30 to 60 minutes    Patient reports waking up during study:1 to 2 times.  Patient reports returning to sleep in 10 to 30 minutes.    Patient reports sleeping 4 to 6 hours without dreaming.    Does the Patient feel this is a typical night of sleep:worse than usual    Patient rated sleepiness: Somewhat sleepy or tired    PAP treatment:yes: Post PAP treatment patient reports feeling unsure if a change is noted and  would wear PAP mask at home.

## 2025-02-14 ENCOUNTER — RESULTS FOLLOW-UP (OUTPATIENT)
Dept: SLEEP CENTER | Facility: CLINIC | Age: 70
End: 2025-02-14

## 2025-02-14 DIAGNOSIS — G47.33 OSA (OBSTRUCTIVE SLEEP APNEA): Primary | ICD-10-CM

## 2025-02-14 DIAGNOSIS — Z86.73 HISTORY OF CVA (CEREBROVASCULAR ACCIDENT): ICD-10-CM

## 2025-02-14 DIAGNOSIS — I48.0 PAROXYSMAL ATRIAL FIBRILLATION (HCC): ICD-10-CM

## 2025-02-17 ENCOUNTER — TELEPHONE (OUTPATIENT)
Dept: SLEEP CENTER | Facility: CLINIC | Age: 70
End: 2025-02-17

## 2025-02-17 NOTE — TELEPHONE ENCOUNTER
Rx for pressure change sent to [facility] via [facility] in a separate encounter note.     [facility] message sent to patient to call office to schedule follow up with Dr. Donnelly in about 2-3 months.

## 2025-02-19 LAB
DME PARACHUTE DELIVERY DATE ACTUAL: NORMAL
DME PARACHUTE DELIVERY DATE REQUESTED: NORMAL
DME PARACHUTE ITEM DESCRIPTION: NORMAL
DME PARACHUTE ORDER STATUS: NORMAL
DME PARACHUTE SUPPLIER NAME: NORMAL
DME PARACHUTE SUPPLIER PHONE: NORMAL

## 2025-03-05 ENCOUNTER — TELEPHONE (OUTPATIENT)
Dept: NEPHROLOGY | Facility: CLINIC | Age: 70
End: 2025-03-05

## 2025-03-06 ENCOUNTER — APPOINTMENT (OUTPATIENT)
Dept: LAB | Age: 70
End: 2025-03-06
Payer: MEDICARE

## 2025-03-06 ENCOUNTER — RESULTS FOLLOW-UP (OUTPATIENT)
Dept: OTHER | Facility: HOSPITAL | Age: 70
End: 2025-03-06

## 2025-03-06 DIAGNOSIS — N18.2 CKD (CHRONIC KIDNEY DISEASE) STAGE 2, GFR 60-89 ML/MIN: ICD-10-CM

## 2025-03-06 DIAGNOSIS — I48.0 PAROXYSMAL ATRIAL FIBRILLATION (HCC): ICD-10-CM

## 2025-03-06 DIAGNOSIS — E78.2 MIXED HYPERLIPIDEMIA: ICD-10-CM

## 2025-03-06 DIAGNOSIS — N18.31 STAGE 3A CHRONIC KIDNEY DISEASE (HCC): ICD-10-CM

## 2025-03-06 DIAGNOSIS — I10 ESSENTIAL HYPERTENSION: ICD-10-CM

## 2025-03-06 DIAGNOSIS — C64.2 RENAL CELL CARCINOMA OF LEFT KIDNEY (HCC): ICD-10-CM

## 2025-03-06 LAB
25(OH)D3 SERPL-MCNC: 43.5 NG/ML (ref 30–100)
ALBUMIN SERPL BCG-MCNC: 3.7 G/DL (ref 3.5–5)
ANION GAP SERPL CALCULATED.3IONS-SCNC: 8 MMOL/L (ref 4–13)
BUN SERPL-MCNC: 13 MG/DL (ref 5–25)
CALCIUM SERPL-MCNC: 8.9 MG/DL (ref 8.4–10.2)
CHLORIDE SERPL-SCNC: 102 MMOL/L (ref 96–108)
CO2 SERPL-SCNC: 29 MMOL/L (ref 21–32)
CREAT SERPL-MCNC: 1.12 MG/DL (ref 0.6–1.3)
CREAT UR-MCNC: 53 MG/DL
GFR SERPL CREATININE-BSD FRML MDRD: 66 ML/MIN/1.73SQ M
GLUCOSE P FAST SERPL-MCNC: 83 MG/DL (ref 65–99)
MAGNESIUM SERPL-MCNC: 2 MG/DL (ref 1.9–2.7)
MICROALBUMIN UR-MCNC: 9.7 MG/L
MICROALBUMIN/CREAT 24H UR: 18 MG/G CREATININE (ref 0–30)
PHOSPHATE SERPL-MCNC: 3 MG/DL (ref 2.3–4.1)
POTASSIUM SERPL-SCNC: 3.8 MMOL/L (ref 3.5–5.3)
SODIUM SERPL-SCNC: 139 MMOL/L (ref 135–147)

## 2025-03-06 PROCEDURE — 36415 COLL VENOUS BLD VENIPUNCTURE: CPT

## 2025-03-06 PROCEDURE — 80069 RENAL FUNCTION PANEL: CPT

## 2025-03-06 PROCEDURE — 82043 UR ALBUMIN QUANTITATIVE: CPT

## 2025-03-06 PROCEDURE — 83735 ASSAY OF MAGNESIUM: CPT

## 2025-03-06 PROCEDURE — 82570 ASSAY OF URINE CREATININE: CPT

## 2025-03-06 PROCEDURE — 82306 VITAMIN D 25 HYDROXY: CPT

## 2025-03-12 ENCOUNTER — OFFICE VISIT (OUTPATIENT)
Dept: NEPHROLOGY | Facility: CLINIC | Age: 70
End: 2025-03-12
Payer: MEDICARE

## 2025-03-12 VITALS
SYSTOLIC BLOOD PRESSURE: 116 MMHG | DIASTOLIC BLOOD PRESSURE: 64 MMHG | WEIGHT: 226 LBS | HEIGHT: 73 IN | BODY MASS INDEX: 29.95 KG/M2

## 2025-03-12 DIAGNOSIS — C64.2 RENAL CELL CARCINOMA OF LEFT KIDNEY (HCC): ICD-10-CM

## 2025-03-12 DIAGNOSIS — N18.2 HYPERTENSIVE KIDNEY DISEASE WITH CHRONIC KIDNEY DISEASE STAGE II: Primary | ICD-10-CM

## 2025-03-12 DIAGNOSIS — M89.9 CHRONIC KIDNEY DISEASE-MINERAL AND BONE DISORDER (CKD-MBD): ICD-10-CM

## 2025-03-12 DIAGNOSIS — E78.2 MIXED HYPERLIPIDEMIA: ICD-10-CM

## 2025-03-12 DIAGNOSIS — N18.9 CHRONIC KIDNEY DISEASE-MINERAL AND BONE DISORDER (CKD-MBD): ICD-10-CM

## 2025-03-12 DIAGNOSIS — E83.9 CHRONIC KIDNEY DISEASE-MINERAL AND BONE DISORDER (CKD-MBD): ICD-10-CM

## 2025-03-12 DIAGNOSIS — I12.9 HYPERTENSIVE KIDNEY DISEASE WITH CHRONIC KIDNEY DISEASE STAGE II: Primary | ICD-10-CM

## 2025-03-12 PROCEDURE — 99214 OFFICE O/P EST MOD 30 MIN: CPT | Performed by: INTERNAL MEDICINE

## 2025-03-12 NOTE — PROGRESS NOTES
Name: Ervin Lawson      : 1955      MRN: [id number]  Encounter Provider: Allison Hernandez MD  Encounter Date: 3/12/2025   Encounter department: [facility] [place]  :  69 y.o.  male with pmh of multiple co-morbidities including HTN, AFib, hyperlipidemia, sleep apnea, CVA, I cc status post partial left nephrectomy in 2020 and CKD stage 3 presented to the office for routine follow-up.       Assessment & Plan  Hypertensive kidney disease with chronic kidney disease stage II  Lab Results   Component Value Date    EGFR 66 2025    EGFR 62 10/08/2024    EGFR 55 08/10/2024    CREATININE 1.12 2025    CREATININE 1.17 10/08/2024    CREATININE 1.29 08/10/2024     BP Readings from Last 3 Encounters:   25 116/64   25 122/78   25 128/78   Current medications: Norvasc 5 mg p.o. daily, Lasix 20 mg p.o. as needed metoprolol 25 mg p.o. nightly  Changes made today: Blood pressure volume status stable continue current regimen has not needed Lasix in many months  Goal BP of < 130/80 based on age and comorbidities  Instructed to follow low sodium (2gm)diet.  after review of records [facility] as well as Care everywhere patient has a baseline creatinine of 1-1.3 mg/dL dating as far back as 2017.   Most recent labs show a Creatinine of 1.12 mg/dL on 3/6/2025. Renal function remains stable.  Check blood work in 6 months and then again prior to next visit  Likely has underlying CKD due to hypertensive nephrosclerosis plus decreased nephron mass due to prior partial nephrectomy 2020 for RCC of left kidney.  Imaging:   CT abdomen 2024 no evidence of recurrence, partial left nephrectomy.  No hydronephrosis.  Recommend to avoid use of NSAIDs, nephrotoxins. Caution advised with regards to exposure to IV contrast dye.   Discussed with the patient in depth his renal status, including the possible etiologies for CKD.   Advised the patient that when his GFR is close to  20mL/min then will start discussing about RRT(renal replacement therapy) options such as renal transplant, peritoneal dialysis and hemodialysis.   Informed the patient about the various options for Renal Replacement therapy.  Discussed with the patient how we need to work together to delay the progression of CKD with optimal BP control based on their age and co-morbidities, optimal BS control with HbA1c of <7% and trying to reduce proteinuria by the use of anti-proteinuric agents.   CKD education/Kidney smart: not Applicable at this time  Follow-up: Patient is to follow-up in 12 months, with lab work to be performed in 6 months a few days prior to the next visit. Advised patient to call me in 10 days to review the results if they do not hear back from me, as I may have not received the results.  Orders:  •  Renal function panel; Future  •  Vitamin D 25 hydroxy; Future  •  Vitamin D 25 hydroxy; Future  •  Renal function panel; Future  •  Albumin / creatinine urine ratio; Future  •  Phosphorus; Future    Chronic kidney disease-mineral and bone disorder (CKD-MBD)  Lab Results   Component Value Date    EGFR 66 03/06/2025    EGFR 62 10/08/2024    EGFR 55 08/10/2024    CREATININE 1.12 03/06/2025    CREATININE 1.17 10/08/2024    CREATININE 1.29 08/10/2024   Based on patients CKD stage following is the goal of therapy.  Maintain calcium phosphorus product of < 55.  Currently on: Multivitamin  Changes made today: None  most recent  vit D 43.5 as of 3/6/2025  Check vitamin D prior to next visit and in 6 months  Orders:  •  Renal function panel; Future  •  Vitamin D 25 hydroxy; Future  •  Vitamin D 25 hydroxy; Future  •  Renal function panel; Future  •  Albumin / creatinine urine ratio; Future  •  Phosphorus; Future    Renal cell carcinoma of left kidney (HCC)  History of prior laparoscopic left partial nephrectomy in October 2022  CT abdomen October 2024 no evidence of recurrence, partial left nephrectomy.  No  "hydronephrosis.  Orders:  •  Renal function panel; Future  •  Vitamin D 25 hydroxy; Future  •  Vitamin D 25 hydroxy; Future  •  Renal function panel; Future  •  Albumin / creatinine urine ratio; Future  •  Phosphorus; Future    Mixed hyperlipidemia  Goal LDL less than 70  Continue on statin  Orders:  •  Renal function panel; Future  •  Vitamin D 25 hydroxy; Future  •  Vitamin D 25 hydroxy; Future  •  Renal function panel; Future  •  Albumin / creatinine urine ratio; Future  •  Phosphorus; Future        History of Present Illness   HPI  Ervin Lawson is a 69 y.o. male who presents to the office for routine follow-up with spouse feels well has no complaints no recent hospitalization no issues with edema thankful for the care information that is gone today agrees with current plan not really checking blood pressures at home has not taken Lasix in many months.      Review of Systems   Constitutional:  Negative for chills and fever.   HENT:  Negative for congestion.    Respiratory:  Negative for cough and shortness of breath.    Cardiovascular:  Negative for leg swelling.   Gastrointestinal:  Negative for constipation.   Genitourinary:  Negative for dysuria and hematuria.   Musculoskeletal:  Negative for back pain.   Neurological:  Negative for dizziness and headaches.   Psychiatric/Behavioral:  Negative for agitation and confusion.    All other systems reviewed and are negative.         Objective   /64 (BP Location: Left arm, Patient Position: Sitting, Cuff Size: Large)   Ht 6' 1\" (1.854 m)   Wt 103 kg (226 lb)   BMI 29.82 kg/m²      Physical Exam  Vitals reviewed.   Constitutional:       General: He is not in acute distress.     Appearance: Normal appearance. He is normal weight. He is not ill-appearing, toxic-appearing or diaphoretic.   HENT:      Head: Normocephalic and atraumatic.      Mouth/Throat:      Mouth: Mucous membranes are moist.      Pharynx: No oropharyngeal exudate.   Eyes:      General: No " scleral icterus.  Cardiovascular:      Rate and Rhythm: Normal rate.   Pulmonary:      Effort: No respiratory distress.      Breath sounds: Normal breath sounds. No stridor. No wheezing.   Abdominal:      Palpations: There is no mass.      Tenderness: There is no abdominal tenderness.   Musculoskeletal:         General: No swelling.      Cervical back: Normal range of motion. No rigidity.   Skin:     Coloration: Skin is not jaundiced.   Neurological:      General: No focal deficit present.      Mental Status: He is alert and oriented to person, place, and time. Mental status is at baseline.   Psychiatric:         Mood and Affect: Mood normal.         Behavior: Behavior normal.

## 2025-03-12 NOTE — ASSESSMENT & PLAN NOTE
Goal LDL less than 70  Continue on statin  Orders:  •  Renal function panel; Future  •  Vitamin D 25 hydroxy; Future  •  Vitamin D 25 hydroxy; Future  •  Renal function panel; Future  •  Albumin / creatinine urine ratio; Future  •  Phosphorus; Future

## 2025-03-12 NOTE — ASSESSMENT & PLAN NOTE
History of prior laparoscopic left partial nephrectomy in October 2022  CT abdomen October 2024 no evidence of recurrence, partial left nephrectomy.  No hydronephrosis.  Orders:  •  Renal function panel; Future  •  Vitamin D 25 hydroxy; Future  •  Vitamin D 25 hydroxy; Future  •  Renal function panel; Future  •  Albumin / creatinine urine ratio; Future  •  Phosphorus; Future

## 2025-03-12 NOTE — ASSESSMENT & PLAN NOTE
Lab Results   Component Value Date    EGFR 66 03/06/2025    EGFR 62 10/08/2024    EGFR 55 08/10/2024    CREATININE 1.12 03/06/2025    CREATININE 1.17 10/08/2024    CREATININE 1.29 08/10/2024     BP Readings from Last 3 Encounters:   03/12/25 116/64   01/29/25 122/78   01/21/25 128/78   Current medications: Norvasc 5 mg p.o. daily, Lasix 20 mg p.o. as needed metoprolol 25 mg p.o. nightly  Changes made today: Blood pressure volume status stable continue current regimen has not needed Lasix in many months  Goal BP of < 130/80 based on age and comorbidities  Instructed to follow low sodium (2gm)diet.  after review of records In [facility] as well as Care everywhere patient has a baseline creatinine of 1-1.3 mg/dL dating as far back as 2017.   Most recent labs show a Creatinine of 1.12 mg/dL on 3/6/2025. Renal function remains stable.  Check blood work in 6 months and then again prior to next visit  Likely has underlying CKD due to hypertensive nephrosclerosis plus decreased nephron mass due to prior partial nephrectomy October 2020 for RCC of left kidney.  Imaging:   CT abdomen October 2024 no evidence of recurrence, partial left nephrectomy.  No hydronephrosis.  Recommend to avoid use of NSAIDs, nephrotoxins. Caution advised with regards to exposure to IV contrast dye.   Discussed with the patient in depth his renal status, including the possible etiologies for CKD.   Advised the patient that when his GFR is close to 20mL/min then will start discussing about RRT(renal replacement therapy) options such as renal transplant, peritoneal dialysis and hemodialysis.   Informed the patient about the various options for Renal Replacement therapy.  Discussed with the patient how we need to work together to delay the progression of CKD with optimal BP control based on their age and co-morbidities, optimal BS control with HbA1c of <7% and trying to reduce proteinuria by the use of anti-proteinuric agents.   CKD education/Kidney smart:  not Applicable at this time  Follow-up: Patient is to follow-up in 12 months, with lab work to be performed in 6 months a few days prior to the next visit. Advised patient to call me in 10 days to review the results if they do not hear back from me, as I may have not received the results.  Orders:  •  Renal function panel; Future  •  Vitamin D 25 hydroxy; Future  •  Vitamin D 25 hydroxy; Future  •  Renal function panel; Future  •  Albumin / creatinine urine ratio; Future  •  Phosphorus; Future

## 2025-03-12 NOTE — ASSESSMENT & PLAN NOTE
Lab Results   Component Value Date    EGFR 66 03/06/2025    EGFR 62 10/08/2024    EGFR 55 08/10/2024    CREATININE 1.12 03/06/2025    CREATININE 1.17 10/08/2024    CREATININE 1.29 08/10/2024   Based on patients CKD stage following is the goal of therapy.  Maintain calcium phosphorus product of < 55.  Currently on: Multivitamin  Changes made today: None  most recent  vit D 43.5 as of 3/6/2025  Check vitamin D prior to next visit and in 6 months  Orders:  •  Renal function panel; Future  •  Vitamin D 25 hydroxy; Future  •  Vitamin D 25 hydroxy; Future  •  Renal function panel; Future  •  Albumin / creatinine urine ratio; Future  •  Phosphorus; Future

## 2025-03-12 NOTE — PATIENT INSTRUCTIONS
Bloodwork in 6months    Please call me in 10 days after having your blood work done to review the results if you do not hear back from me or my office, as I may have not received the results.  please remember to perform blood work prior to the next visit.  Please call if the blood pressure top number is greater than 140 or less than 110 consistently.  Please call if you are gaining more than 2lbs in 2 days for adjustment of water pills.  ~ Please AVOID the following pain medications.  LIST OF NSAIDS (NONSTEROIDAL ANTI-INFLAMMATORY DRUGS) AND BARKER-2 INHIBITORS    DIFLUNISAL (DOLOBID)  IBUPROFEN (MOTRIN, ADVIL)  FLURBIPROFEN (ANSAID)  KETOPROFEN (ORUDIS, ORUVAIL)  FENOPROFEN (NALFON)  NABUMETONE (RELAFEN)  PIROXICAM (FELDENE)  NAPROXEN (ALEVE, NAPROSYN, NAPRELAN, ANAPROX)  DICLOFENAC (VOLTAREN, CATAFLAM)  INDOMETHACIN (INDOCIN)  SULINDAC (CLINORIL)  TOLMETIN (TOLETIN)  ETODOLAC (LODINE)  MELOXICAM (MOBIC)  KETOROLAC (TORADOL)  OXAPROZIN (DAYPRO)  CELECOXIB (CELEBREX)

## 2025-04-09 NOTE — PATIENT INSTRUCTIONS
Patient will follow up in 2 weeks with CT head or sooner if symptoms worsen    Continue to hold Eliquis nerve block performed without complications.  VSS.  Pt tolerated well.  Will continue to monitor.

## 2025-04-15 ENCOUNTER — RA CDI HCC (OUTPATIENT)
Dept: OTHER | Facility: HOSPITAL | Age: 70
End: 2025-04-15

## 2025-04-22 ENCOUNTER — OFFICE VISIT (OUTPATIENT)
Age: 70
End: 2025-04-22
Payer: MEDICARE

## 2025-04-22 VITALS
WEIGHT: 226.2 LBS | DIASTOLIC BLOOD PRESSURE: 64 MMHG | TEMPERATURE: 97.8 F | OXYGEN SATURATION: 98 % | SYSTOLIC BLOOD PRESSURE: 112 MMHG | HEART RATE: 64 BPM | BODY MASS INDEX: 29.98 KG/M2 | HEIGHT: 73 IN

## 2025-04-22 DIAGNOSIS — E78.2 MIXED HYPERLIPIDEMIA: ICD-10-CM

## 2025-04-22 DIAGNOSIS — Z86.73 HISTORY OF CVA (CEREBROVASCULAR ACCIDENT): ICD-10-CM

## 2025-04-22 DIAGNOSIS — I48.19 PERSISTENT ATRIAL FIBRILLATION (HCC): ICD-10-CM

## 2025-04-22 DIAGNOSIS — Z86.79 S/P ABLATION OF ATRIAL FIBRILLATION: ICD-10-CM

## 2025-04-22 DIAGNOSIS — Z12.5 SCREENING FOR PROSTATE CANCER: ICD-10-CM

## 2025-04-22 DIAGNOSIS — S06.5X9A TRAUMATIC SUBDURAL HEMORRHAGE WITH LOSS OF CONSCIOUSNESS OF UNSPECIFIED DURATION, INITIAL ENCOUNTER (HCC): ICD-10-CM

## 2025-04-22 DIAGNOSIS — I10 ESSENTIAL HYPERTENSION: Primary | ICD-10-CM

## 2025-04-22 DIAGNOSIS — R60.9 SWELLING: ICD-10-CM

## 2025-04-22 DIAGNOSIS — I48.0 PAROXYSMAL ATRIAL FIBRILLATION (HCC): ICD-10-CM

## 2025-04-22 DIAGNOSIS — S39.012A STRAIN OF LUMBAR REGION, INITIAL ENCOUNTER: ICD-10-CM

## 2025-04-22 DIAGNOSIS — Z98.890 S/P ABLATION OF ATRIAL FIBRILLATION: ICD-10-CM

## 2025-04-22 PROCEDURE — G2211 COMPLEX E/M VISIT ADD ON: HCPCS | Performed by: FAMILY MEDICINE

## 2025-04-22 PROCEDURE — 99214 OFFICE O/P EST MOD 30 MIN: CPT | Performed by: FAMILY MEDICINE

## 2025-04-22 RX ORDER — AMLODIPINE BESYLATE 5 MG/1
5 TABLET ORAL DAILY
Qty: 90 TABLET | Refills: 1 | Status: SHIPPED | OUTPATIENT
Start: 2025-04-22

## 2025-04-22 RX ORDER — ATORVASTATIN CALCIUM 10 MG/1
10 TABLET, FILM COATED ORAL DAILY
Qty: 90 TABLET | Refills: 1 | Status: SHIPPED | OUTPATIENT
Start: 2025-04-22

## 2025-04-22 RX ORDER — METHOCARBAMOL 500 MG/1
500 TABLET, FILM COATED ORAL 4 TIMES DAILY
Qty: 60 TABLET | Refills: 0 | Status: SHIPPED | OUTPATIENT
Start: 2025-04-22

## 2025-04-22 NOTE — ASSESSMENT & PLAN NOTE
Patient in normal sinus rhythm status post ablation.  Follow with cardiology appropriately.  Orders:    Comprehensive metabolic panel; Future    CBC and differential; Future    Lipid panel; Future    TSH, 3rd generation with Free T4 reflex; Future

## 2025-04-22 NOTE — PROGRESS NOTES
Name: Ervin Lawson      : 1955      MRN: [id number]  Encounter Provider: Reggie Lockhart DO  Encounter Date: 2025   Encounter department: [facility]  :  Assessment & Plan  Essential hypertension  Stable continue with current treatment.  Refills given.    Orders:    amLODIPine (NORVASC) 5 mg tablet; Take 1 tablet (5 mg total) by mouth daily    Comprehensive metabolic panel; Future    CBC and differential; Future    Lipid panel; Future    TSH, 3rd generation with Free T4 reflex; Future    Paroxysmal atrial fibrillation (HCC)  Patient in normal sinus rhythm status post ablation.  Follow with cardiology appropriately.  Orders:    Comprehensive metabolic panel; Future    CBC and differential; Future    Lipid panel; Future    TSH, 3rd generation with Free T4 reflex; Future    Mixed hyperlipidemia  Stable.  Recheck laboratory studies prior to next visit in 6 months.  Labs reviewed.    Orders:    atorvastatin (LIPITOR) 10 mg tablet; Take 1 tablet (10 mg total) by mouth daily    Comprehensive metabolic panel; Future    CBC and differential; Future    Lipid panel; Future    TSH, 3rd generation with Free T4 reflex; Future    History of CVA (cerebrovascular accident)  No new symptoms.  Patient stable in this regard.         S/P ablation of atrial fibrillation  In normal sinus rhythm at this time.         Strain of lumbar region, initial encounter  Patient use the methocarbamol as directed.  Patient will use heat and stretching.  To consider physical therapy.    Orders:    methocarbamol (ROBAXIN) 500 mg tablet; Take 1 tablet (500 mg total) by mouth 4 (four) times a day    Traumatic subdural hemorrhage with loss of consciousness of unspecified duration, initial encounter (HCC)  Stable.  Result         Swelling         Screening for prostate cancer    Orders:    PSA, Total Screen; Future    Persistent atrial fibrillation (HCC)               Depression Screening and Follow-up Plan: Patient was  "screened for depression during today's encounter. They screened negative with a PHQ-2 score of 0.        History of Present Illness   Patient here to follow-up on hypertension hyperlipidemia with history of A-fib PVCs CVA.  No new stroke related issues.  No new chest pain palpitations shortness of breath.  No significant change in urination or defecation.  No headaches or vision changes noted.  No new worsening of swelling of lower extremities.  Patient with some lumbar pain over the past week or so.  Patient 90% improved.  Patient using heat.  Pain is on the right.  No radicular symptoms.  Patient to use Tylenol.      Review of Systems   Constitutional: Negative.    HENT: Negative.     Eyes: Negative.    Respiratory: Negative.     Cardiovascular: Negative.    Gastrointestinal: Negative.    Endocrine: Negative.    Genitourinary: Negative.    Musculoskeletal:  Positive for back pain.   Skin: Negative.    Allergic/Immunologic: Negative.    Neurological: Negative.    Hematological: Negative.    Psychiatric/Behavioral: Negative.         Objective   /64 (BP Location: Right arm, Patient Position: Sitting, Cuff Size: Standard)   Pulse 64   Temp 97.8 °F (36.6 °C) (Temporal)   Ht 6' 1\" (1.854 m)   Wt 103 kg (226 lb 3.2 oz)   SpO2 98%   BMI 29.84 kg/m²      Physical Exam  Vitals and nursing note reviewed.   Constitutional:       General: He is not in acute distress.     Appearance: Normal appearance. He is well-developed. He is not ill-appearing, toxic-appearing or diaphoretic.   HENT:      Head: Normocephalic and atraumatic.      Right Ear: Tympanic membrane, ear canal and external ear normal.      Left Ear: Tympanic membrane, ear canal and external ear normal.      Nose: Nose normal.      Mouth/Throat:      Mouth: Mucous membranes are moist.      Pharynx: No oropharyngeal exudate or posterior oropharyngeal erythema.   Eyes:      General:         Right eye: No discharge.         Left eye: No discharge.   Neck:    "   Thyroid: No thyromegaly.      Vascular: No carotid bruit.      Trachea: No tracheal deviation.   Cardiovascular:      Rate and Rhythm: Normal rate and regular rhythm.      Heart sounds: Normal heart sounds. No murmur heard.     No gallop.   Pulmonary:      Effort: Pulmonary effort is normal. No respiratory distress.      Breath sounds: Normal breath sounds. No stridor. No wheezing or rales.   Chest:      Chest wall: No tenderness.   Musculoskeletal:         General: Tenderness present. No deformity.      Cervical back: Normal range of motion and neck supple.      Right lower leg: Edema present.      Left lower leg: Edema present.      Comments: Paravertebral muscle spasm and tenderness in the right lower lumbar region.   Lymphadenopathy:      Cervical: No cervical adenopathy.   Skin:     General: Skin is warm and dry.      Coloration: Skin is not pale.      Findings: No erythema or rash.   Neurological:      Mental Status: He is alert and oriented to person, place, and time.      Cranial Nerves: No cranial nerve deficit.      Motor: No abnormal muscle tone.      Coordination: Coordination normal.      Deep Tendon Reflexes: Reflexes normal.   Psychiatric:         Behavior: Behavior normal.         Thought Content: Thought content normal.         Judgment: Judgment normal.

## 2025-04-22 NOTE — ASSESSMENT & PLAN NOTE
Stable.  Recheck laboratory studies prior to next visit in 6 months.  Labs reviewed.    Orders:    atorvastatin (LIPITOR) 10 mg tablet; Take 1 tablet (10 mg total) by mouth daily    Comprehensive metabolic panel; Future    CBC and differential; Future    Lipid panel; Future    TSH, 3rd generation with Free T4 reflex; Future

## 2025-04-22 NOTE — ASSESSMENT & PLAN NOTE
Stable continue with current treatment.  Refills given.    Orders:    amLODIPine (NORVASC) 5 mg tablet; Take 1 tablet (5 mg total) by mouth daily    Comprehensive metabolic panel; Future    CBC and differential; Future    Lipid panel; Future    TSH, 3rd generation with Free T4 reflex; Future

## 2025-04-22 NOTE — ASSESSMENT & PLAN NOTE
Patient use the methocarbamol as directed.  Patient will use heat and stretching.  To consider physical therapy.    Orders:    methocarbamol (ROBAXIN) 500 mg tablet; Take 1 tablet (500 mg total) by mouth 4 (four) times a day

## 2025-04-24 ENCOUNTER — TELEPHONE (OUTPATIENT)
Age: 70
End: 2025-04-24

## 2025-04-24 NOTE — TELEPHONE ENCOUNTER
Auth needed for methocarbamol 500mg. Take 1 tablet 4 times daily. Dispense qty 60. Dx: S39.012A    Combo: heat, stretching exercises, tylenol    CMM Key: [id number]  Prescribed by Dr Richy Hauser P: [phone] F: [phone]

## 2025-04-25 DIAGNOSIS — S39.012A STRAIN OF LUMBAR REGION, INITIAL ENCOUNTER: Primary | ICD-10-CM

## 2025-04-25 RX ORDER — CYCLOBENZAPRINE HCL 5 MG
5 TABLET ORAL 3 TIMES DAILY PRN
Qty: 30 TABLET | Refills: 0 | Status: SHIPPED | OUTPATIENT
Start: 2025-04-25 | End: 2025-04-25

## 2025-04-25 NOTE — TELEPHONE ENCOUNTER
Looks like insurance would like patient to use cyclobenzaprine, tizanidine or chlorzoxazone first.

## 2025-04-25 NOTE — TELEPHONE ENCOUNTER
PA for methocarbamol 500mg tablet DENIED    Reason:        Message sent to office clinical pool Yes    Denial letter scanned into Media Yes    Appeal started No (Provider will need to decide if appeal is warranted and send clinical documentation to Prior Authorization Team for initiation.)    **Please follow up with your patient regarding denial and next steps**

## 2025-04-25 NOTE — TELEPHONE ENCOUNTER
PA for methocarbamol 500mg tablet SUBMITTED to [employer]    via    []CMM-KEY:   [x]Surescripts-Case ID # [id number]   []Availity-Auth ID # NDC #   []Faxed to plan   []Other website   []Phone call Case ID #     [x]PA sent as URGENT    All office notes, labs and other pertaining documents and studies sent. Clinical questions answered. Awaiting determination from insurance company.     Turnaround time for your insurance to make a decision on your Prior Authorization can take 7-21 business days.

## 2025-04-25 NOTE — TELEPHONE ENCOUNTER
Spoke with the patient, he stated he already got the methocarbamol as he paid out of pocket for it. Is there a way for you to cancel the cyclbenzaprine or do I need to call the pharmacy?

## 2025-04-25 NOTE — TELEPHONE ENCOUNTER
Prescription for 5 mg Flexeril sent to pharmacy instead, please advise patient to use medication only when really needed as this medicine is not ideal given heart issues.  Would recommend patient trial PT as Dr. Lockhart previously recommended for patient.

## 2025-05-20 ENCOUNTER — OFFICE VISIT (OUTPATIENT)
Dept: CARDIOLOGY CLINIC | Facility: CLINIC | Age: 70
End: 2025-05-20
Payer: MEDICARE

## 2025-05-20 VITALS
BODY MASS INDEX: 29.69 KG/M2 | DIASTOLIC BLOOD PRESSURE: 72 MMHG | HEART RATE: 59 BPM | SYSTOLIC BLOOD PRESSURE: 108 MMHG | WEIGHT: 225 LBS

## 2025-05-20 DIAGNOSIS — I10 ESSENTIAL HYPERTENSION: ICD-10-CM

## 2025-05-20 DIAGNOSIS — I48.0 PAROXYSMAL ATRIAL FIBRILLATION (HCC): Primary | ICD-10-CM

## 2025-05-20 DIAGNOSIS — I49.3 PVC (PREMATURE VENTRICULAR CONTRACTION): ICD-10-CM

## 2025-05-20 DIAGNOSIS — Z95.818 PRESENCE OF WATCHMAN LEFT ATRIAL APPENDAGE CLOSURE DEVICE: ICD-10-CM

## 2025-05-20 DIAGNOSIS — R00.1 SINUS BRADYCARDIA: ICD-10-CM

## 2025-05-20 LAB
ATRIAL RATE: 59 BPM
P AXIS: 80 DEGREES
PR INTERVAL: 234 MS
QRS AXIS: 72 DEGREES
QRSD INTERVAL: 96 MS
QT INTERVAL: 444 MS
QTC INTERVAL: 439 MS
T WAVE AXIS: 62 DEGREES
VENTRICULAR RATE: 59 BPM

## 2025-05-20 PROCEDURE — 99214 OFFICE O/P EST MOD 30 MIN: CPT | Performed by: INTERNAL MEDICINE

## 2025-05-20 PROCEDURE — 93000 ELECTROCARDIOGRAM COMPLETE: CPT | Performed by: INTERNAL MEDICINE

## 2025-05-20 RX ORDER — AMLODIPINE BESYLATE 2.5 MG/1
2.5 TABLET ORAL DAILY
Qty: 90 TABLET | Refills: 3 | Status: SHIPPED | OUTPATIENT
Start: 2025-05-20

## 2025-05-20 NOTE — ASSESSMENT & PLAN NOTE
BP on the lower side, having occasional LE edema.  Decrease amlodipine to 2.5mg  Continue 25mg toprol XL at night.

## 2025-05-20 NOTE — ASSESSMENT & PLAN NOTE
Eventually s/p afib ablation by Dr. Hyatt 8/2024. Watchman device in place from before. On flecainide for PAF and PVCs.  Aspirin given h/o Watchman.  In SR.   Metoprolol dose decreased to 25mg at night because HR was running lower, including on stress test.   Stress negative for ischemia 1/2025 with Flecainide.

## 2025-05-20 NOTE — PROGRESS NOTES
Cardiology Follow Up    Ervin Lawson  1955  [id number]  [facility]  [address]  [address]  [place] [id number] [phone]    1. Paroxysmal atrial fibrillation (HCC)  POCT ECG      2. Essential hypertension  amLODIPine (NORVASC) 2.5 mg tablet      3. PVC (premature ventricular contraction)        4. Sinus bradycardia        5. Presence of Watchman left atrial appendage closure device          Assessment & Plan  Paroxysmal atrial fibrillation (HCC)  Eventually s/p afib ablation by Dr. Hyatt 8/2024. Watchman device in place from before. On flecainide for PAF and PVCs.  Aspirin given h/o Watchman.  In SR.   Metoprolol dose decreased to 25mg at night because HR was running lower, including on stress test.   Stress negative for ischemia 1/2025 with Flecainide.  Essential hypertension  BP on the lower side, having occasional LE edema.  Decrease amlodipine to 2.5mg  Continue 25mg toprol XL at night.    PVC (premature ventricular contraction)  Noted on zio patch. Asymptomatic. On flecainide and metoprolol.  Sinus bradycardia  Asymptomatic.  Metoprolol down to 25mg daily  Flecainide 50 twice a day.  Presence of Watchman left atrial appendage closure device  H/o SDH.  On aspirin alone now.        History of Present Illness:   Pleasant 70-year-old man.  History of atrial fibrillation with a prior CVA.  Was previously maintaining sinus rhythm with Toprol XL, but in 12/2019, was found to be in afib, more persistent. We did a Holter, which showed rate controlled afib, but he was feeling palpitations. After discussing options, he was referred to EP. Initially started on Flecainide, and then underwent cardioversion in 7/2020.  Was having fatigue, so was planned for ablation, but the preoperative CT scan showed a renal mass.  Had partial nephrectomy.  Unfortunately, then had a subdural hematoma.  He was back on medical therapy for  afib, then underwent Watchman Device implantation.    Initially did well, then recurrence and now s/p afib ablation 8/2024.      Interval History:    Since last visit with me, he did follow back up with Dr. Hyatt noted more symptomatic A-fib and has now undergone ablation August 2024. Procedure was uncomplicated he is feeling well. He did have asymptomatic PVCs a Zio patch was done and was restarted on flecainide afterwards. He had a stress test which showed no significant ischemia, but did have concerns related to chronotropic incompetence so his metoprolol was decreased from 50 mg to 25 mg which he is taking at night.    Blood pressure is on the lower side. He gets some occasional lower extremity edema as well. There is an order for Lasix to take as needed on his medication list, but he does not remember the last time he is taking it or if he even has any pills left.    Problem List       Cerebrovascular disease, ill-defined, acute    Essential hypertension    Paroxysmal atrial fibrillation (HCC)    Overview Signed 3/15/2018  2:07 PM by Reggie Lockhart DO     Transitioned From: Atrial fibrillation         Mixed hyperlipidemia    Pain of toe of right foot          Past Medical History:   Diagnosis Date    Abnormal ECG 2014    Arthritis     Atrial fibrillation (HCC)     BPH (benign prostatic hypertrophy)     Cancer (HCC) 08/2020    Kidney cancer    Cancer (HCC)     melanoma    Chronic kidney disease     left kidney cancer    Clotting disorder (HCC) 1/10/2021    CPAP (continuous positive airway pressure) dependence     Dental disease     Headache(784.0)     Not current and not often    Hyperlipidemia     Hypertension     Irregular heart beat     Afib    Pneumonia     Skin cancer     Sleep apnea     Stroke (HCC)     Supraventricular tachycardia (HCC)     Tinnitus     Wears glasses     Wears partial dentures     upper partial     Social History     Tobacco Use    Smoking status: Never    Smokeless tobacco: Never    Substance Use Topics    Alcohol use: Not Currently     Comment: Social only     Family History   Problem Relation Age of Onset    Cancer Father         stomach    Hypertension Father     Stroke Father         syndrome    Cancer Family     Heart disease Family     Hypertension Family     Stroke Family         syndrome    Throat cancer Mother     Diabetes Mother     Hypertension Mother     Cancer Mother     Asthma Sister      Past Surgical History:   Procedure Laterality Date    ABLATION OF DYSRHYTHMIC FOCUS      APPENDECTOMY      CARDIAC ELECTROPHYSIOLOGY PROCEDURE N/A 08/09/2024    Procedure: Cardiac eps/afib ablation PFA;  Surgeon: Farrukh Hyatt MD;  Location: [facility];  Service: Cardiology    CARDIOVERSION      COLONOSCOPY      fiberoptic, also 2009, both negative, resolved 2004    COLONOSCOPY  08/2019    EGD      HERNIA REPAIR      umbilical and bilateral inguinal    KNEE ARTHROSCOPY Left     NJ LAPAROSCOPY SURG PARTIAL NEPHRECTOMY Left 10/07/2020    Procedure: ROBOTIC PARTIAL NEPHRECTOMY;  Surgeon: Vickey Jane MD;  Location: [facility];  Service: Urology    N[place] PERQ CLSR TCAT L ATR APNDGE W/ENDOCARDIAL IMPLNT N/A 03/25/2021    Procedure: LEFT ATRIAL APPENDAGE OCCLUSION;  Surgeon: Farrukh Hyatt MD;  Location: [facility];  Service: Cardiology    SKIN GRAFT      left ear, skin cancer    TONSILLECTOMY         Current Outpatient Medications:     amLODIPine (NORVASC) 2.5 mg tablet, Take 1 tablet (2.5 mg total) by mouth daily, Disp: 90 tablet, Rfl: 3    aspirin (ECOTRIN LOW STRENGTH) 81 mg EC tablet, Take 1 tablet (81 mg total) by mouth daily, Disp: 30 tablet, Rfl: 2    atorvastatin (LIPITOR) 10 mg tablet, Take 1 tablet (10 mg total) by mouth daily, Disp: 90 tablet, Rfl: 1    Coenzyme Q10 (CO Q-10 PO), Take 1 caplet by mouth every morning, Disp: , Rfl:     flecainide (TAMBOCOR) 50 mg tablet, Take 1 tablet (50 mg total) by mouth 2 (two) times a day, Disp: 180 tablet, Rfl: 3    furosemide (LASIX) 20  mg tablet, Take 1 tablet (20 mg total) by mouth daily as needed (swelling), Disp: 30 tablet, Rfl: 1    metoprolol succinate (TOPROL-XL) 50 mg 24 hr tablet, Take 0.5 tablets (25 mg total) by mouth every evening, Disp: , Rfl:     Multiple Vitamins-Minerals (MULTIVITAMIN ADULT PO), Take 1 tablet by mouth every morning, Disp: , Rfl:     methocarbamol (ROBAXIN) 500 mg tablet, Take 1 tablet (500 mg total) by mouth 4 (four) times a day, Disp: 60 tablet, Rfl: 0  No Known Allergies    Vitals:    05/20/25 0937   BP: 108/72   Pulse: 59   Weight: 102 kg (225 lb)     Vitals:    05/20/25 0937   Weight: 102 kg (225 lb)          Body mass index is 29.69 kg/m².    Physical Exam:  GEN: Ervin Lawson appears well, alert and oriented x 3, pleasant and cooperative   HEENT: pupils equal, round, and reactive to light; extraocular muscles intact  NECK: supple, no carotid bruits   HEART: regular rhythm, normal S1 and S2, no murmurs, clicks, gallops or rubs   LUNGS: clear to auscultation bilaterally; no wheezes, rales, or rhonchi   ABDOMEN: normal bowel sounds, soft, no tenderness, no distention  EXTREMITIES: peripheral pulses normal; no clubbing, cyanosis, or edema  NEURO: no focal findings   SKIN: normal without suspicious lesions on exposed skin    ROS:  Except as noted in HPI, is otherwise reviewed in detail and a 12 point review of systems is negative.  ROS reviewed and is unchanged    Labs:  Lab Results   Component Value Date     09/19/2017    K 3.8 03/06/2025     03/06/2025    CREATININE 1.12 03/06/2025    BUN 13 03/06/2025    CO2 29 03/06/2025    ALT 22 10/08/2024    AST 23 10/08/2024    INR 1.34 (H) 08/09/2024    GLUF 83 03/06/2025    WBC 5.69 10/08/2024    HGB 14.3 10/08/2024    HCT 44.0 10/08/2024     10/08/2024       Lab Results   Component Value Date    CHOL 136 09/19/2017    CHOL 132 05/31/2017    CHOL 124 (L) 03/30/2016     Lab Results   Component Value Date    LDLCALC 58 10/08/2024    LDLCALC 54  10/19/2023    LDLCALC 56 09/09/2022     Lab Results   Component Value Date    HDL 52 10/08/2024    HDL 53 10/19/2023    HDL 63 09/09/2022     Lab Results   Component Value Date    TRIG 53 10/08/2024    TRIG 53 10/19/2023    TRIG 48 09/09/2022     Echo 6/8/23:  Left Ventricle: Left ventricular cavity size is normal. Wall thickness is normal. The left ventricular ejection fraction is 55%. Systolic function is normal. Wall motion is normal. Diastolic function is normal.    Right Ventricle: Right ventricular cavity size is normal. Systolic function is normal.    Left Atrium: The atrium is mildly dilated.    Mitral Valve: There is mild regurgitation.    Tricuspid Valve: There is trace regurgitation.    Aorta: The aortic root is mildly dilated. The ascending aorta is mildly dilated.    Prior TTE study available for comparison. Prior study date: 10/8/2019. No significant changes noted compared to the prior study.    ALONZO 5/10/21:  LEFT VENTRICLE:  Systolic function was normal. Ejection fraction was estimated to be 55 %.  Although no diagnostic regional wall motion abnormality was identified, this possibility cannot be completely excluded on the basis of this study.     LEFT ATRIUM:  The atrium was mildly dilated.     LEFT ATRIAL APPENDAGE:  A 30 mm left atrial appendage occluder was visualized and was well seated without associated thrombus. There were 2 small residual defects (<3.5 mm in diameter) in the medial and lateral aspects of the device.     MITRAL VALVE:  There was mild regurgitation.     AORTIC VALVE:  There was trace regurgitation.     TRICUSPID VALVE:  There was mild to moderate regurgitation.    Echo 10/2019:  LEFT VENTRICLE:  Systolic function was normal. Ejection fraction was estimated to be 55 %.  There were no regional wall motion abnormalities.  Wall thickness was at the upper limits of normal.     MITRAL VALVE:  There was mild to moderate regurgitation.     TRICUSPID VALVE:  There was mild  regurgitation.  Pulmonary artery systolic pressure was within the normal range.     PULMONIC VALVE:  There was trace regurgitation.     AORTA:  The root exhibited mild dilatation. 3.8 cm  There was mild dilatation of the ascending aorta. 3.7 cm     Holter 5/27/2020:  IMPRESSION:  Predominantly atrial fibrillation with an average heart rate of 78 bpm.   Rare PVCs, consisting of 0.5% of total beats. No sustained ventricular arrhythmias.  No significant pauses.    Patient's symptoms of fluttering correlated with atrial fibrillation at controlled rates.     EKG:  Sinus bradycardia 59 BPM.

## 2025-07-17 ENCOUNTER — OFFICE VISIT (OUTPATIENT)
Age: 70
End: 2025-07-17
Payer: MEDICARE

## 2025-07-17 VITALS
BODY MASS INDEX: 30.09 KG/M2 | OXYGEN SATURATION: 98 % | DIASTOLIC BLOOD PRESSURE: 72 MMHG | WEIGHT: 227 LBS | SYSTOLIC BLOOD PRESSURE: 118 MMHG | TEMPERATURE: 97.9 F | HEIGHT: 73 IN | HEART RATE: 61 BPM

## 2025-07-17 DIAGNOSIS — R23.3 PETECHIAE: Primary | ICD-10-CM

## 2025-07-17 PROCEDURE — 99213 OFFICE O/P EST LOW 20 MIN: CPT | Performed by: FAMILY MEDICINE

## 2025-07-17 PROCEDURE — G2211 COMPLEX E/M VISIT ADD ON: HCPCS | Performed by: FAMILY MEDICINE

## 2025-07-17 NOTE — ASSESSMENT & PLAN NOTE
We will observe at this time.  To consider Doppler study if worsening redness or pain occurs.

## 2025-07-17 NOTE — PROGRESS NOTES
"Name: Ervin Lawson      : 1955      MRN: [id number]  Encounter Provider: Reggie Lockhart DO  Encounter Date: 2025   Encounter department: [facility]  :  Assessment & Plan  Petechiae  We will observe at this time.  To consider Doppler study if worsening redness or pain occurs.              History of Present Illness   Patient is here with redness on posterior calf left worse than right over the past day or so.  Patient status post trip to [place].  Patient with some tightness noted.  No chest pain or shortness of breath associated with this.  No treatment used at this time.  No significant change urination or defecation.  No personal or family history of DVT.      Review of Systems   Constitutional: Negative.    HENT: Negative.     Eyes: Negative.    Respiratory: Negative.     Cardiovascular: Negative.    Gastrointestinal: Negative.    Endocrine: Negative.    Genitourinary: Negative.    Musculoskeletal: Negative.    Skin:  Positive for color change.   Allergic/Immunologic: Negative.    Neurological: Negative.    Hematological: Negative.    Psychiatric/Behavioral: Negative.         Objective   /72 (BP Location: Left arm, Patient Position: Sitting, Cuff Size: Large)   Pulse 61   Temp 97.9 °F (36.6 °C) (Temporal)   Ht 6' 1\" (1.854 m)   Wt 103 kg (227 lb)   SpO2 98%   BMI 29.95 kg/m²      Physical Exam  Vitals and nursing note reviewed.   Constitutional:       General: He is not in acute distress.     Appearance: Normal appearance. He is not ill-appearing, toxic-appearing or diaphoretic.     Cardiovascular:      Rate and Rhythm: Normal rate and regular rhythm.      Pulses: Normal pulses.      Heart sounds: Normal heart sounds.   Pulmonary:      Effort: Pulmonary effort is normal.      Breath sounds: Normal breath sounds.     Skin:     Comments: Petechiae posterior lower calf left worse than right.  No significant upper calf discomfort     Neurological:      Mental Status: " He is alert.

## (undated) DEVICE — 1 X VERSACROSS CONNECT TRANSSEPTAL DILATOR;  1 X VERSACROSS RF WIRE (INCLUDING 1 X CONNECTOR CABLE (SINGLE USE)): Brand: VERSACROSS CONNECT ACCESS SOLUTION FOR FARADRIVE

## (undated) DEVICE — PINNACLE INTRODUCER SHEATH: Brand: PINNACLE

## (undated) DEVICE — TRANSDUCER ROBOTIC DROP IN

## (undated) DEVICE — LARGE NEEDLE DRIVER: Brand: ENDOWRIST

## (undated) DEVICE — TROCAR: Brand: KII FIOS FIRST ENTRY

## (undated) DEVICE — Device: Brand: WEBSTER CS

## (undated) DEVICE — CAPIT WATCHMAN LAA PROCEDURE ACCESS SYSTEM

## (undated) DEVICE — GLOVE INDICATOR PI UNDERGLOVE SZ 8 BLUE

## (undated) DEVICE — CANNULA SEAL

## (undated) DEVICE — CATH DIAG 5FR .035 100CM PIG CSC 20

## (undated) DEVICE — SUT MONOCRYL 4-0 PS-2 27 IN Y426H

## (undated) DEVICE — ADHESIVE SKIN HIGH VISCOSITY EXOFIN 1ML

## (undated) DEVICE — COLUMN DRAPE

## (undated) DEVICE — PROBE ULTRASOUND ROBOTIC PRO ART

## (undated) DEVICE — TIP COVER ACCESSORY

## (undated) DEVICE — JP PERF DRN SIL FLT 10MM FULL: Brand: CARDINAL HEALTH

## (undated) DEVICE — SUT STRATAFIX SPIRAL 1 CT-1 24 X 24CM SXPL2B400

## (undated) DEVICE — HEM-O-LOK CLIP CARTRIDGE LARGE DA VINCI SI/XI

## (undated) DEVICE — DRAPE SHEET X-LG

## (undated) DEVICE — SUT PROLENE 4-0 RB-1/RB-1 36 IN 8557H

## (undated) DEVICE — AIRSEAL TUBE SMOKE EVAC LUMENX3 FILTERED

## (undated) DEVICE — BLADELESS OBTURATOR: Brand: WECK VISTA

## (undated) DEVICE — JACKSON-PRATT 100CC BULB RESERVOIR: Brand: CARDINAL HEALTH

## (undated) DEVICE — Device: Brand: NRG TRANSSEPTAL NEEDLE

## (undated) DEVICE — GAUZE SPONGES,USP TYPE VII GAUZE, 12 PLY: Brand: CURITY

## (undated) DEVICE — SHEATH INTRO DRY SEAL 16FR 33CM

## (undated) DEVICE — 3M™ DEFIBRULATOR PADS 2346N: Brand: 3M™

## (undated) DEVICE — SUT ETHILON 3-0 FS-1 18 IN 663G

## (undated) DEVICE — TORFLEX TRANSSEPTAL SHEATH; TRANSSEPTAL DILATOR; J-TIP GUIDEWIRE: Brand: TORFLEX TRANSSEPTAL GUIDING SHEATH

## (undated) DEVICE — SUT STRATAFIX SPIRAL 2-0 CT-1 30 CM SXPP1B410

## (undated) DEVICE — REF PATCH ENSITE PRECISION

## (undated) DEVICE — SUT VICRYL 0 CT-1 27 IN J260H

## (undated) DEVICE — LUBRICANT INST ELECTROLUBE ANTISTK WO PAD

## (undated) DEVICE — TISSUE RETRIEVAL SYSTEM: Brand: INZII RETRIEVAL SYSTEM

## (undated) DEVICE — TROCAR PORT ACCESS 12 X120MM W/BLDLS OPTICAL TIP AIRSEAL

## (undated) DEVICE — 3000CC GUARDIAN II: Brand: GUARDIAN

## (undated) DEVICE — CATH ABLATION FARAWAVE PULSED FIELD 31MM

## (undated) DEVICE — INTENDED FOR TISSUE SEPARATION, AND OTHER PROCEDURES THAT REQUIRE A SHARP SURGICAL BLADE TO PUNCTURE OR CUT.: Brand: BARD-PARKER SAFETY BLADES SIZE 11, STERILE

## (undated) DEVICE — HEMOSTATIC MATRIX SURGIFLO 8ML W/THROMBIN

## (undated) DEVICE — TRAY FOLEY 16FR URIMETER SURESTEP

## (undated) DEVICE — SHEATH STEERABLE FARADRIVE

## (undated) DEVICE — VISUALIZATION SYSTEM: Brand: CLEARIFY

## (undated) DEVICE — CHLORAPREP HI-LITE 26ML ORANGE

## (undated) DEVICE — SURGICEL 4 X 8

## (undated) DEVICE — ROSEN CURVED WIRE GUIDE: Brand: ROSEN

## (undated) DEVICE — 3M™ IOBAN™ 2 ANTIMICROBIAL INCISE DRAPE 6650EZ: Brand: IOBAN™ 2

## (undated) DEVICE — ENDOPATH PNEUMONEEDLE INSUFFLATION NEEDLES WITH LUER LOCK CONNECTORS 120MM: Brand: ENDOPATH

## (undated) DEVICE — ARM DRAPE

## (undated) DEVICE — CAPIT WATCHMAN LAA PROCEDURE DEVICE

## (undated) DEVICE — CATH EP ADVISOR HD GRID MAPPING 8F

## (undated) DEVICE — GLOVE SRG BIOGEL 8

## (undated) DEVICE — BETHLEHEM UNIVERSAL LAPAROTOMY: Brand: CARDINAL HEALTH

## (undated) DEVICE — MONOPOLAR CURVED SCISSORS: Brand: ENDOWRIST

## (undated) DEVICE — REF PATCH ENSITE X

## (undated) DEVICE — SUT VICRYL 3-0 RB-1 27 IN J215H

## (undated) DEVICE — IRRIG ENDO FLO TUBING

## (undated) DEVICE — CATH EP 7FR DI-DIR 10-POLE DEFL CS 2-8-2 FJ

## (undated) DEVICE — ACCESS SHEATH WITH DILATOR: Brand: WATCHMAN® ACCESS SYSTEM

## (undated) DEVICE — CABLE CATH CONNECTION FARASTAR

## (undated) DEVICE — 3M™ TEGADERM™ TRANSPARENT FILM DRESSING FRAME STYLE, 1626W, 4 IN X 4-3/4 IN (10 CM X 12 CM), 50/CT 4CT/CASE: Brand: 3M™ TEGADERM™

## (undated) DEVICE — INTENDED FOR TISSUE SEPARATION, AND OTHER PROCEDURES THAT REQUIRE A SHARP SURGICAL BLADE TO PUNCTURE OR CUT.: Brand: BARD-PARKER SAFETY BLADES SIZE 15, STERILE

## (undated) DEVICE — PAD GROUNDING ADULT

## (undated) DEVICE — PROGRASP FORCEPS: Brand: ENDOWRIST

## (undated) DEVICE — CATH ULTRASOUND ACUNAV ICE 8FR 90CM GE VIVID-I

## (undated) DEVICE — DRAIN SPONGES,6 PLY: Brand: EXCILON

## (undated) DEVICE — FENESTRATED BIPOLAR FORCEPS: Brand: ENDOWRIST

## (undated) DEVICE — SUT SILK 0 30 IN A306H

## (undated) DEVICE — SUT VLOC 90 3-0 V-20 9IN VLOCM0644

## (undated) DEVICE — Device